# Patient Record
Sex: FEMALE | Race: WHITE | Employment: OTHER | URBAN - METROPOLITAN AREA
[De-identification: names, ages, dates, MRNs, and addresses within clinical notes are randomized per-mention and may not be internally consistent; named-entity substitution may affect disease eponyms.]

---

## 2017-06-21 ENCOUNTER — HOSPITAL ENCOUNTER (OUTPATIENT)
Dept: WOMENS IMAGING | Age: 67
Discharge: HOME | End: 2017-06-21
Attending: FAMILY MEDICINE | Admitting: FAMILY MEDICINE

## 2017-06-21 ENCOUNTER — HOSPITAL ENCOUNTER (OUTPATIENT)
Dept: ULTRASOUND IMAGING | Age: 67
Discharge: HOME | End: 2017-06-21
Attending: FAMILY MEDICINE | Admitting: FAMILY MEDICINE

## 2017-06-21 DIAGNOSIS — N63.10 BREAST MASS, RIGHT: ICD-10-CM

## 2017-06-21 DIAGNOSIS — R92.8 OTHER ABNORMAL AND INCONCLUSIVE FINDINGS ON DIAGNOSTIC IMAGING OF BREAST: ICD-10-CM

## 2017-06-21 DIAGNOSIS — Z09 FOLLOW-UP EXAM: ICD-10-CM

## 2018-02-27 ENCOUNTER — HOSPITAL ENCOUNTER (OUTPATIENT)
Dept: GENERAL RADIOLOGY | Age: 68
Discharge: HOME | End: 2018-02-27
Attending: FAMILY MEDICINE | Admitting: FAMILY MEDICINE

## 2018-02-27 DIAGNOSIS — M54.5 LOW BACK PAIN, UNSPECIFIED BACK PAIN LATERALITY, UNSPECIFIED CHRONICITY, WITH SCIATICA PRESENCE UNSPECIFIED: ICD-10-CM

## 2019-06-21 RX ORDER — FENOFIBRATE 160 MG/1
160 TABLET ORAL DAILY
Qty: 90 TABLET | Refills: 0 | Status: SHIPPED | OUTPATIENT
Start: 2019-06-21 | End: 2020-11-09

## 2019-06-21 RX ORDER — ROSUVASTATIN CALCIUM 10 MG/1
10 TABLET, COATED ORAL DAILY
Qty: 90 TABLET | Refills: 0 | Status: SHIPPED | OUTPATIENT
Start: 2019-06-21

## 2019-06-21 RX ORDER — LEVOTHYROXINE SODIUM 0.15 MG/1
150 TABLET ORAL DAILY
Qty: 90 TABLET | Refills: 0 | Status: SHIPPED | OUTPATIENT
Start: 2019-06-21 | End: 2019-06-26 | Stop reason: SDUPTHER

## 2019-06-24 ENCOUNTER — TELEPHONE (OUTPATIENT)
Dept: FAMILY MEDICINE CLINIC | Age: 69
End: 2019-06-24

## 2019-06-24 NOTE — TELEPHONE ENCOUNTER
----- Message from Tina Gaviria sent at 6/21/2019 11:11 AM EDT -----  Contact: PATIENT  FENOFIBRATE 160MG   CRESTOR 10 MG  SYNTHROID 150 MCG   WRITTEN TO TAKE TO PHARMACY  [phone]

## 2019-06-25 ENCOUNTER — TELEPHONE (OUTPATIENT)
Dept: FAMILY MEDICINE CLINIC | Age: 69
End: 2019-06-25

## 2019-06-26 ENCOUNTER — TELEPHONE (OUTPATIENT)
Dept: FAMILY MEDICINE CLINIC | Age: 69
End: 2019-06-26

## 2019-06-26 RX ORDER — LEVOTHYROXINE SODIUM 0.15 MG/1
150 TABLET ORAL DAILY
Qty: 90 TABLET | Refills: 0 | Status: SHIPPED | OUTPATIENT
Start: 2019-06-26 | End: 2022-10-28

## 2019-06-26 RX ORDER — LEVOTHYROXINE SODIUM 0.15 MG/1
150 TABLET ORAL DAILY
Qty: 90 TABLET | Refills: 0 | Status: SHIPPED | OUTPATIENT
Start: 2019-06-26 | End: 2019-06-26 | Stop reason: CLARIF

## 2019-06-26 NOTE — TELEPHONE ENCOUNTER
----- Message from Dbera Gonzalez sent at 6/25/2019  2:28 PM EDT -----  Contact: PATIENT  SYNTHROID NEEDED TO BE JOSH WRITTEN, IT WAS NOT. PLEASE WRITE A SCRIPT FOR IT FOR JOSH. CALL WHEN READY.    [phone]

## 2019-08-07 ENCOUNTER — HOSPITAL ENCOUNTER (OUTPATIENT)
Age: 69
Discharge: HOME | End: 2019-08-07
Payer: MEDICARE

## 2019-08-07 LAB
ALBUMIN SERPL-MCNC: 4.2 GM/DL (ref 3.4–5)
ALP BLD-CCNC: 42 IU/L (ref 40–128)
ALT SERPL-CCNC: 11 U/L (ref 10–40)
ANION GAP SERPL CALCULATED.3IONS-SCNC: 12 MMOL/L (ref 4–16)
AST SERPL-CCNC: 26 IU/L (ref 15–37)
BASOPHILS ABSOLUTE: 0.1 K/CU MM
BASOPHILS RELATIVE PERCENT: 1.4 % (ref 0–1)
BILIRUB SERPL-MCNC: 0.2 MG/DL (ref 0–1)
BUN BLDV-MCNC: 32 MG/DL (ref 6–23)
CALCIUM SERPL-MCNC: 10 MG/DL (ref 8.3–10.6)
CHLORIDE BLD-SCNC: 102 MMOL/L (ref 99–110)
CHOLESTEROL: 128 MG/DL
CO2: 24 MMOL/L (ref 21–32)
CREAT SERPL-MCNC: 1.8 MG/DL (ref 0.6–1.1)
DIFFERENTIAL TYPE: ABNORMAL
EOSINOPHILS ABSOLUTE: 0.5 K/CU MM
EOSINOPHILS RELATIVE PERCENT: 8.5 % (ref 0–3)
ESTIMATED AVERAGE GLUCOSE: 163 MG/DL
GFR AFRICAN AMERICAN: 34 ML/MIN/1.73M2
GFR NON-AFRICAN AMERICAN: 28 ML/MIN/1.73M2
GLUCOSE BLD-MCNC: 181 MG/DL (ref 70–99)
HBA1C MFR BLD: 7.3 % (ref 4.2–6.3)
HCT VFR BLD CALC: 41.1 % (ref 37–47)
HDLC SERPL-MCNC: 38 MG/DL
HEMOGLOBIN: 12.7 GM/DL (ref 12.5–16)
IMMATURE NEUTROPHIL %: 0.5 % (ref 0–0.43)
LDL CHOLESTEROL DIRECT: 54 MG/DL
LYMPHOCYTES ABSOLUTE: 1.9 K/CU MM
LYMPHOCYTES RELATIVE PERCENT: 29.9 % (ref 24–44)
MCH RBC QN AUTO: 30.2 PG (ref 27–31)
MCHC RBC AUTO-ENTMCNC: 30.9 % (ref 32–36)
MCV RBC AUTO: 97.9 FL (ref 78–100)
MONOCYTES ABSOLUTE: 0.5 K/CU MM
MONOCYTES RELATIVE PERCENT: 8 % (ref 0–4)
NUCLEATED RBC %: 0 %
PDW BLD-RTO: 14.6 % (ref 11.7–14.9)
PLATELET # BLD: 202 K/CU MM (ref 140–440)
PMV BLD AUTO: 10.8 FL (ref 7.5–11.1)
POTASSIUM SERPL-SCNC: 4 MMOL/L (ref 3.5–5.1)
RBC # BLD: 4.2 M/CU MM (ref 4.2–5.4)
SEGMENTED NEUTROPHILS ABSOLUTE COUNT: 3.2 K/CU MM
SEGMENTED NEUTROPHILS RELATIVE PERCENT: 51.7 % (ref 36–66)
SODIUM BLD-SCNC: 138 MMOL/L (ref 135–145)
TOTAL IMMATURE NEUTOROPHIL: 0.03 K/CU MM
TOTAL NUCLEATED RBC: 0 K/CU MM
TOTAL PROTEIN: 6.4 GM/DL (ref 6.4–8.2)
TRIGL SERPL-MCNC: 233 MG/DL
TSH HIGH SENSITIVITY: 1.86 UIU/ML (ref 0.27–4.2)
WBC # BLD: 6.3 K/CU MM (ref 4–10.5)

## 2019-08-07 PROCEDURE — 84436 ASSAY OF TOTAL THYROXINE: CPT

## 2019-08-07 PROCEDURE — 83721 ASSAY OF BLOOD LIPOPROTEIN: CPT

## 2019-08-07 PROCEDURE — 36415 COLL VENOUS BLD VENIPUNCTURE: CPT

## 2019-08-07 PROCEDURE — 80053 COMPREHEN METABOLIC PANEL: CPT

## 2019-08-07 PROCEDURE — 85025 COMPLETE CBC W/AUTO DIFF WBC: CPT

## 2019-08-07 PROCEDURE — 84443 ASSAY THYROID STIM HORMONE: CPT

## 2019-08-07 PROCEDURE — 83036 HEMOGLOBIN GLYCOSYLATED A1C: CPT

## 2019-08-07 PROCEDURE — 80061 LIPID PANEL: CPT

## 2019-08-08 LAB
T4 TOTAL: 11.29 UG/DL (ref 5.1–14.1)
T4 TOTAL: NORMAL UG/DL (ref 5.1–14.1)

## 2019-08-09 ENCOUNTER — HOSPITAL ENCOUNTER (OUTPATIENT)
Dept: WOMENS IMAGING | Age: 69
Discharge: HOME | End: 2019-08-09
Payer: MEDICARE

## 2019-08-09 DIAGNOSIS — Z12.31 VISIT FOR SCREENING MAMMOGRAM: ICD-10-CM

## 2019-08-09 PROCEDURE — 77063 BREAST TOMOSYNTHESIS BI: CPT

## 2019-09-25 ENCOUNTER — HOSPITAL ENCOUNTER (OUTPATIENT)
Dept: PULMONOLOGY | Age: 69
Discharge: HOME | End: 2019-09-25
Payer: MEDICARE

## 2019-09-25 LAB
DLCO %PRED: 81 %
DLCO PRED: NORMAL ML/MIN/MMHG
DLCO/VA %PRED: NORMAL %
DLCO/VA PRED: NORMAL ML/MIN/MMHG
DLCO/VA: NORMAL ML/MIN/MMHG
DLCO: NORMAL ML/MIN/MMHG
EXPIRATORY TIME-POST: NORMAL SEC
EXPIRATORY TIME: NORMAL SEC
FEF 25-75% %CHNG: NORMAL
FEF 25-75% %PRED-POST: NORMAL %
FEF 25-75% %PRED-PRE: NORMAL L/SEC
FEF 25-75% PRED: NORMAL L/SEC
FEF 25-75%-POST: NORMAL L/SEC
FEF 25-75%-PRE: NORMAL L/SEC
FEV1 %PRED-POST: 95 %
FEV1 %PRED-PRE: 88 %
FEV1 PRED: NORMAL L
FEV1-POST: NORMAL L
FEV1-PRE: NORMAL L
FEV1/FVC %PRED-POST: NORMAL %
FEV1/FVC %PRED-PRE: NORMAL %
FEV1/FVC PRED: NORMAL %
FEV1/FVC-POST: 104 %
FEV1/FVC-PRE: 100 %
FVC %PRED-POST: NORMAL L
FVC %PRED-PRE: NORMAL %
FVC PRED: NORMAL L
FVC-POST: NORMAL L
FVC-PRE: NORMAL L
GAW %PRED: NORMAL %
GAW PRED: NORMAL L/S/CMH2O
GAW: NORMAL L/S/CMH2O
IC %PRED: NORMAL %
IC PRED: NORMAL L
IC: NORMAL L
MEP: NORMAL
MIP: NORMAL
MVV %PRED-PRE: NORMAL %
MVV PRED: NORMAL L/MIN
MVV-PRE: NORMAL L/MIN
PEF %PRED-POST: NORMAL %
PEF %PRED-PRE: NORMAL L/SEC
PEF PRED: NORMAL L/SEC
PEF%CHNG: NORMAL
PEF-POST: NORMAL L/SEC
PEF-PRE: NORMAL L/SEC
RAW %PRED: NORMAL %
RAW PRED: NORMAL CMH2O/L/S
RAW: NORMAL CMH2O/L/S
RV %PRED: NORMAL %
RV PRED: NORMAL L
RV: NORMAL L
SVC %PRED: NORMAL %
SVC PRED: NORMAL L
SVC: NORMAL L
TLC %PRED: 86 %
TLC PRED: NORMAL L
TLC: NORMAL L
VA %PRED: NORMAL %
VA PRED: NORMAL L
VA: NORMAL L
VTG %PRED: NORMAL %
VTG PRED: NORMAL L
VTG: NORMAL L

## 2019-09-25 PROCEDURE — 94060 EVALUATION OF WHEEZING: CPT

## 2019-09-25 PROCEDURE — 94729 DIFFUSING CAPACITY: CPT

## 2019-09-25 PROCEDURE — 94727 GAS DIL/WSHOT DETER LNG VOL: CPT

## 2019-09-25 ASSESSMENT — PULMONARY FUNCTION TESTS
FEV1/FVC_POST: 104
FEV1_PERCENT_PREDICTED_PRE: 88
FEV1/FVC_PRE: 100
FEV1_PERCENT_PREDICTED_POST: 95

## 2019-10-17 ENCOUNTER — HOSPITAL ENCOUNTER (OUTPATIENT)
Age: 69
Discharge: HOME | End: 2019-10-17
Payer: MEDICARE

## 2019-10-17 LAB
ALBUMIN SERPL-MCNC: 4.2 GM/DL (ref 3.4–5)
ANION GAP SERPL CALCULATED.3IONS-SCNC: 13 MMOL/L (ref 4–16)
BACTERIA: NEGATIVE /HPF
BASOPHILS ABSOLUTE: 0.1 K/CU MM
BASOPHILS RELATIVE PERCENT: 1.1 % (ref 0–1)
BILIRUBIN URINE: NEGATIVE MG/DL
BLOOD, URINE: NEGATIVE
BUN BLDV-MCNC: 29 MG/DL (ref 6–23)
CALCIUM SERPL-MCNC: 9.9 MG/DL (ref 8.3–10.6)
CHLORIDE BLD-SCNC: 101 MMOL/L (ref 99–110)
CLARITY: CLEAR
CO2: 26 MMOL/L (ref 21–32)
COLOR: YELLOW
CREAT SERPL-MCNC: 1.7 MG/DL (ref 0.6–1.1)
CREATININE URINE: 59 MG/DL (ref 28–217)
CREATININE URINE: 59 MG/DL (ref 28–217)
DIFFERENTIAL TYPE: ABNORMAL
EOSINOPHILS ABSOLUTE: 0.5 K/CU MM
EOSINOPHILS RELATIVE PERCENT: 7.5 % (ref 0–3)
GFR AFRICAN AMERICAN: 36 ML/MIN/1.73M2
GFR NON-AFRICAN AMERICAN: 30 ML/MIN/1.73M2
GLUCOSE BLD-MCNC: 132 MG/DL (ref 70–99)
GLUCOSE, URINE: >500 MG/DL
HAV IGM SER IA-ACNC: NON REACTIVE
HCT VFR BLD CALC: 43.6 % (ref 37–47)
HEMOGLOBIN: 13.1 GM/DL (ref 12.5–16)
HEPATITIS B CORE IGM ANTIBODY: NON REACTIVE
HEPATITIS B SURFACE ANTIGEN: NON REACTIVE
HEPATITIS C ANTIBODY: NON REACTIVE
IMMATURE NEUTROPHIL %: 0.2 % (ref 0–0.43)
INR BLD: 2.37 INDEX
KETONES, URINE: NEGATIVE MG/DL
LEUKOCYTE ESTERASE, URINE: ABNORMAL
LYMPHOCYTES ABSOLUTE: 2.2 K/CU MM
LYMPHOCYTES RELATIVE PERCENT: 33.3 % (ref 24–44)
MCH RBC QN AUTO: 29.7 PG (ref 27–31)
MCHC RBC AUTO-ENTMCNC: 30 % (ref 32–36)
MCV RBC AUTO: 98.9 FL (ref 78–100)
MICROALBUMIN/CREAT 24H UR: 17 MG/DL
MICROALBUMIN/CREAT UR-RTO: 288.1 MG/G CREAT (ref 0–30)
MONOCYTES ABSOLUTE: 0.5 K/CU MM
MONOCYTES RELATIVE PERCENT: 7.8 % (ref 0–4)
MUCUS: ABNORMAL HPF
NITRITE URINE, QUANTITATIVE: NEGATIVE
NUCLEATED RBC %: 0 %
PDW BLD-RTO: 14 % (ref 11.7–14.9)
PH, URINE: 5 (ref 5–8)
PHOSPHORUS: 2.8 MG/DL (ref 2.5–4.9)
PLATELET # BLD: 207 K/CU MM (ref 140–440)
PMV BLD AUTO: 11.2 FL (ref 7.5–11.1)
POTASSIUM SERPL-SCNC: 4.1 MMOL/L (ref 3.5–5.1)
PROT/CREAT RATIO, UR: ABNORMAL
PROTEIN UA: 30 MG/DL
PROTHROMBIN TIME: 27.2 SECONDS (ref 11.7–14.5)
RBC # BLD: 4.41 M/CU MM (ref 4.2–5.4)
RBC URINE: 2 /HPF (ref 0–6)
SEGMENTED NEUTROPHILS ABSOLUTE COUNT: 3.3 K/CU MM
SEGMENTED NEUTROPHILS RELATIVE PERCENT: 50.1 % (ref 36–66)
SODIUM BLD-SCNC: 140 MMOL/L (ref 135–145)
SPECIFIC GRAVITY UA: 1.02 (ref 1–1.03)
SQUAMOUS EPITHELIAL: 1 /HPF
TOTAL IMMATURE NEUTOROPHIL: 0.01 K/CU MM
TOTAL NUCLEATED RBC: 0 K/CU MM
TRICHOMONAS: ABNORMAL /HPF
URINE TOTAL PROTEIN: 32 MG/DL
UROBILINOGEN, URINE: NORMAL MG/DL (ref 0.2–1)
WBC # BLD: 6.6 K/CU MM (ref 4–10.5)
WBC UA: 10 /HPF (ref 0–5)

## 2019-10-17 PROCEDURE — 82043 UR ALBUMIN QUANTITATIVE: CPT

## 2019-10-17 PROCEDURE — 82570 ASSAY OF URINE CREATININE: CPT

## 2019-10-17 PROCEDURE — 36415 COLL VENOUS BLD VENIPUNCTURE: CPT

## 2019-10-17 PROCEDURE — 87086 URINE CULTURE/COLONY COUNT: CPT

## 2019-10-17 PROCEDURE — 80074 ACUTE HEPATITIS PANEL: CPT

## 2019-10-17 PROCEDURE — 80069 RENAL FUNCTION PANEL: CPT

## 2019-10-17 PROCEDURE — 87389 HIV-1 AG W/HIV-1&-2 AB AG IA: CPT

## 2019-10-17 PROCEDURE — 85025 COMPLETE CBC W/AUTO DIFF WBC: CPT

## 2019-10-17 PROCEDURE — 81001 URINALYSIS AUTO W/SCOPE: CPT

## 2019-10-17 PROCEDURE — 84165 PROTEIN E-PHORESIS SERUM: CPT

## 2019-10-17 PROCEDURE — 85610 PROTHROMBIN TIME: CPT

## 2019-10-17 PROCEDURE — 84156 ASSAY OF PROTEIN URINE: CPT

## 2019-10-19 LAB
CULTURE: ABNORMAL
Lab: ABNORMAL
Lab: NORMAL
Lab: NORMAL
SPECIMEN: ABNORMAL
TEST NAME: NORMAL
TOTAL COLONY COUNT: ABNORMAL

## 2019-10-22 LAB
ALBUMIN ELP: 3.5 GM/DL (ref 3.2–5.6)
ALPHA-1-GLOBULIN: 0.3 GM/DL (ref 0.1–0.4)
ALPHA-2-GLOBULIN: 0.8 GM/DL (ref 0.4–1.2)
BETA GLOBULIN: 1.2 GM/DL (ref 0.5–1.3)
GAMMA GLOBULIN: 0.7 GM/DL (ref 0.5–1.6)
SPEP INTERPRETATION: NORMAL
TOTAL PROTEIN: 6.4 GM/DL (ref 6.4–8.2)

## 2019-11-08 RX ORDER — AMLODIPINE BESYLATE 5 MG/1
TABLET ORAL
COMMUNITY
Start: 2019-08-05 | End: 2022-11-17

## 2019-11-08 RX ORDER — LANCETS 30 GAUGE
EACH MISCELLANEOUS
COMMUNITY
End: 2020-11-09

## 2019-11-08 RX ORDER — LOSARTAN POTASSIUM AND HYDROCHLOROTHIAZIDE 25; 100 MG/1; MG/1
1 TABLET ORAL DAILY
COMMUNITY
Start: 2019-07-31 | End: 2021-10-12

## 2019-11-08 RX ORDER — LORATADINE 10 MG/1
10 TABLET ORAL PRN
COMMUNITY
Start: 2019-08-05 | End: 2023-06-30

## 2019-11-11 ENCOUNTER — TELEPHONE (OUTPATIENT)
Dept: CARDIOLOGY CLINIC | Age: 69
End: 2019-11-11

## 2019-11-19 ENCOUNTER — INITIAL CONSULT (OUTPATIENT)
Dept: CARDIOLOGY CLINIC | Age: 69
End: 2019-11-19
Payer: MEDICARE

## 2019-11-19 VITALS
DIASTOLIC BLOOD PRESSURE: 70 MMHG | WEIGHT: 250 LBS | SYSTOLIC BLOOD PRESSURE: 108 MMHG | BODY MASS INDEX: 40.18 KG/M2 | HEIGHT: 66 IN | HEART RATE: 61 BPM

## 2019-11-19 DIAGNOSIS — E78.2 MIXED HYPERLIPIDEMIA: ICD-10-CM

## 2019-11-19 DIAGNOSIS — E03.9 ACQUIRED HYPOTHYROIDISM: ICD-10-CM

## 2019-11-19 DIAGNOSIS — R06.02 SOB (SHORTNESS OF BREATH): Primary | ICD-10-CM

## 2019-11-19 DIAGNOSIS — I10 ESSENTIAL HYPERTENSION: ICD-10-CM

## 2019-11-19 DIAGNOSIS — G47.33 OSA ON CPAP: ICD-10-CM

## 2019-11-19 DIAGNOSIS — Z99.89 OSA ON CPAP: ICD-10-CM

## 2019-11-19 PROCEDURE — 93000 ELECTROCARDIOGRAM COMPLETE: CPT | Performed by: INTERNAL MEDICINE

## 2019-11-19 PROCEDURE — G8484 FLU IMMUNIZE NO ADMIN: HCPCS | Performed by: INTERNAL MEDICINE

## 2019-11-19 PROCEDURE — G8417 CALC BMI ABV UP PARAM F/U: HCPCS | Performed by: INTERNAL MEDICINE

## 2019-11-19 PROCEDURE — 1036F TOBACCO NON-USER: CPT | Performed by: INTERNAL MEDICINE

## 2019-11-19 PROCEDURE — G8399 PT W/DXA RESULTS DOCUMENT: HCPCS | Performed by: INTERNAL MEDICINE

## 2019-11-19 PROCEDURE — G8427 DOCREV CUR MEDS BY ELIG CLIN: HCPCS | Performed by: INTERNAL MEDICINE

## 2019-11-19 PROCEDURE — 3017F COLORECTAL CA SCREEN DOC REV: CPT | Performed by: INTERNAL MEDICINE

## 2019-11-19 PROCEDURE — 1090F PRES/ABSN URINE INCON ASSESS: CPT | Performed by: INTERNAL MEDICINE

## 2019-11-19 PROCEDURE — 1123F ACP DISCUSS/DSCN MKR DOCD: CPT | Performed by: INTERNAL MEDICINE

## 2019-11-19 PROCEDURE — 4040F PNEUMOC VAC/ADMIN/RCVD: CPT | Performed by: INTERNAL MEDICINE

## 2019-11-19 PROCEDURE — 99204 OFFICE O/P NEW MOD 45 MIN: CPT | Performed by: INTERNAL MEDICINE

## 2019-11-22 ENCOUNTER — TELEPHONE (OUTPATIENT)
Dept: CARDIOLOGY CLINIC | Age: 69
End: 2019-11-22

## 2019-12-04 ENCOUNTER — PROCEDURE VISIT (OUTPATIENT)
Dept: CARDIOLOGY CLINIC | Age: 69
End: 2019-12-04
Payer: MEDICARE

## 2019-12-04 DIAGNOSIS — G47.33 OSA ON CPAP: ICD-10-CM

## 2019-12-04 DIAGNOSIS — E03.9 ACQUIRED HYPOTHYROIDISM: ICD-10-CM

## 2019-12-04 DIAGNOSIS — Z99.89 OSA ON CPAP: ICD-10-CM

## 2019-12-04 DIAGNOSIS — R06.02 SOB (SHORTNESS OF BREATH): Primary | ICD-10-CM

## 2019-12-04 DIAGNOSIS — I10 ESSENTIAL HYPERTENSION: ICD-10-CM

## 2019-12-04 DIAGNOSIS — R06.02 SOB (SHORTNESS OF BREATH): ICD-10-CM

## 2019-12-04 DIAGNOSIS — E78.2 MIXED HYPERLIPIDEMIA: ICD-10-CM

## 2019-12-04 LAB
LV EF: 55 %
LV EF: 58 %
LVEF MODALITY: NORMAL
LVEF MODALITY: NORMAL

## 2019-12-04 PROCEDURE — 93306 TTE W/DOPPLER COMPLETE: CPT | Performed by: INTERNAL MEDICINE

## 2019-12-04 PROCEDURE — A9500 TC99M SESTAMIBI: HCPCS | Performed by: INTERNAL MEDICINE

## 2019-12-04 PROCEDURE — 93017 CV STRESS TEST TRACING ONLY: CPT | Performed by: INTERNAL MEDICINE

## 2019-12-04 PROCEDURE — 93016 CV STRESS TEST SUPVJ ONLY: CPT | Performed by: INTERNAL MEDICINE

## 2019-12-04 PROCEDURE — 93018 CV STRESS TEST I&R ONLY: CPT | Performed by: INTERNAL MEDICINE

## 2019-12-04 PROCEDURE — 78452 HT MUSCLE IMAGE SPECT MULT: CPT | Performed by: INTERNAL MEDICINE

## 2019-12-05 ENCOUNTER — TELEPHONE (OUTPATIENT)
Dept: CARDIOLOGY CLINIC | Age: 69
End: 2019-12-05

## 2019-12-10 ENCOUNTER — TELEPHONE (OUTPATIENT)
Dept: CARDIOLOGY CLINIC | Age: 69
End: 2019-12-10

## 2019-12-10 ENCOUNTER — HOSPITAL ENCOUNTER (OUTPATIENT)
Dept: GENERAL RADIOLOGY | Age: 69
Discharge: HOME | End: 2019-12-10
Payer: MEDICARE

## 2019-12-10 ENCOUNTER — HOSPITAL ENCOUNTER (OUTPATIENT)
Age: 69
End: 2019-12-10
Payer: MEDICARE

## 2019-12-10 ENCOUNTER — HOSPITAL ENCOUNTER (OUTPATIENT)
Age: 69
Discharge: HOME | End: 2019-12-10
Payer: MEDICARE

## 2019-12-10 DIAGNOSIS — Z01.810 PRE-OPERATIVE CARDIOVASCULAR EXAMINATION: ICD-10-CM

## 2019-12-10 LAB
ABO/RH: NORMAL
ANION GAP SERPL CALCULATED.3IONS-SCNC: 15 MMOL/L (ref 4–16)
ANTIBODY SCREEN: NEGATIVE
APTT: 34.4 SECONDS (ref 25.1–37.1)
BASOPHILS ABSOLUTE: 0.1 K/CU MM
BASOPHILS RELATIVE PERCENT: 1.1 % (ref 0–1)
BUN BLDV-MCNC: 20 MG/DL (ref 6–23)
CALCIUM SERPL-MCNC: 10 MG/DL (ref 8.3–10.6)
CHLORIDE BLD-SCNC: 97 MMOL/L (ref 99–110)
CO2: 27 MMOL/L (ref 21–32)
COMMENT: NORMAL
CREAT SERPL-MCNC: 1.5 MG/DL (ref 0.6–1.1)
DIFFERENTIAL TYPE: ABNORMAL
EOSINOPHILS ABSOLUTE: 0.3 K/CU MM
EOSINOPHILS RELATIVE PERCENT: 5.8 % (ref 0–3)
GFR AFRICAN AMERICAN: 41 ML/MIN/1.73M2
GFR NON-AFRICAN AMERICAN: 34 ML/MIN/1.73M2
GLUCOSE BLD-MCNC: 245 MG/DL (ref 70–99)
HCT VFR BLD CALC: 44.7 % (ref 37–47)
HEMOGLOBIN: 13.7 GM/DL (ref 12.5–16)
IMMATURE NEUTROPHIL %: 0.4 % (ref 0–0.43)
INR BLD: 0.93 INDEX
LYMPHOCYTES ABSOLUTE: 1.6 K/CU MM
LYMPHOCYTES RELATIVE PERCENT: 28.2 % (ref 24–44)
MCH RBC QN AUTO: 29.6 PG (ref 27–31)
MCHC RBC AUTO-ENTMCNC: 30.6 % (ref 32–36)
MCV RBC AUTO: 96.5 FL (ref 78–100)
MONOCYTES ABSOLUTE: 0.3 K/CU MM
MONOCYTES RELATIVE PERCENT: 5.4 % (ref 0–4)
NUCLEATED RBC %: 0 %
PDW BLD-RTO: 13.8 % (ref 11.7–14.9)
PLATELET # BLD: 201 K/CU MM (ref 140–440)
PMV BLD AUTO: 11.2 FL (ref 7.5–11.1)
POTASSIUM SERPL-SCNC: 3.5 MMOL/L (ref 3.5–5.1)
PROTHROMBIN TIME: 11.3 SECONDS (ref 11.7–14.5)
RBC # BLD: 4.63 M/CU MM (ref 4.2–5.4)
SEGMENTED NEUTROPHILS ABSOLUTE COUNT: 3.4 K/CU MM
SEGMENTED NEUTROPHILS RELATIVE PERCENT: 59.1 % (ref 36–66)
SODIUM BLD-SCNC: 139 MMOL/L (ref 135–145)
TOTAL IMMATURE NEUTOROPHIL: 0.02 K/CU MM
TOTAL NUCLEATED RBC: 0 K/CU MM
WBC # BLD: 5.7 K/CU MM (ref 4–10.5)

## 2019-12-10 PROCEDURE — 86901 BLOOD TYPING SEROLOGIC RH(D): CPT

## 2019-12-10 PROCEDURE — 85025 COMPLETE CBC W/AUTO DIFF WBC: CPT

## 2019-12-10 PROCEDURE — 86850 RBC ANTIBODY SCREEN: CPT

## 2019-12-10 PROCEDURE — 86900 BLOOD TYPING SEROLOGIC ABO: CPT

## 2019-12-10 PROCEDURE — 85730 THROMBOPLASTIN TIME PARTIAL: CPT

## 2019-12-10 PROCEDURE — 36415 COLL VENOUS BLD VENIPUNCTURE: CPT

## 2019-12-10 PROCEDURE — 85610 PROTHROMBIN TIME: CPT

## 2019-12-10 PROCEDURE — 71046 X-RAY EXAM CHEST 2 VIEWS: CPT

## 2019-12-10 PROCEDURE — 80048 BASIC METABOLIC PNL TOTAL CA: CPT

## 2019-12-11 ENCOUNTER — HOSPITAL ENCOUNTER (OUTPATIENT)
Dept: CARDIAC CATH/INVASIVE PROCEDURES | Age: 69
Discharge: HOME | End: 2019-12-11
Attending: INTERNAL MEDICINE | Admitting: INTERNAL MEDICINE
Payer: MEDICARE

## 2019-12-11 VITALS
OXYGEN SATURATION: 96 % | TEMPERATURE: 98.4 F | DIASTOLIC BLOOD PRESSURE: 66 MMHG | SYSTOLIC BLOOD PRESSURE: 159 MMHG | RESPIRATION RATE: 16 BRPM | BODY MASS INDEX: 40.18 KG/M2 | HEART RATE: 62 BPM | WEIGHT: 250 LBS | HEIGHT: 66 IN

## 2019-12-11 PROBLEM — R94.39 ABNORMAL NUCLEAR STRESS TEST: Status: ACTIVE | Noted: 2019-12-11

## 2019-12-11 LAB
GLUCOSE BLD-MCNC: 164 MG/DL (ref 70–99)
LV EF: 53 %
LVEF MODALITY: NORMAL

## 2019-12-11 PROCEDURE — 93005 ELECTROCARDIOGRAM TRACING: CPT | Performed by: INTERNAL MEDICINE

## 2019-12-11 PROCEDURE — 93458 L HRT ARTERY/VENTRICLE ANGIO: CPT | Performed by: INTERNAL MEDICINE

## 2019-12-11 PROCEDURE — 93010 ELECTROCARDIOGRAM REPORT: CPT | Performed by: INTERNAL MEDICINE

## 2019-12-11 PROCEDURE — 2709999900 HC NON-CHARGEABLE SUPPLY

## 2019-12-11 PROCEDURE — 6370000000 HC RX 637 (ALT 250 FOR IP): Performed by: INTERNAL MEDICINE

## 2019-12-11 PROCEDURE — 2580000003 HC RX 258: Performed by: INTERNAL MEDICINE

## 2019-12-11 PROCEDURE — 6360000004 HC RX CONTRAST MEDICATION

## 2019-12-11 PROCEDURE — 82962 GLUCOSE BLOOD TEST: CPT

## 2019-12-11 PROCEDURE — 6360000002 HC RX W HCPCS

## 2019-12-11 PROCEDURE — C1894 INTRO/SHEATH, NON-LASER: HCPCS

## 2019-12-11 PROCEDURE — 2500000003 HC RX 250 WO HCPCS

## 2019-12-11 PROCEDURE — 93458 L HRT ARTERY/VENTRICLE ANGIO: CPT

## 2019-12-11 RX ORDER — DIPHENHYDRAMINE HCL 25 MG
25 TABLET ORAL
Status: COMPLETED | OUTPATIENT
Start: 2019-12-11 | End: 2019-12-11

## 2019-12-11 RX ORDER — SODIUM CHLORIDE 0.9 % (FLUSH) 0.9 %
10 SYRINGE (ML) INJECTION EVERY 12 HOURS SCHEDULED
Status: DISCONTINUED | OUTPATIENT
Start: 2019-12-11 | End: 2019-12-11 | Stop reason: HOSPADM

## 2019-12-11 RX ORDER — ACETAMINOPHEN 325 MG/1
650 TABLET ORAL EVERY 4 HOURS PRN
Status: DISCONTINUED | OUTPATIENT
Start: 2019-12-11 | End: 2019-12-11 | Stop reason: HOSPADM

## 2019-12-11 RX ORDER — SODIUM CHLORIDE 0.9 % (FLUSH) 0.9 %
10 SYRINGE (ML) INJECTION PRN
Status: DISCONTINUED | OUTPATIENT
Start: 2019-12-11 | End: 2019-12-11 | Stop reason: HOSPADM

## 2019-12-11 RX ORDER — 0.9 % SODIUM CHLORIDE 0.9 %
250 INTRAVENOUS SOLUTION INTRAVENOUS ONCE
Status: COMPLETED | OUTPATIENT
Start: 2019-12-11 | End: 2019-12-11

## 2019-12-11 RX ORDER — SODIUM CHLORIDE 9 MG/ML
INJECTION, SOLUTION INTRAVENOUS CONTINUOUS
Status: DISCONTINUED | OUTPATIENT
Start: 2019-12-11 | End: 2019-12-11 | Stop reason: HOSPADM

## 2019-12-11 RX ADMIN — DIPHENHYDRAMINE HYDROCHLORIDE 25 MG: 25 TABLET ORAL at 09:29

## 2019-12-11 RX ADMIN — SODIUM CHLORIDE: 9 INJECTION, SOLUTION INTRAVENOUS at 09:29

## 2019-12-11 RX ADMIN — SODIUM CHLORIDE 250 ML: 9 INJECTION, SOLUTION INTRAVENOUS at 09:30

## 2019-12-12 LAB
EKG ATRIAL RATE: 53 BPM
EKG DIAGNOSIS: NORMAL
EKG P AXIS: 82 DEGREES
EKG P-R INTERVAL: 222 MS
EKG Q-T INTERVAL: 434 MS
EKG QRS DURATION: 138 MS
EKG QTC CALCULATION (BAZETT): 407 MS
EKG R AXIS: -50 DEGREES
EKG T AXIS: 160 DEGREES
EKG VENTRICULAR RATE: 53 BPM

## 2020-01-30 ENCOUNTER — HOSPITAL ENCOUNTER (OUTPATIENT)
Age: 70
Discharge: HOME | End: 2020-01-30
Payer: MEDICARE

## 2020-01-30 LAB
ALBUMIN SERPL-MCNC: 4 GM/DL (ref 3.4–5)
ALP BLD-CCNC: 53 IU/L (ref 40–128)
ALT SERPL-CCNC: 12 U/L (ref 10–40)
ANION GAP SERPL CALCULATED.3IONS-SCNC: 16 MMOL/L (ref 4–16)
AST SERPL-CCNC: 31 IU/L (ref 15–37)
BACTERIA: NEGATIVE /HPF
BASOPHILS ABSOLUTE: 0.1 K/CU MM
BASOPHILS RELATIVE PERCENT: 1.3 % (ref 0–1)
BILIRUB SERPL-MCNC: 0.3 MG/DL (ref 0–1)
BILIRUBIN URINE: NEGATIVE MG/DL
BLOOD, URINE: ABNORMAL
BUN BLDV-MCNC: 29 MG/DL (ref 6–23)
CALCIUM SERPL-MCNC: 9.3 MG/DL (ref 8.3–10.6)
CHLORIDE BLD-SCNC: 97 MMOL/L (ref 99–110)
CLARITY: ABNORMAL
CO2: 24 MMOL/L (ref 21–32)
COLOR: YELLOW
CREAT SERPL-MCNC: 1.8 MG/DL (ref 0.6–1.1)
CREATININE URINE: 89.2 MG/DL (ref 28–217)
DIFFERENTIAL TYPE: ABNORMAL
EOSINOPHILS ABSOLUTE: 0.5 K/CU MM
EOSINOPHILS RELATIVE PERCENT: 5.8 % (ref 0–3)
GFR AFRICAN AMERICAN: 34 ML/MIN/1.73M2
GFR NON-AFRICAN AMERICAN: 28 ML/MIN/1.73M2
GLUCOSE BLD-MCNC: 130 MG/DL (ref 70–99)
GLUCOSE, URINE: >500 MG/DL
HCT VFR BLD CALC: 43.8 % (ref 37–47)
HEMOGLOBIN: 13.3 GM/DL (ref 12.5–16)
IMMATURE NEUTROPHIL %: 0.1 % (ref 0–0.43)
INR BLD: 0.93 INDEX
KETONES, URINE: NEGATIVE MG/DL
LEUKOCYTE ESTERASE, URINE: ABNORMAL
LYMPHOCYTES ABSOLUTE: 2.8 K/CU MM
LYMPHOCYTES RELATIVE PERCENT: 34.7 % (ref 24–44)
MCH RBC QN AUTO: 30 PG (ref 27–31)
MCHC RBC AUTO-ENTMCNC: 30.4 % (ref 32–36)
MCV RBC AUTO: 98.6 FL (ref 78–100)
MONOCYTES ABSOLUTE: 0.5 K/CU MM
MONOCYTES RELATIVE PERCENT: 6.1 % (ref 0–4)
MUCUS: ABNORMAL HPF
NITRITE URINE, QUANTITATIVE: NEGATIVE
NUCLEATED RBC %: 0 %
PDW BLD-RTO: 14.4 % (ref 11.7–14.9)
PH, URINE: 5 (ref 5–8)
PLATELET # BLD: 222 K/CU MM (ref 140–440)
PMV BLD AUTO: 11.3 FL (ref 7.5–11.1)
POTASSIUM SERPL-SCNC: 4.2 MMOL/L (ref 3.5–5.1)
PROT/CREAT RATIO, UR: ABNORMAL
PROTEIN UA: 30 MG/DL
PROTHROMBIN TIME: 11.2 SECONDS (ref 11.7–14.5)
RBC # BLD: 4.44 M/CU MM (ref 4.2–5.4)
RBC URINE: 18 /HPF (ref 0–6)
SEGMENTED NEUTROPHILS ABSOLUTE COUNT: 4.1 K/CU MM
SEGMENTED NEUTROPHILS RELATIVE PERCENT: 52 % (ref 36–66)
SODIUM BLD-SCNC: 137 MMOL/L (ref 135–145)
SPECIFIC GRAVITY UA: 1.02 (ref 1–1.03)
SQUAMOUS EPITHELIAL: 1 /HPF
TOTAL IMMATURE NEUTOROPHIL: 0.01 K/CU MM
TOTAL NUCLEATED RBC: 0 K/CU MM
TOTAL PROTEIN: 6.5 GM/DL (ref 6.4–8.2)
TRICHOMONAS: ABNORMAL /HPF
URINE TOTAL PROTEIN: 50 MG/DL
UROBILINOGEN, URINE: NORMAL MG/DL (ref 0.2–1)
WBC # BLD: 7.9 K/CU MM (ref 4–10.5)
WBC UA: 311 /HPF (ref 0–5)

## 2020-01-30 PROCEDURE — 85610 PROTHROMBIN TIME: CPT

## 2020-01-30 PROCEDURE — 36415 COLL VENOUS BLD VENIPUNCTURE: CPT

## 2020-01-30 PROCEDURE — 80053 COMPREHEN METABOLIC PANEL: CPT

## 2020-01-30 PROCEDURE — 84156 ASSAY OF PROTEIN URINE: CPT

## 2020-01-30 PROCEDURE — 82570 ASSAY OF URINE CREATININE: CPT

## 2020-01-30 PROCEDURE — 81001 URINALYSIS AUTO W/SCOPE: CPT

## 2020-01-30 PROCEDURE — 85025 COMPLETE CBC W/AUTO DIFF WBC: CPT

## 2020-08-14 ENCOUNTER — HOSPITAL ENCOUNTER (OUTPATIENT)
Age: 70
Discharge: HOME | End: 2020-08-14
Payer: MEDICARE

## 2020-08-14 LAB
ANION GAP SERPL CALCULATED.3IONS-SCNC: 11 MMOL/L (ref 4–16)
BUN BLDV-MCNC: 34 MG/DL (ref 6–23)
CALCIUM SERPL-MCNC: 9.8 MG/DL (ref 8.3–10.6)
CHLORIDE BLD-SCNC: 103 MMOL/L (ref 99–110)
CO2: 26 MMOL/L (ref 21–32)
CREAT SERPL-MCNC: 1.9 MG/DL (ref 0.6–1.1)
GFR AFRICAN AMERICAN: 31 ML/MIN/1.73M2
GFR NON-AFRICAN AMERICAN: 26 ML/MIN/1.73M2
GLUCOSE BLD-MCNC: 159 MG/DL (ref 70–99)
POTASSIUM SERPL-SCNC: 3.9 MMOL/L (ref 3.5–5.1)
SODIUM BLD-SCNC: 140 MMOL/L (ref 135–145)
VITAMIN D 25-HYDROXY: 26.59 NG/ML

## 2020-08-14 PROCEDURE — 82306 VITAMIN D 25 HYDROXY: CPT

## 2020-08-14 PROCEDURE — 36415 COLL VENOUS BLD VENIPUNCTURE: CPT

## 2020-08-14 PROCEDURE — 80048 BASIC METABOLIC PNL TOTAL CA: CPT

## 2020-11-09 PROBLEM — I12.9 HYPERTENSIVE CHRONIC KIDNEY DISEASE: Status: ACTIVE | Noted: 2020-11-09

## 2020-11-09 PROBLEM — R80.8 OTHER PROTEINURIA: Status: ACTIVE | Noted: 2020-11-09

## 2020-11-09 PROBLEM — N17.8 OTHER ACUTE KIDNEY FAILURE (HCC): Status: ACTIVE | Noted: 2020-11-09

## 2020-11-09 PROBLEM — N18.4 CHRONIC KIDNEY DISEASE, STAGE IV (SEVERE) (HCC): Status: ACTIVE | Noted: 2020-11-09

## 2020-11-09 PROBLEM — E11.22 TYPE 2 DIABETES MELLITUS WITH CHRONIC KIDNEY DISEASE (HCC): Status: ACTIVE | Noted: 2020-11-09

## 2020-11-13 ENCOUNTER — HOSPITAL ENCOUNTER (OUTPATIENT)
Age: 70
Discharge: HOME | End: 2020-11-13
Payer: MEDICARE

## 2020-11-13 LAB
ANION GAP SERPL CALCULATED.3IONS-SCNC: 11 MMOL/L (ref 4–16)
BUN BLDV-MCNC: 28 MG/DL (ref 6–23)
CALCIUM SERPL-MCNC: 9.8 MG/DL (ref 8.3–10.6)
CHLORIDE BLD-SCNC: 101 MMOL/L (ref 99–110)
CO2: 27 MMOL/L (ref 21–32)
CREAT SERPL-MCNC: 1.7 MG/DL (ref 0.6–1.1)
GFR AFRICAN AMERICAN: 36 ML/MIN/1.73M2
GFR NON-AFRICAN AMERICAN: 30 ML/MIN/1.73M2
GLUCOSE BLD-MCNC: 161 MG/DL (ref 70–99)
POTASSIUM SERPL-SCNC: 3.8 MMOL/L (ref 3.5–5.1)
SODIUM BLD-SCNC: 139 MMOL/L (ref 135–145)

## 2020-11-13 PROCEDURE — 80048 BASIC METABOLIC PNL TOTAL CA: CPT

## 2020-11-13 PROCEDURE — 36415 COLL VENOUS BLD VENIPUNCTURE: CPT

## 2020-11-16 PROBLEM — E11.21 DIABETIC NEPHROPATHY ASSOCIATED WITH TYPE 2 DIABETES MELLITUS (HCC): Status: ACTIVE | Noted: 2020-11-16

## 2020-11-16 PROBLEM — E83.9 CHRONIC KIDNEY DISEASE-MINERAL AND BONE DISORDER: Status: ACTIVE | Noted: 2020-11-16

## 2020-11-16 PROBLEM — M89.9 CHRONIC KIDNEY DISEASE-MINERAL AND BONE DISORDER: Status: ACTIVE | Noted: 2020-11-16

## 2020-11-16 PROBLEM — N18.9 CHRONIC KIDNEY DISEASE-MINERAL AND BONE DISORDER: Status: ACTIVE | Noted: 2020-11-16

## 2021-03-23 ENCOUNTER — HOSPITAL ENCOUNTER (OUTPATIENT)
Dept: WOMENS IMAGING | Age: 71
Discharge: HOME | End: 2021-03-23
Payer: MEDICARE

## 2021-03-23 DIAGNOSIS — Z78.0 POSTMENOPAUSE: ICD-10-CM

## 2021-03-23 DIAGNOSIS — Z12.31 ENCOUNTER FOR SCREENING MAMMOGRAM FOR MALIGNANT NEOPLASM OF BREAST: ICD-10-CM

## 2021-03-23 PROCEDURE — 77063 BREAST TOMOSYNTHESIS BI: CPT

## 2021-03-23 PROCEDURE — 77080 DXA BONE DENSITY AXIAL: CPT

## 2021-07-15 ENCOUNTER — HOSPITAL ENCOUNTER (OUTPATIENT)
Age: 71
Discharge: HOME | End: 2021-07-15
Payer: MEDICARE

## 2021-07-15 DIAGNOSIS — N18.4 CKD (CHRONIC KIDNEY DISEASE), STAGE IV (HCC): ICD-10-CM

## 2021-07-15 LAB
ANION GAP SERPL CALCULATED.3IONS-SCNC: 13 MMOL/L (ref 4–16)
BUN BLDV-MCNC: 34 MG/DL (ref 6–23)
CALCIUM SERPL-MCNC: 10.8 MG/DL (ref 8.3–10.6)
CHLORIDE BLD-SCNC: 101 MMOL/L (ref 99–110)
CO2: 25 MMOL/L (ref 21–32)
CREAT SERPL-MCNC: 2.1 MG/DL (ref 0.6–1.1)
GFR AFRICAN AMERICAN: 28 ML/MIN/1.73M2
GFR NON-AFRICAN AMERICAN: 23 ML/MIN/1.73M2
GLUCOSE BLD-MCNC: 162 MG/DL (ref 70–99)
POTASSIUM SERPL-SCNC: 3.7 MMOL/L (ref 3.5–5.1)
SODIUM BLD-SCNC: 139 MMOL/L (ref 135–145)

## 2021-07-15 PROCEDURE — 36415 COLL VENOUS BLD VENIPUNCTURE: CPT

## 2021-07-15 PROCEDURE — 80048 BASIC METABOLIC PNL TOTAL CA: CPT

## 2021-08-05 ENCOUNTER — TELEPHONE (OUTPATIENT)
Dept: GASTROENTEROLOGY | Age: 71
End: 2021-08-05

## 2021-09-20 ENCOUNTER — OFFICE VISIT (OUTPATIENT)
Dept: CARDIOLOGY CLINIC | Age: 71
End: 2021-09-20
Payer: MEDICARE

## 2021-09-20 ENCOUNTER — NURSE ONLY (OUTPATIENT)
Dept: CARDIOLOGY CLINIC | Age: 71
End: 2021-09-20
Payer: MEDICARE

## 2021-09-20 VITALS
SYSTOLIC BLOOD PRESSURE: 130 MMHG | HEART RATE: 52 BPM | WEIGHT: 242.6 LBS | DIASTOLIC BLOOD PRESSURE: 80 MMHG | HEIGHT: 66 IN | BODY MASS INDEX: 38.99 KG/M2

## 2021-09-20 DIAGNOSIS — E78.2 MIXED HYPERLIPIDEMIA: ICD-10-CM

## 2021-09-20 DIAGNOSIS — G47.33 OSA ON CPAP: ICD-10-CM

## 2021-09-20 DIAGNOSIS — Z99.89 OSA ON CPAP: ICD-10-CM

## 2021-09-20 DIAGNOSIS — R00.1 BRADYCARDIA: Primary | ICD-10-CM

## 2021-09-20 DIAGNOSIS — E11.22 TYPE 2 DIABETES MELLITUS WITH DIABETIC CHRONIC KIDNEY DISEASE, UNSPECIFIED CKD STAGE, UNSPECIFIED WHETHER LONG TERM INSULIN USE (HCC): ICD-10-CM

## 2021-09-20 DIAGNOSIS — I10 ESSENTIAL HYPERTENSION: ICD-10-CM

## 2021-09-20 PROCEDURE — 3017F COLORECTAL CA SCREEN DOC REV: CPT | Performed by: INTERNAL MEDICINE

## 2021-09-20 PROCEDURE — 2022F DILAT RTA XM EVC RTNOPTHY: CPT | Performed by: INTERNAL MEDICINE

## 2021-09-20 PROCEDURE — G8427 DOCREV CUR MEDS BY ELIG CLIN: HCPCS | Performed by: INTERNAL MEDICINE

## 2021-09-20 PROCEDURE — 3046F HEMOGLOBIN A1C LEVEL >9.0%: CPT | Performed by: INTERNAL MEDICINE

## 2021-09-20 PROCEDURE — 1090F PRES/ABSN URINE INCON ASSESS: CPT | Performed by: INTERNAL MEDICINE

## 2021-09-20 PROCEDURE — 1123F ACP DISCUSS/DSCN MKR DOCD: CPT | Performed by: INTERNAL MEDICINE

## 2021-09-20 PROCEDURE — 99213 OFFICE O/P EST LOW 20 MIN: CPT | Performed by: INTERNAL MEDICINE

## 2021-09-20 PROCEDURE — 4040F PNEUMOC VAC/ADMIN/RCVD: CPT | Performed by: INTERNAL MEDICINE

## 2021-09-20 PROCEDURE — 1036F TOBACCO NON-USER: CPT | Performed by: INTERNAL MEDICINE

## 2021-09-20 PROCEDURE — G8399 PT W/DXA RESULTS DOCUMENT: HCPCS | Performed by: INTERNAL MEDICINE

## 2021-09-20 PROCEDURE — 93242 EXT ECG>48HR<7D RECORDING: CPT | Performed by: INTERNAL MEDICINE

## 2021-09-20 PROCEDURE — G8417 CALC BMI ABV UP PARAM F/U: HCPCS | Performed by: INTERNAL MEDICINE

## 2021-09-20 PROCEDURE — 93000 ELECTROCARDIOGRAM COMPLETE: CPT | Performed by: INTERNAL MEDICINE

## 2021-09-20 NOTE — LETTER
Juliana Monk  1950  [id number]    Have you had any Chest Pain that is not new? - No          Have you had any Shortness of Breath - No      Have you had any dizziness - No       Have you had any palpitations that are not new? - No      Is the patient on any of the following medications -   If Yes DO EKG - Needs done every 6 months    Do you have any edema - swelling in No        If we do not have these labs you are retrieve these labs for these providers! Do you have a surgery or procedure scheduled in the near future -   If Yes- DO EKG  If Yes - Who is the surgery with?    Phone number of physician   Fax number of physician   Type of surgery   GIVE THIS INFORMATION TO LEO NAGY     Ask patient if they want to sign up for [facility] if they are not already signed up     Check to see if we have an E-MAIL on file for the patient     Check medication list thoroughly!!! AND RECONCILE OUTSIDE MEDICATIONS  If dose has changed change the entire order not just the [place] At check out add to every patient's \"wrap up\" the following dot phrase AFTERHOURSEDUCATION and ensure we explain this to our patients

## 2021-09-20 NOTE — LETTER
[facility]  [address] [place] [address]  Phone: [phone]  Fax: [phone]    Tunde Nguyen MD    September 20, 2021     Bandar Rubin, APRN - [facility]  [address] [facility] [address]    Patient: America Young   MR Number: [id number]   YOB: 1950   Date of Visit: 9/20/2021       Dear Bandar Rubin: Thank you for referring Jacob Sunshine to me for evaluation/treatment. Below are the relevant portions of my assessment and plan of care. If you have questions, please do not hesitate to call me. I look forward to following [facility] Schlatter along with you.     Sincerely,      Tunde Nguyen MD

## 2021-09-20 NOTE — PROGRESS NOTES
OFFICE PROGRESS NOTES      Osmin Bertrand is a 66 y.o. female who has    CHIEF COMPLAINT AS FOLLOWS:  CHEST PAIN: Patient denies any C/O chest pains at this time. SOB: No C/O SOB at this time. LEG EDEMA: No leg edema   PALPITATIONS: Denies any C/O Palpitations   DIZZINESS: No C/O Dizziness   SYNCOPE: None   OTHER: Patient says she is feeling better since she has been taken off of Metoprolol. HPI: Patient is here for F/U on her Bradycardia, HTN & Dyslipidemia problems. Bradycardia: with LBBB  HTN: Patient has known Hx of essential HTN. Has been treated with guideline recommended medical / physical/ diet therapy as stated below. Dyslipidemia: Patient has known Hx of mixed dyslipidemia. Has been treated with guideline recommended medical / physical/ diet therapy as stated below. She does not have any new complaints at this time.     Current Outpatient Medications   Medication Sig Dispense Refill    vitamin D3 (CHOLECALCIFEROL) 25 MCG (1000 UT) TABS tablet Take 2 tablets by mouth daily 30 tablet 5    fenofibrate micronized (ANTARA) 130 MG capsule Take 130 mg by mouth every morning (before breakfast)      sitaGLIPtan-metformin (JANUMET)  MG per tablet Take 1 tablet by mouth 2 times daily (with meals)      amLODIPine (NORVASC) 5 MG tablet       loratadine (CLARITIN) 10 MG tablet Take 10 mg by mouth daily       losartan-hydrochlorothiazide (HYZAAR) 100-25 MG per tablet Take 1 tablet by mouth daily       levothyroxine (SYNTHROID) 150 MCG tablet Take 1 tablet by mouth Daily 90 tablet 0    rosuvastatin (CRESTOR) 10 MG tablet Take 1 tablet by mouth daily 90 tablet 0    Canagliflozin (INVOKANA) 300 MG TABS Take 150 mg by mouth every morning (before breakfast)       insulin glargine (LANTUS SOLOSTAR) 100 UNIT/ML injection Inject 25 Units into the skin nightly       FLUoxetine (PROZAC) 20 MG capsule Take 20 mg by mouth 2 times daily       aspirin 81 MG tablet Take 81 mg by mouth daily. No current facility-administered medications for this visit. Allergies: Empagliflozin and Lorazepam  Review of Systems:    Constitutional: Negative for diaphoresis and fatigue  Respiratory: Negative for shortness of breath  Cardiovascular: Negative for chest pain, dyspnea on exertion, claudication, edema, irregular heartbeat, murmur, palpitations or shortness of breath  Musculoskeletal: Negative for muscle pain, muscular weakness, negative for pain in arm and leg or swelling in foot and leg    Objective:  /80   Pulse 52   Ht 5' 6\" (1.676 m)   Wt 242 lb 9.6 oz (110 kg)   BMI 39.16 kg/m²   Wt Readings from Last 3 Encounters:   09/20/21 242 lb 9.6 oz (110 kg)   07/20/21 237 lb 3.2 oz (107.6 kg)   03/15/21 255 lb (115.7 kg)     Body mass index is 39.16 kg/m². GENERAL - Alert, oriented, pleasant, in no apparent distress. EYES: No jaundice, no conjunctival pallor. Neck - Supple. No jugular venous distention noted. No carotid bruits. Cardiovascular - Normal S1 and S2 without obvious murmur or gallop. Extremities - No cyanosis, clubbing, or significant edema. Pulmonary - No respiratory distress. No wheezes or rales.       Lab Review   No results found for: TROPONINT  No results found for: BNP, PROBNP  Lab Results   Component Value Date    INR 0.93 01/30/2020    INR 0.93 12/10/2019     Lab Results   Component Value Date    LABA1C 7.3 (H) 08/07/2019     Lab Results   Component Value Date    WBC 7.9 01/30/2020    WBC 5.7 12/10/2019    HCT 43.8 01/30/2020    HCT 44.7 12/10/2019    MCV 98.6 01/30/2020    MCV 96.5 12/10/2019     01/30/2020     12/10/2019     Lab Results   Component Value Date    CHOL 128 08/07/2019    TRIG 233 (H) 08/07/2019    HDL 38 (L) 08/07/2019    LDLDIRECT 54 08/07/2019     Lab Results   Component Value Date    ALT 12 01/30/2020    ALT 11 08/07/2019    AST 31 01/30/2020    AST 26 08/07/2019     BMP:    Lab Results   Component Value Date     07/15/2021     11/13/2020    K 3.7 07/15/2021    K 3.8 11/13/2020     07/15/2021     11/13/2020    CO2 25 07/15/2021    CO2 27 11/13/2020    BUN 34 07/15/2021    BUN 28 11/13/2020    CREATININE 2.1 07/15/2021    CREATININE 1.7 11/13/2020     CMP:   Lab Results   Component Value Date     07/15/2021     11/13/2020    K 3.7 07/15/2021    K 3.8 11/13/2020     07/15/2021     11/13/2020    CO2 25 07/15/2021    CO2 27 11/13/2020    BUN 34 07/15/2021    BUN 28 11/13/2020    CREATININE 2.1 07/15/2021    CREATININE 1.7 11/13/2020    PROT 6.5 01/30/2020    PROT 6.4 10/17/2019     Lab Results   Component Value Date    TSHHS 1.860 08/07/2019     CATH 12/2019   No significant CAD. Left dominant system & Left side arteries   are all normal. RCA is non-dominant has minimal disease. Normal LV systolic function. LVEF is 50 to 55 %. ECHO 12/2019   Left ventricular systolic function is normal with an ejection fraction of   55-60%. Grade II diastolic dysfunction. The left atrium is moderately dilated. No significant valvular disease noted. Right ventricular systolic pressure of 38 mmHg consistent with mild   pulmonary hypertension. No evidence of pericardial effusion. EKG today: Sinus Bradycardia 52/min with LBBB. QUALITY MEASURES REVIEWED:  1.CAD:Patient is taking anti platelet agent:Yes  Patient does not have Hx of documented CAD  2. DYSLIPIDEMIA: Patient is on cholesterol lowering medication:Yes   3. Beta-Blocker therapy for CAD, if prior Myocardial Infarction:Yes   Has Bradycardia  4. Counselled regarding smoking cessation. No   Patient does not Smoke. 5.Anticoagulation therapy (for A.Fib) No   Does Not have A.Fib.  6.Discussed weight management strategies. Assessment & Plan:  Primary / Secondary prevention is the goal by aggressive risk modification, healthy and therapeutic life style changes for cardiovascular risk reduction.  Various goals are

## 2021-09-20 NOTE — PATIENT INSTRUCTIONS
Please be informed that if you contact our office outside of normal business hours the physician on call cannot help with any scheduling or rescheduling issues, procedure instruction questions or any type of medication issue. We advise you for any urgent/emergency that you go to the nearest emergency room! PLEASE CALL OUR OFFICE DURING NORMAL BUSINESS HOURS    Monday - Friday   8 am to 5 pm    [facility]: [phone]    [place]:  [phone]  CAD:No  HTN:Yes  well controlled on current medical regimen, see list above. - changes in  treatment:   no    VHD:no   No significant VHD noted  DYSLIPIDEMIA: yes,   Patient's profile is at / near [employer] [employer] [place] current medical regimen wellyes,   See most recent Lab values in Labs section above. Diabetes mellitis:yes,   BS under good control no  OTHER RELEVANT DIAGNOSIS:as noted in patient's active problem list:  Arrhythmia: Asymptomatic Bradycardia. TESTS ORDERED:   Check Holter monitor & F/U. All previously ordered tests reviewed. MEDICATIONS: [facility]   Office f/u FOR TEST RESULTS.

## 2021-10-05 PROCEDURE — 93244 EXT ECG>48HR<7D REV&INTERPJ: CPT | Performed by: INTERNAL MEDICINE

## 2021-10-05 NOTE — ADDENDUM NOTE
Ref Range & Units 1d ago 1yr ago   TSH 0.350 - 5.000 mcUnits/mL 0.666  0.635      Attempted to reach pt, no answer at this time. Left message    Addended by: Coby Aguilar on: 10/5/2021 02:37 PM     Modules accepted: Orders

## 2021-10-12 ENCOUNTER — OFFICE VISIT (OUTPATIENT)
Dept: CARDIOLOGY CLINIC | Age: 71
End: 2021-10-12
Payer: MEDICARE

## 2021-10-12 VITALS
SYSTOLIC BLOOD PRESSURE: 138 MMHG | DIASTOLIC BLOOD PRESSURE: 76 MMHG | HEIGHT: 66 IN | WEIGHT: 241 LBS | BODY MASS INDEX: 38.73 KG/M2 | HEART RATE: 68 BPM

## 2021-10-12 DIAGNOSIS — E03.9 ACQUIRED HYPOTHYROIDISM: ICD-10-CM

## 2021-10-12 DIAGNOSIS — E11.22 TYPE 2 DIABETES MELLITUS WITH DIABETIC CHRONIC KIDNEY DISEASE, UNSPECIFIED CKD STAGE, UNSPECIFIED WHETHER LONG TERM INSULIN USE (HCC): ICD-10-CM

## 2021-10-12 DIAGNOSIS — E78.2 MIXED HYPERLIPIDEMIA: ICD-10-CM

## 2021-10-12 DIAGNOSIS — Z99.89 OSA ON CPAP: ICD-10-CM

## 2021-10-12 DIAGNOSIS — I10 ESSENTIAL HYPERTENSION: ICD-10-CM

## 2021-10-12 DIAGNOSIS — R00.1 BRADYCARDIA: Primary | ICD-10-CM

## 2021-10-12 DIAGNOSIS — G47.33 OSA ON CPAP: ICD-10-CM

## 2021-10-12 PROCEDURE — 3046F HEMOGLOBIN A1C LEVEL >9.0%: CPT | Performed by: INTERNAL MEDICINE

## 2021-10-12 PROCEDURE — 1036F TOBACCO NON-USER: CPT | Performed by: INTERNAL MEDICINE

## 2021-10-12 PROCEDURE — 4040F PNEUMOC VAC/ADMIN/RCVD: CPT | Performed by: INTERNAL MEDICINE

## 2021-10-12 PROCEDURE — G8484 FLU IMMUNIZE NO ADMIN: HCPCS | Performed by: INTERNAL MEDICINE

## 2021-10-12 PROCEDURE — 1123F ACP DISCUSS/DSCN MKR DOCD: CPT | Performed by: INTERNAL MEDICINE

## 2021-10-12 PROCEDURE — G8399 PT W/DXA RESULTS DOCUMENT: HCPCS | Performed by: INTERNAL MEDICINE

## 2021-10-12 PROCEDURE — G8427 DOCREV CUR MEDS BY ELIG CLIN: HCPCS | Performed by: INTERNAL MEDICINE

## 2021-10-12 PROCEDURE — G8417 CALC BMI ABV UP PARAM F/U: HCPCS | Performed by: INTERNAL MEDICINE

## 2021-10-12 PROCEDURE — 1090F PRES/ABSN URINE INCON ASSESS: CPT | Performed by: INTERNAL MEDICINE

## 2021-10-12 PROCEDURE — 3017F COLORECTAL CA SCREEN DOC REV: CPT | Performed by: INTERNAL MEDICINE

## 2021-10-12 PROCEDURE — 2022F DILAT RTA XM EVC RTNOPTHY: CPT | Performed by: INTERNAL MEDICINE

## 2021-10-12 PROCEDURE — 99213 OFFICE O/P EST LOW 20 MIN: CPT | Performed by: INTERNAL MEDICINE

## 2021-10-12 RX ORDER — LOSARTAN POTASSIUM 100 MG/1
100 TABLET ORAL DAILY
COMMUNITY
Start: 2021-08-30 | End: 2022-08-30

## 2021-10-12 RX ORDER — SITAGLIPTIN 50 MG/1
TABLET, FILM COATED ORAL
COMMUNITY
Start: 2021-08-31 | End: 2022-02-23 | Stop reason: ALTCHOICE

## 2021-10-12 NOTE — PATIENT INSTRUCTIONS
CAD:None known  HTN:Yes  well controlled on current medical regimen, see list above. - changes in  treatment:   no    VHD:no              No significant VHD noted  DYSLIPIDEMIA: yes,   Patient's profile is at / near [employer] [employer] [place] current medical regimen wellyes,   See most recent Lab values in Labs section above. Diabetes mellitis:yes,   BS under good control no  OTHER RELEVANT DIAGNOSIS:as noted in patient's active problem list:  Arrhythmia: Asymptomatic Bradycardia. Severe Bradycardia's recorded  During sleep only  TESTS ORDERED:   None this visit  All previously ordered tests reviewed. MEDICATIONS: CPM  OV in 6 months.

## 2021-10-12 NOTE — LETTER
2:00    Patient Name: Enrrique Keys  : 1950  MRN# [id number]    REASON FOR VISIT: results holter       Current Outpatient Medications   Medication Sig Dispense Refill    vitamin D3 (CHOLECALCIFEROL) 25 MCG (1000 UT) TABS tablet Take 2 tablets by mouth daily 30 tablet 5    fenofibrate micronized (ANTARA) 130 MG capsule Take 130 mg by mouth every morning (before breakfast)      sitaGLIPtan-metformin (JANUMET)  MG per tablet Take 1 tablet by mouth 2 times daily (with meals)      amLODIPine (NORVASC) 5 MG tablet       loratadine (CLARITIN) 10 MG tablet Take 10 mg by mouth daily       losartan-hydrochlorothiazide (HYZAAR) 100-25 MG per tablet Take 1 tablet by mouth daily       levothyroxine (SYNTHROID) 150 MCG tablet Take 1 tablet by mouth Daily 90 tablet 0    rosuvastatin (CRESTOR) 10 MG tablet Take 1 tablet by mouth daily 90 tablet 0    Canagliflozin (INVOKANA) 300 MG TABS Take 150 mg by mouth every morning (before breakfast)       insulin glargine (LANTUS SOLOSTAR) 100 UNIT/ML injection Inject 25 Units into the skin nightly       FLUoxetine (PROZAC) 20 MG capsule Take 20 mg by mouth 2 times daily       aspirin 81 MG tablet Take 81 mg by mouth daily. No current facility-administered medications for this visit. Smoke: What:                           How much:    Alcohol: How Much:     Caffeine: Pop:         Tea:            Coffee:                Chocolate:    Exercise:    Labs: Lipids:             CBC:       BMP/CMP:          TSH:              A1C:    Last Visit:  Complaints:  Changes:    Last EKG:      STRESS TEST:  2019  Abnormal Study.    Moderate inferior & lateral wall ischemia of a large territory.    Normal LV function. LVEF is 55 %. ECHO: 2019   Left ventricular systolic function is normal with an ejection fraction of   55-60%. Grade II diastolic dysfunction. The left atrium is moderately dilated.    No significant valvular disease noted. Right ventricular systolic pressure of 38 mmHg consistent with mild   pulmonary hypertension. No evidence of pericardial effusion. CAROTID: 6/2013  1. There is no hemodynamic significant stenosis present. 2. There is less than 50 % stenosis of both internal carotid   arteries. MUGA: NONE    LAST PACER CHECK: NONE    CARDIAC CATH: 12/2019   No significant CAD. Left dominant system & Left side arteries   are all normal. RCA is non-dominant has minimal disease. Normal LV systolic function. LVEF is 50 to 55 %    Amio Protocol:    CHADS:[id number] Score for Atrial Fibrillation Stroke Risk   Risk   Factors  Component Value   C CHF No 0   H HTN Yes 1   A2 Age >= 76 No,  (76 y.o.) 0   D DM Yes 1   S2 Prior Stroke/TIA No 0   V Vascular Disease No 0   A Age 74-69 Yes,  (76 y.o.) 1   Sc Sex female 1    [id number]  Score  4   Score last updated 10/12/21 7:80 AM EDT    Click here for a link to the UpToDate guideline \"Atrial Fibrillation: Anticoagulation therapy to prevent embolization    Disclaimer: Risk Score calculation is dependent on accuracy of patient problem list and past encounter diagnosis.

## 2021-10-12 NOTE — LETTER
[facility]  [address] [place] [address]  Phone: [phone]  Fax: [phone]    Mona Mejía MD    October 12, 2021     SARAH Rogers - CNP  [address] [facility] [address]    Patient: Chantal Bonilla   MR Number: [id number]   YOB: 1950   Date of Visit: 10/12/2021       Dear Germán Barton: Thank you for referring Anny Saleem to me for evaluation/treatment. Below are the relevant portions of my assessment and plan of care. If you have questions, please do not hesitate to call me. I look forward to following [facility] along with you.     Sincerely,      Mona Mejía MD

## 2021-10-12 NOTE — PROGRESS NOTES
OFFICE PROGRESS NOTES      Amy Whaley is a 66 y.o. female who has    CHIEF COMPLAINT AS FOLLOWS:  CHEST PAIN: Patient denies any C/O chest pains at this time. SOB: No C/O SOB at this time. LEG EDEMA: No leg edema   PALPITATIONS: Denies any C/O Palpitations   DIZZINESS: No C/O Dizziness   SYNCOPE: None   OTHER: Patient says she has lot more energy now off Metoprolol. HPI: Patient is here for F/U on her Dominguez-Arrhythmia, HTN & Dyslipidemia problems. Arrhythmia: Patient has known Hx of Bradycardia with LBBB. HTN: Patient has known Hx of essential HTN. Has been treated with guideline recommended medical / physical/ diet therapy as stated below. Dyslipidemia: Patient has known Hx of mixed dyslipidemia. Has been treated with guideline recommended medical / physical/ diet therapy as stated below. She does not have any new complaints at this time. Current Outpatient Medications   Medication Sig Dispense Refill    losartan (COZAAR) 100 MG tablet Take 100 mg by mouth daily      JANUVIA 50 MG tablet       fenofibrate micronized (ANTARA) 130 MG capsule Take 130 mg by mouth every morning (before breakfast)      amLODIPine (NORVASC) 5 MG tablet       loratadine (CLARITIN) 10 MG tablet Take 10 mg by mouth daily       levothyroxine (SYNTHROID) 150 MCG tablet Take 1 tablet by mouth Daily 90 tablet 0    rosuvastatin (CRESTOR) 10 MG tablet Take 1 tablet by mouth daily 90 tablet 0    Canagliflozin (INVOKANA) 300 MG TABS Take 150 mg by mouth every morning (before breakfast)       insulin glargine (LANTUS SOLOSTAR) 100 UNIT/ML injection Inject 25 Units into the skin nightly       FLUoxetine (PROZAC) 20 MG capsule Take 20 mg by mouth 2 times daily       aspirin 81 MG tablet Take 81 mg by mouth daily. No current facility-administered medications for this visit.      Allergies: Empagliflozin and Lorazepam  Review of Systems:    Constitutional: Negative for diaphoresis and fatigue  Respiratory: Negative for shortness of breath  Cardiovascular: Negative for chest pain, dyspnea on exertion, claudication, edema, irregular heartbeat, murmur, palpitations or shortness of breath  Musculoskeletal: Negative for muscle pain, muscular weakness, negative for pain in arm and leg or swelling in foot and leg    Objective:  /76   Pulse 68   Ht 5' 6\" (1.676 m)   Wt 241 lb (109.3 kg)   BMI 38.90 kg/m²   Wt Readings from Last 3 Encounters:   10/12/21 241 lb (109.3 kg)   09/20/21 242 lb 9.6 oz (110 kg)   07/20/21 237 lb 3.2 oz (107.6 kg)     Body mass index is 38.9 kg/m². GENERAL - Alert, oriented, pleasant, in no apparent distress. EYES: No jaundice, no conjunctival pallor. Neck - Supple. No jugular venous distention noted. No carotid bruits. Cardiovascular  Normal S1 and S2 without obvious murmur or gallop. Extremities - No cyanosis, clubbing, or significant edema. Pulmonary  No respiratory distress. No wheezes or rales.       Lab Review   No results found for: TROPONINT  No results found for: BNP, PROBNP  Lab Results   Component Value Date    INR 0.93 01/30/2020    INR 0.93 12/10/2019     Lab Results   Component Value Date    LABA1C 7.3 (H) 08/07/2019     Lab Results   Component Value Date    WBC 7.9 01/30/2020    WBC 5.7 12/10/2019    HCT 43.8 01/30/2020    HCT 44.7 12/10/2019    MCV 98.6 01/30/2020    MCV 96.5 12/10/2019     01/30/2020     12/10/2019     Lab Results   Component Value Date    CHOL 128 08/07/2019    TRIG 233 (H) 08/07/2019    HDL 38 (L) 08/07/2019    LDLDIRECT 54 08/07/2019     Lab Results   Component Value Date    ALT 12 01/30/2020    ALT 11 08/07/2019    AST 31 01/30/2020    AST 26 08/07/2019     BMP:    Lab Results   Component Value Date     07/15/2021     11/13/2020    K 3.7 07/15/2021    K 3.8 11/13/2020     07/15/2021     11/13/2020    CO2 25 07/15/2021    CO2 27 11/13/2020    BUN 34 07/15/2021 BUN 28 11/13/2020    CREATININE 2.1 07/15/2021    CREATININE 1.7 11/13/2020     CMP:   Lab Results   Component Value Date     07/15/2021     11/13/2020    K 3.7 07/15/2021    K 3.8 11/13/2020     07/15/2021     11/13/2020    CO2 25 07/15/2021    CO2 27 11/13/2020    BUN 34 07/15/2021    BUN 28 11/13/2020    CREATININE 2.1 07/15/2021    CREATININE 1.7 11/13/2020    PROT 6.5 01/30/2020    PROT 6.4 10/17/2019     Lab Results   Component Value Date    TSHHS 1.860 08/07/2019        CATH 12/2019   No significant CAD. Left dominant system & Left side arteries   are all normal. RCA is non-dominant has minimal disease.   Normal LV systolic function. LVEF is 50 to 55 %.     ECHO 12/2019   Left ventricular systolic function is normal with an ejection fraction of   55-60%.  Grade II diastolic dysfunction.   The left atrium is moderately dilated.   No significant valvular disease noted.   Right ventricular systolic pressure of 38 mmHg consistent with mild   pulmonary hypertension.   No evidence of pericardial effusion. Monitor 9/2021  Normal sinus rhythm with BBB. Bradycardia / Tachycardia episodes noted. Brief SVT runs seen. No clinically significant arrhythmias seen. QUALITY MEASURES REVIEWED:  1.CAD:Patient is taking anti platelet agent:Yes  Patient does not have Hx of documented CAD  2. DYSLIPIDEMIA: Patient is on cholesterol lowering medication:Yes   3. Beta-Blocker therapy for CAD, if prior Myocardial Infarction:No   Due to side-effect(s) / Bradycardia  4. Counselled regarding smoking cessation. No   Patient does not Smoke. 5.Anticoagulation therapy (for A.Fib) No   Does Not have A.Fib.  6.Discussed weight management strategies. Assessment & Plan:  Primary / Secondary prevention is the goal by aggressive risk modification, healthy and therapeutic life style changes for cardiovascular risk reduction. Various goals are discussed and multiple questions answered.     CAD:None known  HTN:Yes  well controlled on current medical regimen, see list above. - changes in  treatment:   no    VHD:no              No significant VHD noted  DYSLIPIDEMIA: yes,   Patient's profile is at / near [employer] [employer] current medical regimen wellyes,   See most recent Lab values in Labs section above. Diabetes mellitis:yes,   BS under good control no  OTHER RELEVANT DIAGNOSIS:as noted in patient's active problem list:  Arrhythmia: Asymptomatic Bradycardia. Severe Bradycardia's recorded  During sleep only  TESTS ORDERED:   None this visit  All previously ordered tests reviewed. MEDICATIONS: CPM  OV in 6 months.

## 2021-10-15 ENCOUNTER — OFFICE VISIT (OUTPATIENT)
Dept: GASTROENTEROLOGY | Age: 71
End: 2021-10-15
Payer: MEDICARE

## 2021-10-15 VITALS
BODY MASS INDEX: 38.41 KG/M2 | TEMPERATURE: 97.2 F | SYSTOLIC BLOOD PRESSURE: 148 MMHG | HEART RATE: 69 BPM | OXYGEN SATURATION: 97 % | DIASTOLIC BLOOD PRESSURE: 62 MMHG | HEIGHT: 66 IN | WEIGHT: 239 LBS

## 2021-10-15 DIAGNOSIS — Z86.010 HISTORY OF COLON POLYPS: ICD-10-CM

## 2021-10-15 DIAGNOSIS — D36.9 ADENOMATOUS POLYPS: Primary | ICD-10-CM

## 2021-10-15 PROCEDURE — G8417 CALC BMI ABV UP PARAM F/U: HCPCS | Performed by: SPECIALIST

## 2021-10-15 PROCEDURE — G8399 PT W/DXA RESULTS DOCUMENT: HCPCS | Performed by: SPECIALIST

## 2021-10-15 PROCEDURE — 1036F TOBACCO NON-USER: CPT | Performed by: SPECIALIST

## 2021-10-15 PROCEDURE — G8427 DOCREV CUR MEDS BY ELIG CLIN: HCPCS | Performed by: SPECIALIST

## 2021-10-15 PROCEDURE — 4040F PNEUMOC VAC/ADMIN/RCVD: CPT | Performed by: SPECIALIST

## 2021-10-15 PROCEDURE — G8484 FLU IMMUNIZE NO ADMIN: HCPCS | Performed by: SPECIALIST

## 2021-10-15 PROCEDURE — 99203 OFFICE O/P NEW LOW 30 MIN: CPT | Performed by: SPECIALIST

## 2021-10-15 PROCEDURE — 1090F PRES/ABSN URINE INCON ASSESS: CPT | Performed by: SPECIALIST

## 2021-10-15 PROCEDURE — 1123F ACP DISCUSS/DSCN MKR DOCD: CPT | Performed by: SPECIALIST

## 2021-10-15 PROCEDURE — 3017F COLORECTAL CA SCREEN DOC REV: CPT | Performed by: SPECIALIST

## 2021-10-15 RX ORDER — INSULIN GLARGINE 100 [IU]/ML
INJECTION, SOLUTION SUBCUTANEOUS
COMMUNITY
Start: 2021-08-31

## 2021-10-15 RX ORDER — BLOOD SUGAR DIAGNOSTIC
STRIP MISCELLANEOUS
COMMUNITY
Start: 2021-08-30 | End: 2022-08-30

## 2021-10-15 NOTE — PROGRESS NOTES
Gastroenterology  Note  Deja Ribera. Kriste Skiff MD      Subjective:      Patient ID:      Tan Ayala                 1950    HPI:     She is here for follow up colonoscopy. Her father had colon cancer in his 52's. At her last colonoscopy 5/2016 she had a 1.5 cm semi- pedunculated polyp removed from the upper ascending colon--- on path was reported as \"hyperplastic\" (which would be unusual for this size polyp and even if so, a hyperplastic polyp this size in the right colon should be treated as an adenoma. Repeat was recommended in 3 years. The patient denies abdominal pain,nausea,vomiting, diarrhea, constipation,melena, hematochezia,hematemesis, unexplained weight loss or dysphagia. ROS: non-contributory except as noted     Objective:     PHYSICAL EXAM:    Vitals:  BP (!) 148/62 (Site: Left Upper Arm, Position: Sitting, Cuff Size: Large Adult)   Pulse 69   Temp 97.2 °F (36.2 °C) (Infrared)   Ht 5' 6\" (1.676 m)   Wt 239 lb (108.4 kg)   SpO2 97%   BMI 38.58 kg/m²   CONSTITUTIONAL: alert, cooperative, no apparent distress,   EYES:  pupils equal, round and reactive to light and sclera clear  ENT:  normocepalic, without obvious abnormality  NECK:  supple, symmetrical, trachea midline  HEMATOLOGIC/LYMPHATICS:  no cervical lymphadenopathy and no supraclavicular lymphadenopathy  LUNGS:  clear to auscultation  CARDIOVASCULAR:  regular rate and rhythm and no murmur noted  ABDOMEN:  normal bowel sounds, soft, non-distended, non-tender with no masses or hepatomegaly palpated  NEUROLOGIC: no focal deficit detected  SKIN:  no lesions  EXTREMITIES: no clubbing, cyanosis, or edema     Assessment:     1) history of large hyperplastic polyp right colon- needs follow up colonoscopy  2) CKD- creat 2.0- needs Colyte bowel prep      Plan:     1) colonoscopy with Colyte prep          Logan Kirk M.D.

## 2021-10-18 ENCOUNTER — HOSPITAL ENCOUNTER (OUTPATIENT)
Age: 71
Discharge: HOME | End: 2021-10-18
Payer: MEDICARE

## 2021-10-18 DIAGNOSIS — E83.52 HYPERCALCEMIA: ICD-10-CM

## 2021-10-18 DIAGNOSIS — N18.4 CKD (CHRONIC KIDNEY DISEASE), STAGE IV (HCC): ICD-10-CM

## 2021-10-18 LAB
ANION GAP SERPL CALCULATED.3IONS-SCNC: 11 MMOL/L (ref 4–16)
BUN BLDV-MCNC: 30 MG/DL (ref 6–23)
CALCIUM SERPL-MCNC: 9.4 MG/DL (ref 8.3–10.6)
CHLORIDE BLD-SCNC: 100 MMOL/L (ref 99–110)
CO2: 23 MMOL/L (ref 21–32)
CREAT SERPL-MCNC: 1.4 MG/DL (ref 0.6–1.1)
GFR AFRICAN AMERICAN: 45 ML/MIN/1.73M2
GFR NON-AFRICAN AMERICAN: 37 ML/MIN/1.73M2
GLUCOSE BLD-MCNC: 162 MG/DL (ref 70–99)
POTASSIUM SERPL-SCNC: 3.9 MMOL/L (ref 3.5–5.1)
SODIUM BLD-SCNC: 134 MMOL/L (ref 135–145)
VITAMIN D 25-HYDROXY: 39.28 NG/ML

## 2021-10-18 PROCEDURE — 80048 BASIC METABOLIC PNL TOTAL CA: CPT

## 2021-10-18 PROCEDURE — 36415 COLL VENOUS BLD VENIPUNCTURE: CPT

## 2021-10-18 PROCEDURE — 82306 VITAMIN D 25 HYDROXY: CPT

## 2021-11-10 ENCOUNTER — HOSPITAL ENCOUNTER (OUTPATIENT)
Age: 71
Setting detail: SPECIMEN
Discharge: HOME | End: 2021-11-10
Payer: MEDICARE

## 2021-11-10 ENCOUNTER — NURSE ONLY (OUTPATIENT)
Dept: GASTROENTEROLOGY | Age: 71
End: 2021-11-10

## 2021-11-10 DIAGNOSIS — Z01.818 PRE-OP TESTING: Primary | ICD-10-CM

## 2021-11-10 PROCEDURE — U0005 INFEC AGEN DETEC AMPLI PROBE: HCPCS

## 2021-11-10 PROCEDURE — U0003 INFECTIOUS AGENT DETECTION BY NUCLEIC ACID (DNA OR RNA); SEVERE ACUTE RESPIRATORY SYNDROME CORONAVIRUS 2 (SARS-COV-2) (CORONAVIRUS DISEASE [COVID-19]), AMPLIFIED PROBE TECHNIQUE, MAKING USE OF HIGH THROUGHPUT TECHNOLOGIES AS DESCRIBED BY CMS-2020-01-R: HCPCS

## 2021-11-11 LAB
SARS-COV-2: NOT DETECTED
SOURCE: NORMAL

## 2021-11-12 NOTE — PROGRESS NOTES
Patient will arrive at 0830 on 11/16/2021 for her procedure at 1000. 1. Do not eat or drink anything after 12 midnight (or____hours) unless instructed by your doctor prior to surgery. This includes no water, chewing gum or mints. NO chewing tobacco.  2. Follow your directions as prescribed by the doctor for your procedure and medications. 3.Check with your Doctor regarding stopping Plavix, Coumadin, Lovenox,Effient,Pradaxa,Xarelto, Fragmin or other blood thinners and follow their instructions. 4. Do not smoke, and do not drink any alcoholic beverages 24 hours prior to surgery. This includes NA Beer. 5. You may brush your teeth and gargle the morning of surgery. DO NOT SWALLOW WATER  6. You MUST make arrangements for a responsible adult to take you home after your surgery and be able to check on you every couple hours for the day. You will not be allowed     to leave alone or drive yourself home. It is strongly suggested someone stay with you the first 24 hrs. Your surgery will be cancelled if you do not have a ride home. 7. Please wear simple, loose fitting clothing to the hospital.  Rangel Barrios not bring valuables (money, credit cards, checkbooks, etc.) Do not wear any makeup (including no eye makeup) or nail polish on your fingers or toes. 8. DO NOT wear any jewelry or piercings on day of surgery. All body piercing jewelry must be removed  9. If you have dentures, they will be removed before going to the OR; we will provide you a container. If you wear contact lenses or glasses, they will be removed; please bring a case for them. 10. If you  have a Living Will and Durable Power of  for Healthcare, please bring in a copy. 11 Please bring picture ID,  insurance card, paperwork from the doctors office    (H & P, Consent, & card for implantable devices). Patient will take her norvasc and synthroid the morning of her procedure.

## 2021-11-15 ENCOUNTER — ANESTHESIA EVENT (OUTPATIENT)
Dept: OPERATING ROOM | Age: 71
End: 2021-11-15
Payer: MEDICARE

## 2021-11-15 NOTE — H&P
Original H &P in soft chart. I have examined the patient immediately before the procedure and there is no change in the previous history and physical exam, which has been reviewed. There is a history of sleep apnea. There has been no  previous adverse experience with sedation/anesthesia. There is no increased risk for aspiration of gastric contents. The patient has been instructed that all resuscitative measures (during the operative and immediate perioperative period) will be instituted in the unlikely event that they will be needed. The patient has no pertinent past surgical or family history other than listed in the original H&P. The patient was counseled about the risks of angel Covid-19 during their perioperative period and any recovery window from their procedure. The patient was made aware that angel Covid-19  may worsen their prognosis for recovering from their procedure  and lend to a higher morbidity and/or mortality risk. All material risks, benefits, and reasonable alternatives including postponing the procedure were discussed. The patient does wish to proceed with the procedure at this time.     ASA Class: 3  AIRWAY Class: 1

## 2021-11-16 ENCOUNTER — HOSPITAL ENCOUNTER (OUTPATIENT)
Age: 71
Setting detail: OUTPATIENT SURGERY
Discharge: HOME | End: 2021-11-16
Attending: SPECIALIST | Admitting: SPECIALIST
Payer: MEDICARE

## 2021-11-16 ENCOUNTER — ANESTHESIA (OUTPATIENT)
Dept: OPERATING ROOM | Age: 71
End: 2021-11-16
Payer: MEDICARE

## 2021-11-16 VITALS
OXYGEN SATURATION: 100 % | HEIGHT: 66 IN | RESPIRATION RATE: 18 BRPM | BODY MASS INDEX: 37.77 KG/M2 | WEIGHT: 235 LBS | DIASTOLIC BLOOD PRESSURE: 55 MMHG | TEMPERATURE: 97 F | HEART RATE: 65 BPM | SYSTOLIC BLOOD PRESSURE: 177 MMHG

## 2021-11-16 VITALS — OXYGEN SATURATION: 99 % | DIASTOLIC BLOOD PRESSURE: 96 MMHG | SYSTOLIC BLOOD PRESSURE: 119 MMHG

## 2021-11-16 DIAGNOSIS — Z86.010 PERSONAL HISTORY OF COLONIC POLYPS: ICD-10-CM

## 2021-11-16 LAB — GLUCOSE BLD-MCNC: 166 MG/DL (ref 70–99)

## 2021-11-16 PROCEDURE — 3700000001 HC ADD 15 MINUTES (ANESTHESIA): Performed by: SPECIALIST

## 2021-11-16 PROCEDURE — 82962 GLUCOSE BLOOD TEST: CPT

## 2021-11-16 PROCEDURE — 45390 COLONOSCOPY W/RESECTION: CPT | Performed by: SPECIALIST

## 2021-11-16 PROCEDURE — 2709999900 HC NON-CHARGEABLE SUPPLY: Performed by: SPECIALIST

## 2021-11-16 PROCEDURE — 6370000000 HC RX 637 (ALT 250 FOR IP): Performed by: SPECIALIST

## 2021-11-16 PROCEDURE — 3700000000 HC ANESTHESIA ATTENDED CARE: Performed by: SPECIALIST

## 2021-11-16 PROCEDURE — 2580000003 HC RX 258: Performed by: SPECIALIST

## 2021-11-16 PROCEDURE — 88305 TISSUE EXAM BY PATHOLOGIST: CPT | Performed by: PATHOLOGY

## 2021-11-16 PROCEDURE — 3609010200 HC COLONOSCOPY ABLATION TUMOR POLYP/OTHER LES: Performed by: SPECIALIST

## 2021-11-16 PROCEDURE — 7100000010 HC PHASE II RECOVERY - FIRST 15 MIN: Performed by: SPECIALIST

## 2021-11-16 PROCEDURE — 7100000011 HC PHASE II RECOVERY - ADDTL 15 MIN: Performed by: SPECIALIST

## 2021-11-16 PROCEDURE — 45385 COLONOSCOPY W/LESION REMOVAL: CPT | Performed by: SPECIALIST

## 2021-11-16 PROCEDURE — 2720000010 HC SURG SUPPLY STERILE: Performed by: SPECIALIST

## 2021-11-16 PROCEDURE — 6360000002 HC RX W HCPCS: Performed by: NURSE ANESTHETIST, CERTIFIED REGISTERED

## 2021-11-16 RX ORDER — ONDANSETRON 2 MG/ML
INJECTION INTRAMUSCULAR; INTRAVENOUS PRN
Status: DISCONTINUED | OUTPATIENT
Start: 2021-11-16 | End: 2021-11-16 | Stop reason: SDUPTHER

## 2021-11-16 RX ORDER — SIMETHICONE 20 MG/.3ML
EMULSION ORAL PRN
Status: DISCONTINUED | OUTPATIENT
Start: 2021-11-16 | End: 2021-11-16 | Stop reason: ALTCHOICE

## 2021-11-16 RX ORDER — SODIUM CHLORIDE, SODIUM LACTATE, POTASSIUM CHLORIDE, CALCIUM CHLORIDE 600; 310; 30; 20 MG/100ML; MG/100ML; MG/100ML; MG/100ML
INJECTION, SOLUTION INTRAVENOUS CONTINUOUS
Status: DISCONTINUED | OUTPATIENT
Start: 2021-11-16 | End: 2021-11-16 | Stop reason: HOSPADM

## 2021-11-16 RX ORDER — LIDOCAINE HYDROCHLORIDE 20 MG/ML
INJECTION, SOLUTION INTRAVENOUS PRN
Status: DISCONTINUED | OUTPATIENT
Start: 2021-11-16 | End: 2021-11-16 | Stop reason: SDUPTHER

## 2021-11-16 RX ORDER — DEXAMETHASONE SODIUM PHOSPHATE 4 MG/ML
INJECTION, SOLUTION INTRA-ARTICULAR; INTRALESIONAL; INTRAMUSCULAR; INTRAVENOUS; SOFT TISSUE PRN
Status: DISCONTINUED | OUTPATIENT
Start: 2021-11-16 | End: 2021-11-16 | Stop reason: SDUPTHER

## 2021-11-16 RX ORDER — PROPOFOL 10 MG/ML
INJECTION, EMULSION INTRAVENOUS PRN
Status: DISCONTINUED | OUTPATIENT
Start: 2021-11-16 | End: 2021-11-16 | Stop reason: SDUPTHER

## 2021-11-16 RX ADMIN — ONDANSETRON 4 MG: 2 INJECTION INTRAMUSCULAR; INTRAVENOUS at 10:01

## 2021-11-16 RX ADMIN — PROPOFOL 630 MG: 10 INJECTION, EMULSION INTRAVENOUS at 10:04

## 2021-11-16 RX ADMIN — DEXAMETHASONE SODIUM PHOSPHATE 4 MG: 4 INJECTION, SOLUTION INTRAMUSCULAR; INTRAVENOUS at 10:01

## 2021-11-16 RX ADMIN — SODIUM CHLORIDE, POTASSIUM CHLORIDE, SODIUM LACTATE AND CALCIUM CHLORIDE: 600; 310; 30; 20 INJECTION, SOLUTION INTRAVENOUS at 09:19

## 2021-11-16 RX ADMIN — LIDOCAINE HYDROCHLORIDE 100 MG: 20 INJECTION, SOLUTION INTRAVENOUS at 10:04

## 2021-11-16 ASSESSMENT — PAIN SCALES - GENERAL
PAINLEVEL_OUTOF10: 0
PAINLEVEL_OUTOF10: 0

## 2021-11-16 ASSESSMENT — PAIN - FUNCTIONAL ASSESSMENT: PAIN_FUNCTIONAL_ASSESSMENT: 0-10

## 2021-11-16 NOTE — BRIEF OP NOTE
BRIEF COLONOSCOPY REPORT:   Photos and full colonoscopy report available by going to \"chart review\" then \"procedures\" then  \"colonoscopy\" then \"View Report\"      IMPRESSION :   1) 1.2 cm sessile polyp removed from the hepatic flexure by EMR: submucosa injected with Orise, polyp transected in 1 piece, margins of the polypectomy site coagulated with snare tip soft coag, and polypectomy site closed with 2 endoclips. The mucosa 5 cm distal (toward anus) to the site tattooed with [place] ink submucosal injection.   2) 5 mm polyp removed from the transverse colon with the cold snare  3) 6 mm polyp removed from the upper descending colon at 40 cm with the cold snare  4) mild sigmoid diverticulosis  5) small internal hemorrhoids  6) otherwise normal colon    PLAN : Colonoscopy in 3 years

## 2021-11-16 NOTE — ANESTHESIA POSTPROCEDURE EVALUATION
Department of Anesthesiology  Postprocedure Note    Patient: Martha Henao  MRN: [id number]  YOB: 1950  Date of evaluation: 11/16/2021  Time:  11:05 AM     Procedure Summary     Date: 11/16/21 Room / Location: [address]    Anesthesia Start: 0957 Anesthesia Stop: 3250    Procedure: COLONOSCOPY W/ ENDOSCOPIC MUCOSAL RESECTION WITH ORISE INJECTION, HEMACLIP PLACEMENT X 2 POST-POLYPECTOMY, SPOT INK TO LEXY 1.2CM POLYP HEPATIC FLEXURE (N/A ) Diagnosis:       Personal history of colonic polyps      (Personal history of colonic polyps [Z86.010])    Surgeons: Jennyfer Douglass MD Responsible Provider: SARAH Gramajo CRNA    Anesthesia Type: MAC ASA Status: 3          Anesthesia Type: MAC    Quinn Phase I: Quinn Score: 10    Quinn Phase II:  10    Last vitals: Reviewed and per EMR flowsheets.        Anesthesia Post Evaluation    Patient location during evaluation: bedside  Patient participation: complete - patient participated  Level of consciousness: awake and alert  Pain score: 0  Airway patency: patent  Nausea & Vomiting: no nausea and no vomiting  Complications: no  Cardiovascular status: hemodynamically stable  Respiratory status: acceptable, room air, spontaneous ventilation and nonlabored ventilation  Hydration status: euvolemic

## 2021-11-19 ENCOUNTER — HOSPITAL ENCOUNTER (OUTPATIENT)
Age: 71
Discharge: HOME | End: 2021-11-19
Payer: MEDICARE

## 2021-11-19 LAB
ALBUMIN SERPL-MCNC: 3.6 GM/DL (ref 3.4–5)
ALP BLD-CCNC: 55 IU/L (ref 40–128)
ALT SERPL-CCNC: 12 U/L (ref 10–40)
ANION GAP SERPL CALCULATED.3IONS-SCNC: 10 MMOL/L (ref 4–16)
AST SERPL-CCNC: 33 IU/L (ref 15–37)
BILIRUB SERPL-MCNC: 0.4 MG/DL (ref 0–1)
BUN BLDV-MCNC: 31 MG/DL (ref 6–23)
CALCIUM SERPL-MCNC: 9.2 MG/DL (ref 8.3–10.6)
CHLORIDE BLD-SCNC: 106 MMOL/L (ref 99–110)
CHOLESTEROL: 124 MG/DL
CO2: 25 MMOL/L (ref 21–32)
CREAT SERPL-MCNC: 2 MG/DL (ref 0.6–1.1)
ESTIMATED AVERAGE GLUCOSE: 148 MG/DL
GFR AFRICAN AMERICAN: 30 ML/MIN/1.73M2
GFR NON-AFRICAN AMERICAN: 24 ML/MIN/1.73M2
GLUCOSE BLD-MCNC: 146 MG/DL (ref 70–99)
HBA1C MFR BLD: 6.8 % (ref 4.2–6.3)
HDLC SERPL-MCNC: 32 MG/DL
LDL CHOLESTEROL DIRECT: 60 MG/DL
POTASSIUM SERPL-SCNC: 4.2 MMOL/L (ref 3.5–5.1)
SODIUM BLD-SCNC: 141 MMOL/L (ref 135–145)
TOTAL PROTEIN: 6.3 GM/DL (ref 6.4–8.2)
TRIGL SERPL-MCNC: 165 MG/DL
TSH HIGH SENSITIVITY: 3.25 UIU/ML (ref 0.27–4.2)

## 2021-11-19 PROCEDURE — 84443 ASSAY THYROID STIM HORMONE: CPT

## 2021-11-19 PROCEDURE — 80053 COMPREHEN METABOLIC PANEL: CPT

## 2021-11-19 PROCEDURE — 83721 ASSAY OF BLOOD LIPOPROTEIN: CPT

## 2021-11-19 PROCEDURE — 80061 LIPID PANEL: CPT

## 2021-11-19 PROCEDURE — 36415 COLL VENOUS BLD VENIPUNCTURE: CPT

## 2021-11-19 PROCEDURE — 83036 HEMOGLOBIN GLYCOSYLATED A1C: CPT

## 2022-02-21 ENCOUNTER — HOSPITAL ENCOUNTER (OUTPATIENT)
Age: 72
Discharge: HOME | End: 2022-02-21
Payer: MEDICARE

## 2022-02-21 DIAGNOSIS — N18.4 CKD (CHRONIC KIDNEY DISEASE), STAGE IV (HCC): ICD-10-CM

## 2022-02-21 DIAGNOSIS — E11.21 DIABETIC NEPHROPATHY ASSOCIATED WITH TYPE 2 DIABETES MELLITUS (HCC): ICD-10-CM

## 2022-02-21 LAB
ANION GAP SERPL CALCULATED.3IONS-SCNC: 12 MMOL/L (ref 4–16)
BACTERIA: ABNORMAL /HPF
BILIRUBIN URINE: NEGATIVE MG/DL
BLOOD, URINE: ABNORMAL
BUN BLDV-MCNC: 28 MG/DL (ref 6–23)
CALCIUM SERPL-MCNC: 9.2 MG/DL (ref 8.3–10.6)
CHLORIDE BLD-SCNC: 103 MMOL/L (ref 99–110)
CLARITY: ABNORMAL
CO2: 22 MMOL/L (ref 21–32)
COLOR: ABNORMAL
CREAT SERPL-MCNC: 1.7 MG/DL (ref 0.6–1.1)
CREATININE URINE: 43.7 MG/DL (ref 28–217)
GFR AFRICAN AMERICAN: 36 ML/MIN/1.73M2
GFR NON-AFRICAN AMERICAN: 29 ML/MIN/1.73M2
GLUCOSE BLD-MCNC: 255 MG/DL (ref 70–99)
GLUCOSE, URINE: >1000 MG/DL
KETONES, URINE: NEGATIVE MG/DL
LEUKOCYTE ESTERASE, URINE: ABNORMAL
MUCUS: ABNORMAL HPF
NITRITE URINE, QUANTITATIVE: POSITIVE
PH, URINE: 5.5 (ref 5–8)
POTASSIUM SERPL-SCNC: 3.9 MMOL/L (ref 3.5–5.1)
PROT/CREAT RATIO, UR: 2
PROTEIN UA: 100 MG/DL
RBC URINE: 12 /HPF (ref 0–6)
SODIUM BLD-SCNC: 137 MMOL/L (ref 135–145)
SPECIFIC GRAVITY UA: 1.01 (ref 1–1.03)
SQUAMOUS EPITHELIAL: <1 /HPF
URINE TOTAL PROTEIN: 89.2 MG/DL
UROBILINOGEN, URINE: 0.2 MG/DL (ref 0.2–1)
WBC CLUMP: ABNORMAL /HPF
WBC UA: 349 /HPF (ref 0–5)

## 2022-02-21 PROCEDURE — 84156 ASSAY OF PROTEIN URINE: CPT

## 2022-02-21 PROCEDURE — 80048 BASIC METABOLIC PNL TOTAL CA: CPT

## 2022-02-21 PROCEDURE — 36415 COLL VENOUS BLD VENIPUNCTURE: CPT

## 2022-02-21 PROCEDURE — 81001 URINALYSIS AUTO W/SCOPE: CPT

## 2022-02-21 PROCEDURE — 82570 ASSAY OF URINE CREATININE: CPT

## 2022-02-21 NOTE — RESULT ENCOUNTER NOTE
TELL PT:  URINE TEST SHOWS A UTI. I HAVE SENT KEFLEX 500MG PO BID FOR 7 DAYS TO [facility].  THX

## 2022-04-13 ENCOUNTER — OFFICE VISIT (OUTPATIENT)
Dept: CARDIOLOGY CLINIC | Age: 72
End: 2022-04-13
Payer: MEDICARE

## 2022-04-13 VITALS
DIASTOLIC BLOOD PRESSURE: 70 MMHG | SYSTOLIC BLOOD PRESSURE: 132 MMHG | HEIGHT: 65 IN | WEIGHT: 239.8 LBS | HEART RATE: 64 BPM | BODY MASS INDEX: 39.95 KG/M2

## 2022-04-13 DIAGNOSIS — G47.33 OSA ON CPAP: ICD-10-CM

## 2022-04-13 DIAGNOSIS — E11.22 TYPE 2 DIABETES MELLITUS WITH DIABETIC CHRONIC KIDNEY DISEASE, UNSPECIFIED CKD STAGE, UNSPECIFIED WHETHER LONG TERM INSULIN USE (HCC): ICD-10-CM

## 2022-04-13 DIAGNOSIS — E78.2 MIXED HYPERLIPIDEMIA: ICD-10-CM

## 2022-04-13 DIAGNOSIS — Z99.89 OSA ON CPAP: ICD-10-CM

## 2022-04-13 DIAGNOSIS — I10 ESSENTIAL HYPERTENSION: Primary | ICD-10-CM

## 2022-04-13 PROCEDURE — 1036F TOBACCO NON-USER: CPT | Performed by: INTERNAL MEDICINE

## 2022-04-13 PROCEDURE — 1123F ACP DISCUSS/DSCN MKR DOCD: CPT | Performed by: INTERNAL MEDICINE

## 2022-04-13 PROCEDURE — 3017F COLORECTAL CA SCREEN DOC REV: CPT | Performed by: INTERNAL MEDICINE

## 2022-04-13 PROCEDURE — 99213 OFFICE O/P EST LOW 20 MIN: CPT | Performed by: INTERNAL MEDICINE

## 2022-04-13 PROCEDURE — 3046F HEMOGLOBIN A1C LEVEL >9.0%: CPT | Performed by: INTERNAL MEDICINE

## 2022-04-13 PROCEDURE — 4040F PNEUMOC VAC/ADMIN/RCVD: CPT | Performed by: INTERNAL MEDICINE

## 2022-04-13 PROCEDURE — G8417 CALC BMI ABV UP PARAM F/U: HCPCS | Performed by: INTERNAL MEDICINE

## 2022-04-13 PROCEDURE — G8399 PT W/DXA RESULTS DOCUMENT: HCPCS | Performed by: INTERNAL MEDICINE

## 2022-04-13 PROCEDURE — 2022F DILAT RTA XM EVC RTNOPTHY: CPT | Performed by: INTERNAL MEDICINE

## 2022-04-13 PROCEDURE — 1090F PRES/ABSN URINE INCON ASSESS: CPT | Performed by: INTERNAL MEDICINE

## 2022-04-13 PROCEDURE — G8427 DOCREV CUR MEDS BY ELIG CLIN: HCPCS | Performed by: INTERNAL MEDICINE

## 2022-04-13 NOTE — PATIENT INSTRUCTIONS
CORONARY ARTERY DISEASE::None known  CATH 12/2019   No significant CAD. Left dominant system & Left side arteries   are all normal. RCA is non-dominant has minimal disease.   Normal LV systolic function. LVEF is 50 to 55 %. HTN:Yes  well controlled on current medical regimen, see list above. - changes in  treatment:   no, on Cozaar & Amlodipine   VHD:no              No significant VHD noted   ECHO 12/2019   Left ventricular systolic function is normal with an ejection fraction of   55-60%.  Grade II diastolic dysfunction.   The left atrium is moderately dilated.   No significant valvular disease noted.   Right ventricular systolic pressure of 38 mmHg consistent with mild   pulmonary hypertension.   No evidence of pericardial effusion. DYSLIPIDEMIA: yes,   Patient's profile is at / near [place] [facility] [place] current medical regimen wellyes, takes Crestor. See most recent Lab values in Labs section above. Diabetes mellitis:yes,   BS under good control no    Arrhythmia: Asymptomatic Bradycardia. Significant Bradycardia's recorded  During sleep only    Obesity: Diet & Exercise. TESTS ORDERED: None this visit     PREVIOUSLY ORDERED TESTS REVIEWED & DISCUSSED WITH THE PATIENT:     I personally reviewed & interpreted, all previously ordered tests as copied above. Latest Labs are pulled in to the note with dates. Labs, specially in Reference to Lipid profile, Cardiac testing in the form of Echo ( dated: ), stress tests ( dated: ) & other relevant cardiac testing reviewed with patient & recommendations made based on assessment of the results. Discussed role of Cardiac risk factors & effects + treatment of co morbidities with patient & advised accordingly. MEDICATIONS: List of medications patient is currently taking is reviewed in detail with the patient. Discussed any side effects or problems taking the medication. Recommend Continue present management & medications as listed.      AFFIRMATION: I spent at least 20 minutes of time reviewing patient's history, previous & current medical problems & all Labs + testing. This includes chart prep even prior to the vosit. Various goals are discussed and multiple questions answered. Relevant concelling performed. Office follow up in six months.

## 2022-04-13 NOTE — LETTER
[facility]  [address] [address]  Phone: [phone]  Fax: [phone]    Marisa Goins MD    April 13, 2022     [facility]  [address] [facility] [address]    Patient: Jenni Flores   MR Number: [id number]   YOB: 1950   Date of Visit: 4/13/2022       Dear Tena Coyle: Thank you for referring Severiano Rojo to me for evaluation/treatment. Below are the relevant portions of my assessment and plan of care. If you have questions, please do not hesitate to call me. I look forward to following Bello Suarez along with you.     Sincerely,      Marisa Goins MD

## 2022-04-13 NOTE — LETTER
2022  2:15 PM    Patient Name: Giuseppe Cross  : 1950  MRN# <[id number]>    REASON FOR VISIT:6 month     Patient Active Problem List    Diagnosis Date Noted    Personal history of colonic polyps     Benign neoplasm of ascending colon     Benign neoplasm of transverse colon     Benign neoplasm of descending colon     Bradycardia 2021    Diabetic nephropathy associated with type 2 diabetes mellitus ([facility] 2020    Chronic kidney disease-mineral and bone disorder 2020    CKD (chronic kidney disease), stage IV ([facility] 2020    Other acute kidney failure ([facility] 2020    Other proteinuria 2020    Type 2 diabetes mellitus with chronic kidney disease ([facility] 2020    Hypertensive renal disease 2020    Abnormal nuclear stress test 2019    SOB (shortness of breath) 2019    Hypothyroidism     Essential hypertension     Hyperlipidemia     Family history of colon cancer 2016    Chronic asthmatic bronchitis ([facility] 10/08/2013    JARAD on CPAP 10/08/2013       Allergies: Empagliflozin and Lorazepam      Current Outpatient Medications   Medication Sig Dispense Refill    cephALEXin (KEFLEX) 500 MG capsule Take 1 capsule by mouth 2 times daily 14 capsule 0    NONFORMULARY daily      blood glucose test strips (ASCENSIA AUTODISC VI;ONE TOUCH ULTRA TEST VI) strip by Other route      blood glucose test strips (EXACTECH TEST) strip TEST BLOOD GLUCOSE 2 TIMES A DAY (BEFORE MEALS).       LANTUS SOLOSTAR 100 UNIT/ML injection pen       losartan (COZAAR) 100 MG tablet Take 100 mg by mouth daily      fenofibrate micronized (ANTARA) 130 MG capsule Take 130 mg by mouth every morning (before breakfast)      amLODIPine (NORVASC) 5 MG tablet       loratadine (CLARITIN) 10 MG tablet Take 10 mg by mouth daily       levothyroxine (SYNTHROID) 150 MCG tablet Take 1 tablet by mouth Daily 90 tablet 0    rosuvastatin (CRESTOR) 10 MG tablet Take 1 tablet by mouth daily 90 tablet 0    Canagliflozin (INVOKANA) 300 MG TABS Take 150 mg by mouth every morning (before breakfast)       insulin glargine (LANTUS SOLOSTAR) 100 UNIT/ML injection Inject 25 Units into the skin nightly       FLUoxetine (PROZAC) 20 MG capsule Take 20 mg by mouth 2 times daily       aspirin 81 MG tablet Take 81 mg by mouth daily. No current facility-administered medications for this visit.          Lab Review   No results found for: CKTOTAL, CKMB, CKMBINDEX, TROPONINT  BNP:  No results found for: BNP, PROBNP  PT/INR:    Lab Results   Component Value Date    INR 0.93 01/30/2020    INR 0.93 12/10/2019     Lab Results   Component Value Date    LABA1C 6.8 (H) 11/19/2021    LABA1C 7.3 (H) 08/07/2019     Lab Results   Component Value Date    WBC 7.9 01/30/2020    WBC 5.7 12/10/2019    HCT 43.8 01/30/2020    HCT 44.7 12/10/2019    MCV 98.6 01/30/2020    MCV 96.5 12/10/2019     01/30/2020     12/10/2019     Lab Results   Component Value Date    CHOL 124 11/19/2021    CHOL 128 08/07/2019    TRIG 165 (H) 11/19/2021    TRIG 233 (H) 08/07/2019    HDL 32 (L) 11/19/2021    HDL 38 (L) 08/07/2019    LDLDIRECT 60 11/19/2021    LDLDIRECT 54 08/07/2019     Lab Results   Component Value Date    ALT 12 11/19/2021    ALT 12 01/30/2020    AST 33 11/19/2021    AST 31 01/30/2020     BMP:    Lab Results   Component Value Date     02/21/2022     11/19/2021    K 3.9 02/21/2022    K 4.2 11/19/2021     02/21/2022     11/19/2021    CO2 22 02/21/2022    CO2 25 11/19/2021    BUN 28 02/21/2022    BUN 31 11/19/2021    CREATININE 1.7 02/21/2022    CREATININE 2.0 11/19/2021     CMP:   Lab Results   Component Value Date     02/21/2022     11/19/2021    K 3.9 02/21/2022    K 4.2 11/19/2021     02/21/2022     11/19/2021    CO2 22 02/21/2022    CO2 25 11/19/2021    BUN 28 02/21/2022    BUN 31 11/19/2021    CREATININE 1.7 02/21/2022    CREATININE 2.0 11/19/2021    PROT 6.3 2021    PROT 6.5 2020     TSH:    Lab Results   Component Value Date    TSHHS 3.250 2021    TSHHS 1.860 2019       Smoke: What:                           How much:    Alcohol: How Much:     Caffeine: Pop:         Tea:            Coffee:                Chocolate:    Exercise:    Last Visit:10/12/2021  Complaints:Patient says she has lot more energy now off Metoprolol  Changes:None this visit    LAST EK2021  Sinus  Bradycardia  -With rate variation   cv = 22.  -Left bundle branch block and left axis. ABNORMAL    VENOUS DOPPLER: NONE    HOLTER/ EVENT MONITOR: 10/5/2021  Normal sinus rhythm with BBB. Bradycardia / Tachycardia episodes noted. Brief SVT runs seen. No clinically significant arrhythmias seen    STRESS TEST:  2019  Abnormal Study.    Moderate inferior & lateral wall ischemia of a large territory.    Normal LV function. LVEF is 55 %. ECHO: 2019   Left ventricular systolic function is normal with an ejection fraction of   55-60%. Grade II diastolic dysfunction. The left atrium is moderately dilated. No significant valvular disease noted. Right ventricular systolic pressure of 38 mmHg consistent with mild   pulmonary hypertension. No evidence of pericardial effusion    CAROTID: 2014  No significant stenosis at the origin of the internal   carotid   arteries bilaterally       MUGA: NONE    LAST PACER CHECK: NONE    CARDIAC CATH: 2019   No significant CAD. Left dominant system & Left side arteries   are all normal. RCA is non-dominant has minimal disease. Normal LV systolic function. LVEF is 50 to 55 %.        Amio Protocol:    CHADS: [id number] Score for Atrial Fibrillation Stroke Risk   Risk   Factors  Component Value   C CHF No 0   H HTN Yes 1   A2 Age >= 76 No,  (76 y.o.) 0   D DM Yes 1   S2 Prior Stroke/TIA No 0   V Vascular Disease No 0   A Age 74-69 Yes,  (76 y.o.) 1   Sc Sex female 1 [id number]  Score  4   Score last updated 4/13/22 9:54 AM EDT    Click here for a link to the UpToDate guideline \"Atrial Fibrillation: Anticoagulation therapy to prevent embolization    Disclaimer: Risk Score calculation is dependent on accuracy of patient problem list and past encounter diagnosis.

## 2022-04-13 NOTE — PROGRESS NOTES
OFFICE PROGRESS NOTES      Mike Vilchis is a 66 y.o. female who has    CHIEF COMPLAINT AS FOLLOWS:  CHEST PAIN: Patient denies any C/O chest pains at this time. SOB:  C/O SOB some times. LEG EDEMA: No leg edema   PALPITATIONS: Denies any C/O Palpitations   DIZZINESS: No C/O Dizziness   SYNCOPE: None   OTHER/ ADDITIONAL COMPLAINTS:                                     HPI: Patient is here for F/U on her Daria Kasal- Arrhythmia, HTN & Dyslipidemia problems. Arrhythmia: Patient has known  Bradycardia with LBBB. HTN: Patient has known essential HTN. Has been treated with guideline recommended medical / physical/ diet therapy as stated below. Dyslipidemia: Patient has known mixed dyslipidemia. Has been treated with guideline recommended medical / physical/ diet therapy as stated below. Current Outpatient Medications   Medication Sig Dispense Refill    cephALEXin (KEFLEX) 500 MG capsule Take 1 capsule by mouth 2 times daily 14 capsule 0    NONFORMULARY daily      blood glucose test strips (ASCENSIA AUTODISC VI;ONE TOUCH ULTRA TEST VI) strip by Other route      blood glucose test strips (EXACTECH TEST) strip TEST BLOOD GLUCOSE 2 TIMES A DAY (BEFORE MEALS).       LANTUS SOLOSTAR 100 UNIT/ML injection pen       losartan (COZAAR) 100 MG tablet Take 100 mg by mouth daily      fenofibrate micronized (ANTARA) 130 MG capsule Take 130 mg by mouth every morning (before breakfast)      amLODIPine (NORVASC) 5 MG tablet       loratadine (CLARITIN) 10 MG tablet Take 10 mg by mouth daily       levothyroxine (SYNTHROID) 150 MCG tablet Take 1 tablet by mouth Daily 90 tablet 0    rosuvastatin (CRESTOR) 10 MG tablet Take 1 tablet by mouth daily 90 tablet 0    canagliflozin (INVOKANA) 100 MG TABS tablet Take 100 mg by mouth every morning (before breakfast)       insulin glargine (LANTUS SOLOSTAR) 100 UNIT/ML injection Inject 25 Units into the skin nightly       FLUoxetine (PROZAC) 20 MG capsule Take 20 mg by mouth 2 times daily       aspirin 81 MG tablet Take 81 mg by mouth daily. No current facility-administered medications for this visit. Allergies: Empagliflozin and Lorazepam  Review of Systems:    Constitutional: Negative for diaphoresis and fatigue  Respiratory: Negative for shortness of breath  Cardiovascular: Negative for chest pain, dyspnea on exertion, claudication, edema, irregular heartbeat, murmur, palpitations or shortness of breath  Musculoskeletal: Negative for muscle pain, muscular weakness, negative for pain in arm and leg or swelling in foot and leg    Objective:  /70   Pulse 64   Ht 5' 5\" (1.651 m)   Wt 239 lb 12.8 oz (108.8 kg)   BMI 39.90 kg/m²   Wt Readings from Last 3 Encounters:   04/13/22 239 lb 12.8 oz (108.8 kg)   02/23/22 234 lb 12.8 oz (106.5 kg)   11/16/21 235 lb (106.6 kg)     Body mass index is 39.9 kg/m². GENERAL - Alert, oriented, pleasant, in no apparent distress. EYES: No jaundice, no conjunctival pallor. Neck - Supple. No jugular venous distention noted. No carotid bruits. Cardiovascular - Normal S1 and S2  With 0-0/3 systolic murmur . Extremities - No cyanosis, clubbing, or significant edema. Pulmonary - No respiratory distress. No wheezes or rales.       MEDICAL DECISION MAKING & DATA REVIEW:    Lab Review   No results found for: TROPONINT  No results found for: BNP, PROBNP  Lab Results   Component Value Date    INR 0.93 01/30/2020    INR 0.93 12/10/2019     Lab Results   Component Value Date    LABA1C 6.8 (H) 11/19/2021    LABA1C 7.3 (H) 08/07/2019     Lab Results   Component Value Date    WBC 7.9 01/30/2020    WBC 5.7 12/10/2019    HCT 43.8 01/30/2020    HCT 44.7 12/10/2019    MCV 98.6 01/30/2020    MCV 96.5 12/10/2019     01/30/2020     12/10/2019     Lab Results   Component Value Date    CHOL 124 11/19/2021    CHOL 128 08/07/2019    TRIG 165 (H) 11/19/2021    TRIG 233 (H) 08/07/2019    HDL 32 (L) 11/19/2021    HDL 38 (L) 08/07/2019 LDLDIRECT 60 11/19/2021    LDLDIRECT 54 08/07/2019     Lab Results   Component Value Date    ALT 12 11/19/2021    ALT 12 01/30/2020    AST 33 11/19/2021    AST 31 01/30/2020     BMP:    Lab Results   Component Value Date     02/21/2022     11/19/2021    K 3.9 02/21/2022    K 4.2 11/19/2021     02/21/2022     11/19/2021    CO2 22 02/21/2022    CO2 25 11/19/2021    BUN 28 02/21/2022    BUN 31 11/19/2021    CREATININE 1.7 02/21/2022    CREATININE 2.0 11/19/2021     CMP:   Lab Results   Component Value Date     02/21/2022     11/19/2021    K 3.9 02/21/2022    K 4.2 11/19/2021     02/21/2022     11/19/2021    CO2 22 02/21/2022    CO2 25 11/19/2021    BUN 28 02/21/2022    BUN 31 11/19/2021    CREATININE 1.7 02/21/2022    CREATININE 2.0 11/19/2021    PROT 6.3 11/19/2021    PROT 6.5 01/30/2020     Lab Results   Component Value Date    TSHHS 3.250 11/19/2021    TSHHS 1.860 08/07/2019       QUALITY MEASURES REVIEWED:  1.CAD:Patient is taking anti platelet agent:Yes  Patient does not have Hx of documented CAD  2. DYSLIPIDEMIA: Patient is on cholesterol lowering medication:Yes   3. Beta-Blocker therapy for CAD, if prior Myocardial Infarction:No   4. Counselled regarding smoking cessation. No   Patient does not Smoke. 5.Anticoagulation therapy (for A.Fib) No   Does Not have A.Fib.  6.Discussed weight management strategies. Assessment & Plan:  Primary / Secondary prevention is the goal by aggressive risk modification, healthy and therapeutic life style changes for cardiovascular risk reduction. CORONARY ARTERY DISEASE::None known  CATH 12/2019   No significant CAD. Left dominant system & Left side arteries   are all normal. RCA is non-dominant has minimal disease.   Normal LV systolic function. LVEF is 50 to 55 %. HTN:Yes  well controlled on current medical regimen, see list above.   - changes in  treatment:   no, on Cozaar & Amlodipine   VHD:no              No significant VHD noted   ECHO 12/2019   Left ventricular systolic function is normal with an ejection fraction of   55-60%.  Grade II diastolic dysfunction.   The left atrium is moderately dilated.   No significant valvular disease noted.   Right ventricular systolic pressure of 38 mmHg consistent with mild   pulmonary hypertension.   No evidence of pericardial effusion. DYSLIPIDEMIA: yes,   Patient's profile is at / near [place] [facility] [place] current medical regimen wellyes, takes Crestor. See most recent Lab values in Labs section above. Diabetes mellitis:yes,   BS under good control no    Arrhythmia: Asymptomatic Bradycardia. Significant Bradycardia's recorded  During sleep only    Obesity: Diet & Exercise. TESTS ORDERED: None this visit     PREVIOUSLY ORDERED TESTS REVIEWED & DISCUSSED WITH THE PATIENT:     I personally reviewed & interpreted, all previously ordered tests as copied above. Latest Labs are pulled in to the note with dates. Labs, specially in Reference to Lipid profile, Cardiac testing in the form of Echo ( dated: ), stress tests ( dated: ) & other relevant cardiac testing reviewed with patient & recommendations made based on assessment of the results. Discussed role of Cardiac risk factors & effects + treatment of co morbidities with patient & advised accordingly. MEDICATIONS: List of medications patient is currently taking is reviewed in detail with the patient. Discussed any side effects or problems taking the medication. Recommend Continue present management & medications as listed. AFFIRMATION: I spent at least 20 minutes of time reviewing patient's history, previous & current medical problems & all Labs + testing. This includes chart prep even prior to the vosit. Various goals are discussed and multiple questions answered. Relevant concelling performed. Office follow up in six months.

## 2022-10-21 NOTE — ANESTHESIA PRE PROCEDURE
Department of Anesthesiology  Preprocedure Note       Name:  Bola Holman   Age:  66 y.o.  :  1950                                          MRN:  [id number]         Date:  11/15/2021      Surgeon: Dez Abraham):  Lendel Koyanagi, MD    Procedure: Procedure(s):  COLONOSCOPY DIAGNOSTIC    Medications prior to admission:   Prior to Admission medications    Medication Sig Start Date End Date Taking? Authorizing Provider   NONFORMULARY daily   Yes Historical Provider, MD   blood glucose test strips (ASCENSIA AUTODISC VI;ONE TOUCH ULTRA TEST VI) strip by Other route 21  Historical Provider, MD   blood glucose test strips (EXACTECH TEST) strip TEST BLOOD GLUCOSE 2 TIMES A DAY (BEFORE MEALS).  21  Historical Provider, MD   vitamin D (CHOLECALCIFEROL) 25 MCG (1000 UT) TABS tablet Take 2,000 Units by mouth daily    Historical Provider, MD   LANTUS SOLOSTAR 100 UNIT/ML injection pen  21   Historical Provider, MD   losartan (COZAAR) 100 MG tablet Take 100 mg by mouth daily 21  Historical Provider, MD   JANUVIA 50 MG tablet  21   Historical Provider, MD   fenofibrate micronized (ANTARA) 130 MG capsule Take 130 mg by mouth every morning (before breakfast)    Historical Provider, MD   amLODIPine (NORVASC) 5 MG tablet  19   Historical Provider, MD   loratadine (CLARITIN) 10 MG tablet Take 10 mg by mouth daily  19   Historical Provider, MD   levothyroxine (SYNTHROID) 150 MCG tablet Take 1 tablet by mouth Daily 19  Deepali Treviño MD   rosuvastatin (CRESTOR) 10 MG tablet Take 1 tablet by mouth daily 19   Deepali Treviño MD   Canagliflozin (INVOKANA) 300 MG TABS Take 150 mg by mouth every morning (before breakfast)     Historical Provider, MD   insulin glargine (LANTUS SOLOSTAR) 100 UNIT/ML injection Inject 25 Units into the skin nightly     Historical Provider, MD   FLUoxetine (PROZAC) 20 MG capsule Take 20 mg by mouth 2 times daily Historical Provider, MD   aspirin 81 MG tablet Take 81 mg by mouth daily. Historical Provider, MD       Current medications:    No current facility-administered medications for this encounter. Current Outpatient Medications   Medication Sig Dispense Refill    NONFORMULARY daily      blood glucose test strips (ASCENSIA AUTODISC VI;ONE TOUCH ULTRA TEST VI) strip by Other route      blood glucose test strips (EXACTECH TEST) strip TEST BLOOD GLUCOSE 2 TIMES A DAY (BEFORE MEALS).  vitamin D (CHOLECALCIFEROL) 25 MCG (1000 UT) TABS tablet Take 2,000 Units by mouth daily      LANTUS SOLOSTAR 100 UNIT/ML injection pen       losartan (COZAAR) 100 MG tablet Take 100 mg by mouth daily      JANUVIA 50 MG tablet       fenofibrate micronized (ANTARA) 130 MG capsule Take 130 mg by mouth every morning (before breakfast)      amLODIPine (NORVASC) 5 MG tablet       loratadine (CLARITIN) 10 MG tablet Take 10 mg by mouth daily       levothyroxine (SYNTHROID) 150 MCG tablet Take 1 tablet by mouth Daily 90 tablet 0    rosuvastatin (CRESTOR) 10 MG tablet Take 1 tablet by mouth daily 90 tablet 0    Canagliflozin (INVOKANA) 300 MG TABS Take 150 mg by mouth every morning (before breakfast)       insulin glargine (LANTUS SOLOSTAR) 100 UNIT/ML injection Inject 25 Units into the skin nightly       FLUoxetine (PROZAC) 20 MG capsule Take 20 mg by mouth 2 times daily       aspirin 81 MG tablet Take 81 mg by mouth daily. Allergies:     Allergies   Allergen Reactions    Empagliflozin Hives    Lorazepam Other (See Comments)     Confusion        Problem List:    Patient Active Problem List   Diagnosis Code    Chronic asthmatic bronchitis (HCC) J44.9    JARAD on CPAP G47.33, Z99.89    Family history of colon cancer Z80.0    Hypothyroidism E03.9    Essential hypertension I10    Hyperlipidemia E78.5    SOB (shortness of breath) R06.02    Abnormal nuclear stress test R94.39    CKD (chronic kidney disease), stage IV (HCC) N18.4    Other acute kidney failure (HCC) N17.8    Other proteinuria R80.8    Type 2 diabetes mellitus with chronic kidney disease ([facility] E11.22    Hypertensive renal disease I12.9    Diabetic nephropathy associated with type 2 diabetes mellitus ([facility]) E11.21    Chronic kidney disease-mineral and bone disorder N18.9, E83.9, M89.9    Bradycardia R00.1       Past Medical History:        Diagnosis Date    Asthma     Bradycardia     Bronchitis     COPD (chronic obstructive pulmonary disease) ([facility]     H/O echocardiogram 2019    EF 55-60%, Grade II Diastolic Dysfunction, Left atrium is moderately dilated, no significant valvular disease, Mild Pulm HTN, No pericardial effusion     History of nuclear stress test 2019    ABN, Moderate inferior and lateral wall ischemia of a large territory, EF 55%    Hypercholesteremia     Hyperlipidemia     Hypertension     Hyperthyroidism     Hypothyroidism     Kidney disease     Pneumonia     Type II or unspecified type diabetes mellitus without mention of complication, not stated as uncontrolled     Unspecified sleep apnea        Past Surgical History:        Procedure Laterality Date    APPENDECTOMY      CARDIAC CATHETERIZATION       SECTION      CHOLECYSTECTOMY      COLONOSCOPY         Social History:    Social History     Tobacco Use    Smoking status: Former Smoker     Packs/day: 1.00     Years: 20.00     Pack years: 20.00     Quit date: 1991     Years since quittin.8    Smokeless tobacco: Never Used   Substance Use Topics    Alcohol use: Not Currently                                Counseling given: Not Answered      Vital Signs (Current):   Vitals:    21 0849   Weight: 239 lb (108.4 kg)   Height: 5' 6\" (1.676 m)                                              BP Readings from Last 3 Encounters:   10/20/21 (!) 140/70   10/15/21 (!) 148/62   10/12/21 138/76       NPO Status: 24 hrs. Solids                    6 hrs. clears                                      BMI:   Wt Readings from Last 3 Encounters:   10/20/21 239 lb (108.4 kg)   10/15/21 239 lb (108.4 kg)   10/12/21 241 lb (109.3 kg)     Body mass index is 38.58 kg/m². CBC:   Lab Results   Component Value Date    WBC 7.9 01/30/2020    RBC 4.44 01/30/2020    HGB 13.3 01/30/2020    HCT 43.8 01/30/2020    MCV 98.6 01/30/2020    RDW 14.4 01/30/2020     01/30/2020       CMP:   Lab Results   Component Value Date     10/18/2021    K 3.9 10/18/2021     10/18/2021    CO2 23 10/18/2021    BUN 30 10/18/2021    CREATININE 1.4 10/18/2021    GFRAA 45 10/18/2021    LABGLOM 37 10/18/2021    GLUCOSE 162 10/18/2021    PROT 6.5 01/30/2020    CALCIUM 9.4 10/18/2021    BILITOT 0.3 01/30/2020    ALKPHOS 53 01/30/2020    AST 31 01/30/2020    ALT 12 01/30/2020       POC Tests: No results for input(s): POCGLU, POCNA, POCK, POCCL, POCBUN, POCHEMO, POCHCT in the last 72 hours. Coags:   Lab Results   Component Value Date    PROTIME 11.2 01/30/2020    PROTIME 9.0 09/17/2010    INR 0.93 01/30/2020    APTT 34.4 12/10/2019       HCG (If Applicable): No results found for: PREGTESTUR, PREGSERUM, HCG, HCGQUANT     ABGs: No results found for: PHART, PO2ART, PIE2YTP, WTG0ORI, BEART, W3XQYBSR     Type & Screen (If Applicable):  No results found for: LABABO, LABRH    Drug/Infectious Status (If Applicable):  Lab Results   Component Value Date    HEPCAB NON REACTIVE 10/17/2019       COVID-19 Screening (If Applicable):   Lab Results   Component Value Date    COVID19 NOT DETECTED 11/10/2021           Anesthesia Evaluation  Patient summary reviewed   history of anesthetic complications: PONV.   Airway: Mallampati: II  TM distance: >3 FB   Neck ROM: full  Mouth opening: > = 3 FB Dental:          Pulmonary:normal exam    (+) COPD:  sleep apnea: on CPAP,  asthma:                           ROS comment: Former Smoker - 20 pack years  Quit Smoking: 01/01/91    CXR 12/2019:  Impression  No acute process. Cardiovascular:  Exercise tolerance: good (>4 METS),   (+) hypertension:, pulmonary hypertension: mild, hyperlipidemia         Beta Blocker:  Not on Beta Blocker      ROS comment: 12/2019:  Diagnostic procedure:Left Heart Cath With Ventriculogram, Right   Coronary Angiography, Left Coronary Angiography      Conclusions      Procedure Summary   No significant CAD. Left dominant system & Left side arteries   are all normal. RCA is non-dominant has minimal disease. Normal LV systolic function. LVEF is 50 to 55 %. Patient tolerated the procedure well. No immediate complications. Recommendations   Medical therapy as needed. Echo 12/2019:  Summary   Left ventricular systolic function is normal with an ejection fraction of   55-60%. Grade II diastolic dysfunction. The left atrium is moderately dilated. No significant valvular disease noted. Right ventricular systolic pressure of 38 mmHg consistent with mild   pulmonary hypertension. No evidence of pericardial effusion. Neuro/Psych:   Negative Neuro/Psych ROS              GI/Hepatic/Renal:   (+) renal disease: CRI, bowel prep, morbid obesity          Endo/Other:    (+) DiabetesType II DM, , hypothyroidism::., .                 Abdominal:   (+) obese,           Vascular: Other Findings:           Anesthesia Plan      MAC     ASA 3       Induction: intravenous. Anesthetic plan and risks discussed with patient. SARAH Bennett CRNA   11/15/2021        Pre Anesthesia Evaluation complete. Anesthesia plan, risks, benefits, alternatives, and personnel discussed with patient and/or legal guardian. Patient and/or legal guardian verbalized an understanding and agreed to proceed. Anesthesia plan discussed with care team members and agreed upon.   SARAH Bennett CRNA  11/16/2021 no

## 2022-10-25 NOTE — PROGRESS NOTES
IR Procedure at [facility]:  Spoke with patient's  Daryl Sorenson and patient will arrive at 0930 for her procedure at 1130. Also went over below instructions. NPO at [address]     2. Follow your directions as prescribed by the doctor for your procedure and medications. 3.   Consult your provider as to when to stop blood thinner  4. Do not take any pain medication within 6 hours of your procedure  5. Do not drink any alcoholic beverages or use any street drugs 24 hours before procedure. 6.   Please wear simple, loose fitting clothing to the hospital.  Do not bring valuables (money,             credit cards, checkbooks, etc.)     7. If you  have a Living Will and Durable Power of  for Healthcare, please bring in a copy. 8.   Please bring picture ID,  insurance card, paperwork from the doctors office            (H & P, Consent,  & card for implantable devices). 9.   Report to the information desk on the ground floor. 10. Take a shower the night before or morning of your procedure, do not apply any lotion, oil or powder. 11. If you are going to be sedated for the procedure, you will need a responsible adult to drive you home.

## 2022-10-28 ENCOUNTER — HOSPITAL ENCOUNTER (OUTPATIENT)
Dept: INTERVENTIONAL RADIOLOGY/VASCULAR | Age: 72
Discharge: HOME | End: 2022-10-28
Payer: MEDICARE

## 2022-10-28 VITALS
HEIGHT: 66 IN | DIASTOLIC BLOOD PRESSURE: 66 MMHG | OXYGEN SATURATION: 99 % | HEART RATE: 70 BPM | WEIGHT: 193 LBS | SYSTOLIC BLOOD PRESSURE: 153 MMHG | BODY MASS INDEX: 31.02 KG/M2 | TEMPERATURE: 98.1 F | RESPIRATION RATE: 12 BRPM

## 2022-10-28 DIAGNOSIS — N18.6 ESRD (END STAGE RENAL DISEASE) (HCC): ICD-10-CM

## 2022-10-28 LAB
APTT: 46.5 SECONDS (ref 25.1–37.1)
HCT VFR BLD CALC: 27 % (ref 37–47)
HEMOGLOBIN: 8.2 GM/DL (ref 12.5–16)
INR BLD: 1.51 INDEX
MCH RBC QN AUTO: 31.8 PG (ref 27–31)
MCHC RBC AUTO-ENTMCNC: 30.4 % (ref 32–36)
MCV RBC AUTO: 104.7 FL (ref 78–100)
PDW BLD-RTO: 15.4 % (ref 11.7–14.9)
PLATELET # BLD: 122 K/CU MM (ref 140–440)
PMV BLD AUTO: 9.9 FL (ref 7.5–11.1)
PROTHROMBIN TIME: 19.6 SECONDS (ref 11.7–14.5)
RBC # BLD: 2.58 M/CU MM (ref 4.2–5.4)
WBC # BLD: 4.6 K/CU MM (ref 4–10.5)

## 2022-10-28 PROCEDURE — 7100000010 HC PHASE II RECOVERY - FIRST 15 MIN

## 2022-10-28 PROCEDURE — 36415 COLL VENOUS BLD VENIPUNCTURE: CPT

## 2022-10-28 PROCEDURE — 36589 REMOVAL TUNNELED CV CATH: CPT

## 2022-10-28 PROCEDURE — 85027 COMPLETE CBC AUTOMATED: CPT

## 2022-10-28 PROCEDURE — 85730 THROMBOPLASTIN TIME PARTIAL: CPT

## 2022-10-28 PROCEDURE — 85610 PROTHROMBIN TIME: CPT

## 2022-10-28 PROCEDURE — 7100000011 HC PHASE II RECOVERY - ADDTL 15 MIN

## 2022-10-28 RX ORDER — PANTOPRAZOLE SODIUM 40 MG/1
40 GRANULE, DELAYED RELEASE ORAL
COMMUNITY

## 2022-10-28 RX ORDER — HYDROCODONE BITARTRATE AND ACETAMINOPHEN 5; 325 MG/1; MG/1
0.5 TABLET ORAL EVERY 6 HOURS PRN
COMMUNITY
End: 2022-11-17

## 2022-10-28 ASSESSMENT — PAIN SCALES - GENERAL: PAINLEVEL_OUTOF10: 0

## 2022-10-28 ASSESSMENT — PAIN - FUNCTIONAL ASSESSMENT: PAIN_FUNCTIONAL_ASSESSMENT: 0-10

## 2022-10-28 NOTE — PROGRESS NOTES
1240 Pt VSS, AxO. Pt dressing site is clean, dry and intact. denies any needs, call light within reach,  at bedside. 1306 Discharge instructions reviewed with Patient and family. 1266 [facility] called, report given to Ravindra. 1315 Pt wheeled down for discharge to [facility] medical transport.

## 2022-10-28 NOTE — PROGRESS NOTES
1210  Patient arrived back to [facility]. Report given to this nurse from [facility]. Patient A&O  Beverage of choice offered to patient. Call light in reach and bed in lowest position.    1240 report given to [facility]

## 2022-10-28 NOTE — OR NURSING
PROCEDURE PERFORMED: Remove tunneled dialysis catheter    PRIMARY INDICATION FOR PROCEDURE: no longer needed    INFORMED CONSENT:  Obtained prior to procedure with Dr Yvrose Elizabeth                                              Consent placed in chart. PT TRANSPORTED FROM:  [facility]                                 TO THE IR ROOM: IR large room                       ASSESSMENT: WDL    BARRIER PRECAUTIONS & STERILE TECHNIQUE:               Pt arrived to IR large room in bed and will stay in bed for the procedure. Warm blankets given. Pt placed on Cardiac Monitor. Pt and draped in a sterile fashion with chlorhexadine by Hector ALONSO    PAIN/LOCAL ANESTHESIA/SEDATION MANAGEMENT: lidocaine 1% was used          Local: Lidocaine 1% given by Dr          Sedation:              Fentanyl:             Versed:     INTRAOPERATIVE:           ACCESS TIME:           US/FLUORO:           WIRE USED:           SHEATH USED:           CATHETER USED:           FINAL IMAGE TAKEN TO CONFIRM PLACEMENT OF:           CONTRAST/CC:     STERILE DRESSINGS: 4X4 and tegaderm was applied to site    SPECIMENS: no orders to send cath tip at this time    EBL:   Less than 1cc       FOLLOW- UP X-RAY: none needed     COMPLICATIONS/ OUTCOME: patient tolerated procedure well without any complications. Catheter was removed at 1200. Pressure was held for approximately 7 minutes until hemostasis was achieved.       REPORT CALLED TO: Report called to Rommel Yepez RN in [facility]

## 2022-10-28 NOTE — OR NURSING
PROCEDURE PERFORMED: Remove tunneled dialysis catheter    PRIMARY INDICATION FOR PROCEDURE:  No longer needed    PT TRANSPORTED FROM: [facility]                           TO THE IR ROOM: IR large room       PT IN THE ROOM AT WHAT TIME: 1125                            INFORMED CONSENT:  PT ALERT & ORIENTED and verbalizes understanding of procedure. Pt signed consent with Dr. Heather Roman prior to procedure. Consent placed in chart. NURSING ASSESSMENT: WDL    TIME OUT COMPLETE: [address]  Pt arrived to IR large room in bed and will stay in bed for the procedure. Warm blankets given. Pt placed on Cardiac Monitor. Pt in the semifowlers position.  CHLORHEXADINE scrubbed and draped in a sterile fashion by Flores ALONSO    PAIN/LOCAL ANESTHESIA/SEDATION MANAGEMENT:  Lidocaine 1% was used    INTRAOPERATIVE:    ACCESS TIME:   US/FLUORO:  WIRE USED:  SHEATH USED:   CATHETER USED:  FINAL IMAGE TAKEN TO CONFIRM PLACEMENT OF:   CONTRAST/CC:   FLUID RETURN:     STERILE DRESSINGS:  Dry sterile dressing applied by Flores ALONSO    SPECIMENS: no orders to send cath tip at this time    EBL: less than 1cc    FOLLOW- UP X-RAY: None needed    COMPLICATIONS: patient tolerated procedure well without any complications    STAFF PRESENT DURING PROCEDURE:  Flores Leo RT, Gabriela RN, Dr Leo Villarreal: Report called to Gema MARTINEZ in [facility]    PT LEFT ROOM AT WHAT TIME:

## 2022-11-17 ENCOUNTER — OFFICE VISIT (OUTPATIENT)
Dept: CARDIOLOGY CLINIC | Age: 72
End: 2022-11-17
Payer: MEDICARE

## 2022-11-17 VITALS
HEART RATE: 60 BPM | DIASTOLIC BLOOD PRESSURE: 60 MMHG | HEIGHT: 65 IN | WEIGHT: 193 LBS | BODY MASS INDEX: 32.15 KG/M2 | SYSTOLIC BLOOD PRESSURE: 120 MMHG

## 2022-11-17 DIAGNOSIS — E78.2 MIXED HYPERLIPIDEMIA: ICD-10-CM

## 2022-11-17 DIAGNOSIS — Z99.89 OSA ON CPAP: ICD-10-CM

## 2022-11-17 DIAGNOSIS — I10 ESSENTIAL HYPERTENSION: Primary | ICD-10-CM

## 2022-11-17 DIAGNOSIS — G47.33 OSA ON CPAP: ICD-10-CM

## 2022-11-17 DIAGNOSIS — E11.22 TYPE 2 DIABETES MELLITUS WITH DIABETIC CHRONIC KIDNEY DISEASE, UNSPECIFIED CKD STAGE, UNSPECIFIED WHETHER LONG TERM INSULIN USE (HCC): ICD-10-CM

## 2022-11-17 PROCEDURE — 3074F SYST BP LT 130 MM HG: CPT | Performed by: INTERNAL MEDICINE

## 2022-11-17 PROCEDURE — 1123F ACP DISCUSS/DSCN MKR DOCD: CPT | Performed by: INTERNAL MEDICINE

## 2022-11-17 PROCEDURE — G8417 CALC BMI ABV UP PARAM F/U: HCPCS | Performed by: INTERNAL MEDICINE

## 2022-11-17 PROCEDURE — G8484 FLU IMMUNIZE NO ADMIN: HCPCS | Performed by: INTERNAL MEDICINE

## 2022-11-17 PROCEDURE — 99214 OFFICE O/P EST MOD 30 MIN: CPT | Performed by: INTERNAL MEDICINE

## 2022-11-17 PROCEDURE — 93000 ELECTROCARDIOGRAM COMPLETE: CPT | Performed by: INTERNAL MEDICINE

## 2022-11-17 PROCEDURE — 3046F HEMOGLOBIN A1C LEVEL >9.0%: CPT | Performed by: INTERNAL MEDICINE

## 2022-11-17 PROCEDURE — 2022F DILAT RTA XM EVC RTNOPTHY: CPT | Performed by: INTERNAL MEDICINE

## 2022-11-17 PROCEDURE — G8427 DOCREV CUR MEDS BY ELIG CLIN: HCPCS | Performed by: INTERNAL MEDICINE

## 2022-11-17 PROCEDURE — 3017F COLORECTAL CA SCREEN DOC REV: CPT | Performed by: INTERNAL MEDICINE

## 2022-11-17 PROCEDURE — G8399 PT W/DXA RESULTS DOCUMENT: HCPCS | Performed by: INTERNAL MEDICINE

## 2022-11-17 PROCEDURE — 1036F TOBACCO NON-USER: CPT | Performed by: INTERNAL MEDICINE

## 2022-11-17 PROCEDURE — 1090F PRES/ABSN URINE INCON ASSESS: CPT | Performed by: INTERNAL MEDICINE

## 2022-11-17 PROCEDURE — 3078F DIAST BP <80 MM HG: CPT | Performed by: INTERNAL MEDICINE

## 2022-11-17 RX ORDER — FERROUS SULFATE 325(65) MG
325 TABLET ORAL
COMMUNITY

## 2022-11-17 RX ORDER — LIDOCAINE 4 G/G
1 PATCH TOPICAL DAILY
COMMUNITY
Start: 2022-06-03

## 2022-11-17 RX ORDER — LANOLIN ALCOHOL/MO/W.PET/CERES
3 CREAM (GRAM) TOPICAL DAILY
COMMUNITY

## 2022-11-17 RX ORDER — CHOLECALCIFEROL (VITAMIN D3) 10 MCG
1 TABLET ORAL DAILY
COMMUNITY

## 2022-11-17 RX ORDER — CYCLOBENZAPRINE HCL 10 MG
10 TABLET ORAL PRN
COMMUNITY

## 2022-11-17 RX ORDER — ZINC OXIDE 20 %
OINTMENT (GRAM) TOPICAL PRN
COMMUNITY

## 2022-11-17 RX ORDER — ATORVASTATIN CALCIUM 20 MG/1
20 TABLET, FILM COATED ORAL DAILY
COMMUNITY

## 2022-11-17 NOTE — LETTER
[facility]  [address] [address]  Phone: [phone]  Fax: [phone]    Johana Hill MD    November 17, 2022     Kelsie Guzmán, APRN - CNP  [address] [facility] [address]    Patient: Sylvia Cesar   MR Number: [id number]   YOB: 1950   Date of Visit: 11/17/2022       Dear Kelsie Guzmán: Thank you for referring Jose Enrique Schaefer to me for evaluation/treatment. Below are the relevant portions of my assessment and plan of care. If you have questions, please do not hesitate to call me. I look forward to following Brooke Walker along with you.     Sincerely,      Johana Hill MD

## 2022-11-17 NOTE — PROGRESS NOTES
OFFICE PROGRESS NOTES      Niles Lima is a 74 y.o. female who has    CHIEF COMPLAINT AS FOLLOWS:  CHEST PAIN:  Patient denies any C/O chest pains at this time. SOB:  C/O SOB some times. LEG EDEMA: No leg edema   PALPITATIONS: Denies any C/O Palpitations   DIZZINESS: No C/O Dizziness   SYNCOPE: None   OTHER/ ADDITIONAL COMPLAINTS: Patient was hospitalized for 3 weeks with infection & renal failure. Was on Ventilator    Just got off dialysis recently                                    HPI: Patient is here for F/U on her A-fib, Dominguez-Arrhythmia, HTN & Dyslipidemia problems. Arrhythmia: Patient has known  Bradycardia with LBBB. New diagnosis of A-fib. HTN: Patient has known essential HTN. Has been treated with guideline recommended medical / physical/ diet therapy as stated below. Dyslipidemia: Patient has known mixed dyslipidemia. Has been treated with guideline recommended medical / physical/ diet therapy as stated below. Current Outpatient Medications   Medication Sig Dispense Refill    atorvastatin (LIPITOR) 20 MG tablet Take 20 mg by mouth daily      ferrous sulfate (IRON 325) 325 (65 Fe) MG tablet Take 325 mg by mouth daily (with breakfast)      lidocaine 4 % external patch Place 1 patch onto the skin daily      zinc oxide 20 % ointment Apply topically as needed for Dry Skin Apply topically as needed.       cyclobenzaprine (FLEXERIL) 10 MG tablet Take 10 mg by mouth as needed for Muscle spasms      melatonin 3 MG TABS tablet Take 3 mg by mouth daily      b complex-C-folic acid (NEPHROCAPS) 1 MG capsule Take 1 capsule by mouth daily      vitamin D (CHOLECALCIFEROL) 25 MCG (1000 UT) TABS tablet Take 1,000 Units by mouth daily      apixaban (ELIQUIS) 5 MG TABS tablet Take by mouth 2 times daily      pantoprazole sodium (PROTONIX) 40 MG PACK packet Take 40 mg by mouth every morning (before breakfast)      cephALEXin (KEFLEX) 500 MG capsule Take 1 capsule by mouth 2 times daily 14 capsule 0 loratadine (CLARITIN) 10 MG tablet Take 10 mg by mouth daily       levothyroxine (SYNTHROID) 150 MCG tablet Take 1 tablet by mouth Daily (Patient taking differently: Take 175 mcg by mouth Daily) 90 tablet 0    FLUoxetine (PROZAC) 20 MG capsule Take 20 mg by mouth 2 times daily       canagliflozin (INVOKANA) 100 MG TABS tablet Take 100 mg by mouth every morning (before breakfast)  (Patient not taking: No sig reported)       No current facility-administered medications for this visit. Allergies: Empagliflozin and Lorazepam  Review of Systems:    Constitutional: Negative for diaphoresis and fatigue  Respiratory: Negative for shortness of breath  Cardiovascular: Negative for chest pain, dyspnea on exertion, claudication, edema, irregular heartbeat, murmur, palpitations or shortness of breath  Musculoskeletal: Negative for muscle pain, muscular weakness, negative for pain in arm and leg or swelling in foot and leg    Objective:  /60   Pulse 60   Ht 5' 5\" (1.651 m)   Wt 193 lb (87.5 kg)   BMI 32.12 kg/m²   Wt Readings from Last 3 Encounters:   11/17/22 193 lb (87.5 kg)   10/28/22 193 lb (87.5 kg)   04/13/22 239 lb 12.8 oz (108.8 kg)     Body mass index is 32.12 kg/m². GENERAL - Alert, oriented, pleasant, in no apparent distress. EYES: No jaundice, no conjunctival pallor. Neck - Supple. No jugular venous distention noted. No carotid bruits. Cardiovascular - Normal S1 and S2 without obvious murmur or gallop. Extremities - No cyanosis, clubbing, There is significant edema. R>L   Pulmonary - No respiratory distress. No wheezes or rales.       MEDICAL DECISION MAKING & DATA REVIEW:    Lab Review   No results found for: TROPONINT  No results found for: BNP, PROBNP  Lab Results   Component Value Date    INR 1.51 10/28/2022    INR 0.93 01/30/2020     Lab Results   Component Value Date    LABA1C 6.8 (H) 11/19/2021    LABA1C 7.3 (H) 08/07/2019     Lab Results   Component Value Date    WBC 4.6 10/28/2022 WBC 7.9 01/30/2020    HCT 27.0 (L) 10/28/2022    HCT 43.8 01/30/2020    .7 (H) 10/28/2022    MCV 98.6 01/30/2020     (L) 10/28/2022     01/30/2020     Lab Results   Component Value Date    CHOL 124 11/19/2021    CHOL 128 08/07/2019    TRIG 165 (H) 11/19/2021    TRIG 233 (H) 08/07/2019    HDL 32 (L) 11/19/2021    HDL 38 (L) 08/07/2019    LDLDIRECT 60 11/19/2021    LDLDIRECT 54 08/07/2019     Lab Results   Component Value Date    ALT 12 11/19/2021    ALT 12 01/30/2020    AST 33 11/19/2021    AST 31 01/30/2020     BMP:    Lab Results   Component Value Date/Time     02/21/2022 12:41 PM     11/19/2021 03:22 PM    K 3.9 02/21/2022 12:41 PM    K 4.2 11/19/2021 03:22 PM     02/21/2022 12:41 PM     11/19/2021 03:22 PM    CO2 22 02/21/2022 12:41 PM    CO2 25 11/19/2021 03:22 PM    BUN 28 02/21/2022 12:41 PM    BUN 31 11/19/2021 03:22 PM    CREATININE 1.7 02/21/2022 12:41 PM    CREATININE 2.0 11/19/2021 03:22 PM     CMP:   Lab Results   Component Value Date/Time     02/21/2022 12:41 PM     11/19/2021 03:22 PM    K 3.9 02/21/2022 12:41 PM    K 4.2 11/19/2021 03:22 PM     02/21/2022 12:41 PM     11/19/2021 03:22 PM    CO2 22 02/21/2022 12:41 PM    CO2 25 11/19/2021 03:22 PM    BUN 28 02/21/2022 12:41 PM    BUN 31 11/19/2021 03:22 PM    CREATININE 1.7 02/21/2022 12:41 PM    CREATININE 2.0 11/19/2021 03:22 PM    PROT 6.3 11/19/2021 03:22 PM    PROT 6.5 01/30/2020 03:53 PM     Lab Results   Component Value Date/Time    [facility] 3.250 11/19/2021 03:22 PM    TSH 1.860 08/07/2019 10:11 AM       QUALITY MEASURES REVIEWED:  1.CAD:Patient is taking anti platelet agent:No  Patient does not have Hx of documented CAD  2. DYSLIPIDEMIA: Patient is on cholesterol lowering medication:Yes   3. Beta-Blocker therapy for CAD, if prior Myocardial Infarction:No   4. Counselled regarding smoking cessation. No   Patient does not Smoke.   5.Anticoagulation therapy (for A.Fib) Yes. 6.Discussed weight management strategies. Assessment & Plan:  Primary / Secondary prevention is the goal by aggressive risk modification, healthy and therapeutic life style changes for cardiovascular risk reduction. CORONARY ARTERY DISEASE:None known  CATH 12/2019   No significant CAD. Left dominant system & Left side arteries   are all normal. RCA is non-dominant has minimal disease. Normal LV systolic function. LVEF is 50 to 55 %. HTN:Yes  well controlled on current medical regimen, see list above. - changes in  treatment:   no, not on any medications at this time. VHD: No significant VHD noted     ECHO 5/2022  1. Left ventricle: The cavity size was mildly to moderately      dilated. There was mild concentric hypertrophy. Systolic      function was normal. The calculated Ejection Fraction is 58%. Wall motion was normal; there were no regional wall motion      abnormalities. Doppler parameters are consistent with abnormal      left ventricular relaxation (grade 1 diastolic dysfunction). Internal dimension, ED (PLAX chordal): 6.2cm. Global      longitudinal strain rate of 17.70%. The longitudal strain study      is normal. The longitudal strain study is borderline abnormal.   2. Aortic valve: There was very mild stenosis. Peak velocity (S):      2.15m/sec. Mean gradient (S): 10mm Hg. VTI ratio of LVOT to      aortic valve: 0.57. Valve area (VTI): 1.9cm^2. 3. Mitral valve: There was mild regurgitation. Mean gradient (D):      3mm Hg. Valve area by pressure half-time: 2.3cm^2. Valve area by      continuity equation (using LVOT flow): 2.8cm^2. 4. Left atrium: The atrium was moderately to markedly dilated. 5. Right ventricle: The cavity size was normal. Wall thickness was      normal. Systolic function was normal.   6. Right atrium: The atrium was dilated. 7. Pulmonary arteries: Estimated PA peak pressure is 40mm Hg (S). 8. Pericardium, extracardiac:  There was no pericardial effusion. DYSLIPIDEMIA: yes,   Patient's profile is at / near [place] [employer] [place] current medical regimen wellyes, takes Crestor. See most recent Lab values in Labs section above. Diabetes mellitis:yes,   BS under good control no     Arrhythmia: EKG at [facility]    5/22/2022    ABNORMAL ECG -      REPORT  Atrial  fibrillation  with  RVR    REPORT  Left bundle branch block    REPORT  ST elevation secondary to IVCD     Patient is now on Eliquis for a new diagnosis of A-fib. [id number] Score for Atrial Fibrillation Stroke Risk   Risk   Factors  Component Value   C CHF No 0   H HTN Yes 1   A2 Age >= 76 No,  (69 y.o.) 0   D DM Yes 1   S2 Prior Stroke/TIA No 0   V Vascular Disease No 0   A Age 74-69 Yes,  (69 y.o.) 1   Sc Sex female 1    [id number]  Score  4   Score last updated 11/17/22 1:47 PM EST    EKG: Sinus Bradycardia, LBBB at 55/min. Obesity: Diet & Exercise. TESTS ORDERED: None this visit     PREVIOUSLY ORDERED TESTS REVIEWED & DISCUSSED WITH THE PATIENT:     I personally reviewed & interpreted, all previously ordered tests as copied above. Latest Labs are pulled in to the note with dates. Labs, specially in Reference to Lipid profile, Cardiac testing in the form of Echo ( dated: ), stress tests ( dated: ) & other relevant cardiac testing reviewed with patient & recommendations made based on assessment of the results. Discussed role of Cardiac risk factors & effects + treatment of co morbidities with patient & advised accordingly. MEDICATIONS: List of medications patient is currently taking is reviewed in detail with the patient. Discussed any side effects or problems taking the medication. Recommend Continue present management & medications as listed. AFFIRMATION: I  reviewed patient's history, previous & current medical problems & all Labs + testing. This includes chart prep even prior to the vosit. Various goals are discussed and multiple questions answered. Relevant concelling performed. Office follow up in 3 months.

## 2022-11-17 NOTE — PROGRESS NOTES
After Visit Summary   9/20/2017    Mely Guerra    MRN: [id number]           Patient Information     Date Of Birth          1944        Visit Information        Provider Department      9/20/2017 2:00 PM  39 ATC; [facility]        Today's Diagnoses     Dysphagia    -  1    Age-related osteoporosis without current pathological fracture          Care Instructions    Dear Mely Guerra    Thank you for choosing [facility] ([facility]) for your infusion.  The following information is a summary of our appointment as well as important reminders.          Additional information: you received your reclast infusion today.       Patient Education    Zoledronic Acid Solution for injection    Zoledronic Acid Solution for injection [Hypercalcemia of Malignancy]    Zoledronic Acid Solution for injection [Pagets Disease]  Zoledronic Acid Solution for injection  What is this medicine?  ZOLEDRONIC ACID (JAYLEN le dron ik AS id) lowers the amount of calcium loss from bone. It is used to treat Paget's disease and osteoporosis in women.  This medicine may be used for other purposes; ask your health care provider or pharmacist if you have questions.  What should I tell my health care provider before I take this medicine?  They need to know if you have any of these conditions:    aspirin-sensitive asthma    cancer, especially if you are receiving medicines used to treat cancer    dental disease or wear dentures    infection    kidney disease    low levels of calcium in the blood    past surgery on the parathyroid gland or intestines    receiving corticosteroids like dexamethasone or prednisone    an unusual or allergic reaction to zoledronic acid, other medicines, foods, dyes, or preservatives    pregnant or trying to get pregnant    breast-feeding  How should I use this medicine?  This  Vein \"LEG PROBLEM Questionnaire\"  Do you have prominent leg veins? Yes   Do you have any skin discoloration? Yes  Do you have any healed or active sores? No  Do you have swelling of the legs? Yes  Do you have a family history of varicose veins? No  Does your profession involve pro-longed        standing or heavy lifting? No  7. Have you been fighting overweight problems? Yes  8. Do you have restless legs? No  9. Do you have any night time cramps? No  10. Do you have any of the following in your legs:        Aching I  11. If Yes - Have they worn compression stockings No  12.  If they have worn compression stockings medicine is for infusion into a vein. It is given by a health care professional in a hospital or clinic setting.  Talk to your pediatrician regarding the use of this medicine in children. This medicine is not approved for use in children.  Overdosage: If you think you have taken too much of this medicine contact a poison control center or emergency room at once.  NOTE: This medicine is only for you. Do not share this medicine with others.  What if I miss a dose?  It is important not to miss your dose. Call your doctor or health care professional if you are unable to keep an appointment.  What may interact with this medicine?    certain antibiotics given by injection    NSAIDs, medicines for pain and inflammation, like ibuprofen or naproxen    some diuretics like bumetanide, furosemide    teriparatide  This list may not describe all possible interactions. Give your health care provider a list of all the medicines, herbs, non-prescription drugs, or dietary supplements you use. Also tell them if you smoke, drink alcohol, or use illegal drugs. Some items may interact with your medicine.  What should I watch for while using this medicine?  Visit your doctor or health care professional for regular checkups. It may be some time before you see the benefit from this medicine. Do not stop taking your medicine unless your doctor tells you to. Your doctor may order blood tests or other tests to see how you are doing.  Women should inform their doctor if they wish to become pregnant or think they might be pregnant. There is a potential for serious side effects to an unborn child. Talk to your health care professional or pharmacist for more information.  You should make sure that you get enough calcium and vitamin D while you are taking this medicine. Discuss the foods you eat and the vitamins you take with your health care professional.  Some people who take this medicine have severe bone, joint, and/or muscle pain. This  medicine may also increase your risk for jaw problems or a broken thigh bone. Tell your doctor right away if you have severe pain in your jaw, bones, joints, or muscles. Tell your doctor if you have any pain that does not go away or that gets worse.  Tell your dentist and dental surgeon that you are taking this medicine. You should not have major dental surgery while on this medicine. See your dentist to have a dental exam and fix any dental problems before starting this medicine. Take good care of your teeth while on this medicine. Make sure you see your dentist for regular follow-up appointments.  What side effects may I notice from receiving this medicine?  Side effects that you should report to your doctor or health care professional as soon as possible:    allergic reactions like skin rash, itching or hives, swelling of the face, lips, or tongue    anxiety, confusion, or depression    breathing problems    changes in vision    eye pain    feeling faint or lightheaded, falls    jaw pain, especially after dental work    mouth sores    muscle cramps, stiffness, or weakness    redness, blistering, peeling or loosening of the skin, including inside the mouth    trouble passing urine or change in the amount of urine  Side effects that usually do not require medical attention (report to your doctor or health care professional if they continue or are bothersome):    bone, joint, or muscle pain    constipation    diarrhea    fever    hair loss    irritation at site where injected    loss of appetite    nausea, vomiting    stomach upset    trouble sleeping    trouble swallowing    weak or tired  This list may not describe all possible side effects. Call your doctor for medical advice about side effects. You may report side effects to FDA at 4-526-FDA-6284.  Where should I keep my medicine?  This drug is given in a hospital or clinic and will not be stored at home.  NOTE:This sheet is a summary. It may not cover all  possible information. If you have questions about this medicine, talk to your doctor, pharmacist, or health care provider. Copyright  2016 Gold Standard            We look forward in seeing you on your next appointment here at [facility].  Please don t hesitate to call us at [phone] to reschedule any of your appointments or to speak with one of the [facility] registered nurses.  It was a pleasure taking care of you today.    Sincerely,    [facility]  [address] [place] [address]  [id number]  Phone:  [phone]          Follow-ups after your visit        Who to contact     If you have questions or need follow up information about today's clinic visit or your schedule please contact [facility] directly at [phone].  Normal or non-critical lab and imaging results will be communicated to you by [employer]17hart, letter or phone within 4 business days after the clinic has received the results. If you do not hear from us within 7 days, please contact the clinic through R17hart or phone. If you have a critical or abnormal lab result, we will notify you by phone as soon as possible.  Submit refill requests through [employer] or call your pharmacy and they will forward the refill request to us. Please allow 3 business days for your refill to be completed.          Additional Information About Your Visit        [employer] Information     [employer] gives you secure access to your electronic health record. If you see a primary care provider, you can also send messages to your care team and make appointments. If you have questions, please call your primary care clinic.  If you do not have a primary care provider, please call [phone] and they will assist you.        Care EveryWhere ID     This is your Care EveryWhere ID. This could be used by other organizations to access your [place] medical records  [id number]         Your Vitals Were     Pulse Temperature Respirations Pulse Oximetry BMI (Body Mass Index)       91 98.8  F (37.1  C) (Oral) 18 95% 22.76 kg/m2        Blood Pressure from Last 3 Encounters:   09/20/17 121/73   09/06/17 130/78   08/30/17 147/75    Weight from Last 3 Encounters:   09/20/17 60.1 kg (132 lb 9.6 oz)   09/06/17 59.2 kg (130 lb 9.6 oz)   08/30/17 60.5 kg (133 lb 4.8 oz)              We Performed the Following     Calcium     Creatinine        Primary Care Provider Office Phone # Fax #    Nette Burk [phone] [address] [address]        Equal Access to Services     CIRILO BAUER : Eliot Hutchinson, waleela sinclair, qaybrahat kaalmaisaías zazueta, erik greene . So [facility] [phone].    ATENCIÓN: Si habla español, tiene a mcfadden disposición servicios gratuitos de asistencia lingüística. Jostin al [phone].    We comply with applicable federal civil rights laws and [place] laws. We do not discriminate on the basis of race, color, national origin, age, disability sex, sexual orientation or gender identity.            Thank you!     Thank you for choosing [facility]  for your care. Our goal is always to provide you with excellent care. Hearing back from our patients is one way we can continue to improve our services. Please take a few minutes to complete the written survey that you may receive in the mail after your visit with us. Thank you!             Your Updated Medication List - Protect others around you: Learn how to safely use, store and throw away your medicines at www.disposemymeds.org.          This list is accurate as of: 9/20/17  2:06 PM.  Always use your most recent med list.                   Brand Name Dispense Instructions for use Diagnosis    ALEVE PO      Take 220 mg by mouth Takes every AM and PM when not taking ibuprofen.        CALCIUM CITRATE + D  PO      2 tablets 2 times daily        Chromium 200 MCG Tabs tablet      Take  by mouth.        FISH OIL PO      1,000 mg 2 times daily        fluticasone 50 MCG/ACT spray    FLONASE    48 g    Spray 2 sprays into both nostrils daily    Other allergic rhinitis       GLUCOSAMINE 1500 COMPLEX Caps      Take 1 capsule by mouth daily.    Osteoporosis, unspecified       IBUPROFEN PO      Takes every AM and PM when not taking Aleve.        levothyroxine 100 MCG tablet    SYNTHROID/LEVOTHROID    90 tablet    Take 1 tablet (100 mcg) by mouth daily    Acquired hypothyroidism       losartan 100 MG tablet    COZAAR    90 tablet    Take 1 tablet (100 mg) by mouth daily    Essential hypertension with goal blood pressure less than 140/90       PRESERVISION AREDS 2+MULTI VIT Caps           triamterene-hydrochlorothiazide 37.5-25 MG per capsule    DYAZIDE    90 capsule    Take 1 capsule by mouth daily    Hypertension goal BP (blood pressure) < 140/90

## 2022-11-17 NOTE — PATIENT INSTRUCTIONS
CORONARY ARTERY DISEASE:None known  CATH 12/2019   No significant CAD. Left dominant system & Left side arteries   are all normal. RCA is non-dominant has minimal disease. Normal LV systolic function. LVEF is 50 to 55 %. HTN:Yes  well controlled on current medical regimen, see list above. - changes in  treatment:   no, not on any medications at this time. VHD: No significant VHD noted     ECHO 5/2022  1. Left ventricle: The cavity size was mildly to moderately      dilated. There was mild concentric hypertrophy. Systolic      function was normal. The calculated Ejection Fraction is 58%. Wall motion was normal; there were no regional wall motion      abnormalities. Doppler parameters are consistent with abnormal      left ventricular relaxation (grade 1 diastolic dysfunction). Internal dimension, ED (PLAX chordal): 6.2cm. Global      longitudinal strain rate of 17.70%. The longitudal strain study      is normal. The longitudal strain study is borderline abnormal.   2. Aortic valve: There was very mild stenosis. Peak velocity (S):      2.15m/sec. Mean gradient (S): 10mm Hg. VTI ratio of LVOT to      aortic valve: 0.57. Valve area (VTI): 1.9cm^2. 3. Mitral valve: There was mild regurgitation. Mean gradient (D):      3mm Hg. Valve area by pressure half-time: 2.3cm^2. Valve area by      continuity equation (using LVOT flow): 2.8cm^2. 4. Left atrium: The atrium was moderately to markedly dilated. 5. Right ventricle: The cavity size was normal. Wall thickness was      normal. Systolic function was normal.   6. Right atrium: The atrium was dilated. 7. Pulmonary arteries: Estimated PA peak pressure is 40mm Hg (S). 8. Pericardium, extracardiac: There was no pericardial effusion. DYSLIPIDEMIA: yes,   Patient's profile is at / near [place] [employer] [place] current medical regimen wellyes, takes Crestor. See most recent Lab values in Labs section above.   Diabetes mellitis:yes,   BS under good control no     Arrhythmia: EKG at [facility]    5/22/2022    ABNORMAL ECG -      REPORT  Atrial  fibrillation  with  RVR    REPORT  Left bundle branch block    REPORT  ST elevation secondary to IVCD     Patient is now on Eliquis for a new diagnosis of A-fib. [id number] Score for Atrial Fibrillation Stroke Risk   Risk   Factors  Component Value   C CHF No 0   H HTN Yes 1   A2 Age >= 76 No,  (69 y.o.) 0   D DM Yes 1   S2 Prior Stroke/TIA No 0   V Vascular Disease No 0   A Age 74-69 Yes,  (69 y.o.) 1   Sc Sex female 1    [id number]  Score  4   Score last updated 11/17/22 1:47 PM EST    EKG: Sinus Bradycardia, LBBB at 55/min. Obesity: Diet & Exercise. TESTS ORDERED: None this visit     PREVIOUSLY ORDERED TESTS REVIEWED & DISCUSSED WITH THE PATIENT:     I personally reviewed & interpreted, all previously ordered tests as copied above. Latest Labs are pulled in to the note with dates. Labs, specially in Reference to Lipid profile, Cardiac testing in the form of Echo ( dated: ), stress tests ( dated: ) & other relevant cardiac testing reviewed with patient & recommendations made based on assessment of the results. Discussed role of Cardiac risk factors & effects + treatment of co morbidities with patient & advised accordingly. MEDICATIONS: List of medications patient is currently taking is reviewed in detail with the patient. Discussed any side effects or problems taking the medication. Recommend Continue present management & medications as listed. AFFIRMATION: I  reviewed patient's history, previous & current medical problems & all Labs + testing. This includes chart prep even prior to the vosit. Various goals are discussed and multiple questions answered. Relevant concelling performed. Office follow up in 3 months.

## 2022-11-30 ENCOUNTER — HOSPITAL ENCOUNTER (OUTPATIENT)
Age: 72
Setting detail: SPECIMEN
Discharge: HOME | End: 2022-11-30
Payer: MEDICARE

## 2022-11-30 LAB
BACTERIA: NEGATIVE /HPF
BILIRUBIN URINE: NEGATIVE MG/DL
BLOOD, URINE: ABNORMAL
CLARITY: CLEAR
COLOR: YELLOW
GLUCOSE, URINE: NEGATIVE MG/DL
KETONES, URINE: NEGATIVE MG/DL
LEUKOCYTE ESTERASE, URINE: ABNORMAL
NITRITE URINE, QUANTITATIVE: NEGATIVE
PH, URINE: 5.5 (ref 5–8)
PROTEIN UA: 100 MG/DL
RBC URINE: 67 /HPF (ref 0–6)
SPECIFIC GRAVITY UA: 1.02 (ref 1–1.03)
TRICHOMONAS: ABNORMAL /HPF
UROBILINOGEN, URINE: 0.2 MG/DL (ref 0.2–1)
WBC UA: 2402 /HPF (ref 0–5)

## 2022-11-30 PROCEDURE — 87186 SC STD MICRODIL/AGAR DIL: CPT

## 2022-11-30 PROCEDURE — 87077 CULTURE AEROBIC IDENTIFY: CPT

## 2022-11-30 PROCEDURE — 81001 URINALYSIS AUTO W/SCOPE: CPT

## 2022-11-30 PROCEDURE — 87086 URINE CULTURE/COLONY COUNT: CPT

## 2022-12-02 LAB
CULTURE: ABNORMAL
Lab: ABNORMAL
SPECIMEN: ABNORMAL

## 2022-12-14 ENCOUNTER — HOSPITAL ENCOUNTER (OUTPATIENT)
Age: 72
Discharge: HOME | End: 2022-12-14
Payer: MEDICARE

## 2022-12-14 LAB
ANION GAP SERPL CALCULATED.3IONS-SCNC: 9 MMOL/L (ref 4–16)
BUN BLDV-MCNC: 50 MG/DL (ref 6–23)
CALCIUM SERPL-MCNC: 8.9 MG/DL (ref 8.3–10.6)
CHLORIDE BLD-SCNC: 106 MMOL/L (ref 99–110)
CO2: 21 MMOL/L (ref 21–32)
CREAT SERPL-MCNC: 3.3 MG/DL (ref 0.6–1.1)
GFR SERPL CREATININE-BSD FRML MDRD: 14 ML/MIN/1.73M2
GLUCOSE BLD-MCNC: 174 MG/DL (ref 70–99)
POTASSIUM SERPL-SCNC: 4.1 MMOL/L (ref 3.5–5.1)
SODIUM BLD-SCNC: 136 MMOL/L (ref 135–145)

## 2022-12-14 PROCEDURE — 36415 COLL VENOUS BLD VENIPUNCTURE: CPT

## 2022-12-14 PROCEDURE — 80048 BASIC METABOLIC PNL TOTAL CA: CPT

## 2023-02-14 ENCOUNTER — HOSPITAL ENCOUNTER (OUTPATIENT)
Age: 73
Discharge: HOME | End: 2023-02-14
Payer: MEDICARE

## 2023-02-14 LAB
25(OH)D3 SERPL-MCNC: 38.5 NG/ML
ANION GAP SERPL CALCULATED.3IONS-SCNC: 14 MMOL/L (ref 4–16)
BUN SERPL-MCNC: 70 MG/DL (ref 6–23)
CALCIUM SERPL-MCNC: 9.2 MG/DL (ref 8.3–10.6)
CHLORIDE BLD-SCNC: 105 MMOL/L (ref 99–110)
CO2: 19 MMOL/L (ref 21–32)
CREAT SERPL-MCNC: 3.7 MG/DL (ref 0.6–1.1)
GFR SERPL CREATININE-BSD FRML MDRD: 12 ML/MIN/1.73M2
GLUCOSE SERPL-MCNC: 134 MG/DL (ref 70–99)
POTASSIUM SERPL-SCNC: 4 MMOL/L (ref 3.5–5.1)
SODIUM BLD-SCNC: 138 MMOL/L (ref 135–145)

## 2023-02-14 PROCEDURE — 82306 VITAMIN D 25 HYDROXY: CPT

## 2023-02-14 PROCEDURE — 83970 ASSAY OF PARATHORMONE: CPT

## 2023-02-14 PROCEDURE — 36415 COLL VENOUS BLD VENIPUNCTURE: CPT

## 2023-02-14 PROCEDURE — 80048 BASIC METABOLIC PNL TOTAL CA: CPT

## 2023-02-16 ENCOUNTER — OFFICE VISIT (OUTPATIENT)
Dept: CARDIOLOGY CLINIC | Age: 73
End: 2023-02-16
Payer: MEDICARE

## 2023-02-16 VITALS
BODY MASS INDEX: 31.99 KG/M2 | HEART RATE: 56 BPM | WEIGHT: 192 LBS | DIASTOLIC BLOOD PRESSURE: 60 MMHG | HEIGHT: 65 IN | SYSTOLIC BLOOD PRESSURE: 122 MMHG

## 2023-02-16 DIAGNOSIS — Z99.89 OSA ON CPAP: ICD-10-CM

## 2023-02-16 DIAGNOSIS — E11.21 DIABETIC NEPHROPATHY ASSOCIATED WITH TYPE 2 DIABETES MELLITUS (HCC): ICD-10-CM

## 2023-02-16 DIAGNOSIS — G47.33 OSA ON CPAP: ICD-10-CM

## 2023-02-16 DIAGNOSIS — E78.2 MIXED HYPERLIPIDEMIA: ICD-10-CM

## 2023-02-16 DIAGNOSIS — I10 ESSENTIAL HYPERTENSION: Primary | ICD-10-CM

## 2023-02-16 PROCEDURE — G8417 CALC BMI ABV UP PARAM F/U: HCPCS | Performed by: INTERNAL MEDICINE

## 2023-02-16 PROCEDURE — 3074F SYST BP LT 130 MM HG: CPT | Performed by: INTERNAL MEDICINE

## 2023-02-16 PROCEDURE — 99213 OFFICE O/P EST LOW 20 MIN: CPT | Performed by: INTERNAL MEDICINE

## 2023-02-16 PROCEDURE — 3017F COLORECTAL CA SCREEN DOC REV: CPT | Performed by: INTERNAL MEDICINE

## 2023-02-16 PROCEDURE — 1090F PRES/ABSN URINE INCON ASSESS: CPT | Performed by: INTERNAL MEDICINE

## 2023-02-16 PROCEDURE — G8427 DOCREV CUR MEDS BY ELIG CLIN: HCPCS | Performed by: INTERNAL MEDICINE

## 2023-02-16 PROCEDURE — G8399 PT W/DXA RESULTS DOCUMENT: HCPCS | Performed by: INTERNAL MEDICINE

## 2023-02-16 PROCEDURE — G8484 FLU IMMUNIZE NO ADMIN: HCPCS | Performed by: INTERNAL MEDICINE

## 2023-02-16 PROCEDURE — 1123F ACP DISCUSS/DSCN MKR DOCD: CPT | Performed by: INTERNAL MEDICINE

## 2023-02-16 PROCEDURE — 2022F DILAT RTA XM EVC RTNOPTHY: CPT | Performed by: INTERNAL MEDICINE

## 2023-02-16 PROCEDURE — 3046F HEMOGLOBIN A1C LEVEL >9.0%: CPT | Performed by: INTERNAL MEDICINE

## 2023-02-16 PROCEDURE — 1036F TOBACCO NON-USER: CPT | Performed by: INTERNAL MEDICINE

## 2023-02-16 PROCEDURE — 3078F DIAST BP <80 MM HG: CPT | Performed by: INTERNAL MEDICINE

## 2023-02-16 NOTE — LETTER
[facility]  [address] [place] [address] [address]  Phone: [phone]  Fax: [phone]    Aby Mir MD    February 16, 2023     Moe Quiñones, APRN - [facility]  [address] [facility] [address]    Patient: Clover Guerrero   MR Number: [id number]   YOB: 1950   Date of Visit: 2/16/2023       Dear Moe Quiñones: Thank you for referring Miguel Saeed to me for evaluation/treatment. Below are the relevant portions of my assessment and plan of care. If you have questions, please do not hesitate to call me. I look forward to following Kavon Aaliyah along with you.     Sincerely,      Aby Mir MD

## 2023-02-16 NOTE — PROGRESS NOTES
OFFICE PROGRESS NOTES      Josie Whitman is a 74 y.o. female who has    CHIEF COMPLAINT AS FOLLOWS:  CHEST PAIN: Patient denies any C/O chest pains at this time. SOB:  C/O SOB some times. LEG EDEMA: No leg edema   PALPITATIONS: Denies any C/O Palpitations   DIZZINESS: No C/O Dizziness   SYNCOPE: None   OTHER/ ADDITIONAL COMPLAINTS:                                     HPI: Patient is here for F/U on her  Arrhythmia, A-Fib, HTN & Dyslipidemia problems. Arrhythmia: Patient has known  Bradycardia with LBBB. New diagnosis of A-fib. HTN: Patient has known essential HTN. Has been treated with guideline recommended medical / physical/ diet therapy as stated below. Dyslipidemia: Patient has known mixed dyslipidemia. Has been treated with guideline recommended medical / physical/ diet therapy as stated below.                 Current Outpatient Medications   Medication Sig Dispense Refill    [START ON 2/17/2023] bumetanide (BUMEX) 2 MG tablet Take 1 tablet by mouth three times a week 30 tablet 3    apixaban (ELIQUIS) 5 MG TABS tablet Take 1 tablet by mouth 2 times daily 60 tablet 5    atorvastatin (LIPITOR) 20 MG tablet Take 20 mg by mouth daily      melatonin 3 MG TABS tablet Take 3 mg by mouth daily      b complex-C-folic acid (NEPHROCAPS) 1 MG capsule Take 1 capsule by mouth daily      vitamin D (CHOLECALCIFEROL) 25 MCG (1000 UT) TABS tablet Take 1,000 Units by mouth daily      loratadine (CLARITIN) 10 MG tablet Take 10 mg by mouth daily       levothyroxine (SYNTHROID) 150 MCG tablet Take 1 tablet by mouth Daily (Patient taking differently: Take 175 mcg by mouth Daily) 90 tablet 0    FLUoxetine (PROZAC) 20 MG capsule Take 20 mg by mouth 2 times daily       sodium bicarbonate 650 MG tablet Take 1 tablet by mouth daily (Patient not taking: Reported on 2/16/2023) 30 tablet 3    lidocaine 4 % external patch Place 1 patch onto the skin daily (Patient not taking: Reported on 2/16/2023)      zinc oxide 20 % ointment Apply topically as needed for Dry Skin Apply topically as needed. (Patient not taking: Reported on 2/16/2023)      cyclobenzaprine (FLEXERIL) 10 MG tablet Take 10 mg by mouth as needed for Muscle spasms (Patient not taking: Reported on 2/16/2023)      pantoprazole sodium (PROTONIX) 40 MG PACK packet Take 40 mg by mouth every morning (before breakfast) (Patient not taking: Reported on 2/16/2023)       No current facility-administered medications for this visit. Allergies: Empagliflozin and Lorazepam  Review of Systems:    Constitutional: Negative for diaphoresis and fatigue  Respiratory: Negative for shortness of breath  Cardiovascular: Negative for chest pain, dyspnea on exertion, claudication, edema, irregular heartbeat, murmur, palpitations or shortness of breath  Musculoskeletal: Negative for muscle pain, muscular weakness, negative for pain in arm and leg or swelling in foot and leg    Objective:  /60 (Site: Right Upper Arm, Position: Sitting, Cuff Size: Medium Adult)   Pulse 56   Ht 5' 5\" (1.651 m)   Wt 192 lb (87.1 kg)   BMI 31.95 kg/m²   Wt Readings from Last 3 Encounters:   02/16/23 192 lb (87.1 kg)   02/16/23 191 lb (86.6 kg)   12/16/22 193 lb (87.5 kg)     Body mass index is 31.95 kg/m². GENERAL - Alert, oriented, pleasant, in no apparent distress. EYES: No jaundice, no conjunctival pallor. Neck - Supple. No jugular venous distention noted. No carotid bruits. Cardiovascular - Normal S1 and S2 without obvious murmur or gallop. Extremities - No cyanosis, clubbing, or significant edema. Pulmonary - No respiratory distress. No wheezes or rales.       MEDICAL DECISION MAKING & DATA REVIEW:    Lab Review   No results found for: TROPONINT  No results found for: BNP, PROBNP  Lab Results   Component Value Date    INR 1.51 10/28/2022    INR 0.93 01/30/2020     Lab Results   Component Value Date    LABA1C 6.8 (H) 11/19/2021    LABA1C 7.3 (H) 08/07/2019     Lab Results   Component Value Date    WBC 4.6 10/28/2022    WBC 7.9 01/30/2020    HCT 27.0 (L) 10/28/2022    HCT 43.8 01/30/2020    .7 (H) 10/28/2022    MCV 98.6 01/30/2020     (L) 10/28/2022     01/30/2020     Lab Results   Component Value Date    CHOL 124 11/19/2021    CHOL 128 08/07/2019    TRIG 165 (H) 11/19/2021    TRIG 233 (H) 08/07/2019    HDL 32 (L) 11/19/2021    HDL 38 (L) 08/07/2019    LDLDIRECT 60 11/19/2021    LDLDIRECT 54 08/07/2019     Lab Results   Component Value Date    ALT 12 11/19/2021    ALT 12 01/30/2020    AST 33 11/19/2021    AST 31 01/30/2020     BMP:    Lab Results   Component Value Date/Time     02/14/2023 02:21 PM     12/14/2022 03:32 PM    K 4.0 02/14/2023 02:21 PM    K 4.1 12/14/2022 03:32 PM     02/14/2023 02:21 PM     12/14/2022 03:32 PM    CO2 19 02/14/2023 02:21 PM    CO2 21 12/14/2022 03:32 PM    BUN 70 02/14/2023 02:21 PM    BUN 50 12/14/2022 03:32 PM    CREATININE 3.7 02/14/2023 02:21 PM    CREATININE 3.3 12/14/2022 03:32 PM     CMP:   Lab Results   Component Value Date/Time     02/14/2023 02:21 PM     12/14/2022 03:32 PM    K 4.0 02/14/2023 02:21 PM    K 4.1 12/14/2022 03:32 PM     02/14/2023 02:21 PM     12/14/2022 03:32 PM    CO2 19 02/14/2023 02:21 PM    CO2 21 12/14/2022 03:32 PM    BUN 70 02/14/2023 02:21 PM    BUN 50 12/14/2022 03:32 PM    CREATININE 3.7 02/14/2023 02:21 PM    CREATININE 3.3 12/14/2022 03:32 PM    PROT 6.3 11/19/2021 03:22 PM    PROT 6.5 01/30/2020 03:53 PM     Lab Results   Component Value Date/Time    [facility] 3.250 11/19/2021 03:22 PM    [facility] 1.860 08/07/2019 10:11 AM       QUALITY MEASURES REVIEWED:  1.CAD:Patient is taking anti platelet agent:No  Patient does not have Hx of documented CAD  2. DYSLIPIDEMIA: Patient is on cholesterol lowering medication:Yes   3. Beta-Blocker therapy for CAD, if prior Myocardial Infarction:No   4. Counselled regarding smoking cessation. No   Patient does not Smoke.   5.Anticoagulation therapy (for A.Fib) Yes   6. Discussed weight management strategies. Assessment & Plan:  Primary / Secondary prevention is the goal by aggressive risk modification, healthy and therapeutic life style changes for cardiovascular risk reduction. CORONARY ARTERY DISEASE:None known  CATH 12/2019   No significant CAD. Left dominant system & Left side arteries   are all normal. RCA is non-dominant has minimal disease. Normal LV systolic function. LVEF is 50 to 55 %. HTN:Yes  well controlled on current medical regimen, see list above. - changes in  treatment:   no, not on any medications at this time. VHD: No significant VHD noted      ECHO 5/2022  1. Left ventricle: The cavity size was mildly to moderately      dilated. There was mild concentric hypertrophy. Systolic      function was normal. The calculated Ejection Fraction is 58%. Wall motion was normal; there were no regional wall motion      abnormalities. Doppler parameters are consistent with abnormal      left ventricular relaxation (grade 1 diastolic dysfunction). Internal dimension, ED (PLAX chordal): 6.2cm. Global      longitudinal strain rate of 17.70%. The longitudal strain study      is normal. The longitudal strain study is borderline abnormal.   2. Aortic valve: There was very mild stenosis. Peak velocity (S):      2.15m/sec. Mean gradient (S): 10mm Hg. VTI ratio of LVOT to      aortic valve: 0.57. Valve area (VTI): 1.9cm^2. 3. Mitral valve: There was mild regurgitation. Mean gradient (D):      3mm Hg. Valve area by pressure half-time: 2.3cm^2. Valve area by      continuity equation (using LVOT flow): 2.8cm^2. 4. Left atrium: The atrium was moderately to markedly dilated. 5. Right ventricle: The cavity size was normal. Wall thickness was      normal. Systolic function was normal.   6. Right atrium: The atrium was dilated. 7. Pulmonary arteries: Estimated PA peak pressure is 40mm Hg (S). 8. Pericardium, extracardiac:  There was no pericardial effusion. DYSLIPIDEMIA: yes,   Patient's profile is at / near [place] current medical regimen wellyes, takes Lipitor. See most recent Lab values in Labs section above. Diabetes mellitis:yes,   BS under good control no     Arrhythmia: EKG at [facility]    5/22/2022    ABNORMAL ECG -        REPORT   Atrial  fibrillation  with  RVR    REPORT   Left bundle branch block    REPORT   ST elevation secondary to IVCD      Patient is now on Eliquis for a new diagnosis of A-fib. [id number] Score for Atrial Fibrillation Stroke Risk    Risk   Factors   Component Value   C CHF No 0   H HTN Yes 1   A2 Age >= 76 No,  (69 y.o.) 0   D DM Yes 1   S2 Prior Stroke/TIA No 0   V Vascular Disease No 0   A Age 74-69 Yes,  (69 y.o.) 1   Sc Sex female 1     [id number]  Score   4   Score last updated 11/17/22 1:47 PM EST     EKG: Sinus Bradycardia, LBBB at 55/min. Obesity: Diet & Exercise. TESTS ORDERED: None this visit     PREVIOUSLY ORDERED TESTS REVIEWED & DISCUSSED WITH THE PATIENT:     I personally reviewed & interpreted, all previously ordered tests as copied above. Latest Labs are pulled in to the note with dates. Labs, specially in Reference to Lipid profile, Cardiac testing in the form of Echo ( dated: ), stress tests ( dated: ) & other relevant cardiac testing reviewed with patient & recommendations made based on assessment of the results. Discussed role of Cardiac risk factors & effects + treatment of co morbidities with patient & advised accordingly. MEDICATIONS: List of medications patient is currently taking is reviewed in detail with the patient. Discussed any side effects or problems taking the medication. Recommend Continue present management & medications as listed. AFFIRMATION: I spent at least 20 minutes of time reviewing patient's history, previous & current medical problems & all Labs + testing. This includes chart prep even prior to the vosit.  Various goals are discussed and multiple questions answered. Relevant concelling performed. Office follow up in six months.

## 2023-02-16 NOTE — PATIENT INSTRUCTIONS
CORONARY ARTERY DISEASE:None known  CATH 12/2019   No significant CAD. Left dominant system & Left side arteries   are all normal. RCA is non-dominant has minimal disease. Normal LV systolic function. LVEF is 50 to 55 %. HTN:Yes  well controlled on current medical regimen, see list above. - changes in  treatment:   no, not on any medications at this time. VHD: No significant VHD noted      ECHO 5/2022  1. Left ventricle: The cavity size was mildly to moderately      dilated. There was mild concentric hypertrophy. Systolic      function was normal. The calculated Ejection Fraction is 58%. Wall motion was normal; there were no regional wall motion      abnormalities. Doppler parameters are consistent with abnormal      left ventricular relaxation (grade 1 diastolic dysfunction). Internal dimension, ED (PLAX chordal): 6.2cm. Global      longitudinal strain rate of 17.70%. The longitudal strain study      is normal. The longitudal strain study is borderline abnormal.   2. Aortic valve: There was very mild stenosis. Peak velocity (S):      2.15m/sec. Mean gradient (S): 10mm Hg. VTI ratio of LVOT to      aortic valve: 0.57. Valve area (VTI): 1.9cm^2. 3. Mitral valve: There was mild regurgitation. Mean gradient (D):      3mm Hg. Valve area by pressure half-time: 2.3cm^2. Valve area by      continuity equation (using LVOT flow): 2.8cm^2. 4. Left atrium: The atrium was moderately to markedly dilated. 5. Right ventricle: The cavity size was normal. Wall thickness was      normal. Systolic function was normal.   6. Right atrium: The atrium was dilated. 7. Pulmonary arteries: Estimated PA peak pressure is 40mm Hg (S). 8. Pericardium, extracardiac: There was no pericardial effusion. DYSLIPIDEMIA: yes,   Patient's profile is at / near [place] [employer] [place] current medical regimen wellyes, takes Lipitor. See most recent Lab values in Labs section above.   Diabetes mellitis:yes,   BS under good control no     Arrhythmia: EKG at [facility]    5/22/2022    ABNORMAL ECG -        REPORT   Atrial  fibrillation  with  RVR    REPORT   Left bundle branch block    REPORT   ST elevation secondary to IVCD      Patient is now on Eliquis for a new diagnosis of A-fib. [id number] Score for Atrial Fibrillation Stroke Risk    Risk   Factors   Component Value   C CHF No 0   H HTN Yes 1   A2 Age >= 76 No,  (69 y.o.) 0   D DM Yes 1   S2 Prior Stroke/TIA No 0   V Vascular Disease No 0   A Age 74-69 Yes,  (69 y.o.) 1   Sc Sex female 1     [id number]  Score   4   Score last updated 11/17/22 1:47 PM EST     EKG: Sinus Bradycardia, LBBB at 55/min. Obesity: Diet & Exercise. TESTS ORDERED: None this visit     PREVIOUSLY ORDERED TESTS REVIEWED & DISCUSSED WITH THE PATIENT:     I personally reviewed & interpreted, all previously ordered tests as copied above. Latest Labs are pulled in to the note with dates. Labs, specially in Reference to Lipid profile, Cardiac testing in the form of Echo ( dated: ), stress tests ( dated: ) & other relevant cardiac testing reviewed with patient & recommendations made based on assessment of the results. Discussed role of Cardiac risk factors & effects + treatment of co morbidities with patient & advised accordingly. MEDICATIONS: List of medications patient is currently taking is reviewed in detail with the patient. Discussed any side effects or problems taking the medication. Recommend Continue present management & medications as listed. AFFIRMATION: I spent at least 20 minutes of time reviewing patient's history, previous & current medical problems & all Labs + testing. This includes chart prep even prior to the vosit. Various goals are discussed and multiple questions answered. Relevant concelling performed. Office follow up in six months.

## 2023-02-17 LAB — PTH-INTACT SERPL-MCNC: 102 PG/ML (ref 15–65)

## 2023-03-02 ENCOUNTER — HOSPITAL ENCOUNTER (OUTPATIENT)
Age: 73
Discharge: HOME | End: 2023-03-02
Payer: MEDICARE

## 2023-03-02 DIAGNOSIS — N18.4 CKD (CHRONIC KIDNEY DISEASE), STAGE IV (HCC): ICD-10-CM

## 2023-03-02 LAB
ANION GAP SERPL CALCULATED.3IONS-SCNC: 15 MMOL/L (ref 4–16)
BUN SERPL-MCNC: 78 MG/DL (ref 6–23)
CALCIUM SERPL-MCNC: 9.3 MG/DL (ref 8.3–10.6)
CHLORIDE BLD-SCNC: 103 MMOL/L (ref 99–110)
CO2: 19 MMOL/L (ref 21–32)
CREAT SERPL-MCNC: 4.7 MG/DL (ref 0.6–1.1)
FERRITIN: 147 NG/ML (ref 15–150)
FOLATE SERPL-MCNC: >20 NG/ML (ref 3.1–17.5)
GFR SERPL CREATININE-BSD FRML MDRD: 9 ML/MIN/1.73M2
GLUCOSE SERPL-MCNC: 224 MG/DL (ref 70–99)
HCT VFR BLD CALC: 33.9 % (ref 37–47)
HEMOGLOBIN: 10.5 GM/DL (ref 12.5–16)
IRON: 49 UG/DL (ref 37–145)
MCH RBC QN AUTO: 30.7 PG (ref 27–31)
MCHC RBC AUTO-ENTMCNC: 31 % (ref 32–36)
MCV RBC AUTO: 99.1 FL (ref 78–100)
PCT TRANSFERRIN: 21 % (ref 10–44)
PDW BLD-RTO: 14.4 % (ref 11.7–14.9)
PLATELET # BLD: 164 K/CU MM (ref 140–440)
PMV BLD AUTO: 10.4 FL (ref 7.5–11.1)
POTASSIUM SERPL-SCNC: 4 MMOL/L (ref 3.5–5.1)
RBC # BLD: 3.42 M/CU MM (ref 4.2–5.4)
SODIUM BLD-SCNC: 137 MMOL/L (ref 135–145)
TOTAL IRON BINDING CAPACITY: 230 UG/DL (ref 250–450)
UNSATURATED IRON BINDING CAPACITY: 181 UG/DL (ref 110–370)
VITAMIN B-12: 1384 PG/ML (ref 211–911)
WBC # BLD: 5.4 K/CU MM (ref 4–10.5)

## 2023-03-02 PROCEDURE — 83550 IRON BINDING TEST: CPT

## 2023-03-02 PROCEDURE — 82728 ASSAY OF FERRITIN: CPT

## 2023-03-02 PROCEDURE — 82746 ASSAY OF FOLIC ACID SERUM: CPT

## 2023-03-02 PROCEDURE — 82607 VITAMIN B-12: CPT

## 2023-03-02 PROCEDURE — 83540 ASSAY OF IRON: CPT

## 2023-03-02 PROCEDURE — 80048 BASIC METABOLIC PNL TOTAL CA: CPT

## 2023-03-02 PROCEDURE — 85027 COMPLETE CBC AUTOMATED: CPT

## 2023-03-02 PROCEDURE — 36415 COLL VENOUS BLD VENIPUNCTURE: CPT

## 2023-03-03 NOTE — RESULT ENCOUNTER NOTE
TELL PT:  -YOUR KIDNEY FUNCTION IS WORSE THAN LAST TIME. IT IS AT THE STAGE WHERE YOU MIGHT NEED DIALYSIS AGAIN.   -PLEASE STOP THE WATER PILL, BUMEX  -INCREASE THE SODIUM BICARBONATE TO TWICE A DAY  -I WANT YOU TO HAVE A DIALYSIS CLASS ASAP SO THAT YOU CAN LEARN ABOUT HOME DIALYSIS AND LET ME KNOW IF YOU WOULD BE INTERESTED IN THAT (have Seymour Marcos do it asap with her)  -ANOTHER BMP next week    THX

## 2023-03-16 ENCOUNTER — HOSPITAL ENCOUNTER (OUTPATIENT)
Age: 73
Discharge: HOME | End: 2023-03-16
Payer: MEDICARE

## 2023-03-16 DIAGNOSIS — N18.9 CHRONIC KIDNEY DISEASE-MINERAL AND BONE DISORDER: ICD-10-CM

## 2023-03-16 DIAGNOSIS — N18.4 CKD (CHRONIC KIDNEY DISEASE), STAGE IV (HCC): ICD-10-CM

## 2023-03-16 DIAGNOSIS — M89.9 CHRONIC KIDNEY DISEASE-MINERAL AND BONE DISORDER: ICD-10-CM

## 2023-03-16 DIAGNOSIS — E83.9 CHRONIC KIDNEY DISEASE-MINERAL AND BONE DISORDER: ICD-10-CM

## 2023-03-16 LAB
25(OH)D3 SERPL-MCNC: 33.18 NG/ML
ANION GAP SERPL CALCULATED.3IONS-SCNC: 10 MMOL/L (ref 4–16)
BUN SERPL-MCNC: 87 MG/DL (ref 6–23)
CALCIUM SERPL-MCNC: 8.9 MG/DL (ref 8.3–10.6)
CHLORIDE BLD-SCNC: 104 MMOL/L (ref 99–110)
CO2: 20 MMOL/L (ref 21–32)
CREAT SERPL-MCNC: 3.9 MG/DL (ref 0.6–1.1)
GFR SERPL CREATININE-BSD FRML MDRD: 12 ML/MIN/1.73M2
GLUCOSE SERPL-MCNC: 122 MG/DL (ref 70–99)
POTASSIUM SERPL-SCNC: 4.5 MMOL/L (ref 3.5–5.1)
SODIUM BLD-SCNC: 134 MMOL/L (ref 135–145)

## 2023-03-16 PROCEDURE — 83970 ASSAY OF PARATHORMONE: CPT

## 2023-03-16 PROCEDURE — 36415 COLL VENOUS BLD VENIPUNCTURE: CPT

## 2023-03-16 PROCEDURE — 80048 BASIC METABOLIC PNL TOTAL CA: CPT

## 2023-03-16 PROCEDURE — 82306 VITAMIN D 25 HYDROXY: CPT

## 2023-03-17 NOTE — RESULT ENCOUNTER NOTE
TELL PT:  KIDNEY FUNCTION SLIGHTLY BETTER AT 12% BUT WE NEED TO MEET IN PERSON AND DISCUSS THE FUTURE  COME NEXT WK FOR AN APPT PLEASE  THX

## 2023-03-18 LAB — PTH-INTACT SERPL-MCNC: 103 PG/ML (ref 15–65)

## 2023-04-14 ENCOUNTER — HOSPITAL ENCOUNTER (INPATIENT)
Age: 73
LOS: 10 days | Discharge: NURSING, REHAB OR LONG-TERM CARE | DRG: 242 | End: 2023-04-24
Attending: EMERGENCY MEDICINE | Admitting: STUDENT IN AN ORGANIZED HEALTH CARE EDUCATION/TRAINING PROGRAM
Payer: MEDICARE

## 2023-04-14 ENCOUNTER — APPOINTMENT (OUTPATIENT)
Dept: GENERAL RADIOLOGY | Age: 73
DRG: 242 | End: 2023-04-14
Payer: MEDICARE

## 2023-04-14 DIAGNOSIS — R00.1 BRADYCARDIA: Primary | ICD-10-CM

## 2023-04-14 DIAGNOSIS — I44.39 HIGH DEGREE ATRIOVENTRICULAR BLOCK: ICD-10-CM

## 2023-04-14 DIAGNOSIS — N28.9 ACUTE RENAL INSUFFICIENCY: ICD-10-CM

## 2023-04-14 PROBLEM — N18.6 ESRD (END STAGE RENAL DISEASE) (HCC): Status: ACTIVE | Noted: 2023-04-14

## 2023-04-14 LAB
ALBUMIN SERPL-MCNC: 3.2 GM/DL (ref 3.4–5)
ALP BLD-CCNC: 105 IU/L (ref 40–129)
ALT SERPL-CCNC: 21 U/L (ref 10–40)
ANION GAP SERPL CALCULATED.3IONS-SCNC: 13 MMOL/L (ref 4–16)
ANION GAP SERPL CALCULATED.3IONS-SCNC: 15 MMOL/L (ref 4–16)
ANION GAP SERPL CALCULATED.3IONS-SCNC: 16 MMOL/L (ref 4–16)
AST SERPL-CCNC: 34 IU/L (ref 15–37)
BASE EXCESS: 6 (ref 0–2.4)
BASOPHILS ABSOLUTE: 0 K/CU MM
BASOPHILS ABSOLUTE: 0.1 K/CU MM
BASOPHILS RELATIVE PERCENT: 0.5 % (ref 0–1)
BASOPHILS RELATIVE PERCENT: 0.8 % (ref 0–1)
BILIRUB SERPL-MCNC: 0.4 MG/DL (ref 0–1)
BUN SERPL-MCNC: 86 MG/DL (ref 6–23)
BUN SERPL-MCNC: 88 MG/DL (ref 6–23)
BUN SERPL-MCNC: 88 MG/DL (ref 6–23)
CALCIUM IONIZED: 4.76 MG/DL (ref 4.48–5.28)
CALCIUM SERPL-MCNC: 8.8 MG/DL (ref 8.3–10.6)
CALCIUM SERPL-MCNC: 9 MG/DL (ref 8.3–10.6)
CALCIUM SERPL-MCNC: 9.5 MG/DL (ref 8.3–10.6)
CARBON MONOXIDE, BLOOD: 1.9 % (ref 0–5)
CHLORIDE BLD-SCNC: 103 MMOL/L (ref 99–110)
CHLORIDE BLD-SCNC: 107 MMOL/L (ref 99–110)
CHLORIDE BLD-SCNC: 107 MMOL/L (ref 99–110)
CHOLEST SERPL-MCNC: 89 MG/DL
CO2 CONTENT: 19.1 MMOL/L (ref 19–24)
CO2: 16 MMOL/L (ref 21–32)
CO2: 18 MMOL/L (ref 21–32)
CO2: 20 MMOL/L (ref 21–32)
COMMENT: ABNORMAL
CREAT SERPL-MCNC: 5.3 MG/DL (ref 0.6–1.1)
DIFFERENTIAL TYPE: ABNORMAL
DIFFERENTIAL TYPE: ABNORMAL
EKG ATRIAL RATE: 38 BPM
EKG DIAGNOSIS: NORMAL
EKG P AXIS: 29 DEGREES
EKG P-R INTERVAL: 214 MS
EKG Q-T INTERVAL: 554 MS
EKG QRS DURATION: 178 MS
EKG QTC CALCULATION (BAZETT): 440 MS
EKG R AXIS: -51 DEGREES
EKG T AXIS: 106 DEGREES
EKG VENTRICULAR RATE: 38 BPM
EOSINOPHILS ABSOLUTE: 0.1 K/CU MM
EOSINOPHILS ABSOLUTE: 0.4 K/CU MM
EOSINOPHILS RELATIVE PERCENT: 0.9 % (ref 0–3)
EOSINOPHILS RELATIVE PERCENT: 4.8 % (ref 0–3)
ESTIMATED AVERAGE GLUCOSE: 126 MG/DL
GFR SERPL CREATININE-BSD FRML MDRD: 8 ML/MIN/1.73M2
GLUCOSE BLD-MCNC: 139 MG/DL (ref 70–99)
GLUCOSE SERPL-MCNC: 144 MG/DL (ref 70–99)
GLUCOSE SERPL-MCNC: 155 MG/DL (ref 70–99)
GLUCOSE SERPL-MCNC: 212 MG/DL (ref 70–99)
HBA1C MFR BLD: 6 % (ref 4.2–6.3)
HCO3 ARTERIAL: 18.1 MMOL/L (ref 18–23)
HCT VFR BLD CALC: 25.8 % (ref 37–47)
HCT VFR BLD CALC: 31.6 % (ref 37–47)
HDLC SERPL-MCNC: 45 MG/DL
HEMOGLOBIN: 8.2 GM/DL (ref 12.5–16)
HEMOGLOBIN: 9.8 GM/DL (ref 12.5–16)
IMMATURE NEUTROPHIL %: 0.1 % (ref 0–0.43)
IMMATURE NEUTROPHIL %: 0.8 % (ref 0–0.43)
IONIZED CA: 1.19 MMOL/L (ref 1.12–1.32)
LACTATE: 1.1 MMOL/L (ref 0.5–1.9)
LDLC SERPL CALC-MCNC: 32 MG/DL
LV EF: 38 %
LVEF MODALITY: NORMAL
LYMPHOCYTES ABSOLUTE: 0.8 K/CU MM
LYMPHOCYTES ABSOLUTE: 2.7 K/CU MM
LYMPHOCYTES RELATIVE PERCENT: 12.7 % (ref 24–44)
LYMPHOCYTES RELATIVE PERCENT: 35.6 % (ref 24–44)
MAGNESIUM: 2.2 MG/DL (ref 1.8–2.4)
MAGNESIUM: 2.4 MG/DL (ref 1.8–2.4)
MAGNESIUM: 2.5 MG/DL (ref 1.8–2.4)
MCH RBC QN AUTO: 30.4 PG (ref 27–31)
MCH RBC QN AUTO: 30.7 PG (ref 27–31)
MCHC RBC AUTO-ENTMCNC: 31 % (ref 32–36)
MCHC RBC AUTO-ENTMCNC: 31.8 % (ref 32–36)
MCV RBC AUTO: 96.6 FL (ref 78–100)
MCV RBC AUTO: 98.1 FL (ref 78–100)
METHEMOGLOBIN ARTERIAL: 1.8 %
MONOCYTES ABSOLUTE: 0.8 K/CU MM
MONOCYTES ABSOLUTE: 0.8 K/CU MM
MONOCYTES RELATIVE PERCENT: 12.1 % (ref 0–4)
MONOCYTES RELATIVE PERCENT: 9.9 % (ref 0–4)
NUCLEATED RBC %: 0 %
NUCLEATED RBC %: 0 %
O2 SATURATION: 96 % (ref 96–97)
PCO2 ARTERIAL: 32 MMHG (ref 32–45)
PDW BLD-RTO: 14.9 % (ref 11.7–14.9)
PDW BLD-RTO: 15 % (ref 11.7–14.9)
PH BLOOD: 7.36 (ref 7.34–7.45)
PHOSPHORUS: 4.9 MG/DL (ref 2.5–4.9)
PHOSPHORUS: 5 MG/DL (ref 2.5–4.9)
PLATELET # BLD: 129 K/CU MM (ref 140–440)
PLATELET # BLD: 167 K/CU MM (ref 140–440)
PMV BLD AUTO: 10 FL (ref 7.5–11.1)
PMV BLD AUTO: 10.5 FL (ref 7.5–11.1)
PO2 ARTERIAL: 257 MMHG (ref 75–100)
POTASSIUM SERPL-SCNC: 3.4 MMOL/L (ref 3.5–5.1)
POTASSIUM SERPL-SCNC: 3.6 MMOL/L (ref 3.5–5.1)
POTASSIUM SERPL-SCNC: 3.7 MMOL/L (ref 3.5–5.1)
PRO-BNP: ABNORMAL PG/ML
RBC # BLD: 2.67 M/CU MM (ref 4.2–5.4)
RBC # BLD: 3.22 M/CU MM (ref 4.2–5.4)
SEGMENTED NEUTROPHILS ABSOLUTE COUNT: 3.7 K/CU MM
SEGMENTED NEUTROPHILS ABSOLUTE COUNT: 4.9 K/CU MM
SEGMENTED NEUTROPHILS RELATIVE PERCENT: 48.8 % (ref 36–66)
SEGMENTED NEUTROPHILS RELATIVE PERCENT: 73 % (ref 36–66)
SODIUM BLD-SCNC: 136 MMOL/L (ref 135–145)
SODIUM BLD-SCNC: 139 MMOL/L (ref 135–145)
SODIUM BLD-SCNC: 140 MMOL/L (ref 135–145)
T4 FREE SERPL-MCNC: 1.71 NG/DL (ref 0.9–1.8)
TOTAL IMMATURE NEUTOROPHIL: 0.01 K/CU MM
TOTAL IMMATURE NEUTOROPHIL: 0.05 K/CU MM
TOTAL NUCLEATED RBC: 0 K/CU MM
TOTAL NUCLEATED RBC: 0 K/CU MM
TOTAL PROTEIN: 6.8 GM/DL (ref 6.4–8.2)
TRIGL SERPL-MCNC: 59 MG/DL
TROPONIN T: 0.04 NG/ML
TROPONIN T: 0.04 NG/ML
TSH SERPL DL<=0.005 MIU/L-ACNC: 0.15 UIU/ML (ref 0.27–4.2)
WBC # BLD: 6.6 K/CU MM (ref 4–10.5)
WBC # BLD: 7.7 K/CU MM (ref 4–10.5)

## 2023-04-14 PROCEDURE — 02H63JZ INSERTION OF PACEMAKER LEAD INTO RIGHT ATRIUM, PERCUTANEOUS APPROACH: ICD-10-PCS | Performed by: INTERNAL MEDICINE

## 2023-04-14 PROCEDURE — 93010 ELECTROCARDIOGRAM REPORT: CPT | Performed by: INTERNAL MEDICINE

## 2023-04-14 PROCEDURE — 83036 HEMOGLOBIN GLYCOSYLATED A1C: CPT

## 2023-04-14 PROCEDURE — 0JH606Z INSERTION OF PACEMAKER, DUAL CHAMBER INTO CHEST SUBCUTANEOUS TISSUE AND FASCIA, OPEN APPROACH: ICD-10-PCS | Performed by: INTERNAL MEDICINE

## 2023-04-14 PROCEDURE — 2500000003 HC RX 250 WO HCPCS

## 2023-04-14 PROCEDURE — 84439 ASSAY OF FREE THYROXINE: CPT

## 2023-04-14 PROCEDURE — 2500000003 HC RX 250 WO HCPCS: Performed by: STUDENT IN AN ORGANIZED HEALTH CARE EDUCATION/TRAINING PROGRAM

## 2023-04-14 PROCEDURE — 2580000003 HC RX 258: Performed by: PHYSICIAN ASSISTANT

## 2023-04-14 PROCEDURE — 5A1945Z RESPIRATORY VENTILATION, 24-96 CONSECUTIVE HOURS: ICD-10-PCS | Performed by: STUDENT IN AN ORGANIZED HEALTH CARE EDUCATION/TRAINING PROGRAM

## 2023-04-14 PROCEDURE — 94003 VENT MGMT INPAT SUBQ DAY: CPT

## 2023-04-14 PROCEDURE — 02HK3JZ INSERTION OF PACEMAKER LEAD INTO RIGHT VENTRICLE, PERCUTANEOUS APPROACH: ICD-10-PCS | Performed by: INTERNAL MEDICINE

## 2023-04-14 PROCEDURE — 85025 COMPLETE CBC W/AUTO DIFF WBC: CPT

## 2023-04-14 PROCEDURE — 89220 SPUTUM SPECIMEN COLLECTION: CPT

## 2023-04-14 PROCEDURE — 82962 GLUCOSE BLOOD TEST: CPT

## 2023-04-14 PROCEDURE — 6360000002 HC RX W HCPCS

## 2023-04-14 PROCEDURE — 2500000003 HC RX 250 WO HCPCS: Performed by: PHYSICIAN ASSISTANT

## 2023-04-14 PROCEDURE — 71045 X-RAY EXAM CHEST 1 VIEW: CPT

## 2023-04-14 PROCEDURE — 93005 ELECTROCARDIOGRAM TRACING: CPT | Performed by: NURSE PRACTITIONER

## 2023-04-14 PROCEDURE — C1733 CATH, EP, OTHR THAN COOL-TIP: HCPCS

## 2023-04-14 PROCEDURE — 84100 ASSAY OF PHOSPHORUS: CPT

## 2023-04-14 PROCEDURE — 33208 INSRT HEART PM ATRIAL & VENT: CPT

## 2023-04-14 PROCEDURE — 2000000000 HC ICU R&B

## 2023-04-14 PROCEDURE — 2709999900 HC NON-CHARGEABLE SUPPLY

## 2023-04-14 PROCEDURE — C1898 LEAD, PMKR, OTHER THAN TRANS: HCPCS

## 2023-04-14 PROCEDURE — 83880 ASSAY OF NATRIURETIC PEPTIDE: CPT

## 2023-04-14 PROCEDURE — 84484 ASSAY OF TROPONIN QUANT: CPT

## 2023-04-14 PROCEDURE — 6360000002 HC RX W HCPCS: Performed by: PHYSICIAN ASSISTANT

## 2023-04-14 PROCEDURE — 80048 BASIC METABOLIC PNL TOTAL CA: CPT

## 2023-04-14 PROCEDURE — 37799 UNLISTED PX VASCULAR SURGERY: CPT

## 2023-04-14 PROCEDURE — 36620 INSERTION CATHETER ARTERY: CPT

## 2023-04-14 PROCEDURE — 2580000003 HC RX 258: Performed by: NURSE PRACTITIONER

## 2023-04-14 PROCEDURE — 80053 COMPREHEN METABOLIC PANEL: CPT

## 2023-04-14 PROCEDURE — C1892 INTRO/SHEATH,FIXED,PEEL-AWAY: HCPCS

## 2023-04-14 PROCEDURE — C1785 PMKR, DUAL, RATE-RESP: HCPCS

## 2023-04-14 PROCEDURE — 82330 ASSAY OF CALCIUM: CPT

## 2023-04-14 PROCEDURE — 85027 COMPLETE CBC AUTOMATED: CPT

## 2023-04-14 PROCEDURE — 83605 ASSAY OF LACTIC ACID: CPT

## 2023-04-14 PROCEDURE — 0BH17EZ INSERTION OF ENDOTRACHEAL AIRWAY INTO TRACHEA, VIA NATURAL OR ARTIFICIAL OPENING: ICD-10-PCS | Performed by: STUDENT IN AN ORGANIZED HEALTH CARE EDUCATION/TRAINING PROGRAM

## 2023-04-14 PROCEDURE — 6370000000 HC RX 637 (ALT 250 FOR IP): Performed by: PHYSICIAN ASSISTANT

## 2023-04-14 PROCEDURE — 99285 EMERGENCY DEPT VISIT HI MDM: CPT

## 2023-04-14 PROCEDURE — C1889 IMPLANT/INSERT DEVICE, NOC: HCPCS

## 2023-04-14 PROCEDURE — 93306 TTE W/DOPPLER COMPLETE: CPT

## 2023-04-14 PROCEDURE — 31500 INSERT EMERGENCY AIRWAY: CPT

## 2023-04-14 PROCEDURE — C9113 INJ PANTOPRAZOLE SODIUM, VIA: HCPCS | Performed by: PHYSICIAN ASSISTANT

## 2023-04-14 PROCEDURE — 6360000002 HC RX W HCPCS: Performed by: STUDENT IN AN ORGANIZED HEALTH CARE EDUCATION/TRAINING PROGRAM

## 2023-04-14 PROCEDURE — 2720000010 HC SURG SUPPLY STERILE

## 2023-04-14 PROCEDURE — 84443 ASSAY THYROID STIM HORMONE: CPT

## 2023-04-14 PROCEDURE — 2580000003 HC RX 258: Performed by: STUDENT IN AN ORGANIZED HEALTH CARE EDUCATION/TRAINING PROGRAM

## 2023-04-14 PROCEDURE — 6360000004 HC RX CONTRAST MEDICATION

## 2023-04-14 PROCEDURE — 94761 N-INVAS EAR/PLS OXIMETRY MLT: CPT

## 2023-04-14 PROCEDURE — 33208 INSRT HEART PM ATRIAL & VENT: CPT | Performed by: INTERNAL MEDICINE

## 2023-04-14 PROCEDURE — 83735 ASSAY OF MAGNESIUM: CPT

## 2023-04-14 PROCEDURE — 80061 LIPID PANEL: CPT

## 2023-04-14 PROCEDURE — 94002 VENT MGMT INPAT INIT DAY: CPT

## 2023-04-14 PROCEDURE — 2700000000 HC OXYGEN THERAPY PER DAY

## 2023-04-14 PROCEDURE — 82803 BLOOD GASES ANY COMBINATION: CPT

## 2023-04-14 PROCEDURE — 36415 COLL VENOUS BLD VENIPUNCTURE: CPT

## 2023-04-14 PROCEDURE — 03HY32Z INSERTION OF MONITORING DEVICE INTO UPPER ARTERY, PERCUTANEOUS APPROACH: ICD-10-PCS | Performed by: INTERNAL MEDICINE

## 2023-04-14 RX ORDER — CALCIUM GLUCONATE 20 MG/ML
2000 INJECTION, SOLUTION INTRAVENOUS PRN
Status: DISCONTINUED | OUTPATIENT
Start: 2023-04-14 | End: 2023-04-17

## 2023-04-14 RX ORDER — EPINEPHRINE 0.1 MG/ML
SYRINGE (ML) INJECTION
Status: COMPLETED | OUTPATIENT
Start: 2023-04-14 | End: 2023-04-14

## 2023-04-14 RX ORDER — SODIUM CHLORIDE 0.9 % (FLUSH) 0.9 %
5-40 SYRINGE (ML) INJECTION EVERY 12 HOURS SCHEDULED
Status: DISCONTINUED | OUTPATIENT
Start: 2023-04-14 | End: 2023-04-24 | Stop reason: HOSPADM

## 2023-04-14 RX ORDER — ONDANSETRON 2 MG/ML
4 INJECTION INTRAMUSCULAR; INTRAVENOUS EVERY 6 HOURS PRN
Status: DISCONTINUED | OUTPATIENT
Start: 2023-04-14 | End: 2023-04-24 | Stop reason: HOSPADM

## 2023-04-14 RX ORDER — CHLORHEXIDINE GLUCONATE 0.12 MG/ML
15 RINSE ORAL 2 TIMES DAILY
Status: DISCONTINUED | OUTPATIENT
Start: 2023-04-14 | End: 2023-04-17

## 2023-04-14 RX ORDER — LEVOTHYROXINE SODIUM 0.15 MG/1
150 TABLET ORAL DAILY
Status: DISCONTINUED | OUTPATIENT
Start: 2023-04-15 | End: 2023-04-24 | Stop reason: HOSPADM

## 2023-04-14 RX ORDER — POTASSIUM CHLORIDE 29.8 MG/ML
20 INJECTION INTRAVENOUS PRN
Status: DISCONTINUED | OUTPATIENT
Start: 2023-04-14 | End: 2023-04-17

## 2023-04-14 RX ORDER — CALCIUM GLUCONATE 20 MG/ML
1000 INJECTION, SOLUTION INTRAVENOUS PRN
Status: DISCONTINUED | OUTPATIENT
Start: 2023-04-14 | End: 2023-04-17

## 2023-04-14 RX ORDER — FLUOXETINE HYDROCHLORIDE 20 MG/1
20 CAPSULE ORAL 2 TIMES DAILY
Status: DISCONTINUED | OUTPATIENT
Start: 2023-04-15 | End: 2023-04-24 | Stop reason: HOSPADM

## 2023-04-14 RX ORDER — POLYETHYLENE GLYCOL 3350 17 G/17G
17 POWDER, FOR SOLUTION ORAL DAILY PRN
Status: DISCONTINUED | OUTPATIENT
Start: 2023-04-14 | End: 2023-04-24 | Stop reason: HOSPADM

## 2023-04-14 RX ORDER — SODIUM CHLORIDE 0.9 % (FLUSH) 0.9 %
5-40 SYRINGE (ML) INJECTION PRN
Status: DISCONTINUED | OUTPATIENT
Start: 2023-04-14 | End: 2023-04-24 | Stop reason: HOSPADM

## 2023-04-14 RX ORDER — ACETAMINOPHEN 325 MG/1
650 TABLET ORAL EVERY 6 HOURS PRN
Status: DISCONTINUED | OUTPATIENT
Start: 2023-04-14 | End: 2023-04-24 | Stop reason: HOSPADM

## 2023-04-14 RX ORDER — PROPOFOL 10 MG/ML
5-80 INJECTION, EMULSION INTRAVENOUS CONTINUOUS
Status: DISCONTINUED | OUTPATIENT
Start: 2023-04-14 | End: 2023-04-17

## 2023-04-14 RX ORDER — HEPARIN SODIUM 5000 [USP'U]/ML
5000 INJECTION, SOLUTION INTRAVENOUS; SUBCUTANEOUS EVERY 8 HOURS SCHEDULED
Status: DISCONTINUED | OUTPATIENT
Start: 2023-04-15 | End: 2023-04-15

## 2023-04-14 RX ORDER — MIDAZOLAM HYDROCHLORIDE 1 MG/ML
INJECTION INTRAMUSCULAR; INTRAVENOUS
Status: COMPLETED
Start: 2023-04-14 | End: 2023-04-14

## 2023-04-14 RX ORDER — ROSUVASTATIN CALCIUM 5 MG/1
10 TABLET, COATED ORAL NIGHTLY
Status: DISCONTINUED | OUTPATIENT
Start: 2023-04-15 | End: 2023-04-24 | Stop reason: HOSPADM

## 2023-04-14 RX ORDER — SODIUM CHLORIDE 9 MG/ML
INJECTION, SOLUTION INTRAVENOUS PRN
Status: DISCONTINUED | OUTPATIENT
Start: 2023-04-14 | End: 2023-04-24 | Stop reason: HOSPADM

## 2023-04-14 RX ORDER — ETOMIDATE 2 MG/ML
INJECTION INTRAVENOUS
Status: COMPLETED | OUTPATIENT
Start: 2023-04-14 | End: 2023-04-14

## 2023-04-14 RX ORDER — ROSUVASTATIN CALCIUM 20 MG/1
20 TABLET, COATED ORAL NIGHTLY
Status: DISCONTINUED | OUTPATIENT
Start: 2023-04-15 | End: 2023-04-14

## 2023-04-14 RX ORDER — ONDANSETRON 4 MG/1
4 TABLET, ORALLY DISINTEGRATING ORAL EVERY 8 HOURS PRN
Status: DISCONTINUED | OUTPATIENT
Start: 2023-04-14 | End: 2023-04-24 | Stop reason: HOSPADM

## 2023-04-14 RX ORDER — MIDAZOLAM HYDROCHLORIDE 2 MG/2ML
2 INJECTION, SOLUTION INTRAMUSCULAR; INTRAVENOUS ONCE
Status: COMPLETED | OUTPATIENT
Start: 2023-04-14 | End: 2023-04-14

## 2023-04-14 RX ORDER — HEPARIN SODIUM 1000 [USP'U]/ML
2800 INJECTION, SOLUTION INTRAVENOUS; SUBCUTANEOUS ONCE
Status: COMPLETED | OUTPATIENT
Start: 2023-04-14 | End: 2023-04-16

## 2023-04-14 RX ORDER — 0.9 % SODIUM CHLORIDE 0.9 %
1000 INTRAVENOUS SOLUTION INTRAVENOUS PRN
Status: DISCONTINUED | OUTPATIENT
Start: 2023-04-14 | End: 2023-04-17

## 2023-04-14 RX ORDER — FAMOTIDINE 10 MG/ML
20 INJECTION, SOLUTION INTRAVENOUS DAILY
Status: DISCONTINUED | OUTPATIENT
Start: 2023-04-14 | End: 2023-04-14

## 2023-04-14 RX ORDER — IPRATROPIUM BROMIDE AND ALBUTEROL SULFATE 2.5; .5 MG/3ML; MG/3ML
1 SOLUTION RESPIRATORY (INHALATION)
Status: DISCONTINUED | OUTPATIENT
Start: 2023-04-14 | End: 2023-04-15

## 2023-04-14 RX ORDER — PANTOPRAZOLE SODIUM 40 MG/10ML
40 INJECTION, POWDER, LYOPHILIZED, FOR SOLUTION INTRAVENOUS DAILY
Status: DISCONTINUED | OUTPATIENT
Start: 2023-04-14 | End: 2023-04-17

## 2023-04-14 RX ORDER — NOREPINEPHRINE BIT/0.9 % NACL 16MG/250ML
1-100 INFUSION BOTTLE (ML) INTRAVENOUS CONTINUOUS
Status: DISCONTINUED | OUTPATIENT
Start: 2023-04-14 | End: 2023-04-17

## 2023-04-14 RX ORDER — HEPARIN SODIUM 5000 [USP'U]/ML
5000 INJECTION, SOLUTION INTRAVENOUS; SUBCUTANEOUS EVERY 8 HOURS SCHEDULED
Status: DISCONTINUED | OUTPATIENT
Start: 2023-04-15 | End: 2023-04-14

## 2023-04-14 RX ORDER — MAGNESIUM SULFATE 1 G/100ML
1000 INJECTION INTRAVENOUS PRN
Status: DISCONTINUED | OUTPATIENT
Start: 2023-04-14 | End: 2023-04-17

## 2023-04-14 RX ORDER — AMIODARONE HYDROCHLORIDE 50 MG/ML
INJECTION, SOLUTION INTRAVENOUS
Status: COMPLETED | OUTPATIENT
Start: 2023-04-14 | End: 2023-04-14

## 2023-04-14 RX ORDER — ACETAMINOPHEN 650 MG/1
650 SUPPOSITORY RECTAL EVERY 6 HOURS PRN
Status: DISCONTINUED | OUTPATIENT
Start: 2023-04-14 | End: 2023-04-24 | Stop reason: HOSPADM

## 2023-04-14 RX ORDER — PROPOFOL 10 MG/ML
INJECTION, EMULSION INTRAVENOUS
Status: COMPLETED
Start: 2023-04-14 | End: 2023-04-14

## 2023-04-14 RX ADMIN — SODIUM CHLORIDE, PRESERVATIVE FREE 20 ML: 5 INJECTION INTRAVENOUS at 20:50

## 2023-04-14 RX ADMIN — SODIUM CHLORIDE, PRESERVATIVE FREE 10 ML: 5 INJECTION INTRAVENOUS at 20:52

## 2023-04-14 RX ADMIN — EPINEPHRINE 1 MG: 0.1 INJECTION INTRACARDIAC; INTRAVENOUS at 14:46

## 2023-04-14 RX ADMIN — PANTOPRAZOLE SODIUM 40 MG: 40 INJECTION, POWDER, FOR SOLUTION INTRAVENOUS at 20:50

## 2023-04-14 RX ADMIN — AMIODARONE HYDROCHLORIDE 300 MG: 50 INJECTION, SOLUTION INTRAVENOUS at 14:48

## 2023-04-14 RX ADMIN — MIDAZOLAM HYDROCHLORIDE 2 MG: 2 INJECTION, SOLUTION INTRAMUSCULAR; INTRAVENOUS at 17:40

## 2023-04-14 RX ADMIN — CHLORHEXIDINE GLUCONATE 0.12% ORAL RINSE 15 ML: 1.2 LIQUID ORAL at 20:50

## 2023-04-14 RX ADMIN — EPINEPHRINE 40 MG: 0.1 INJECTION INTRACARDIAC; INTRAVENOUS at 14:55

## 2023-04-14 RX ADMIN — ETOMIDATE 20 MG: 2 INJECTION, SOLUTION INTRAVENOUS at 14:54

## 2023-04-14 RX ADMIN — PROPOFOL 10 MCG/KG/MIN: 10 INJECTION, EMULSION INTRAVENOUS at 17:48

## 2023-04-14 RX ADMIN — Medication 5 MCG/MIN: at 17:40

## 2023-04-14 RX ADMIN — EPINEPHRINE 1 MG: 0.1 INJECTION INTRACARDIAC; INTRAVENOUS at 14:48

## 2023-04-14 RX ADMIN — PROPOFOL 30 MCG/KG/MIN: 10 INJECTION, EMULSION INTRAVENOUS at 22:58

## 2023-04-14 RX ADMIN — SODIUM BICARBONATE: 84 INJECTION, SOLUTION INTRAVENOUS at 17:43

## 2023-04-14 ASSESSMENT — LIFESTYLE VARIABLES
HOW MANY STANDARD DRINKS CONTAINING ALCOHOL DO YOU HAVE ON A TYPICAL DAY: PATIENT DOES NOT DRINK
HOW OFTEN DO YOU HAVE A DRINK CONTAINING ALCOHOL: NEVER
HOW OFTEN DO YOU HAVE A DRINK CONTAINING ALCOHOL: NEVER

## 2023-04-14 ASSESSMENT — PULMONARY FUNCTION TESTS
PIF_VALUE: 19
PIF_VALUE: 19
PIF_VALUE: 29
PIF_VALUE: 24
PIF_VALUE: 21
PIF_VALUE: 33
PIF_VALUE: 22
PIF_VALUE: 19
PIF_VALUE: 27
PIF_VALUE: 23
PIF_VALUE: 35
PIF_VALUE: 21
PIF_VALUE: 19
PIF_VALUE: 22
PIF_VALUE: 25
PIF_VALUE: 23
PIF_VALUE: 26
PIF_VALUE: 31
PIF_VALUE: 19
PIF_VALUE: 23
PIF_VALUE: 19
PIF_VALUE: 19
PIF_VALUE: 21
PIF_VALUE: 19
PIF_VALUE: 24
PIF_VALUE: 19

## 2023-04-15 ENCOUNTER — APPOINTMENT (OUTPATIENT)
Dept: GENERAL RADIOLOGY | Age: 73
DRG: 242 | End: 2023-04-15
Payer: MEDICARE

## 2023-04-15 LAB
ANION GAP SERPL CALCULATED.3IONS-SCNC: 10 MMOL/L (ref 4–16)
ANION GAP SERPL CALCULATED.3IONS-SCNC: 10 MMOL/L (ref 4–16)
ANION GAP SERPL CALCULATED.3IONS-SCNC: 13 MMOL/L (ref 4–16)
BUN SERPL-MCNC: 59 MG/DL (ref 6–23)
BUN SERPL-MCNC: 59 MG/DL (ref 6–23)
BUN SERPL-MCNC: 87 MG/DL (ref 6–23)
CALCIUM IONIZED: 4.6 MG/DL (ref 4.48–5.28)
CALCIUM SERPL-MCNC: 8.3 MG/DL (ref 8.3–10.6)
CALCIUM SERPL-MCNC: 8.3 MG/DL (ref 8.3–10.6)
CALCIUM SERPL-MCNC: 8.6 MG/DL (ref 8.3–10.6)
CHLORIDE BLD-SCNC: 106 MMOL/L (ref 99–110)
CO2: 21 MMOL/L (ref 21–32)
CO2: 23 MMOL/L (ref 21–32)
CO2: 23 MMOL/L (ref 21–32)
CREAT SERPL-MCNC: 3.4 MG/DL (ref 0.6–1.1)
CREAT SERPL-MCNC: 3.4 MG/DL (ref 0.6–1.1)
CREAT SERPL-MCNC: 5.2 MG/DL (ref 0.6–1.1)
GFR SERPL CREATININE-BSD FRML MDRD: 14 ML/MIN/1.73M2
GFR SERPL CREATININE-BSD FRML MDRD: 14 ML/MIN/1.73M2
GFR SERPL CREATININE-BSD FRML MDRD: 8 ML/MIN/1.73M2
GLUCOSE SERPL-MCNC: 124 MG/DL (ref 70–99)
GLUCOSE SERPL-MCNC: 124 MG/DL (ref 70–99)
GLUCOSE SERPL-MCNC: 154 MG/DL (ref 70–99)
HCT VFR BLD CALC: 25.9 % (ref 37–47)
HEMOGLOBIN: 8.3 GM/DL (ref 12.5–16)
IONIZED CA: 1.15 MMOL/L (ref 1.12–1.32)
MAGNESIUM: 2.1 MG/DL (ref 1.8–2.4)
MAGNESIUM: 2.1 MG/DL (ref 1.8–2.4)
MAGNESIUM: 2.2 MG/DL (ref 1.8–2.4)
MCH RBC QN AUTO: 30.4 PG (ref 27–31)
MCHC RBC AUTO-ENTMCNC: 32 % (ref 32–36)
MCV RBC AUTO: 94.9 FL (ref 78–100)
PDW BLD-RTO: 14.8 % (ref 11.7–14.9)
PHOSPHORUS: 2.7 MG/DL (ref 2.5–4.9)
PHOSPHORUS: 2.7 MG/DL (ref 2.5–4.9)
PHOSPHORUS: 4.3 MG/DL (ref 2.5–4.9)
PLATELET # BLD: 128 K/CU MM (ref 140–440)
PMV BLD AUTO: 10.4 FL (ref 7.5–11.1)
POTASSIUM SERPL-SCNC: 3.2 MMOL/L (ref 3.5–5.1)
POTASSIUM SERPL-SCNC: 3.6 MMOL/L (ref 3.5–5.1)
POTASSIUM SERPL-SCNC: 3.6 MMOL/L (ref 3.5–5.1)
RBC # BLD: 2.73 M/CU MM (ref 4.2–5.4)
SODIUM BLD-SCNC: 139 MMOL/L (ref 135–145)
SODIUM BLD-SCNC: 139 MMOL/L (ref 135–145)
SODIUM BLD-SCNC: 140 MMOL/L (ref 135–145)
TROPONIN T: 0.07 NG/ML
TROPONIN T: 0.07 NG/ML
TROPONIN T: 0.08 NG/ML
TROPONIN T: 0.12 NG/ML
TROPONIN T: 0.15 NG/ML
WBC # BLD: 5.4 K/CU MM (ref 4–10.5)

## 2023-04-15 PROCEDURE — 84100 ASSAY OF PHOSPHORUS: CPT

## 2023-04-15 PROCEDURE — 94761 N-INVAS EAR/PLS OXIMETRY MLT: CPT

## 2023-04-15 PROCEDURE — 2700000000 HC OXYGEN THERAPY PER DAY

## 2023-04-15 PROCEDURE — 2580000003 HC RX 258: Performed by: NURSE PRACTITIONER

## 2023-04-15 PROCEDURE — 2500000003 HC RX 250 WO HCPCS: Performed by: STUDENT IN AN ORGANIZED HEALTH CARE EDUCATION/TRAINING PROGRAM

## 2023-04-15 PROCEDURE — 2580000003 HC RX 258: Performed by: INTERNAL MEDICINE

## 2023-04-15 PROCEDURE — 6360000002 HC RX W HCPCS: Performed by: PHYSICIAN ASSISTANT

## 2023-04-15 PROCEDURE — 5A1D90Z PERFORMANCE OF URINARY FILTRATION, CONTINUOUS, GREATER THAN 18 HOURS PER DAY: ICD-10-PCS | Performed by: INTERNAL MEDICINE

## 2023-04-15 PROCEDURE — 83735 ASSAY OF MAGNESIUM: CPT

## 2023-04-15 PROCEDURE — 6370000000 HC RX 637 (ALT 250 FOR IP): Performed by: PHYSICIAN ASSISTANT

## 2023-04-15 PROCEDURE — 2000000000 HC ICU R&B

## 2023-04-15 PROCEDURE — 2500000003 HC RX 250 WO HCPCS: Performed by: INTERNAL MEDICINE

## 2023-04-15 PROCEDURE — 37799 UNLISTED PX VASCULAR SURGERY: CPT

## 2023-04-15 PROCEDURE — 82330 ASSAY OF CALCIUM: CPT

## 2023-04-15 PROCEDURE — 99233 SBSQ HOSP IP/OBS HIGH 50: CPT | Performed by: INTERNAL MEDICINE

## 2023-04-15 PROCEDURE — 6370000000 HC RX 637 (ALT 250 FOR IP): Performed by: STUDENT IN AN ORGANIZED HEALTH CARE EDUCATION/TRAINING PROGRAM

## 2023-04-15 PROCEDURE — 2580000003 HC RX 258: Performed by: STUDENT IN AN ORGANIZED HEALTH CARE EDUCATION/TRAINING PROGRAM

## 2023-04-15 PROCEDURE — 90945 DIALYSIS ONE EVALUATION: CPT

## 2023-04-15 PROCEDURE — 94003 VENT MGMT INPAT SUBQ DAY: CPT

## 2023-04-15 PROCEDURE — 85027 COMPLETE CBC AUTOMATED: CPT

## 2023-04-15 PROCEDURE — C9113 INJ PANTOPRAZOLE SODIUM, VIA: HCPCS | Performed by: PHYSICIAN ASSISTANT

## 2023-04-15 PROCEDURE — 84484 ASSAY OF TROPONIN QUANT: CPT

## 2023-04-15 PROCEDURE — 74018 RADEX ABDOMEN 1 VIEW: CPT

## 2023-04-15 PROCEDURE — 6360000002 HC RX W HCPCS: Performed by: INTERNAL MEDICINE

## 2023-04-15 PROCEDURE — 89220 SPUTUM SPECIMEN COLLECTION: CPT

## 2023-04-15 PROCEDURE — 94640 AIRWAY INHALATION TREATMENT: CPT

## 2023-04-15 PROCEDURE — 80048 BASIC METABOLIC PNL TOTAL CA: CPT

## 2023-04-15 PROCEDURE — 6360000002 HC RX W HCPCS: Performed by: NURSE PRACTITIONER

## 2023-04-15 RX ORDER — ALBUTEROL SULFATE 90 UG/1
4 AEROSOL, METERED RESPIRATORY (INHALATION) 4 TIMES DAILY
Status: DISCONTINUED | OUTPATIENT
Start: 2023-04-15 | End: 2023-04-16

## 2023-04-15 RX ADMIN — Medication: at 20:14

## 2023-04-15 RX ADMIN — ALBUTEROL SULFATE 4 PUFF: 90 AEROSOL, METERED RESPIRATORY (INHALATION) at 07:33

## 2023-04-15 RX ADMIN — HEPARIN SODIUM 5000 UNITS: 5000 INJECTION INTRAVENOUS; SUBCUTANEOUS at 13:40

## 2023-04-15 RX ADMIN — Medication: at 11:00

## 2023-04-15 RX ADMIN — Medication 5 MCG/MIN: at 12:45

## 2023-04-15 RX ADMIN — PROPOFOL 40 MCG/KG/MIN: 10 INJECTION, EMULSION INTRAVENOUS at 10:24

## 2023-04-15 RX ADMIN — CHLORHEXIDINE GLUCONATE 0.12% ORAL RINSE 15 ML: 1.2 LIQUID ORAL at 10:17

## 2023-04-15 RX ADMIN — Medication: at 14:00

## 2023-04-15 RX ADMIN — SODIUM CHLORIDE, PRESERVATIVE FREE 10 ML: 5 INJECTION INTRAVENOUS at 21:42

## 2023-04-15 RX ADMIN — IPRATROPIUM BROMIDE 4 PUFF: 17 AEROSOL, METERED RESPIRATORY (INHALATION) at 11:13

## 2023-04-15 RX ADMIN — PANTOPRAZOLE SODIUM 40 MG: 40 INJECTION, POWDER, FOR SOLUTION INTRAVENOUS at 21:42

## 2023-04-15 RX ADMIN — FLUOXETINE HYDROCHLORIDE 20 MG: 10 CAPSULE ORAL at 10:17

## 2023-04-15 RX ADMIN — POTASSIUM CHLORIDE 20 MEQ: 29.8 INJECTION, SOLUTION INTRAVENOUS at 06:58

## 2023-04-15 RX ADMIN — HEPARIN SODIUM 5000 UNITS: 5000 INJECTION INTRAVENOUS; SUBCUTANEOUS at 05:56

## 2023-04-15 RX ADMIN — Medication: at 22:46

## 2023-04-15 RX ADMIN — CEFAZOLIN SODIUM 1000 MG: 1 INJECTION, POWDER, FOR SOLUTION INTRAMUSCULAR; INTRAVENOUS at 06:20

## 2023-04-15 RX ADMIN — LEVOTHYROXINE SODIUM 150 MCG: 150 TABLET ORAL at 10:17

## 2023-04-15 RX ADMIN — CALCIUM GLUCONATE 1000 MG: 20 INJECTION, SOLUTION INTRAVENOUS at 16:54

## 2023-04-15 RX ADMIN — CEFAZOLIN SODIUM 1000 MG: 1 INJECTION, POWDER, FOR SOLUTION INTRAMUSCULAR; INTRAVENOUS at 00:41

## 2023-04-15 RX ADMIN — ALBUTEROL SULFATE 4 PUFF: 90 AEROSOL, METERED RESPIRATORY (INHALATION) at 11:13

## 2023-04-15 RX ADMIN — IPRATROPIUM BROMIDE 4 PUFF: 17 AEROSOL, METERED RESPIRATORY (INHALATION) at 07:33

## 2023-04-15 ASSESSMENT — PULMONARY FUNCTION TESTS
PIF_VALUE: 19
PIF_VALUE: 20
PIF_VALUE: 19
PIF_VALUE: 24
PIF_VALUE: 18
PIF_VALUE: 19
PIF_VALUE: 34
PIF_VALUE: 22
PIF_VALUE: 19
PIF_VALUE: 31
PIF_VALUE: 19
PIF_VALUE: 20
PIF_VALUE: 19
PIF_VALUE: 21
PIF_VALUE: 21
PIF_VALUE: 23
PIF_VALUE: 19
PIF_VALUE: 23
PIF_VALUE: 23
PIF_VALUE: 19
PIF_VALUE: 19
PIF_VALUE: 20
PIF_VALUE: 19
PIF_VALUE: 22
PIF_VALUE: 21
PIF_VALUE: 20
PIF_VALUE: 20

## 2023-04-15 ASSESSMENT — PAIN SCALES - GENERAL
PAINLEVEL_OUTOF10: 0
PAINLEVEL_OUTOF10: 4
PAINLEVEL_OUTOF10: 0
PAINLEVEL_OUTOF10: 4
PAINLEVEL_OUTOF10: 0

## 2023-04-15 ASSESSMENT — PAIN - FUNCTIONAL ASSESSMENT
PAIN_FUNCTIONAL_ASSESSMENT: PREVENTS OR INTERFERES SOME ACTIVE ACTIVITIES AND ADLS
PAIN_FUNCTIONAL_ASSESSMENT: PREVENTS OR INTERFERES SOME ACTIVE ACTIVITIES AND ADLS

## 2023-04-15 ASSESSMENT — PAIN DESCRIPTION - FREQUENCY
FREQUENCY: CONTINUOUS
FREQUENCY: CONTINUOUS

## 2023-04-15 ASSESSMENT — PAIN DESCRIPTION - LOCATION
LOCATION: CHEST
LOCATION: CHEST

## 2023-04-15 ASSESSMENT — PAIN DESCRIPTION - DESCRIPTORS
DESCRIPTORS: ACHING
DESCRIPTORS: ACHING

## 2023-04-15 ASSESSMENT — PAIN DESCRIPTION - ONSET
ONSET: ON-GOING
ONSET: ON-GOING

## 2023-04-15 ASSESSMENT — PAIN DESCRIPTION - PAIN TYPE
TYPE: ACUTE PAIN
TYPE: ACUTE PAIN

## 2023-04-15 ASSESSMENT — PAIN DESCRIPTION - ORIENTATION
ORIENTATION: MID
ORIENTATION: MID

## 2023-04-16 LAB
ANION GAP SERPL CALCULATED.3IONS-SCNC: 10 MMOL/L (ref 4–16)
ANION GAP SERPL CALCULATED.3IONS-SCNC: 12 MMOL/L (ref 4–16)
BUN SERPL-MCNC: 17 MG/DL (ref 6–23)
BUN SERPL-MCNC: 24 MG/DL (ref 6–23)
CALCIUM IONIZED: 4.44 MG/DL (ref 4.48–5.28)
CALCIUM IONIZED: 4.48 MG/DL (ref 4.48–5.28)
CALCIUM SERPL-MCNC: 7.6 MG/DL (ref 8.3–10.6)
CALCIUM SERPL-MCNC: 8 MG/DL (ref 8.3–10.6)
CHLORIDE BLD-SCNC: 104 MMOL/L (ref 99–110)
CHLORIDE BLD-SCNC: 105 MMOL/L (ref 99–110)
CO2: 24 MMOL/L (ref 21–32)
CO2: 24 MMOL/L (ref 21–32)
CREAT SERPL-MCNC: 1.4 MG/DL (ref 0.6–1.1)
CREAT SERPL-MCNC: 1.7 MG/DL (ref 0.6–1.1)
GFR SERPL CREATININE-BSD FRML MDRD: 31 ML/MIN/1.73M2
GFR SERPL CREATININE-BSD FRML MDRD: 39 ML/MIN/1.73M2
GLUCOSE SERPL-MCNC: 103 MG/DL (ref 70–99)
GLUCOSE SERPL-MCNC: 112 MG/DL (ref 70–99)
HCT VFR BLD CALC: 26.7 % (ref 37–47)
HEMOGLOBIN: 8.3 GM/DL (ref 12.5–16)
IONIZED CA: 1.11 MMOL/L (ref 1.12–1.32)
IONIZED CA: 1.12 MMOL/L (ref 1.12–1.32)
MAGNESIUM: 2.1 MG/DL (ref 1.8–2.4)
MAGNESIUM: 2.1 MG/DL (ref 1.8–2.4)
MCH RBC QN AUTO: 30.2 PG (ref 27–31)
MCHC RBC AUTO-ENTMCNC: 31.1 % (ref 32–36)
MCV RBC AUTO: 97.1 FL (ref 78–100)
PDW BLD-RTO: 15 % (ref 11.7–14.9)
PHOSPHORUS: 1.9 MG/DL (ref 2.5–4.9)
PHOSPHORUS: 2.2 MG/DL (ref 2.5–4.9)
PLATELET # BLD: 115 K/CU MM (ref 140–440)
PMV BLD AUTO: 9.9 FL (ref 7.5–11.1)
POTASSIUM SERPL-SCNC: 3.8 MMOL/L (ref 3.5–5.1)
POTASSIUM SERPL-SCNC: 4 MMOL/L (ref 3.5–5.1)
RBC # BLD: 2.75 M/CU MM (ref 4.2–5.4)
SODIUM BLD-SCNC: 139 MMOL/L (ref 135–145)
SODIUM BLD-SCNC: 140 MMOL/L (ref 135–145)
TROPONIN T: 0.04 NG/ML
TROPONIN T: 0.06 NG/ML
TROPONIN T: 0.06 NG/ML
WBC # BLD: 5.6 K/CU MM (ref 4–10.5)

## 2023-04-16 PROCEDURE — 6360000002 HC RX W HCPCS: Performed by: PHYSICIAN ASSISTANT

## 2023-04-16 PROCEDURE — 6370000000 HC RX 637 (ALT 250 FOR IP): Performed by: PHYSICIAN ASSISTANT

## 2023-04-16 PROCEDURE — 80048 BASIC METABOLIC PNL TOTAL CA: CPT

## 2023-04-16 PROCEDURE — 2500000003 HC RX 250 WO HCPCS: Performed by: INTERNAL MEDICINE

## 2023-04-16 PROCEDURE — 6360000002 HC RX W HCPCS: Performed by: INTERNAL MEDICINE

## 2023-04-16 PROCEDURE — 2500000003 HC RX 250 WO HCPCS: Performed by: STUDENT IN AN ORGANIZED HEALTH CARE EDUCATION/TRAINING PROGRAM

## 2023-04-16 PROCEDURE — 2000000000 HC ICU R&B

## 2023-04-16 PROCEDURE — 83735 ASSAY OF MAGNESIUM: CPT

## 2023-04-16 PROCEDURE — 2580000003 HC RX 258: Performed by: NURSE PRACTITIONER

## 2023-04-16 PROCEDURE — 37799 UNLISTED PX VASCULAR SURGERY: CPT

## 2023-04-16 PROCEDURE — C9113 INJ PANTOPRAZOLE SODIUM, VIA: HCPCS | Performed by: PHYSICIAN ASSISTANT

## 2023-04-16 PROCEDURE — 2580000003 HC RX 258: Performed by: STUDENT IN AN ORGANIZED HEALTH CARE EDUCATION/TRAINING PROGRAM

## 2023-04-16 PROCEDURE — 84484 ASSAY OF TROPONIN QUANT: CPT

## 2023-04-16 PROCEDURE — 84100 ASSAY OF PHOSPHORUS: CPT

## 2023-04-16 PROCEDURE — 82330 ASSAY OF CALCIUM: CPT

## 2023-04-16 PROCEDURE — 2580000003 HC RX 258: Performed by: INTERNAL MEDICINE

## 2023-04-16 PROCEDURE — 85027 COMPLETE CBC AUTOMATED: CPT

## 2023-04-16 RX ORDER — ALBUTEROL SULFATE 90 UG/1
2 AEROSOL, METERED RESPIRATORY (INHALATION) EVERY 4 HOURS PRN
Status: DISCONTINUED | OUTPATIENT
Start: 2023-04-16 | End: 2023-04-24 | Stop reason: HOSPADM

## 2023-04-16 RX ADMIN — ROSUVASTATIN CALCIUM 10 MG: 5 TABLET, COATED ORAL at 20:36

## 2023-04-16 RX ADMIN — Medication: at 14:20

## 2023-04-16 RX ADMIN — SODIUM CHLORIDE, PRESERVATIVE FREE 10 ML: 5 INJECTION INTRAVENOUS at 08:05

## 2023-04-16 RX ADMIN — Medication: at 16:37

## 2023-04-16 RX ADMIN — HEPARIN SODIUM 2800 UNITS: 1000 INJECTION INTRAVENOUS; SUBCUTANEOUS at 21:07

## 2023-04-16 RX ADMIN — Medication: at 16:38

## 2023-04-16 RX ADMIN — SODIUM PHOSPHATE, MONOBASIC, MONOHYDRATE AND SODIUM PHOSPHATE, DIBASIC, ANHYDROUS 6 MMOL: 142; 276 INJECTION, SOLUTION INTRAVENOUS at 17:47

## 2023-04-16 RX ADMIN — APIXABAN 5 MG: 5 TABLET, FILM COATED ORAL at 08:04

## 2023-04-16 RX ADMIN — Medication: at 06:25

## 2023-04-16 RX ADMIN — SODIUM CHLORIDE, PRESERVATIVE FREE 10 ML: 5 INJECTION INTRAVENOUS at 08:13

## 2023-04-16 RX ADMIN — CALCIUM GLUCONATE 1000 MG: 20 INJECTION, SOLUTION INTRAVENOUS at 16:40

## 2023-04-16 RX ADMIN — Medication: at 06:19

## 2023-04-16 RX ADMIN — Medication 1 MCG/MIN: at 17:01

## 2023-04-16 RX ADMIN — CALCIUM GLUCONATE 1000 MG: 20 INJECTION, SOLUTION INTRAVENOUS at 08:04

## 2023-04-16 RX ADMIN — Medication: at 18:57

## 2023-04-16 RX ADMIN — SODIUM CHLORIDE, PRESERVATIVE FREE 10 ML: 5 INJECTION INTRAVENOUS at 20:30

## 2023-04-16 RX ADMIN — SODIUM CHLORIDE, PRESERVATIVE FREE 10 ML: 5 INJECTION INTRAVENOUS at 20:31

## 2023-04-16 RX ADMIN — SODIUM PHOSPHATE, MONOBASIC, MONOHYDRATE AND SODIUM PHOSPHATE, DIBASIC, ANHYDROUS 12 MMOL: 142; 276 INJECTION, SOLUTION INTRAVENOUS at 08:48

## 2023-04-16 RX ADMIN — Medication: at 03:45

## 2023-04-16 RX ADMIN — Medication: at 01:06

## 2023-04-16 RX ADMIN — FLUOXETINE HYDROCHLORIDE 20 MG: 10 CAPSULE ORAL at 20:36

## 2023-04-16 RX ADMIN — APIXABAN 5 MG: 5 TABLET, FILM COATED ORAL at 20:36

## 2023-04-16 RX ADMIN — PANTOPRAZOLE SODIUM 40 MG: 40 INJECTION, POWDER, FOR SOLUTION INTRAVENOUS at 20:36

## 2023-04-16 RX ADMIN — Medication: at 08:56

## 2023-04-16 RX ADMIN — LEVOTHYROXINE SODIUM 150 MCG: 150 TABLET ORAL at 08:07

## 2023-04-16 RX ADMIN — FLUOXETINE HYDROCHLORIDE 20 MG: 10 CAPSULE ORAL at 08:05

## 2023-04-16 ASSESSMENT — PAIN SCALES - GENERAL
PAINLEVEL_OUTOF10: 0

## 2023-04-17 ENCOUNTER — ANESTHESIA EVENT (OUTPATIENT)
Dept: OPERATING ROOM | Age: 73
End: 2023-04-17

## 2023-04-17 LAB
ANION GAP SERPL CALCULATED.3IONS-SCNC: 12 MMOL/L (ref 4–16)
BUN SERPL-MCNC: 21 MG/DL (ref 6–23)
CALCIUM IONIZED: 4.64 MG/DL (ref 4.48–5.28)
CALCIUM SERPL-MCNC: 8.1 MG/DL (ref 8.3–10.6)
CHLORIDE BLD-SCNC: 104 MMOL/L (ref 99–110)
CO2: 22 MMOL/L (ref 21–32)
CREAT SERPL-MCNC: 1.9 MG/DL (ref 0.6–1.1)
GFR SERPL CREATININE-BSD FRML MDRD: 27 ML/MIN/1.73M2
GLUCOSE SERPL-MCNC: 91 MG/DL (ref 70–99)
HBV SURFACE AB SERPL IA-ACNC: <3.5 M[IU]/ML
HBV SURFACE AG SERPL QL IA: NON REACTIVE
HCT VFR BLD CALC: 26.1 % (ref 37–47)
HEMOGLOBIN: 8 GM/DL (ref 12.5–16)
IONIZED CA: 1.16 MMOL/L (ref 1.12–1.32)
MAGNESIUM: 2.1 MG/DL (ref 1.8–2.4)
MCH RBC QN AUTO: 30.3 PG (ref 27–31)
MCHC RBC AUTO-ENTMCNC: 30.7 % (ref 32–36)
MCV RBC AUTO: 98.9 FL (ref 78–100)
PDW BLD-RTO: 14.8 % (ref 11.7–14.9)
PHOSPHORUS: 3 MG/DL (ref 2.5–4.9)
PLATELET # BLD: 99 K/CU MM (ref 140–440)
PMV BLD AUTO: 9.6 FL (ref 7.5–11.1)
POTASSIUM SERPL-SCNC: 3.9 MMOL/L (ref 3.5–5.1)
RBC # BLD: 2.64 M/CU MM (ref 4.2–5.4)
SODIUM BLD-SCNC: 138 MMOL/L (ref 135–145)
TROPONIN T: 0.07 NG/ML
TROPONIN T: 0.07 NG/ML
WBC # BLD: 5.8 K/CU MM (ref 4–10.5)

## 2023-04-17 PROCEDURE — 99232 SBSQ HOSP IP/OBS MODERATE 35: CPT | Performed by: SURGERY

## 2023-04-17 PROCEDURE — 2580000003 HC RX 258: Performed by: STUDENT IN AN ORGANIZED HEALTH CARE EDUCATION/TRAINING PROGRAM

## 2023-04-17 PROCEDURE — 87340 HEPATITIS B SURFACE AG IA: CPT

## 2023-04-17 PROCEDURE — 83735 ASSAY OF MAGNESIUM: CPT

## 2023-04-17 PROCEDURE — 37799 UNLISTED PX VASCULAR SURGERY: CPT

## 2023-04-17 PROCEDURE — 6370000000 HC RX 637 (ALT 250 FOR IP): Performed by: PHYSICIAN ASSISTANT

## 2023-04-17 PROCEDURE — 85027 COMPLETE CBC AUTOMATED: CPT

## 2023-04-17 PROCEDURE — 86706 HEP B SURFACE ANTIBODY: CPT

## 2023-04-17 PROCEDURE — 2580000003 HC RX 258: Performed by: NURSE PRACTITIONER

## 2023-04-17 PROCEDURE — 2140000000 HC CCU INTERMEDIATE R&B

## 2023-04-17 PROCEDURE — 6370000000 HC RX 637 (ALT 250 FOR IP): Performed by: INTERNAL MEDICINE

## 2023-04-17 PROCEDURE — 94761 N-INVAS EAR/PLS OXIMETRY MLT: CPT

## 2023-04-17 PROCEDURE — 2700000000 HC OXYGEN THERAPY PER DAY

## 2023-04-17 PROCEDURE — 84100 ASSAY OF PHOSPHORUS: CPT

## 2023-04-17 PROCEDURE — 80048 BASIC METABOLIC PNL TOTAL CA: CPT

## 2023-04-17 PROCEDURE — 82330 ASSAY OF CALCIUM: CPT

## 2023-04-17 PROCEDURE — 84484 ASSAY OF TROPONIN QUANT: CPT

## 2023-04-17 PROCEDURE — 86704 HEP B CORE ANTIBODY TOTAL: CPT

## 2023-04-17 RX ORDER — PANTOPRAZOLE SODIUM 40 MG/1
40 TABLET, DELAYED RELEASE ORAL
Status: DISCONTINUED | OUTPATIENT
Start: 2023-04-18 | End: 2023-04-24 | Stop reason: HOSPADM

## 2023-04-17 RX ORDER — BUMETANIDE 0.5 MG/1
2 TABLET ORAL DAILY
Status: DISCONTINUED | OUTPATIENT
Start: 2023-04-17 | End: 2023-04-24 | Stop reason: HOSPADM

## 2023-04-17 RX ADMIN — LEVOTHYROXINE SODIUM 150 MCG: 150 TABLET ORAL at 10:03

## 2023-04-17 RX ADMIN — SODIUM CHLORIDE, PRESERVATIVE FREE 10 ML: 5 INJECTION INTRAVENOUS at 10:08

## 2023-04-17 RX ADMIN — SODIUM CHLORIDE, PRESERVATIVE FREE 10 ML: 5 INJECTION INTRAVENOUS at 09:00

## 2023-04-17 RX ADMIN — SODIUM CHLORIDE, PRESERVATIVE FREE 10 ML: 5 INJECTION INTRAVENOUS at 20:01

## 2023-04-17 RX ADMIN — BUMETANIDE 2 MG: 0.5 TABLET ORAL at 10:00

## 2023-04-17 RX ADMIN — FLUOXETINE HYDROCHLORIDE 20 MG: 10 CAPSULE ORAL at 10:00

## 2023-04-17 RX ADMIN — CHLORHEXIDINE GLUCONATE 0.12% ORAL RINSE 15 ML: 1.2 LIQUID ORAL at 10:04

## 2023-04-17 RX ADMIN — ROSUVASTATIN CALCIUM 10 MG: 5 TABLET, COATED ORAL at 20:01

## 2023-04-17 RX ADMIN — FLUOXETINE HYDROCHLORIDE 20 MG: 10 CAPSULE ORAL at 20:01

## 2023-04-17 ASSESSMENT — PAIN SCALES - GENERAL
PAINLEVEL_OUTOF10: 0

## 2023-04-17 NOTE — ANESTHESIA PRE PROCEDURE
Ventriculogram, Right   Coronary Angiography, Left Coronary Angiography      Conclusions      Procedure Summary   No significant CAD. Left dominant system & Left side arteries   are all normal. RCA is non-dominant has minimal disease. Normal LV systolic function. LVEF is 50 to 55 %. Patient tolerated the procedure well. No immediate complications. Recommendations   Medical therapy as needed. Echo 12/2019:  Summary   Left ventricular systolic function is normal with an ejection fraction of   55-60%. Grade II diastolic dysfunction. The left atrium is moderately dilated. No significant valvular disease noted. Right ventricular systolic pressure of 38 mmHg consistent with mild   pulmonary hypertension. No evidence of pericardial effusion. Neuro/Psych:               GI/Hepatic/Renal:   (+) renal disease: CRI and ESRD, bowel prep, morbid obesity          Endo/Other:    (+) DiabetesType II DM, , hypothyroidism::., .                 Abdominal:             Vascular: Other Findings:             Anesthesia Plan      MAC     ASA 3     (Chart review 4/17/2023)  Induction: intravenous. Anesthetic plan and risks discussed with patient. SARAH Moctezuma CRNA   4/17/2023        Pre Anesthesia Evaluation complete. Anesthesia plan, risks, benefits, alternatives, and personnel discussed with patient and/or legal guardian. Patient and/or legal guardian verbalized an understanding and agreed to proceed. Anesthesia plan discussed with care team members and agreed upon.   SARAH Moctezuma CRNA  4/17/2023

## 2023-04-18 ENCOUNTER — ANESTHESIA (OUTPATIENT)
Dept: OPERATING ROOM | Age: 73
End: 2023-04-18

## 2023-04-18 LAB
HBV CORE AB SERPL QL IA: NEGATIVE
HCT VFR BLD CALC: 28.7 % (ref 37–47)
HEMOGLOBIN: 8.7 GM/DL (ref 12.5–16)
MCH RBC QN AUTO: 30.5 PG (ref 27–31)
MCHC RBC AUTO-ENTMCNC: 30.3 % (ref 32–36)
MCV RBC AUTO: 100.7 FL (ref 78–100)
PDW BLD-RTO: 14.4 % (ref 11.7–14.9)
PLATELET # BLD: 105 K/CU MM (ref 140–440)
PMV BLD AUTO: 9.4 FL (ref 7.5–11.1)
RBC # BLD: 2.85 M/CU MM (ref 4.2–5.4)
WBC # BLD: 5.7 K/CU MM (ref 4–10.5)

## 2023-04-18 PROCEDURE — 2140000000 HC CCU INTERMEDIATE R&B

## 2023-04-18 PROCEDURE — 84100 ASSAY OF PHOSPHORUS: CPT

## 2023-04-18 PROCEDURE — 6370000000 HC RX 637 (ALT 250 FOR IP): Performed by: STUDENT IN AN ORGANIZED HEALTH CARE EDUCATION/TRAINING PROGRAM

## 2023-04-18 PROCEDURE — 82330 ASSAY OF CALCIUM: CPT

## 2023-04-18 PROCEDURE — 6370000000 HC RX 637 (ALT 250 FOR IP): Performed by: INTERNAL MEDICINE

## 2023-04-18 PROCEDURE — 85730 THROMBOPLASTIN TIME PARTIAL: CPT

## 2023-04-18 PROCEDURE — 2700000000 HC OXYGEN THERAPY PER DAY

## 2023-04-18 PROCEDURE — 6370000000 HC RX 637 (ALT 250 FOR IP): Performed by: PHYSICIAN ASSISTANT

## 2023-04-18 PROCEDURE — 83735 ASSAY OF MAGNESIUM: CPT

## 2023-04-18 PROCEDURE — 51702 INSERT TEMP BLADDER CATH: CPT

## 2023-04-18 PROCEDURE — 6360000002 HC RX W HCPCS: Performed by: INTERNAL MEDICINE

## 2023-04-18 PROCEDURE — 85027 COMPLETE CBC AUTOMATED: CPT

## 2023-04-18 PROCEDURE — 94761 N-INVAS EAR/PLS OXIMETRY MLT: CPT

## 2023-04-18 PROCEDURE — 80048 BASIC METABOLIC PNL TOTAL CA: CPT

## 2023-04-18 RX ADMIN — LEVOTHYROXINE SODIUM 150 MCG: 150 TABLET ORAL at 06:12

## 2023-04-18 RX ADMIN — EPOETIN ALFA-EPBX 10000 UNITS: 10000 INJECTION, SOLUTION INTRAVENOUS; SUBCUTANEOUS at 08:59

## 2023-04-18 RX ADMIN — PANTOPRAZOLE SODIUM 40 MG: 40 TABLET, DELAYED RELEASE ORAL at 06:12

## 2023-04-18 RX ADMIN — FLUOXETINE HYDROCHLORIDE 20 MG: 10 CAPSULE ORAL at 21:02

## 2023-04-18 RX ADMIN — ACETAMINOPHEN 650 MG: 325 TABLET ORAL at 14:17

## 2023-04-18 RX ADMIN — BUMETANIDE 2 MG: 0.5 TABLET ORAL at 14:17

## 2023-04-18 RX ADMIN — FLUOXETINE HYDROCHLORIDE 20 MG: 10 CAPSULE ORAL at 14:18

## 2023-04-18 RX ADMIN — ROSUVASTATIN CALCIUM 10 MG: 5 TABLET, COATED ORAL at 21:02

## 2023-04-18 ASSESSMENT — PAIN SCALES - GENERAL: PAINLEVEL_OUTOF10: 4

## 2023-04-18 ASSESSMENT — PAIN DESCRIPTION - PAIN TYPE: TYPE: ACUTE PAIN

## 2023-04-18 ASSESSMENT — PAIN DESCRIPTION - FREQUENCY: FREQUENCY: INTERMITTENT

## 2023-04-18 ASSESSMENT — PAIN DESCRIPTION - DESCRIPTORS: DESCRIPTORS: ACHING;DISCOMFORT;STABBING

## 2023-04-18 ASSESSMENT — PAIN DESCRIPTION - LOCATION: LOCATION: BACK

## 2023-04-18 ASSESSMENT — PAIN - FUNCTIONAL ASSESSMENT: PAIN_FUNCTIONAL_ASSESSMENT: ACTIVITIES ARE NOT PREVENTED

## 2023-04-18 ASSESSMENT — PAIN DESCRIPTION - ONSET: ONSET: GRADUAL

## 2023-04-19 ENCOUNTER — APPOINTMENT (OUTPATIENT)
Dept: INTERVENTIONAL RADIOLOGY/VASCULAR | Age: 73
DRG: 242 | End: 2023-04-19
Payer: MEDICARE

## 2023-04-19 LAB
ANION GAP SERPL CALCULATED.3IONS-SCNC: 9 MMOL/L (ref 4–16)
APTT: 22.6 SECONDS (ref 25.1–37.1)
BUN SERPL-MCNC: 24 MG/DL (ref 6–23)
CALCIUM SERPL-MCNC: 8.4 MG/DL (ref 8.3–10.6)
CHLORIDE BLD-SCNC: 99 MMOL/L (ref 99–110)
CO2: 28 MMOL/L (ref 21–32)
CREAT SERPL-MCNC: 2.8 MG/DL (ref 0.6–1.1)
GFR SERPL CREATININE-BSD FRML MDRD: 17 ML/MIN/1.73M2
GLUCOSE SERPL-MCNC: 133 MG/DL (ref 70–99)
HCT VFR BLD CALC: 29.3 % (ref 37–47)
HEMOGLOBIN: 8.9 GM/DL (ref 12.5–16)
INR BLD: 1.08 INDEX
MCH RBC QN AUTO: 30.3 PG (ref 27–31)
MCHC RBC AUTO-ENTMCNC: 30.4 % (ref 32–36)
MCV RBC AUTO: 99.7 FL (ref 78–100)
PDW BLD-RTO: 14.3 % (ref 11.7–14.9)
PLATELET # BLD: 101 K/CU MM (ref 140–440)
PMV BLD AUTO: 10.1 FL (ref 7.5–11.1)
POTASSIUM SERPL-SCNC: 3.8 MMOL/L (ref 3.5–5.1)
PROTHROMBIN TIME: 13.7 SECONDS (ref 11.7–14.5)
RBC # BLD: 2.94 M/CU MM (ref 4.2–5.4)
SODIUM BLD-SCNC: 136 MMOL/L (ref 135–145)
WBC # BLD: 5.6 K/CU MM (ref 4–10.5)

## 2023-04-19 PROCEDURE — B5131ZA FLUOROSCOPY OF RIGHT JUGULAR VEINS USING LOW OSMOLAR CONTRAST, GUIDANCE: ICD-10-PCS | Performed by: RADIOLOGY

## 2023-04-19 PROCEDURE — 6360000002 HC RX W HCPCS

## 2023-04-19 PROCEDURE — 80048 BASIC METABOLIC PNL TOTAL CA: CPT

## 2023-04-19 PROCEDURE — 2700000000 HC OXYGEN THERAPY PER DAY

## 2023-04-19 PROCEDURE — 36415 COLL VENOUS BLD VENIPUNCTURE: CPT

## 2023-04-19 PROCEDURE — 85730 THROMBOPLASTIN TIME PARTIAL: CPT

## 2023-04-19 PROCEDURE — 6370000000 HC RX 637 (ALT 250 FOR IP): Performed by: STUDENT IN AN ORGANIZED HEALTH CARE EDUCATION/TRAINING PROGRAM

## 2023-04-19 PROCEDURE — 36558 INSERT TUNNELED CV CATH: CPT

## 2023-04-19 PROCEDURE — 6370000000 HC RX 637 (ALT 250 FOR IP): Performed by: INTERNAL MEDICINE

## 2023-04-19 PROCEDURE — 2500000003 HC RX 250 WO HCPCS

## 2023-04-19 PROCEDURE — 0JH63XZ INSERTION OF TUNNELED VASCULAR ACCESS DEVICE INTO CHEST SUBCUTANEOUS TISSUE AND FASCIA, PERCUTANEOUS APPROACH: ICD-10-PCS | Performed by: RADIOLOGY

## 2023-04-19 PROCEDURE — 2709999900 IR TUNNELED CVC PLACE WO SQ PORT/PUMP > 5 YEARS

## 2023-04-19 PROCEDURE — 2580000003 HC RX 258: Performed by: NURSE PRACTITIONER

## 2023-04-19 PROCEDURE — 85027 COMPLETE CBC AUTOMATED: CPT

## 2023-04-19 PROCEDURE — 76937 US GUIDE VASCULAR ACCESS: CPT

## 2023-04-19 PROCEDURE — 77001 FLUOROGUIDE FOR VEIN DEVICE: CPT

## 2023-04-19 PROCEDURE — 94761 N-INVAS EAR/PLS OXIMETRY MLT: CPT

## 2023-04-19 PROCEDURE — 05HM33Z INSERTION OF INFUSION DEVICE INTO RIGHT INTERNAL JUGULAR VEIN, PERCUTANEOUS APPROACH: ICD-10-PCS | Performed by: RADIOLOGY

## 2023-04-19 PROCEDURE — 85610 PROTHROMBIN TIME: CPT

## 2023-04-19 PROCEDURE — 6370000000 HC RX 637 (ALT 250 FOR IP): Performed by: PHYSICIAN ASSISTANT

## 2023-04-19 PROCEDURE — 1200000000 HC SEMI PRIVATE

## 2023-04-19 PROCEDURE — 2580000003 HC RX 258: Performed by: STUDENT IN AN ORGANIZED HEALTH CARE EDUCATION/TRAINING PROGRAM

## 2023-04-19 RX ORDER — ACETAMINOPHEN 500 MG
1000 TABLET ORAL ONCE
Status: COMPLETED | OUTPATIENT
Start: 2023-04-19 | End: 2023-04-19

## 2023-04-19 RX ADMIN — BUMETANIDE 2 MG: 0.5 TABLET ORAL at 08:53

## 2023-04-19 RX ADMIN — SODIUM CHLORIDE, PRESERVATIVE FREE 10 ML: 5 INJECTION INTRAVENOUS at 19:49

## 2023-04-19 RX ADMIN — ACETAMINOPHEN 1000 MG: 500 TABLET ORAL at 19:46

## 2023-04-19 RX ADMIN — LEVOTHYROXINE SODIUM 150 MCG: 150 TABLET ORAL at 08:53

## 2023-04-19 RX ADMIN — SODIUM CHLORIDE, PRESERVATIVE FREE 10 ML: 5 INJECTION INTRAVENOUS at 19:47

## 2023-04-19 RX ADMIN — FLUOXETINE HYDROCHLORIDE 20 MG: 10 CAPSULE ORAL at 08:53

## 2023-04-19 RX ADMIN — SODIUM CHLORIDE, PRESERVATIVE FREE 10 ML: 5 INJECTION INTRAVENOUS at 08:56

## 2023-04-19 RX ADMIN — PANTOPRAZOLE SODIUM 40 MG: 40 TABLET, DELAYED RELEASE ORAL at 08:53

## 2023-04-19 RX ADMIN — ROSUVASTATIN CALCIUM 10 MG: 5 TABLET, COATED ORAL at 19:46

## 2023-04-19 RX ADMIN — FLUOXETINE HYDROCHLORIDE 20 MG: 10 CAPSULE ORAL at 19:47

## 2023-04-19 ASSESSMENT — PAIN DESCRIPTION - DESCRIPTORS: DESCRIPTORS: ACHING

## 2023-04-19 ASSESSMENT — PAIN DESCRIPTION - ORIENTATION: ORIENTATION: RIGHT

## 2023-04-19 ASSESSMENT — PAIN SCALES - GENERAL
PAINLEVEL_OUTOF10: 0
PAINLEVEL_OUTOF10: 3
PAINLEVEL_OUTOF10: 0
PAINLEVEL_OUTOF10: 0

## 2023-04-19 ASSESSMENT — PAIN DESCRIPTION - ONSET: ONSET: GRADUAL

## 2023-04-19 ASSESSMENT — PAIN - FUNCTIONAL ASSESSMENT: PAIN_FUNCTIONAL_ASSESSMENT: ACTIVITIES ARE NOT PREVENTED

## 2023-04-19 ASSESSMENT — PAIN DESCRIPTION - FREQUENCY: FREQUENCY: INTERMITTENT

## 2023-04-19 ASSESSMENT — PAIN DESCRIPTION - PAIN TYPE: TYPE: ACUTE PAIN

## 2023-04-19 ASSESSMENT — PAIN DESCRIPTION - LOCATION: LOCATION: CHEST

## 2023-04-20 LAB
ANION GAP SERPL CALCULATED.3IONS-SCNC: 11 MMOL/L (ref 4–16)
BUN SERPL-MCNC: 33 MG/DL (ref 6–23)
CALCIUM SERPL-MCNC: 8.4 MG/DL (ref 8.3–10.6)
CHLORIDE BLD-SCNC: 99 MMOL/L (ref 99–110)
CO2: 27 MMOL/L (ref 21–32)
CREAT SERPL-MCNC: 3.3 MG/DL (ref 0.6–1.1)
GFR SERPL CREATININE-BSD FRML MDRD: 14 ML/MIN/1.73M2
GLUCOSE SERPL-MCNC: 142 MG/DL (ref 70–99)
HCT VFR BLD CALC: 30.1 % (ref 37–47)
HEMOGLOBIN: 9.1 GM/DL (ref 12.5–16)
MCH RBC QN AUTO: 30.2 PG (ref 27–31)
MCHC RBC AUTO-ENTMCNC: 30.2 % (ref 32–36)
MCV RBC AUTO: 100 FL (ref 78–100)
PDW BLD-RTO: 13.9 % (ref 11.7–14.9)
PLATELET # BLD: 88 K/CU MM (ref 140–440)
PMV BLD AUTO: 10.1 FL (ref 7.5–11.1)
POTASSIUM SERPL-SCNC: 3.6 MMOL/L (ref 3.5–5.1)
RBC # BLD: 3.01 M/CU MM (ref 4.2–5.4)
SODIUM BLD-SCNC: 137 MMOL/L (ref 135–145)
WBC # BLD: 4.7 K/CU MM (ref 4–10.5)

## 2023-04-20 PROCEDURE — 6370000000 HC RX 637 (ALT 250 FOR IP): Performed by: INTERNAL MEDICINE

## 2023-04-20 PROCEDURE — 97166 OT EVAL MOD COMPLEX 45 MIN: CPT

## 2023-04-20 PROCEDURE — 5A1D70Z PERFORMANCE OF URINARY FILTRATION, INTERMITTENT, LESS THAN 6 HOURS PER DAY: ICD-10-PCS | Performed by: INTERNAL MEDICINE

## 2023-04-20 PROCEDURE — 6370000000 HC RX 637 (ALT 250 FOR IP): Performed by: STUDENT IN AN ORGANIZED HEALTH CARE EDUCATION/TRAINING PROGRAM

## 2023-04-20 PROCEDURE — 6360000002 HC RX W HCPCS: Performed by: INTERNAL MEDICINE

## 2023-04-20 PROCEDURE — 2580000003 HC RX 258: Performed by: STUDENT IN AN ORGANIZED HEALTH CARE EDUCATION/TRAINING PROGRAM

## 2023-04-20 PROCEDURE — 97116 GAIT TRAINING THERAPY: CPT

## 2023-04-20 PROCEDURE — 97112 NEUROMUSCULAR REEDUCATION: CPT

## 2023-04-20 PROCEDURE — 94761 N-INVAS EAR/PLS OXIMETRY MLT: CPT

## 2023-04-20 PROCEDURE — 36415 COLL VENOUS BLD VENIPUNCTURE: CPT

## 2023-04-20 PROCEDURE — 85027 COMPLETE CBC AUTOMATED: CPT

## 2023-04-20 PROCEDURE — 2580000003 HC RX 258: Performed by: NURSE PRACTITIONER

## 2023-04-20 PROCEDURE — 97162 PT EVAL MOD COMPLEX 30 MIN: CPT

## 2023-04-20 PROCEDURE — 90935 HEMODIALYSIS ONE EVALUATION: CPT

## 2023-04-20 PROCEDURE — 80048 BASIC METABOLIC PNL TOTAL CA: CPT

## 2023-04-20 PROCEDURE — 6370000000 HC RX 637 (ALT 250 FOR IP): Performed by: PHYSICIAN ASSISTANT

## 2023-04-20 PROCEDURE — 97530 THERAPEUTIC ACTIVITIES: CPT

## 2023-04-20 PROCEDURE — 1200000000 HC SEMI PRIVATE

## 2023-04-20 RX ORDER — OXYCODONE HYDROCHLORIDE 5 MG/1
5 TABLET ORAL ONCE
Status: DISCONTINUED | OUTPATIENT
Start: 2023-04-20 | End: 2023-04-23

## 2023-04-20 RX ORDER — MIDODRINE HYDROCHLORIDE 5 MG/1
5 TABLET ORAL 3 TIMES DAILY PRN
Status: DISCONTINUED | OUTPATIENT
Start: 2023-04-20 | End: 2023-04-24 | Stop reason: HOSPADM

## 2023-04-20 RX ADMIN — SODIUM CHLORIDE, PRESERVATIVE FREE 10 ML: 5 INJECTION INTRAVENOUS at 20:39

## 2023-04-20 RX ADMIN — ACETAMINOPHEN 650 MG: 325 TABLET ORAL at 02:02

## 2023-04-20 RX ADMIN — FLUOXETINE HYDROCHLORIDE 20 MG: 10 CAPSULE ORAL at 20:38

## 2023-04-20 RX ADMIN — SODIUM CHLORIDE, PRESERVATIVE FREE 10 ML: 5 INJECTION INTRAVENOUS at 09:00

## 2023-04-20 RX ADMIN — BUMETANIDE 2 MG: 0.5 TABLET ORAL at 13:52

## 2023-04-20 RX ADMIN — ACETAMINOPHEN 650 MG: 325 TABLET ORAL at 13:54

## 2023-04-20 RX ADMIN — SODIUM CHLORIDE, PRESERVATIVE FREE 10 ML: 5 INJECTION INTRAVENOUS at 20:40

## 2023-04-20 RX ADMIN — EPOETIN ALFA-EPBX 10000 UNITS: 10000 INJECTION, SOLUTION INTRAVENOUS; SUBCUTANEOUS at 10:46

## 2023-04-20 RX ADMIN — LEVOTHYROXINE SODIUM 150 MCG: 150 TABLET ORAL at 05:37

## 2023-04-20 RX ADMIN — ROSUVASTATIN CALCIUM 10 MG: 5 TABLET, COATED ORAL at 20:38

## 2023-04-20 RX ADMIN — APIXABAN 5 MG: 5 TABLET, FILM COATED ORAL at 20:38

## 2023-04-20 RX ADMIN — FLUOXETINE HYDROCHLORIDE 20 MG: 10 CAPSULE ORAL at 13:53

## 2023-04-20 RX ADMIN — MIDODRINE HYDROCHLORIDE 5 MG: 5 TABLET ORAL at 18:14

## 2023-04-20 RX ADMIN — APIXABAN 5 MG: 5 TABLET, FILM COATED ORAL at 13:53

## 2023-04-20 ASSESSMENT — PAIN DESCRIPTION - LOCATION
LOCATION: CHEST
LOCATION: ARM
LOCATION: CHEST

## 2023-04-20 ASSESSMENT — PAIN SCALES - GENERAL
PAINLEVEL_OUTOF10: 0
PAINLEVEL_OUTOF10: 0
PAINLEVEL_OUTOF10: 3
PAINLEVEL_OUTOF10: 2
PAINLEVEL_OUTOF10: 0
PAINLEVEL_OUTOF10: 2

## 2023-04-20 ASSESSMENT — PAIN DESCRIPTION - ORIENTATION
ORIENTATION: RIGHT

## 2023-04-20 ASSESSMENT — PAIN DESCRIPTION - PAIN TYPE: TYPE: ACUTE PAIN

## 2023-04-20 ASSESSMENT — PAIN DESCRIPTION - DESCRIPTORS
DESCRIPTORS: ACHING
DESCRIPTORS: DISCOMFORT
DESCRIPTORS: ACHING

## 2023-04-20 ASSESSMENT — PAIN - FUNCTIONAL ASSESSMENT
PAIN_FUNCTIONAL_ASSESSMENT: ACTIVITIES ARE NOT PREVENTED
PAIN_FUNCTIONAL_ASSESSMENT: ACTIVITIES ARE NOT PREVENTED

## 2023-04-20 ASSESSMENT — PAIN DESCRIPTION - FREQUENCY: FREQUENCY: INTERMITTENT

## 2023-04-20 ASSESSMENT — PAIN DESCRIPTION - ONSET: ONSET: ON-GOING

## 2023-04-21 LAB
ANION GAP SERPL CALCULATED.3IONS-SCNC: 11 MMOL/L (ref 4–16)
BUN SERPL-MCNC: 24 MG/DL (ref 6–23)
CALCIUM SERPL-MCNC: 8.3 MG/DL (ref 8.3–10.6)
CHLORIDE BLD-SCNC: 100 MMOL/L (ref 99–110)
CO2: 24 MMOL/L (ref 21–32)
CREAT SERPL-MCNC: 2.7 MG/DL (ref 0.6–1.1)
GFR SERPL CREATININE-BSD FRML MDRD: 18 ML/MIN/1.73M2
GLUCOSE SERPL-MCNC: 161 MG/DL (ref 70–99)
HCT VFR BLD CALC: 31.2 % (ref 37–47)
HEMOGLOBIN: 9.3 GM/DL (ref 12.5–16)
MCH RBC QN AUTO: 30.3 PG (ref 27–31)
MCHC RBC AUTO-ENTMCNC: 29.8 % (ref 32–36)
MCV RBC AUTO: 101.6 FL (ref 78–100)
PDW BLD-RTO: 14.2 % (ref 11.7–14.9)
PLATELET # BLD: 97 K/CU MM (ref 140–440)
PMV BLD AUTO: 10.6 FL (ref 7.5–11.1)
POTASSIUM SERPL-SCNC: 3.7 MMOL/L (ref 3.5–5.1)
RBC # BLD: 3.07 M/CU MM (ref 4.2–5.4)
SARS-COV-2 RDRP RESP QL NAA+PROBE: NOT DETECTED
SODIUM BLD-SCNC: 135 MMOL/L (ref 135–145)
SOURCE: NORMAL
WBC # BLD: 5.2 K/CU MM (ref 4–10.5)

## 2023-04-21 PROCEDURE — 2580000003 HC RX 258: Performed by: STUDENT IN AN ORGANIZED HEALTH CARE EDUCATION/TRAINING PROGRAM

## 2023-04-21 PROCEDURE — 2580000003 HC RX 258: Performed by: NURSE PRACTITIONER

## 2023-04-21 PROCEDURE — 85027 COMPLETE CBC AUTOMATED: CPT

## 2023-04-21 PROCEDURE — 80048 BASIC METABOLIC PNL TOTAL CA: CPT

## 2023-04-21 PROCEDURE — 6370000000 HC RX 637 (ALT 250 FOR IP): Performed by: INTERNAL MEDICINE

## 2023-04-21 PROCEDURE — 36415 COLL VENOUS BLD VENIPUNCTURE: CPT

## 2023-04-21 PROCEDURE — 87635 SARS-COV-2 COVID-19 AMP PRB: CPT

## 2023-04-21 PROCEDURE — 6370000000 HC RX 637 (ALT 250 FOR IP): Performed by: PHYSICIAN ASSISTANT

## 2023-04-21 PROCEDURE — 97535 SELF CARE MNGMENT TRAINING: CPT

## 2023-04-21 PROCEDURE — 94761 N-INVAS EAR/PLS OXIMETRY MLT: CPT

## 2023-04-21 PROCEDURE — 1200000000 HC SEMI PRIVATE

## 2023-04-21 PROCEDURE — 2700000000 HC OXYGEN THERAPY PER DAY

## 2023-04-21 PROCEDURE — 97530 THERAPEUTIC ACTIVITIES: CPT

## 2023-04-21 PROCEDURE — 6370000000 HC RX 637 (ALT 250 FOR IP): Performed by: STUDENT IN AN ORGANIZED HEALTH CARE EDUCATION/TRAINING PROGRAM

## 2023-04-21 RX ADMIN — SODIUM CHLORIDE, PRESERVATIVE FREE 10 ML: 5 INJECTION INTRAVENOUS at 20:33

## 2023-04-21 RX ADMIN — ACETAMINOPHEN 650 MG: 325 TABLET ORAL at 12:35

## 2023-04-21 RX ADMIN — ACETAMINOPHEN 650 MG: 325 TABLET ORAL at 06:15

## 2023-04-21 RX ADMIN — SODIUM CHLORIDE, PRESERVATIVE FREE 10 ML: 5 INJECTION INTRAVENOUS at 20:34

## 2023-04-21 RX ADMIN — ROSUVASTATIN CALCIUM 10 MG: 5 TABLET, COATED ORAL at 20:33

## 2023-04-21 RX ADMIN — LEVOTHYROXINE SODIUM 150 MCG: 150 TABLET ORAL at 06:15

## 2023-04-21 RX ADMIN — FLUOXETINE HYDROCHLORIDE 20 MG: 10 CAPSULE ORAL at 20:33

## 2023-04-21 RX ADMIN — APIXABAN 5 MG: 5 TABLET, FILM COATED ORAL at 09:05

## 2023-04-21 RX ADMIN — SODIUM CHLORIDE, PRESERVATIVE FREE 10 ML: 5 INJECTION INTRAVENOUS at 09:07

## 2023-04-21 RX ADMIN — BUMETANIDE 2 MG: 0.5 TABLET ORAL at 09:05

## 2023-04-21 RX ADMIN — MIDODRINE HYDROCHLORIDE 5 MG: 5 TABLET ORAL at 13:25

## 2023-04-21 RX ADMIN — APIXABAN 5 MG: 5 TABLET, FILM COATED ORAL at 20:33

## 2023-04-21 RX ADMIN — ACETAMINOPHEN 650 MG: 325 TABLET ORAL at 18:44

## 2023-04-21 RX ADMIN — FLUOXETINE HYDROCHLORIDE 20 MG: 10 CAPSULE ORAL at 09:05

## 2023-04-21 RX ADMIN — ACETAMINOPHEN 650 MG: 325 TABLET ORAL at 00:31

## 2023-04-21 ASSESSMENT — PAIN SCALES - WONG BAKER
WONGBAKER_NUMERICALRESPONSE: 0
WONGBAKER_NUMERICALRESPONSE: 0
WONGBAKER_NUMERICALRESPONSE: 2
WONGBAKER_NUMERICALRESPONSE: 0
WONGBAKER_NUMERICALRESPONSE: 0

## 2023-04-21 ASSESSMENT — PAIN SCALES - GENERAL
PAINLEVEL_OUTOF10: 2
PAINLEVEL_OUTOF10: 0
PAINLEVEL_OUTOF10: 0
PAINLEVEL_OUTOF10: 3
PAINLEVEL_OUTOF10: 2
PAINLEVEL_OUTOF10: 4
PAINLEVEL_OUTOF10: 8
PAINLEVEL_OUTOF10: 2
PAINLEVEL_OUTOF10: 0
PAINLEVEL_OUTOF10: 2
PAINLEVEL_OUTOF10: 8

## 2023-04-21 ASSESSMENT — PAIN - FUNCTIONAL ASSESSMENT
PAIN_FUNCTIONAL_ASSESSMENT: PREVENTS OR INTERFERES SOME ACTIVE ACTIVITIES AND ADLS
PAIN_FUNCTIONAL_ASSESSMENT: ACTIVITIES ARE NOT PREVENTED

## 2023-04-21 ASSESSMENT — PAIN DESCRIPTION - DESCRIPTORS
DESCRIPTORS: STABBING;CRUSHING
DESCRIPTORS: STABBING
DESCRIPTORS: OTHER (COMMENT)
DESCRIPTORS: ACHING
DESCRIPTORS: ACHING
DESCRIPTORS: ACHING;DISCOMFORT

## 2023-04-21 ASSESSMENT — PAIN DESCRIPTION - ORIENTATION
ORIENTATION: RIGHT
ORIENTATION: RIGHT
ORIENTATION: POSTERIOR
ORIENTATION: RIGHT;UPPER
ORIENTATION: RIGHT

## 2023-04-21 ASSESSMENT — PAIN DESCRIPTION - PAIN TYPE
TYPE: ACUTE PAIN
TYPE: ACUTE PAIN

## 2023-04-21 ASSESSMENT — PAIN DESCRIPTION - LOCATION
LOCATION: CHEST
LOCATION: BACK;CHEST
LOCATION: BACK

## 2023-04-21 ASSESSMENT — PAIN DESCRIPTION - FREQUENCY
FREQUENCY: INTERMITTENT
FREQUENCY: INTERMITTENT

## 2023-04-21 ASSESSMENT — PAIN DESCRIPTION - ONSET
ONSET: ON-GOING
ONSET: ON-GOING

## 2023-04-22 LAB
ANION GAP SERPL CALCULATED.3IONS-SCNC: 10 MMOL/L (ref 4–16)
BUN SERPL-MCNC: 13 MG/DL (ref 6–23)
CALCIUM SERPL-MCNC: 8.2 MG/DL (ref 8.3–10.6)
CHLORIDE BLD-SCNC: 98 MMOL/L (ref 99–110)
CO2: 28 MMOL/L (ref 21–32)
CREAT SERPL-MCNC: 1.7 MG/DL (ref 0.6–1.1)
GFR SERPL CREATININE-BSD FRML MDRD: 31 ML/MIN/1.73M2
GLUCOSE SERPL-MCNC: 131 MG/DL (ref 70–99)
HCT VFR BLD CALC: 29.9 % (ref 37–47)
HEMOGLOBIN: 9.2 GM/DL (ref 12.5–16)
MCH RBC QN AUTO: 30.3 PG (ref 27–31)
MCHC RBC AUTO-ENTMCNC: 30.8 % (ref 32–36)
MCV RBC AUTO: 98.4 FL (ref 78–100)
PDW BLD-RTO: 14.4 % (ref 11.7–14.9)
PLATELET # BLD: 106 K/CU MM (ref 140–440)
PMV BLD AUTO: 9.9 FL (ref 7.5–11.1)
POTASSIUM SERPL-SCNC: 3.2 MMOL/L (ref 3.5–5.1)
RBC # BLD: 3.04 M/CU MM (ref 4.2–5.4)
SODIUM BLD-SCNC: 136 MMOL/L (ref 135–145)
WBC # BLD: 6.5 K/CU MM (ref 4–10.5)

## 2023-04-22 PROCEDURE — 6370000000 HC RX 637 (ALT 250 FOR IP): Performed by: PHYSICIAN ASSISTANT

## 2023-04-22 PROCEDURE — 6360000002 HC RX W HCPCS: Performed by: INTERNAL MEDICINE

## 2023-04-22 PROCEDURE — 2580000003 HC RX 258: Performed by: NURSE PRACTITIONER

## 2023-04-22 PROCEDURE — 6370000000 HC RX 637 (ALT 250 FOR IP): Performed by: STUDENT IN AN ORGANIZED HEALTH CARE EDUCATION/TRAINING PROGRAM

## 2023-04-22 PROCEDURE — 6370000000 HC RX 637 (ALT 250 FOR IP): Performed by: INTERNAL MEDICINE

## 2023-04-22 PROCEDURE — 2580000003 HC RX 258: Performed by: STUDENT IN AN ORGANIZED HEALTH CARE EDUCATION/TRAINING PROGRAM

## 2023-04-22 PROCEDURE — 80048 BASIC METABOLIC PNL TOTAL CA: CPT

## 2023-04-22 PROCEDURE — 1200000000 HC SEMI PRIVATE

## 2023-04-22 PROCEDURE — 85027 COMPLETE CBC AUTOMATED: CPT

## 2023-04-22 PROCEDURE — 94761 N-INVAS EAR/PLS OXIMETRY MLT: CPT

## 2023-04-22 PROCEDURE — 90935 HEMODIALYSIS ONE EVALUATION: CPT

## 2023-04-22 RX ADMIN — EPOETIN ALFA-EPBX 10000 UNITS: 10000 INJECTION, SOLUTION INTRAVENOUS; SUBCUTANEOUS at 09:35

## 2023-04-22 RX ADMIN — SODIUM CHLORIDE, PRESERVATIVE FREE 10 ML: 5 INJECTION INTRAVENOUS at 19:34

## 2023-04-22 RX ADMIN — ACETAMINOPHEN 650 MG: 325 TABLET ORAL at 08:26

## 2023-04-22 RX ADMIN — SODIUM CHLORIDE, PRESERVATIVE FREE 10 ML: 5 INJECTION INTRAVENOUS at 08:21

## 2023-04-22 RX ADMIN — LEVOTHYROXINE SODIUM 150 MCG: 150 TABLET ORAL at 06:33

## 2023-04-22 RX ADMIN — ACETAMINOPHEN 650 MG: 325 TABLET ORAL at 14:46

## 2023-04-22 RX ADMIN — PANTOPRAZOLE SODIUM 40 MG: 40 TABLET, DELAYED RELEASE ORAL at 06:33

## 2023-04-22 RX ADMIN — FLUOXETINE HYDROCHLORIDE 20 MG: 10 CAPSULE ORAL at 19:34

## 2023-04-22 RX ADMIN — ACETAMINOPHEN 650 MG: 325 TABLET ORAL at 02:19

## 2023-04-22 RX ADMIN — BUMETANIDE 2 MG: 0.5 TABLET ORAL at 08:16

## 2023-04-22 RX ADMIN — ROSUVASTATIN CALCIUM 10 MG: 5 TABLET, COATED ORAL at 19:34

## 2023-04-22 RX ADMIN — FLUOXETINE HYDROCHLORIDE 20 MG: 10 CAPSULE ORAL at 08:17

## 2023-04-22 ASSESSMENT — PAIN DESCRIPTION - ORIENTATION
ORIENTATION: RIGHT

## 2023-04-22 ASSESSMENT — PAIN SCALES - WONG BAKER
WONGBAKER_NUMERICALRESPONSE: 0

## 2023-04-22 ASSESSMENT — PAIN SCALES - GENERAL
PAINLEVEL_OUTOF10: 2
PAINLEVEL_OUTOF10: 4
PAINLEVEL_OUTOF10: 0
PAINLEVEL_OUTOF10: 7
PAINLEVEL_OUTOF10: 0
PAINLEVEL_OUTOF10: 2
PAINLEVEL_OUTOF10: 3

## 2023-04-22 ASSESSMENT — PAIN DESCRIPTION - DESCRIPTORS
DESCRIPTORS: SORE
DESCRIPTORS: ACHING;SORE;THROBBING

## 2023-04-22 ASSESSMENT — PAIN DESCRIPTION - LOCATION
LOCATION: CHEST
LOCATION: HEAD;CHEST
LOCATION: CHEST
LOCATION: CHEST

## 2023-04-23 LAB
ANION GAP SERPL CALCULATED.3IONS-SCNC: 9 MMOL/L (ref 4–16)
BUN SERPL-MCNC: 24 MG/DL (ref 6–23)
CALCIUM SERPL-MCNC: 8.4 MG/DL (ref 8.3–10.6)
CHLORIDE BLD-SCNC: 98 MMOL/L (ref 99–110)
CO2: 28 MMOL/L (ref 21–32)
CREAT SERPL-MCNC: 3.3 MG/DL (ref 0.6–1.1)
GFR SERPL CREATININE-BSD FRML MDRD: 14 ML/MIN/1.73M2
GLUCOSE SERPL-MCNC: 158 MG/DL (ref 70–99)
HCT VFR BLD CALC: 28.7 % (ref 37–47)
HEMOGLOBIN: 8.7 GM/DL (ref 12.5–16)
MCH RBC QN AUTO: 30.3 PG (ref 27–31)
MCHC RBC AUTO-ENTMCNC: 30.3 % (ref 32–36)
MCV RBC AUTO: 100 FL (ref 78–100)
PDW BLD-RTO: 14.6 % (ref 11.7–14.9)
PLATELET # BLD: 96 K/CU MM (ref 140–440)
PMV BLD AUTO: 9.8 FL (ref 7.5–11.1)
POTASSIUM SERPL-SCNC: 3.7 MMOL/L (ref 3.5–5.1)
RBC # BLD: 2.87 M/CU MM (ref 4.2–5.4)
SODIUM BLD-SCNC: 135 MMOL/L (ref 135–145)
WBC # BLD: 4.9 K/CU MM (ref 4–10.5)

## 2023-04-23 PROCEDURE — 2580000003 HC RX 258: Performed by: NURSE PRACTITIONER

## 2023-04-23 PROCEDURE — 1200000000 HC SEMI PRIVATE

## 2023-04-23 PROCEDURE — 6370000000 HC RX 637 (ALT 250 FOR IP): Performed by: STUDENT IN AN ORGANIZED HEALTH CARE EDUCATION/TRAINING PROGRAM

## 2023-04-23 PROCEDURE — 2580000003 HC RX 258: Performed by: STUDENT IN AN ORGANIZED HEALTH CARE EDUCATION/TRAINING PROGRAM

## 2023-04-23 PROCEDURE — 6370000000 HC RX 637 (ALT 250 FOR IP): Performed by: PHYSICIAN ASSISTANT

## 2023-04-23 PROCEDURE — 6370000000 HC RX 637 (ALT 250 FOR IP): Performed by: INTERNAL MEDICINE

## 2023-04-23 PROCEDURE — 94761 N-INVAS EAR/PLS OXIMETRY MLT: CPT

## 2023-04-23 PROCEDURE — 80048 BASIC METABOLIC PNL TOTAL CA: CPT

## 2023-04-23 PROCEDURE — 85027 COMPLETE CBC AUTOMATED: CPT

## 2023-04-23 RX ADMIN — BUMETANIDE 2 MG: 0.5 TABLET ORAL at 09:10

## 2023-04-23 RX ADMIN — PANTOPRAZOLE SODIUM 40 MG: 40 TABLET, DELAYED RELEASE ORAL at 06:10

## 2023-04-23 RX ADMIN — FLUOXETINE HYDROCHLORIDE 20 MG: 10 CAPSULE ORAL at 22:04

## 2023-04-23 RX ADMIN — ROSUVASTATIN CALCIUM 10 MG: 5 TABLET, COATED ORAL at 22:04

## 2023-04-23 RX ADMIN — LEVOTHYROXINE SODIUM 150 MCG: 150 TABLET ORAL at 06:11

## 2023-04-23 RX ADMIN — ACETAMINOPHEN 650 MG: 325 TABLET ORAL at 17:23

## 2023-04-23 RX ADMIN — SODIUM CHLORIDE, PRESERVATIVE FREE 10 ML: 5 INJECTION INTRAVENOUS at 22:05

## 2023-04-23 RX ADMIN — SODIUM CHLORIDE, PRESERVATIVE FREE 10 ML: 5 INJECTION INTRAVENOUS at 09:08

## 2023-04-23 RX ADMIN — FLUOXETINE HYDROCHLORIDE 20 MG: 10 CAPSULE ORAL at 09:10

## 2023-04-23 ASSESSMENT — PAIN DESCRIPTION - ORIENTATION
ORIENTATION: RIGHT
ORIENTATION: RIGHT

## 2023-04-23 ASSESSMENT — PAIN DESCRIPTION - DESCRIPTORS
DESCRIPTORS: ACHING;SORE
DESCRIPTORS: ACHING;SORE

## 2023-04-23 ASSESSMENT — PAIN DESCRIPTION - LOCATION
LOCATION: CHEST
LOCATION: CHEST

## 2023-04-23 ASSESSMENT — PAIN SCALES - GENERAL
PAINLEVEL_OUTOF10: 3
PAINLEVEL_OUTOF10: 0

## 2023-04-24 ENCOUNTER — ANESTHESIA (OUTPATIENT)
Dept: OPERATING ROOM | Age: 73
DRG: 242 | End: 2023-04-24
Payer: MEDICARE

## 2023-04-24 ENCOUNTER — HOSPITAL ENCOUNTER (INPATIENT)
Age: 73
DRG: 949 | End: 2023-04-24
Attending: PHYSICAL MEDICINE & REHABILITATION | Admitting: PHYSICAL MEDICINE & REHABILITATION
Payer: MEDICARE

## 2023-04-24 ENCOUNTER — ANESTHESIA EVENT (OUTPATIENT)
Dept: OPERATING ROOM | Age: 73
DRG: 242 | End: 2023-04-24
Payer: MEDICARE

## 2023-04-24 VITALS
TEMPERATURE: 97.5 F | OXYGEN SATURATION: 94 % | WEIGHT: 182.02 LBS | HEIGHT: 65 IN | SYSTOLIC BLOOD PRESSURE: 120 MMHG | DIASTOLIC BLOOD PRESSURE: 58 MMHG | RESPIRATION RATE: 16 BRPM | HEART RATE: 63 BPM | BODY MASS INDEX: 30.33 KG/M2

## 2023-04-24 PROBLEM — E44.0 MODERATE MALNUTRITION (HCC): Status: ACTIVE | Noted: 2023-04-24

## 2023-04-24 PROBLEM — I46.9 CARDIAC ARREST (HCC): Status: ACTIVE | Noted: 2023-04-24

## 2023-04-24 LAB
ANION GAP SERPL CALCULATED.3IONS-SCNC: 11 MMOL/L (ref 4–16)
BUN SERPL-MCNC: 33 MG/DL (ref 6–23)
CALCIUM SERPL-MCNC: 8.6 MG/DL (ref 8.3–10.6)
CHLORIDE BLD-SCNC: 99 MMOL/L (ref 99–110)
CO2: 27 MMOL/L (ref 21–32)
CREAT SERPL-MCNC: 4.1 MG/DL (ref 0.6–1.1)
GFR SERPL CREATININE-BSD FRML MDRD: 11 ML/MIN/1.73M2
GLUCOSE SERPL-MCNC: 170 MG/DL (ref 70–99)
HCT VFR BLD CALC: 31 % (ref 37–47)
HEMOGLOBIN: 9.5 GM/DL (ref 12.5–16)
MCH RBC QN AUTO: 30.4 PG (ref 27–31)
MCHC RBC AUTO-ENTMCNC: 30.6 % (ref 32–36)
MCV RBC AUTO: 99.4 FL (ref 78–100)
PDW BLD-RTO: 14.7 % (ref 11.7–14.9)
PLATELET # BLD: 127 K/CU MM (ref 140–440)
PMV BLD AUTO: 10.4 FL (ref 7.5–11.1)
POTASSIUM SERPL-SCNC: 3.7 MMOL/L (ref 3.5–5.1)
RBC # BLD: 3.12 M/CU MM (ref 4.2–5.4)
SODIUM BLD-SCNC: 137 MMOL/L (ref 135–145)
WBC # BLD: 5.5 K/CU MM (ref 4–10.5)

## 2023-04-24 PROCEDURE — C1750 CATH, HEMODIALYSIS,LONG-TERM: HCPCS | Performed by: SURGERY

## 2023-04-24 PROCEDURE — 3E1M39Z IRRIGATION OF PERITONEAL CAVITY USING DIALYSATE, PERCUTANEOUS APPROACH: ICD-10-PCS | Performed by: SURGERY

## 2023-04-24 PROCEDURE — 94761 N-INVAS EAR/PLS OXIMETRY MLT: CPT

## 2023-04-24 PROCEDURE — 2500000003 HC RX 250 WO HCPCS: Performed by: NURSE ANESTHETIST, CERTIFIED REGISTERED

## 2023-04-24 PROCEDURE — 6360000002 HC RX W HCPCS: Performed by: SURGERY

## 2023-04-24 PROCEDURE — 6360000002 HC RX W HCPCS: Performed by: NURSE ANESTHETIST, CERTIFIED REGISTERED

## 2023-04-24 PROCEDURE — 2580000003 HC RX 258: Performed by: NURSE PRACTITIONER

## 2023-04-24 PROCEDURE — 2500000003 HC RX 250 WO HCPCS: Performed by: SURGERY

## 2023-04-24 PROCEDURE — 2580000003 HC RX 258: Performed by: NURSE ANESTHETIST, CERTIFIED REGISTERED

## 2023-04-24 PROCEDURE — 6370000000 HC RX 637 (ALT 250 FOR IP): Performed by: STUDENT IN AN ORGANIZED HEALTH CARE EDUCATION/TRAINING PROGRAM

## 2023-04-24 PROCEDURE — 3600000012 HC SURGERY LEVEL 2 ADDTL 15MIN: Performed by: SURGERY

## 2023-04-24 PROCEDURE — 3700000000 HC ANESTHESIA ATTENDED CARE: Performed by: SURGERY

## 2023-04-24 PROCEDURE — 2580000003 HC RX 258: Performed by: SURGERY

## 2023-04-24 PROCEDURE — 6370000000 HC RX 637 (ALT 250 FOR IP): Performed by: PHYSICAL MEDICINE & REHABILITATION

## 2023-04-24 PROCEDURE — 6370000000 HC RX 637 (ALT 250 FOR IP): Performed by: PHYSICIAN ASSISTANT

## 2023-04-24 PROCEDURE — 99223 1ST HOSP IP/OBS HIGH 75: CPT | Performed by: PHYSICAL MEDICINE & REHABILITATION

## 2023-04-24 PROCEDURE — 3700000001 HC ADD 15 MINUTES (ANESTHESIA): Performed by: SURGERY

## 2023-04-24 PROCEDURE — 80048 BASIC METABOLIC PNL TOTAL CA: CPT

## 2023-04-24 PROCEDURE — 2709999900 HC NON-CHARGEABLE SUPPLY: Performed by: SURGERY

## 2023-04-24 PROCEDURE — 7100000000 HC PACU RECOVERY - FIRST 15 MIN: Performed by: SURGERY

## 2023-04-24 PROCEDURE — 85027 COMPLETE CBC AUTOMATED: CPT

## 2023-04-24 PROCEDURE — C2628 CATHETER, OCCLUSION: HCPCS | Performed by: SURGERY

## 2023-04-24 PROCEDURE — 6370000000 HC RX 637 (ALT 250 FOR IP): Performed by: INTERNAL MEDICINE

## 2023-04-24 PROCEDURE — 7100000001 HC PACU RECOVERY - ADDTL 15 MIN: Performed by: SURGERY

## 2023-04-24 PROCEDURE — 1280000000 HC REHAB R&B

## 2023-04-24 PROCEDURE — 3600000002 HC SURGERY LEVEL 2 BASE: Performed by: SURGERY

## 2023-04-24 PROCEDURE — 36415 COLL VENOUS BLD VENIPUNCTURE: CPT

## 2023-04-24 PROCEDURE — 49324 LAP INSERT TUNNEL IP CATH: CPT | Performed by: SURGERY

## 2023-04-24 DEVICE — CATHETER PERITONEAL DLYS 35X53 MMX62 CM FLEX-NECK CLASSIC: Type: IMPLANTABLE DEVICE | Site: ABDOMEN | Status: FUNCTIONAL

## 2023-04-24 RX ORDER — ACETAMINOPHEN 325 MG/1
650 TABLET ORAL EVERY 4 HOURS PRN
Status: DISCONTINUED | OUTPATIENT
Start: 2023-04-24 | End: 2023-05-06 | Stop reason: HOSPADM

## 2023-04-24 RX ORDER — ACETAMINOPHEN 650 MG/1
650 SUPPOSITORY RECTAL EVERY 6 HOURS PRN
Status: DISCONTINUED | OUTPATIENT
Start: 2023-04-24 | End: 2023-04-24

## 2023-04-24 RX ORDER — FENTANYL CITRATE 50 UG/ML
INJECTION, SOLUTION INTRAMUSCULAR; INTRAVENOUS PRN
Status: DISCONTINUED | OUTPATIENT
Start: 2023-04-24 | End: 2023-04-24 | Stop reason: SDUPTHER

## 2023-04-24 RX ORDER — POLYETHYLENE GLYCOL 3350 17 G/17G
17 POWDER, FOR SOLUTION ORAL DAILY PRN
Status: DISCONTINUED | OUTPATIENT
Start: 2023-04-24 | End: 2023-05-06 | Stop reason: HOSPADM

## 2023-04-24 RX ORDER — LIDOCAINE HYDROCHLORIDE 20 MG/ML
INJECTION, SOLUTION INTRAVENOUS PRN
Status: DISCONTINUED | OUTPATIENT
Start: 2023-04-24 | End: 2023-04-24 | Stop reason: SDUPTHER

## 2023-04-24 RX ORDER — HYDRALAZINE HYDROCHLORIDE 20 MG/ML
10 INJECTION INTRAMUSCULAR; INTRAVENOUS
Status: DISCONTINUED | OUTPATIENT
Start: 2023-04-24 | End: 2023-04-24 | Stop reason: HOSPADM

## 2023-04-24 RX ORDER — POLYETHYLENE GLYCOL 3350 17 G/17G
17 POWDER, FOR SOLUTION ORAL DAILY PRN
Status: CANCELLED | OUTPATIENT
Start: 2023-04-24

## 2023-04-24 RX ORDER — BUMETANIDE 0.5 MG/1
2 TABLET ORAL DAILY
Status: CANCELLED | OUTPATIENT
Start: 2023-04-25

## 2023-04-24 RX ORDER — DEXAMETHASONE SODIUM PHOSPHATE 4 MG/ML
INJECTION, SOLUTION INTRA-ARTICULAR; INTRALESIONAL; INTRAMUSCULAR; INTRAVENOUS; SOFT TISSUE PRN
Status: DISCONTINUED | OUTPATIENT
Start: 2023-04-24 | End: 2023-04-24 | Stop reason: SDUPTHER

## 2023-04-24 RX ORDER — MIDODRINE HYDROCHLORIDE 5 MG/1
5 TABLET ORAL
Status: DISCONTINUED | OUTPATIENT
Start: 2023-04-24 | End: 2023-04-25

## 2023-04-24 RX ORDER — SODIUM CHLORIDE 0.9 % (FLUSH) 0.9 %
5-40 SYRINGE (ML) INJECTION PRN
Status: DISCONTINUED | OUTPATIENT
Start: 2023-04-24 | End: 2023-04-24 | Stop reason: HOSPADM

## 2023-04-24 RX ORDER — MIDODRINE HYDROCHLORIDE 5 MG/1
5 TABLET ORAL 3 TIMES DAILY PRN
Status: CANCELLED | OUTPATIENT
Start: 2023-04-24

## 2023-04-24 RX ORDER — FLUOXETINE HYDROCHLORIDE 20 MG/1
20 CAPSULE ORAL 2 TIMES DAILY
Status: CANCELLED | OUTPATIENT
Start: 2023-04-24

## 2023-04-24 RX ORDER — SODIUM CHLORIDE, SODIUM LACTATE, POTASSIUM CHLORIDE, CALCIUM CHLORIDE 600; 310; 30; 20 MG/100ML; MG/100ML; MG/100ML; MG/100ML
INJECTION, SOLUTION INTRAVENOUS CONTINUOUS
Status: DISCONTINUED | OUTPATIENT
Start: 2023-04-24 | End: 2023-04-24 | Stop reason: HOSPADM

## 2023-04-24 RX ORDER — ONDANSETRON 2 MG/ML
INJECTION INTRAMUSCULAR; INTRAVENOUS PRN
Status: DISCONTINUED | OUTPATIENT
Start: 2023-04-24 | End: 2023-04-24 | Stop reason: SDUPTHER

## 2023-04-24 RX ORDER — DROPERIDOL 2.5 MG/ML
0.62 INJECTION, SOLUTION INTRAMUSCULAR; INTRAVENOUS EVERY 10 MIN PRN
Status: DISCONTINUED | OUTPATIENT
Start: 2023-04-24 | End: 2023-04-24 | Stop reason: HOSPADM

## 2023-04-24 RX ORDER — ROSUVASTATIN CALCIUM 5 MG/1
10 TABLET, COATED ORAL NIGHTLY
Status: CANCELLED | OUTPATIENT
Start: 2023-04-24

## 2023-04-24 RX ORDER — ONDANSETRON 2 MG/ML
4 INJECTION INTRAMUSCULAR; INTRAVENOUS EVERY 6 HOURS PRN
Status: DISCONTINUED | OUTPATIENT
Start: 2023-04-24 | End: 2023-05-06 | Stop reason: HOSPADM

## 2023-04-24 RX ORDER — DIPHENHYDRAMINE HYDROCHLORIDE 50 MG/ML
12.5 INJECTION INTRAMUSCULAR; INTRAVENOUS
Status: DISCONTINUED | OUTPATIENT
Start: 2023-04-24 | End: 2023-04-24 | Stop reason: HOSPADM

## 2023-04-24 RX ORDER — FENTANYL CITRATE 50 UG/ML
25 INJECTION, SOLUTION INTRAMUSCULAR; INTRAVENOUS EVERY 5 MIN PRN
Status: DISCONTINUED | OUTPATIENT
Start: 2023-04-24 | End: 2023-04-24 | Stop reason: HOSPADM

## 2023-04-24 RX ORDER — IPRATROPIUM BROMIDE AND ALBUTEROL SULFATE 2.5; .5 MG/3ML; MG/3ML
1 SOLUTION RESPIRATORY (INHALATION)
Status: DISCONTINUED | OUTPATIENT
Start: 2023-04-24 | End: 2023-04-24 | Stop reason: HOSPADM

## 2023-04-24 RX ORDER — ONDANSETRON 4 MG/1
4 TABLET, ORALLY DISINTEGRATING ORAL EVERY 8 HOURS PRN
Status: DISCONTINUED | OUTPATIENT
Start: 2023-04-24 | End: 2023-05-06 | Stop reason: HOSPADM

## 2023-04-24 RX ORDER — ALBUTEROL SULFATE 90 UG/1
2 AEROSOL, METERED RESPIRATORY (INHALATION) EVERY 4 HOURS PRN
Status: CANCELLED | OUTPATIENT
Start: 2023-04-24

## 2023-04-24 RX ORDER — ALBUTEROL SULFATE 90 UG/1
2 AEROSOL, METERED RESPIRATORY (INHALATION) EVERY 4 HOURS PRN
Status: DISCONTINUED | OUTPATIENT
Start: 2023-04-24 | End: 2023-05-06 | Stop reason: HOSPADM

## 2023-04-24 RX ORDER — FLUOXETINE HYDROCHLORIDE 20 MG/1
20 CAPSULE ORAL 2 TIMES DAILY
Status: DISCONTINUED | OUTPATIENT
Start: 2023-04-24 | End: 2023-05-06 | Stop reason: HOSPADM

## 2023-04-24 RX ORDER — ACETAMINOPHEN 325 MG/1
650 TABLET ORAL EVERY 6 HOURS PRN
Status: DISCONTINUED | OUTPATIENT
Start: 2023-04-24 | End: 2023-05-06 | Stop reason: HOSPADM

## 2023-04-24 RX ORDER — LEVOTHYROXINE SODIUM 0.07 MG/1
150 TABLET ORAL DAILY
Status: DISCONTINUED | OUTPATIENT
Start: 2023-04-25 | End: 2023-05-06 | Stop reason: HOSPADM

## 2023-04-24 RX ORDER — ACETAMINOPHEN 650 MG/1
650 SUPPOSITORY RECTAL EVERY 6 HOURS PRN
Status: CANCELLED | OUTPATIENT
Start: 2023-04-24

## 2023-04-24 RX ORDER — ONDANSETRON 2 MG/ML
4 INJECTION INTRAMUSCULAR; INTRAVENOUS
Status: DISCONTINUED | OUTPATIENT
Start: 2023-04-24 | End: 2023-04-24 | Stop reason: HOSPADM

## 2023-04-24 RX ORDER — BUPIVACAINE HYDROCHLORIDE 5 MG/ML
INJECTION, SOLUTION EPIDURAL; INTRACAUDAL
Status: COMPLETED | OUTPATIENT
Start: 2023-04-24 | End: 2023-04-24

## 2023-04-24 RX ORDER — BUMETANIDE 1 MG/1
2 TABLET ORAL DAILY
Status: DISCONTINUED | OUTPATIENT
Start: 2023-04-25 | End: 2023-04-26

## 2023-04-24 RX ORDER — ROCURONIUM BROMIDE 10 MG/ML
INJECTION, SOLUTION INTRAVENOUS PRN
Status: DISCONTINUED | OUTPATIENT
Start: 2023-04-24 | End: 2023-04-24 | Stop reason: SDUPTHER

## 2023-04-24 RX ORDER — SODIUM CHLORIDE 0.9 % (FLUSH) 0.9 %
5-40 SYRINGE (ML) INJECTION EVERY 12 HOURS SCHEDULED
Status: DISCONTINUED | OUTPATIENT
Start: 2023-04-24 | End: 2023-04-24 | Stop reason: HOSPADM

## 2023-04-24 RX ORDER — ACETAMINOPHEN 325 MG/1
650 TABLET ORAL EVERY 6 HOURS PRN
Status: CANCELLED | OUTPATIENT
Start: 2023-04-24

## 2023-04-24 RX ORDER — PROPOFOL 10 MG/ML
INJECTION, EMULSION INTRAVENOUS PRN
Status: DISCONTINUED | OUTPATIENT
Start: 2023-04-24 | End: 2023-04-24 | Stop reason: SDUPTHER

## 2023-04-24 RX ORDER — PANTOPRAZOLE SODIUM 40 MG/1
40 TABLET, DELAYED RELEASE ORAL
Status: DISCONTINUED | OUTPATIENT
Start: 2023-04-25 | End: 2023-05-06 | Stop reason: HOSPADM

## 2023-04-24 RX ORDER — MIDODRINE HYDROCHLORIDE 5 MG/1
5 TABLET ORAL 3 TIMES DAILY PRN
Status: DISCONTINUED | OUTPATIENT
Start: 2023-04-24 | End: 2023-04-24

## 2023-04-24 RX ORDER — LEVOTHYROXINE SODIUM 0.15 MG/1
150 TABLET ORAL DAILY
Status: CANCELLED | OUTPATIENT
Start: 2023-04-25

## 2023-04-24 RX ORDER — POLYETHYLENE GLYCOL 3350 17 G/17G
17 POWDER, FOR SOLUTION ORAL DAILY PRN
Status: DISCONTINUED | OUTPATIENT
Start: 2023-04-24 | End: 2023-04-24

## 2023-04-24 RX ORDER — ONDANSETRON 2 MG/ML
4 INJECTION INTRAMUSCULAR; INTRAVENOUS EVERY 6 HOURS PRN
Status: CANCELLED | OUTPATIENT
Start: 2023-04-24

## 2023-04-24 RX ORDER — LABETALOL HYDROCHLORIDE 5 MG/ML
10 INJECTION, SOLUTION INTRAVENOUS
Status: DISCONTINUED | OUTPATIENT
Start: 2023-04-24 | End: 2023-04-24 | Stop reason: HOSPADM

## 2023-04-24 RX ORDER — PANTOPRAZOLE SODIUM 40 MG/1
40 TABLET, DELAYED RELEASE ORAL
Status: CANCELLED | OUTPATIENT
Start: 2023-04-25

## 2023-04-24 RX ORDER — OXYCODONE HYDROCHLORIDE 5 MG/1
5 TABLET ORAL
Status: DISCONTINUED | OUTPATIENT
Start: 2023-04-24 | End: 2023-04-24 | Stop reason: HOSPADM

## 2023-04-24 RX ORDER — OXYCODONE HYDROCHLORIDE AND ACETAMINOPHEN 5; 325 MG/1; MG/1
1 TABLET ORAL EVERY 4 HOURS PRN
Status: CANCELLED | OUTPATIENT
Start: 2023-04-24

## 2023-04-24 RX ORDER — ONDANSETRON 4 MG/1
4 TABLET, ORALLY DISINTEGRATING ORAL EVERY 8 HOURS PRN
Status: CANCELLED | OUTPATIENT
Start: 2023-04-24

## 2023-04-24 RX ORDER — SODIUM CHLORIDE, SODIUM LACTATE, POTASSIUM CHLORIDE, CALCIUM CHLORIDE 600; 310; 30; 20 MG/100ML; MG/100ML; MG/100ML; MG/100ML
INJECTION, SOLUTION INTRAVENOUS CONTINUOUS
OUTPATIENT
Start: 2023-04-24

## 2023-04-24 RX ORDER — ROSUVASTATIN CALCIUM 5 MG/1
10 TABLET, COATED ORAL NIGHTLY
Status: DISCONTINUED | OUTPATIENT
Start: 2023-04-24 | End: 2023-05-06 | Stop reason: HOSPADM

## 2023-04-24 RX ORDER — PHENYLEPHRINE HCL IN 0.9% NACL 1 MG/10 ML
SYRINGE (ML) INTRAVENOUS PRN
Status: DISCONTINUED | OUTPATIENT
Start: 2023-04-24 | End: 2023-04-24 | Stop reason: SDUPTHER

## 2023-04-24 RX ADMIN — Medication 200 MCG: at 12:21

## 2023-04-24 RX ADMIN — Medication 200 MCG: at 12:19

## 2023-04-24 RX ADMIN — ACETAMINOPHEN 650 MG: 325 TABLET ORAL at 16:06

## 2023-04-24 RX ADMIN — PROPOFOL 100 MG: 10 INJECTION, EMULSION INTRAVENOUS at 11:59

## 2023-04-24 RX ADMIN — SODIUM CHLORIDE: 9 INJECTION, SOLUTION INTRAVENOUS at 11:33

## 2023-04-24 RX ADMIN — SUGAMMADEX 100 MG: 100 INJECTION, SOLUTION INTRAVENOUS at 12:38

## 2023-04-24 RX ADMIN — FLUOXETINE 20 MG: 20 CAPSULE ORAL at 21:53

## 2023-04-24 RX ADMIN — PHENYLEPHRINE HYDROCHLORIDE 100 MCG/MIN: 10 INJECTION INTRAVENOUS at 12:22

## 2023-04-24 RX ADMIN — PANTOPRAZOLE SODIUM 40 MG: 40 TABLET, DELAYED RELEASE ORAL at 06:27

## 2023-04-24 RX ADMIN — ROSUVASTATIN CALCIUM 10 MG: 5 TABLET, FILM COATED ORAL at 21:54

## 2023-04-24 RX ADMIN — DEXAMETHASONE SODIUM PHOSPHATE 4 MG: 4 INJECTION, SOLUTION INTRAMUSCULAR; INTRAVENOUS at 12:04

## 2023-04-24 RX ADMIN — LIDOCAINE HYDROCHLORIDE 100 MG: 20 INJECTION, SOLUTION INTRAVENOUS at 11:59

## 2023-04-24 RX ADMIN — ONDANSETRON 4 MG: 2 INJECTION INTRAMUSCULAR; INTRAVENOUS at 12:24

## 2023-04-24 RX ADMIN — ACETAMINOPHEN 650 MG: 325 TABLET ORAL at 21:53

## 2023-04-24 RX ADMIN — Medication 100 MCG: at 12:12

## 2023-04-24 RX ADMIN — SUGAMMADEX 100 MG: 100 INJECTION, SOLUTION INTRAVENOUS at 12:35

## 2023-04-24 RX ADMIN — Medication 100 MCG: at 12:04

## 2023-04-24 RX ADMIN — SODIUM CHLORIDE, PRESERVATIVE FREE 10 ML: 5 INJECTION INTRAVENOUS at 08:47

## 2023-04-24 RX ADMIN — LEVOTHYROXINE SODIUM 150 MCG: 150 TABLET ORAL at 06:27

## 2023-04-24 RX ADMIN — Medication 100 MCG: at 12:16

## 2023-04-24 RX ADMIN — CEFAZOLIN 2000 MG: 2 INJECTION, POWDER, FOR SOLUTION INTRAMUSCULAR; INTRAVENOUS at 11:52

## 2023-04-24 RX ADMIN — APIXABAN 5 MG: 5 TABLET, FILM COATED ORAL at 21:54

## 2023-04-24 RX ADMIN — FENTANYL CITRATE 25 MCG: 50 INJECTION, SOLUTION INTRAMUSCULAR; INTRAVENOUS at 11:59

## 2023-04-24 RX ADMIN — ROCURONIUM BROMIDE 50 MG: 10 INJECTION, SOLUTION INTRAVENOUS at 11:59

## 2023-04-24 RX ADMIN — CEFAZOLIN 2000 MG: 2 INJECTION, POWDER, FOR SOLUTION INTRAMUSCULAR; INTRAVENOUS at 12:04

## 2023-04-24 ASSESSMENT — PAIN SCALES - WONG BAKER: WONGBAKER_NUMERICALRESPONSE: 0

## 2023-04-24 ASSESSMENT — PAIN DESCRIPTION - DESCRIPTORS
DESCRIPTORS: ACHING
DESCRIPTORS: ACHING;TENDER;PRESSURE

## 2023-04-24 ASSESSMENT — PAIN - FUNCTIONAL ASSESSMENT
PAIN_FUNCTIONAL_ASSESSMENT: 0-10
PAIN_FUNCTIONAL_ASSESSMENT: PREVENTS OR INTERFERES SOME ACTIVE ACTIVITIES AND ADLS
PAIN_FUNCTIONAL_ASSESSMENT: ACTIVITIES ARE NOT PREVENTED

## 2023-04-24 ASSESSMENT — PAIN SCALES - GENERAL
PAINLEVEL_OUTOF10: 4
PAINLEVEL_OUTOF10: 0
PAINLEVEL_OUTOF10: 2
PAINLEVEL_OUTOF10: 0
PAINLEVEL_OUTOF10: 4

## 2023-04-24 ASSESSMENT — PAIN DESCRIPTION - ORIENTATION
ORIENTATION: LEFT
ORIENTATION: LEFT

## 2023-04-24 ASSESSMENT — PAIN DESCRIPTION - LOCATION
LOCATION: ABDOMEN
LOCATION: ABDOMEN;CHEST

## 2023-04-24 NOTE — CARE COORDINATION
Per MD patient medically ready for discharge to ARU today s/p PD port placement. Notified ARU charge. COVID negative test noted. Patient meets criteria and is approved to come to ARU. Patient able to admit once medically stable and after ARU Medical Director and  sign the pre-admission screen (PAS).

## 2023-04-24 NOTE — CONSULTS
>24 hour central line rounding completed on newly placed tunneled Hemodialysis Catheter placed in R R Upper Chest. Sterile dressing change completed per protocol. DRSG Occlusive at this time. Please consult IV/PICC team for questions, concerns, or patient's needs change.

## 2023-04-24 NOTE — PLAN OF CARE
Problem: Discharge Planning  Goal: Discharge to home or other facility with appropriate resources  4/23/2023 2207 by Ham Kimball RN  Outcome: Progressing  4/23/2023 2207 by Ham Kimball RN  Outcome: Progressing     Problem: Safety - Adult  Goal: Free from fall injury  4/23/2023 2207 by Ham Kimball RN  Outcome: Progressing  4/23/2023 2207 by Ham Kimball RN  Outcome: Progressing     Problem: ABCDS Injury Assessment  Goal: Absence of physical injury  4/23/2023 2207 by Ham Kimball RN  Outcome: Progressing  4/23/2023 2207 by Ham Kimball RN  Outcome: Progressing     Problem: Chronic Conditions and Co-morbidities  Goal: Patient's chronic conditions and co-morbidity symptoms are monitored and maintained or improved  Outcome: Progressing     Problem: Skin/Tissue Integrity  Goal: Absence of new skin breakdown  Description: 1. Monitor for areas of redness and/or skin breakdown  2. Assess vascular access sites hourly  3. Every 4-6 hours minimum:  Change oxygen saturation probe site  4. Every 4-6 hours:  If on nasal continuous positive airway pressure, respiratory therapy assess nares and determine need for appliance change or resting period.   Outcome: Progressing

## 2023-04-24 NOTE — OP NOTE
Operative Note      Patient: Devi Rajan  YOB: 1950  MRN: [id number]    Date of Procedure: 4/24/2023    Pre-Op Diagnosis Codes:     * Injury of kidney, unspecified laterality, sequela [S37.009S]    Post-Op Diagnosis: Same       Procedure(s):  CATHETER INSERTION PERITONEAL DIALYSIS    Surgeon(s):  Jay Ramirez MD    Assistant:   * No surgical staff found *    Anesthesia: General    Estimated Blood Loss (mL): 41KT    Complications: None    Specimens:   * No specimens in log *    Implants:  Implant Name Type Inv. Item Serial No.  Lot No. LRB No. Used Action   CATHETER PERITONEAL DLYS 35X53 MMX62 CM FLEX-NECK CLASSIC - [id number] Hemodialysis catheters CATHETER PERITONEAL DLYS 35X53 MMX62 CM FLEX-NECK CLASSIC  [employer]- [id number] N/A 1 Implanted         Drains:   [REMOVED] NG/OG/NJ/NE Tube Nasogastric 16 fr Right nostril (Removed)   Surrounding Skin Clean, dry & intact 04/15/23 1558   Securement device Tape 04/15/23 1558   Status Clamped 04/15/23 1558   Placement Verified X-Ray (Initial) 04/15/23 1558   NG/OG/NJ/NE External Measurement (cm) 65 cm 04/15/23 1558       [REMOVED] Urinary Catheter 04/14/23 [place] (Removed)   $ Urethral catheter insertion $ Not inserted for procedure 04/18/23 0720   Catheter Indications Need for fluid volume management of the critically ill patient in a critical care setting 04/18/23 1657   Site Assessment Pink 04/18/23 1657   Urine Color Yellow 04/18/23 1657   Urine Appearance Clear 04/18/23 1657   Urine Odor Malodorous 04/18/23 1657   Collection Container Standard 04/18/23 1657   Securement Method Leg strap 04/18/23 1657   Catheter Care  Soap and water 04/15/23 0800   Catheter Best Practices  Drainage tube clipped to bed; Tamper seal intact; Bag below bladder;Bag not on floor; Lack of dependent loop in tubing;Drainage bag less than half full;Catheter secured to thigh 04/17/23 2319   Status Draining 04/18/23 1657   Output (mL) 225 mL

## 2023-04-24 NOTE — H&P
Jennifer Valentine    : 1950  Acct #: [de-identified]  MRN: [id number]              History and physical    Date of face-to-face exam: 2023. Time of face-to-face exam: 1505. Admitting diagnosis: Cardiac arrest ( [address])    Comorbid diagnoses impacting rehabilitation: Generalized weakness, gait disturbance, paroxysmal atrial fibrillation, end-stage renal disease on hemodialysis, bradycardia with AV block, uncontrolled diabetes type 2 with hyperglycemia, COPD, mixed hyperlipidemia    Chief complaint: Pain at the peritoneal dialysis insertion site. History of present illness: The patient is a 76-year-old right-hand-dominant female who was noted to have severe AV block at the cardiologist office on 2023. She was sent to our ED from the cardiologist office for further evaluation. At the hospital, she suffered cardiac arrest and required urgent pacemaker placement on 2023. She was eventually extubated but had suffered significant hypotension causing acute kidney injury. This required hemodialysis and a tunneled catheter was placed in her chest.  She has been receiving hemodialysis through that catheter. Her creatinine continues to wax and wane and a peritoneal dialysis port was placed by general surgery earlier today. Throughout this the patient has had poor activity tolerance and is not assimilating sternal precautions well into her self-care activities. She has had poor attention and reasoning and carryover. Blood sugars and blood pressures have fluctuated some and medications of been adjusted. She has become unable to do her own toileting, transfers and self-care and cannot return directly home. She requires inpatient rehabilitation to address these issues. Review of systems: Abdominal pain at the tube insertion site. Some positional dizziness. Generally weak. Poor appetite. Poor sleep. Infrequent bowel movements.   The remainder of their review of systems was negative except as

## 2023-04-24 NOTE — PROGRESS NOTES
GENERAL SURGERY PROGRESS NOTE    Jameson Pierson is a 74 y.o. female with ESRD. Subjective:  Doing well this AM. Questions answered regarding planned PD catheter placement. Objective:    Vitals: VITALS:  /70   Pulse 68   Temp 97.3 °F (36.3 °C) (Oral)   Resp 16   Ht 5' 5\" (1.651 m)   Wt 182 lb 0.3 oz (82.6 kg)   SpO2 97%   BMI 30.29 kg/m²     I/O: No intake/output data recorded. Labs/Imaging Results:   Lab Results   Component Value Date/Time     04/24/2023 05:47 AM    K 3.7 04/24/2023 05:47 AM    CL 99 04/24/2023 05:47 AM    CO2 27 04/24/2023 05:47 AM    BUN 33 04/24/2023 05:47 AM    CREATININE 4.1 04/24/2023 05:47 AM    GLUCOSE 170 04/24/2023 05:47 AM    CALCIUM 8.6 04/24/2023 05:47 AM      Lab Results   Component Value Date    WBC 5.5 04/24/2023    HGB 9.5 (L) 04/24/2023    HCT 31.0 (L) 04/24/2023    MCV 99.4 04/24/2023     (L) 04/24/2023       IV Fluids:   sodium chloride    sodium chloride    Scheduled Meds:   epoetin madison-epbx, 10,000 Units, IntraVENous, Once per day on Tue Thu Sat    bumetanide, 2 mg, Oral, Daily    pantoprazole, 40 mg, Oral, QAM AC    sodium chloride flush, 5-40 mL, IntraVENous, 2 times per day    sodium chloride flush, 5-40 mL, IntraVENous, 2 times per day    [Held by provider] apixaban, 5 mg, Oral, BID    FLUoxetine, 20 mg, Oral, BID    levothyroxine, 150 mcg, Oral, Daily    rosuvastatin, 10 mg, Oral, Nightly    Physical Exam:  General: A&O x 3, no distress. HEENT: Anicteric sclerae, MMM. Extremities: atraumatic      Assessment and Plan:  74 y.o. female with ESRD. Need for PD catheter for ongoing dialysis.     Patient Active Problem List:     Chronic asthmatic bronchitis (HCC)     JARAD on CPAP     Family history of colon cancer     Hypothyroidism     Essential hypertension     Hyperlipidemia     SOB (shortness of breath)     Abnormal nuclear stress test     CKD (chronic kidney disease), stage IV ([address]     Other acute kidney failure ([address]
Nephrology Progress Note        [address], [address] [place] [address]  Phone: [phone]  Office Hours: 8:30AM - 4:30PM  Monday - Friday 4/24/2023 6:50 AM  Subjective:   Admit Date: 4/14/2023  PCP: Wilman Chavez, APRN - CNP  Interval History:   Doing ok  Her mouth feels dry since being npo  BP at goal    Diet: Diet NPO Exceptions are: Sips of Water with Meds      Data:   Scheduled Meds:   epoetin madison-epbx  10,000 Units IntraVENous Once per day on Tue Thu Sat    bumetanide  2 mg Oral Daily    pantoprazole  40 mg Oral QAM AC    sodium chloride flush  5-40 mL IntraVENous 2 times per day    sodium chloride flush  5-40 mL IntraVENous 2 times per day    [Held by provider] apixaban  5 mg Oral BID    FLUoxetine  20 mg Oral BID    levothyroxine  150 mcg Oral Daily    rosuvastatin  10 mg Oral Nightly     Continuous Infusions:   sodium chloride      sodium chloride       PRN Meds:midodrine, albuterol sulfate HFA, ipratropium, sodium chloride flush, sodium chloride, ondansetron **OR** ondansetron, polyethylene glycol, acetaminophen **OR** acetaminophen, sodium chloride flush, sodium chloride  I/O last 3 completed shifts: In: 575 [P.O.:75]  Out: 2200 [Urine:200]  No intake/output data recorded. No intake or output data in the 24 hours ending 04/24/23 0650    CBC:   Recent Labs     04/22/23  1135 04/23/23  0600 04/24/23  0547   WBC 6.5 4.9 5.5   HGB 9.2* 8.7* 9.5*   * 96* 127*       BMP:    Recent Labs     04/22/23  1135 04/23/23  0600 04/24/23  0547    135 137   K 3.2* 3.7 3.7   CL 98* 98* 99   CO2 28 28 27   BUN 13 24* 33*   CREATININE 1.7* 3.3* 4.1*   GLUCOSE 131* 158* 170*     Hepatic: No results for input(s): AST, ALT, ALB, BILITOT, ALKPHOS in the last 72 hours. Troponin: No results for input(s): TROPONINI in the last 72 hours. BNP: No results for input(s): BNP in the last 72 hours. Lipids: No results for input(s): CHOL, HDL in the last 72 hours.     Invalid input(s):
Pt and belongings to [facility] room 1024
Report called to [place], [address]
30.3  Usual Body Weight: 193 lb (87.5 kg) (per MD office Novemebr 2022)  % Weight Change (Calculated): -0.5  Weight Adjustment For: No Adjustment                 BMI Categories: Obese Class 1 (BMI 30.0-34. 9)    Estimated Daily Nutrient Needs:  Energy Requirements Based On: Formula  Weight Used for Energy Requirements: Current  Energy (kcal/day): 8144-3643 [facility] [place], stress factor 1.1-1.2)  Weight Used for Protein Requirements: Ideal  Protein (g/day): 68-74 (1.2-1.3 g/kg)  Method Used for Fluid Requirements: Standard Renal  Fluid (ml/day): fluids per nephrology    Nutrition Diagnosis:   Moderate malnutrition, In context of acute illness or injury related to acute injury/trauma, impaired respiratory function, renal dysfunction (reduced oral intake related to intubation and acute illness) as evidenced by moderate muscle loss, weight loss greater than or equal to 2% in 1 week, localized or generalized fluid accumulation    Nutrition Interventions:   Food and/or Nutrient Delivery: Continue NPO (Start diet and supplements when able)  Nutrition Education/Counseling: No recommendation at this time  Coordination of Nutrition Care: Continue to monitor while inpatient, Feeding Assistance/Environment Change, Coordination of Care  Plan of Care discussed with: pt, MD over malnutrition dx    Goals:  Previous Goal Met: Goal(s) Achieved  Goals: PO intake 50% or greater       Nutrition Monitoring and Evaluation:   Behavioral-Environmental Outcomes: None Identified  Food/Nutrient Intake Outcomes: Diet Advancement/Tolerance  Physical Signs/Symptoms Outcomes: Biochemical Data, GI Status, Skin, Weight, Fluid Status or Edema    Discharge Planning:     (Consider Carb Control/Low Phosphous/No Added Salt Diet with Diabetic supplements at least once per day)     Claudia Courtney RD, LD  Contact: [phone]

## 2023-04-24 NOTE — ANESTHESIA PRE PROCEDURE
Department of Anesthesiology  Preprocedure Note       Name:  Paula Bynum   Age:  74 y.o.  :  1950                                          MRN:  [id number]         Date:  2023      Surgeon: Mary Sánchez):  Ting Flood MD    Procedure: Procedure(s):  CATHETER INSERTION PERITONEAL DIALYSIS    Medications prior to admission:   Prior to Admission medications    Medication Sig Start Date End Date Taking? Authorizing Provider   rosuvastatin (CRESTOR) 20 MG tablet Take 1 tablet by mouth nightly 3/24/23   Historical Provider, MD   acetaminophen (TYLENOL) 500 MG tablet Take 1 tablet by mouth three times daily    Historical Provider, MD   bumetanide (BUMEX) 2 MG tablet Take 1 tablet by mouth daily as needed (leg swelling) 3/20/23   Thelma hTorpe DO   sodium bicarbonate 650 MG tablet Take 1 tablet by mouth 2 times daily 3/20/23 3/19/24  Thelma Thorpe DO   calcitRIOL (ROCALTROL) 0.25 MCG capsule Take 1 capsule by mouth daily 3/20/23   Thelma Thorpe DO   apixaban (ELIQUIS) 5 MG TABS tablet Take 1 tablet by mouth 2 times daily 23   Arnol Aldrich MD   lidocaine 4 % external patch Place 1 patch onto the skin daily 6/3/22   Historical Provider, MD   zinc oxide 20 % ointment Apply topically as needed for Dry Skin Apply topically as needed.     Historical Provider, MD   b complex-C-folic acid (NEPHROCAPS) 1 MG capsule Take 1 capsule by mouth daily    Historical Provider, MD   vitamin D (CHOLECALCIFEROL) 25 MCG (1000 UT) TABS tablet Take 2 tablets by mouth daily    Historical Provider, MD   loratadine (CLARITIN) 10 MG tablet Take 1 tablet by mouth daily 19   Historical Provider, MD   levothyroxine (SYNTHROID) 150 MCG tablet Take 1 tablet by mouth Daily  Patient taking differently: Take 1 tablet by mouth Daily 23 Patient reports dose changed yesterday to 150 mcg, previously on 175 mcg 19  Jeancarlos Castaneda MD   FLUoxetine (PROZAC) 20 MG capsule Take 1 capsule

## 2023-04-25 PROCEDURE — 97163 PT EVAL HIGH COMPLEX 45 MIN: CPT

## 2023-04-25 PROCEDURE — 6370000000 HC RX 637 (ALT 250 FOR IP): Performed by: PHYSICAL MEDICINE & REHABILITATION

## 2023-04-25 PROCEDURE — 97542 WHEELCHAIR MNGMENT TRAINING: CPT

## 2023-04-25 PROCEDURE — 97535 SELF CARE MNGMENT TRAINING: CPT

## 2023-04-25 PROCEDURE — 97530 THERAPEUTIC ACTIVITIES: CPT

## 2023-04-25 PROCEDURE — 99232 SBSQ HOSP IP/OBS MODERATE 35: CPT | Performed by: PHYSICAL MEDICINE & REHABILITATION

## 2023-04-25 PROCEDURE — 97167 OT EVAL HIGH COMPLEX 60 MIN: CPT

## 2023-04-25 PROCEDURE — 6370000000 HC RX 637 (ALT 250 FOR IP): Performed by: INTERNAL MEDICINE

## 2023-04-25 PROCEDURE — 1280000000 HC REHAB R&B

## 2023-04-25 PROCEDURE — 94761 N-INVAS EAR/PLS OXIMETRY MLT: CPT

## 2023-04-25 PROCEDURE — 5A1D70Z PERFORMANCE OF URINARY FILTRATION, INTERMITTENT, LESS THAN 6 HOURS PER DAY: ICD-10-PCS | Performed by: INTERNAL MEDICINE

## 2023-04-25 RX ORDER — MIDODRINE HYDROCHLORIDE 5 MG/1
5 TABLET ORAL 3 TIMES DAILY PRN
Status: DISCONTINUED | OUTPATIENT
Start: 2023-04-25 | End: 2023-05-06 | Stop reason: HOSPADM

## 2023-04-25 RX ADMIN — ACETAMINOPHEN 650 MG: 325 TABLET ORAL at 18:12

## 2023-04-25 RX ADMIN — ACETAMINOPHEN 650 MG: 325 TABLET ORAL at 05:40

## 2023-04-25 RX ADMIN — FLUOXETINE 20 MG: 20 CAPSULE ORAL at 08:29

## 2023-04-25 RX ADMIN — FLUOXETINE 20 MG: 20 CAPSULE ORAL at 21:11

## 2023-04-25 RX ADMIN — BUMETANIDE 2 MG: 1 TABLET ORAL at 08:29

## 2023-04-25 RX ADMIN — PANTOPRAZOLE SODIUM 40 MG: 40 TABLET, DELAYED RELEASE ORAL at 05:40

## 2023-04-25 RX ADMIN — APIXABAN 5 MG: 5 TABLET, FILM COATED ORAL at 08:29

## 2023-04-25 RX ADMIN — ACETAMINOPHEN 650 MG: 325 TABLET ORAL at 11:22

## 2023-04-25 RX ADMIN — APIXABAN 5 MG: 5 TABLET, FILM COATED ORAL at 21:11

## 2023-04-25 RX ADMIN — LEVOTHYROXINE SODIUM 150 MCG: 0.07 TABLET ORAL at 05:40

## 2023-04-25 RX ADMIN — MIDODRINE HYDROCHLORIDE 5 MG: 5 TABLET ORAL at 18:12

## 2023-04-25 RX ADMIN — ROSUVASTATIN CALCIUM 10 MG: 5 TABLET, FILM COATED ORAL at 21:11

## 2023-04-25 ASSESSMENT — PAIN SCALES - GENERAL
PAINLEVEL_OUTOF10: 0
PAINLEVEL_OUTOF10: 1
PAINLEVEL_OUTOF10: 4
PAINLEVEL_OUTOF10: 5

## 2023-04-25 ASSESSMENT — PAIN DESCRIPTION - ORIENTATION
ORIENTATION: RIGHT
ORIENTATION: LEFT
ORIENTATION: MID

## 2023-04-25 ASSESSMENT — PAIN DESCRIPTION - DESCRIPTORS
DESCRIPTORS: ACHING;THROBBING
DESCRIPTORS: ACHING;TENDER
DESCRIPTORS: ACHING;SORE

## 2023-04-25 ASSESSMENT — PAIN DESCRIPTION - LOCATION
LOCATION: BACK
LOCATION: ABDOMEN
LOCATION: ABDOMEN

## 2023-04-25 ASSESSMENT — PAIN - FUNCTIONAL ASSESSMENT: PAIN_FUNCTIONAL_ASSESSMENT: ACTIVITIES ARE NOT PREVENTED

## 2023-04-25 NOTE — CARE COORDINATION
Case Management Admission Note    Patient:Malena Zavala      :1950  JIZ:[id number]  Rehab Dx/Hx: Cardiac arrest [facility]) [I46.9]    Chief Complaint:   Past Medical History:   Diagnosis Date    Asthma     Bradycardia     Bronchitis     COPD (chronic obstructive pulmonary disease) ([facility]     H/O echocardiogram 2019    EF 55-60%, Grade II Diastolic Dysfunction, Left atrium is moderately dilated, no significant valvular disease, Mild Pulm HTN, No pericardial effusion     History of nuclear stress test 2019    ABN, Moderate inferior and lateral wall ischemia of a large territory, EF 55%    Hypercholesteremia     Hyperlipidemia     Hypertension     Hyperthyroidism     Hypothyroidism     Kidney disease     Pneumonia     Type II or unspecified type diabetes mellitus without mention of complication, not stated as uncontrolled     Unspecified sleep apnea      Past Surgical History:   Procedure Laterality Date    [address]    COLONOSCOPY      COLONOSCOPY N/A 2021    COLONOSCOPY W/ ENDOSCOPIC MUCOSAL RESECTION WITH ORISE INJECTION, HEMACLIP PLACEMENT X 2 POST-POLYPECTOMY, SPOT INK TO LEXY 1.2CM POLYP HEPATIC FLEXURE, POLYPECTOMY performed by Kandy Teague MD at [place] [address]    IR TUNNELED [address] 5 YEARS  2023    IR TUNNELED CATHETER PLACEMENT GREATER THAN 5 YEARS 2023 [facility] SPECIAL PROCEDURES     Allergies   Allergen Reactions    Empagliflozin Hives    Lorazepam Other (See Comments)     Confusion      Precautions: falls    Date of Admit: 2023  Room #: 1412/8966-M    Current functional status at time of admit:  Home Living/DME Available:  Type of Home: House  Home Access: Ramped entrance  Bathroom Shower/Tub: Tub/Shower unit, Shower chair with back  Bathroom Toilet: Handicap height (has been using [facility] frame over toilet)  Bathroom Equipment: Shower chair, 3-in-1 commode  Home

## 2023-04-26 PROCEDURE — 6370000000 HC RX 637 (ALT 250 FOR IP): Performed by: INTERNAL MEDICINE

## 2023-04-26 PROCEDURE — 97530 THERAPEUTIC ACTIVITIES: CPT

## 2023-04-26 PROCEDURE — 97110 THERAPEUTIC EXERCISES: CPT

## 2023-04-26 PROCEDURE — 6370000000 HC RX 637 (ALT 250 FOR IP): Performed by: PHYSICAL MEDICINE & REHABILITATION

## 2023-04-26 PROCEDURE — 99232 SBSQ HOSP IP/OBS MODERATE 35: CPT | Performed by: PHYSICAL MEDICINE & REHABILITATION

## 2023-04-26 PROCEDURE — 1280000000 HC REHAB R&B

## 2023-04-26 RX ORDER — BUMETANIDE 1 MG/1
2 TABLET ORAL
Status: DISCONTINUED | OUTPATIENT
Start: 2023-04-27 | End: 2023-04-27

## 2023-04-26 RX ORDER — MIDODRINE HYDROCHLORIDE 5 MG/1
5 TABLET ORAL
Status: DISCONTINUED | OUTPATIENT
Start: 2023-04-26 | End: 2023-04-27

## 2023-04-26 RX ADMIN — MIDODRINE HYDROCHLORIDE 5 MG: 5 TABLET ORAL at 13:47

## 2023-04-26 RX ADMIN — MIDODRINE HYDROCHLORIDE 5 MG: 5 TABLET ORAL at 18:03

## 2023-04-26 RX ADMIN — APIXABAN 5 MG: 5 TABLET, FILM COATED ORAL at 09:15

## 2023-04-26 RX ADMIN — APIXABAN 5 MG: 5 TABLET, FILM COATED ORAL at 20:42

## 2023-04-26 RX ADMIN — FLUOXETINE 20 MG: 20 CAPSULE ORAL at 09:15

## 2023-04-26 RX ADMIN — ACETAMINOPHEN 650 MG: 325 TABLET ORAL at 20:39

## 2023-04-26 RX ADMIN — ROSUVASTATIN CALCIUM 10 MG: 5 TABLET, FILM COATED ORAL at 20:39

## 2023-04-26 RX ADMIN — MIDODRINE HYDROCHLORIDE 5 MG: 5 TABLET ORAL at 13:46

## 2023-04-26 RX ADMIN — LEVOTHYROXINE SODIUM 150 MCG: 0.07 TABLET ORAL at 05:58

## 2023-04-26 RX ADMIN — ACETAMINOPHEN 650 MG: 325 TABLET ORAL at 06:19

## 2023-04-26 RX ADMIN — FLUOXETINE 20 MG: 20 CAPSULE ORAL at 20:39

## 2023-04-26 RX ADMIN — PANTOPRAZOLE SODIUM 40 MG: 40 TABLET, DELAYED RELEASE ORAL at 05:58

## 2023-04-26 RX ADMIN — MIDODRINE HYDROCHLORIDE 5 MG: 5 TABLET ORAL at 09:15

## 2023-04-26 RX ADMIN — MIDODRINE HYDROCHLORIDE 5 MG: 5 TABLET ORAL at 13:53

## 2023-04-26 ASSESSMENT — PAIN DESCRIPTION - DESCRIPTORS: DESCRIPTORS: ACHING

## 2023-04-26 ASSESSMENT — PAIN DESCRIPTION - ORIENTATION: ORIENTATION: RIGHT

## 2023-04-26 ASSESSMENT — PAIN SCALES - GENERAL
PAINLEVEL_OUTOF10: 0
PAINLEVEL_OUTOF10: 2
PAINLEVEL_OUTOF10: 2

## 2023-04-26 ASSESSMENT — PAIN DESCRIPTION - LOCATION: LOCATION: CHEST

## 2023-04-26 NOTE — FLOWSHEET NOTE
[x] daily progress note       [] discharge       Patient Name:  Rosario Barton   :  1956 MRN: [id number]  Room:  [address] [place], [place] [address]A Date of Admission: 2023  Rehabilitation Diagnosis:   Cardiac arrest [facility]) [I46.9]       Date 2023       Day of ARU Week:  3   Time IN/-945   Individual Tx Minutes 60   TOTAL Tx Time Mins 60   Restrictions Restrictions/Precautions  Restrictions/Precautions: General Precautions, Fall Risk, Surgical Protocols (new peritoneal dialysis catheter (watch gait belt placement, can't shower for 2 weeks), pacemaker precautions)  Implants present? : Pacemaker      Communication with other providers: [x]   OK to see per nursing:     [x]   Bladimir Ocampo) provided BP meds during session. Subjective observations and cognitive status: Pt seen in semi-acuna's position in bed at beginning of treatment. Agreeable to therapy. Supine BP was 105/46. No symptoms. Sitting BP: 109/51. No symptoms. BP 82/41 while standing with dizziness. /44 seated EOB with dizziness subsiding. O2 sats 96%; HR 60 bpm. 110/43 seated with no dizziness. Trialed 2nd trial of standing and patient reported dizziness after 30 seconds and BP was 76/49. BP 86/50 seated (slight dizziness). Returned patient to bed and BP was 107/51 while supine. Pain level/location:    0/10        Discharge recommendations   TBD     Bed Mobility:             Rolling R/L:  SBA with bed rails (pt did well with maintaining pacemaker precautions)   Lying --> Sit:  SBA with bed rails and bed features (pt did well with maintaining pacemaker precautions)   Sit --> lying:  mod A for BLEs  Pt did well with maintaining pacemaker precautions    Transfers:    Sit--> Stand:  min A   Stand --> Sit:   CGA  Chair-->Bed/Bed --> Chair:   Deferred d/t low BP   Assistive device required for transfer:   RW  Vcs for proper hand placement with right UE. Pt did well with maintaining pacemaker precautions.      Additional Therapeutic activities/exercises

## 2023-04-26 NOTE — CARE COORDINATION
Spoke with patient and spouse to follow up on patient's request for home delivered meals, not meals on wheels. Provided her with information on [employer] per patient request. Also discussed home cleaning services. Provided her with a list of agencies in her geographic area.

## 2023-04-27 PROCEDURE — 6370000000 HC RX 637 (ALT 250 FOR IP): Performed by: INTERNAL MEDICINE

## 2023-04-27 PROCEDURE — 97110 THERAPEUTIC EXERCISES: CPT

## 2023-04-27 PROCEDURE — 97530 THERAPEUTIC ACTIVITIES: CPT

## 2023-04-27 PROCEDURE — 97116 GAIT TRAINING THERAPY: CPT

## 2023-04-27 PROCEDURE — 1280000000 HC REHAB R&B

## 2023-04-27 PROCEDURE — 94761 N-INVAS EAR/PLS OXIMETRY MLT: CPT

## 2023-04-27 PROCEDURE — 99232 SBSQ HOSP IP/OBS MODERATE 35: CPT | Performed by: PHYSICAL MEDICINE & REHABILITATION

## 2023-04-27 PROCEDURE — 97535 SELF CARE MNGMENT TRAINING: CPT

## 2023-04-27 PROCEDURE — 97542 WHEELCHAIR MNGMENT TRAINING: CPT

## 2023-04-27 RX ORDER — MIDODRINE HYDROCHLORIDE 5 MG/1
10 TABLET ORAL
Status: DISCONTINUED | OUTPATIENT
Start: 2023-04-27 | End: 2023-05-04

## 2023-04-27 RX ORDER — BUMETANIDE 1 MG/1
2 TABLET ORAL
Status: DISCONTINUED | OUTPATIENT
Start: 2023-04-29 | End: 2023-05-06 | Stop reason: HOSPADM

## 2023-04-27 RX ADMIN — APIXABAN 5 MG: 5 TABLET, FILM COATED ORAL at 08:52

## 2023-04-27 RX ADMIN — PANTOPRAZOLE SODIUM 40 MG: 40 TABLET, DELAYED RELEASE ORAL at 06:08

## 2023-04-27 RX ADMIN — ACETAMINOPHEN 650 MG: 325 TABLET ORAL at 21:43

## 2023-04-27 RX ADMIN — MIDODRINE HYDROCHLORIDE 10 MG: 5 TABLET ORAL at 08:51

## 2023-04-27 RX ADMIN — FLUOXETINE 20 MG: 20 CAPSULE ORAL at 21:42

## 2023-04-27 RX ADMIN — MIDODRINE HYDROCHLORIDE 10 MG: 5 TABLET ORAL at 12:53

## 2023-04-27 RX ADMIN — FLUOXETINE 20 MG: 20 CAPSULE ORAL at 08:52

## 2023-04-27 RX ADMIN — MIDODRINE HYDROCHLORIDE 10 MG: 5 TABLET ORAL at 17:07

## 2023-04-27 RX ADMIN — ROSUVASTATIN CALCIUM 10 MG: 5 TABLET, FILM COATED ORAL at 21:42

## 2023-04-27 RX ADMIN — APIXABAN 5 MG: 5 TABLET, FILM COATED ORAL at 21:43

## 2023-04-27 RX ADMIN — LEVOTHYROXINE SODIUM 150 MCG: 0.07 TABLET ORAL at 06:09

## 2023-04-27 RX ADMIN — MIDODRINE HYDROCHLORIDE 5 MG: 5 TABLET ORAL at 06:12

## 2023-04-27 ASSESSMENT — PAIN DESCRIPTION - ORIENTATION: ORIENTATION: RIGHT

## 2023-04-27 ASSESSMENT — PAIN DESCRIPTION - LOCATION: LOCATION: SHOULDER

## 2023-04-27 ASSESSMENT — PAIN DESCRIPTION - PAIN TYPE: TYPE: ACUTE PAIN

## 2023-04-27 ASSESSMENT — PAIN SCALES - WONG BAKER
WONGBAKER_NUMERICALRESPONSE: 0
WONGBAKER_NUMERICALRESPONSE: 0

## 2023-04-27 ASSESSMENT — PAIN SCALES - GENERAL: PAINLEVEL_OUTOF10: 3

## 2023-04-27 NOTE — DISCHARGE SUMMARY
V2.0  Discharge Summary    Name:  Tonie Kayser /Age/Sex: 1950 (36 y.o. female)   Admit Date: 2023  Discharge Date: 23    MRN & CSN:  [id number] & [id number] Encounter Date and Time 23 4:24 PM EDT    Attending:  No att. providers found Discharging Provider: César Shen MD       Hospital Course:     Brief HPI: Tonie Kayser is a 74 y.o. female who presented with bradycardia. Temporary pacemaker was placed in the ED. She went into V. Tach arrest and received CPR with 1 shock, 2 epi, 1 calcium gluconate. Received ROSC and 8 minutes. She also got 300 mg of amnio, 1 amp of bicarb. She was intubated and mechanically ventilated. Patient was taken to the Cath Lab due to complete heart block. Nephrology was consulted for LALITA she was placed on bicarb drip and was placed on pressors. Patient was extubated on . And transferred out of the ICU. Patient did require dialysis, PD was placed on day of discharge. Echo showed EF of 35 to 40% with grade 2 diastolic dysfunction. Patient was excepted into ARU. While inpatient patient received dialysis through tunneled catheter. Patient received her PD catheter and was discharged to ARU    Brief Problem Based Course:     Symptomatic bradycardia s/p PPM: Extubated. Stable: Intubated after code blue. Heart rate in 30s likely sick sinus syndrome EP was consulted patient going for PPM. Temporary pacemaker in place telemetry in place cardiology on board held off on Eliquis. EP on board. PPM in place. Under ICU care primarily, may get transferred out of ICU today. ARF on Chronic kidney disease stage IIIb nephrology sees the patient outpatient. Off CRRT. Continue HD. Right femoral vein hemodialysis catheter removed in IR .    HFrEF (EF 35-40%)  Echo(): EF 35-40%, GIIDD, mild AS, mod TR (RVSP 73), severe pHTN  Holding eliquis until after PD catheter placement tomorrow  Paroxysmal atrial fibrillation on Eliquis hold off on Eliquis for now

## 2023-04-27 NOTE — CARE COORDINATION
Received consult from Dr. Hebert Kwan DO : PLEASE SET HER UP FOR HD AT [address], TWICE PER WEEK AS ESRD    Called [facility] ([phone]) and spoke with Jasmyn. She stated that [facility] does not have any chair times available and patient's referral was routed to [facility] ([phone]). Notified her of upcoming discharge 05/06/2023 and faxed the last 3 dialysis notes to [phone] via route fax. Patient dialysis admissions coordinator is Jenny Concepcion at ext [phone]. Марина Thorpe DO was notified via perfect serve and consult was closed.

## 2023-04-27 NOTE — ANESTHESIA POSTPROCEDURE EVALUATION
Department of Anesthesiology  Postprocedure Note    Patient: Humberto Marc  MRN: [id number]  YOB: 1950  Date of evaluation: 4/26/2023      Procedure Summary     Date: 04/24/23 Room / Location: [address]    Anesthesia Start: 1152 Anesthesia Stop: 1252    Procedure: CATHETER INSERTION PERITONEAL DIALYSIS (Abdomen) Diagnosis:       Injury of kidney, unspecified laterality, sequela      (Injury of kidney, unspecified laterality, sequela [S37.009S])    Surgeons: Emani Snell MD Responsible Provider: Christie Aranda MD    Anesthesia Type: general ASA Status: 3          Anesthesia Type: No value filed.     Quinn Phase I: Quinn Score: 10    Quinn Phase II:        Anesthesia Post Evaluation    Patient location during evaluation: PACU  Patient participation: complete - patient participated  Level of consciousness: awake (@baseline)  Pain score: 1  Airway patency: patent  Nausea & Vomiting: no nausea and no vomiting  Complications: no  Cardiovascular status: blood pressure returned to baseline  Respiratory status: acceptable  Hydration status: euvolemic

## 2023-04-28 PROCEDURE — 99232 SBSQ HOSP IP/OBS MODERATE 35: CPT | Performed by: PHYSICAL MEDICINE & REHABILITATION

## 2023-04-28 PROCEDURE — 6370000000 HC RX 637 (ALT 250 FOR IP): Performed by: PHYSICAL MEDICINE & REHABILITATION

## 2023-04-28 PROCEDURE — 90935 HEMODIALYSIS ONE EVALUATION: CPT

## 2023-04-28 PROCEDURE — 6370000000 HC RX 637 (ALT 250 FOR IP): Performed by: INTERNAL MEDICINE

## 2023-04-28 PROCEDURE — 6360000002 HC RX W HCPCS: Performed by: INTERNAL MEDICINE

## 2023-04-28 PROCEDURE — 97530 THERAPEUTIC ACTIVITIES: CPT

## 2023-04-28 PROCEDURE — 97535 SELF CARE MNGMENT TRAINING: CPT

## 2023-04-28 PROCEDURE — 94761 N-INVAS EAR/PLS OXIMETRY MLT: CPT

## 2023-04-28 PROCEDURE — 1280000000 HC REHAB R&B

## 2023-04-28 PROCEDURE — 97110 THERAPEUTIC EXERCISES: CPT

## 2023-04-28 RX ADMIN — LEVOTHYROXINE SODIUM 150 MCG: 0.07 TABLET ORAL at 05:22

## 2023-04-28 RX ADMIN — MIDODRINE HYDROCHLORIDE 10 MG: 5 TABLET ORAL at 17:41

## 2023-04-28 RX ADMIN — MIDODRINE HYDROCHLORIDE 10 MG: 5 TABLET ORAL at 12:02

## 2023-04-28 RX ADMIN — ACETAMINOPHEN 650 MG: 325 TABLET ORAL at 08:32

## 2023-04-28 RX ADMIN — ROSUVASTATIN CALCIUM 10 MG: 5 TABLET, FILM COATED ORAL at 20:37

## 2023-04-28 RX ADMIN — ACETAMINOPHEN 650 MG: 325 TABLET ORAL at 20:37

## 2023-04-28 RX ADMIN — APIXABAN 5 MG: 5 TABLET, FILM COATED ORAL at 08:33

## 2023-04-28 RX ADMIN — FLUOXETINE 20 MG: 20 CAPSULE ORAL at 08:33

## 2023-04-28 RX ADMIN — EPOETIN ALFA-EPBX 10000 UNITS: 10000 INJECTION, SOLUTION INTRAVENOUS; SUBCUTANEOUS at 14:31

## 2023-04-28 RX ADMIN — PANTOPRAZOLE SODIUM 40 MG: 40 TABLET, DELAYED RELEASE ORAL at 05:22

## 2023-04-28 RX ADMIN — APIXABAN 5 MG: 5 TABLET, FILM COATED ORAL at 20:38

## 2023-04-28 RX ADMIN — FLUOXETINE 20 MG: 20 CAPSULE ORAL at 20:38

## 2023-04-28 RX ADMIN — MIDODRINE HYDROCHLORIDE 10 MG: 5 TABLET ORAL at 08:32

## 2023-04-28 ASSESSMENT — PAIN DESCRIPTION - DESCRIPTORS
DESCRIPTORS: ACHING
DESCRIPTORS: ACHING

## 2023-04-28 ASSESSMENT — PAIN DESCRIPTION - ORIENTATION: ORIENTATION: RIGHT;LEFT

## 2023-04-28 ASSESSMENT — PAIN DESCRIPTION - LOCATION
LOCATION: BACK
LOCATION: GENERALIZED

## 2023-04-28 ASSESSMENT — PAIN SCALES - GENERAL
PAINLEVEL_OUTOF10: 1
PAINLEVEL_OUTOF10: 4

## 2023-04-28 NOTE — CARE COORDINATION
Spoke with Jo Rice with [facility]. She stated that the patient is on a waiting list for [facility] [employer]. They currently have no chairs available for patient. Patient has been set up with [facility] on [place] for 05/08/2023 with a 2:00 PM chair time. AVS updated. Received a call from Jo Rice the patient has been accepted at [address] [place] [address], Sat with a 6:00 AM chair time starting 05/09/2023. AVS updated. Thelma Thorpe DO was notified via perfect serve.

## 2023-04-29 PROCEDURE — 94761 N-INVAS EAR/PLS OXIMETRY MLT: CPT

## 2023-04-29 PROCEDURE — 1280000000 HC REHAB R&B

## 2023-04-29 PROCEDURE — 97150 GROUP THERAPEUTIC PROCEDURES: CPT

## 2023-04-29 PROCEDURE — 6370000000 HC RX 637 (ALT 250 FOR IP): Performed by: PHYSICAL MEDICINE & REHABILITATION

## 2023-04-29 PROCEDURE — 97535 SELF CARE MNGMENT TRAINING: CPT

## 2023-04-29 PROCEDURE — 97110 THERAPEUTIC EXERCISES: CPT

## 2023-04-29 PROCEDURE — 6370000000 HC RX 637 (ALT 250 FOR IP): Performed by: INTERNAL MEDICINE

## 2023-04-29 PROCEDURE — 97542 WHEELCHAIR MNGMENT TRAINING: CPT

## 2023-04-29 PROCEDURE — 36556 INSERT NON-TUNNEL CV CATH: CPT

## 2023-04-29 PROCEDURE — 97530 THERAPEUTIC ACTIVITIES: CPT

## 2023-04-29 PROCEDURE — 97112 NEUROMUSCULAR REEDUCATION: CPT

## 2023-04-29 RX ADMIN — ACETAMINOPHEN 650 MG: 325 TABLET ORAL at 09:18

## 2023-04-29 RX ADMIN — APIXABAN 5 MG: 5 TABLET, FILM COATED ORAL at 21:24

## 2023-04-29 RX ADMIN — FLUOXETINE 20 MG: 20 CAPSULE ORAL at 21:24

## 2023-04-29 RX ADMIN — MIDODRINE HYDROCHLORIDE 10 MG: 5 TABLET ORAL at 11:56

## 2023-04-29 RX ADMIN — BUMETANIDE 2 MG: 1 TABLET ORAL at 09:17

## 2023-04-29 RX ADMIN — PANTOPRAZOLE SODIUM 40 MG: 40 TABLET, DELAYED RELEASE ORAL at 05:46

## 2023-04-29 RX ADMIN — ACETAMINOPHEN 650 MG: 325 TABLET ORAL at 21:23

## 2023-04-29 RX ADMIN — LEVOTHYROXINE SODIUM 150 MCG: 0.07 TABLET ORAL at 05:46

## 2023-04-29 RX ADMIN — APIXABAN 5 MG: 5 TABLET, FILM COATED ORAL at 09:17

## 2023-04-29 RX ADMIN — FLUOXETINE 20 MG: 20 CAPSULE ORAL at 09:17

## 2023-04-29 RX ADMIN — ROSUVASTATIN CALCIUM 10 MG: 5 TABLET, FILM COATED ORAL at 21:24

## 2023-04-29 RX ADMIN — MIDODRINE HYDROCHLORIDE 10 MG: 5 TABLET ORAL at 09:17

## 2023-04-29 RX ADMIN — MIDODRINE HYDROCHLORIDE 10 MG: 5 TABLET ORAL at 16:11

## 2023-04-29 ASSESSMENT — PAIN SCALES - GENERAL
PAINLEVEL_OUTOF10: 4
PAINLEVEL_OUTOF10: 3
PAINLEVEL_OUTOF10: 0

## 2023-04-29 ASSESSMENT — PAIN DESCRIPTION - LOCATION
LOCATION: GENERALIZED
LOCATION: NECK

## 2023-04-29 ASSESSMENT — PAIN DESCRIPTION - ORIENTATION: ORIENTATION: MID

## 2023-04-29 ASSESSMENT — PAIN - FUNCTIONAL ASSESSMENT: PAIN_FUNCTIONAL_ASSESSMENT: PREVENTS OR INTERFERES SOME ACTIVE ACTIVITIES AND ADLS

## 2023-04-29 ASSESSMENT — PAIN DESCRIPTION - DESCRIPTORS: DESCRIPTORS: ACHING

## 2023-04-30 VITALS
DIASTOLIC BLOOD PRESSURE: 53 MMHG | RESPIRATION RATE: 18 BRPM | BODY MASS INDEX: 29.42 KG/M2 | WEIGHT: 176.59 LBS | HEART RATE: 63 BPM | HEIGHT: 65 IN | TEMPERATURE: 98.2 F | OXYGEN SATURATION: 97 % | SYSTOLIC BLOOD PRESSURE: 138 MMHG

## 2023-04-30 PROCEDURE — 6370000000 HC RX 637 (ALT 250 FOR IP): Performed by: INTERNAL MEDICINE

## 2023-04-30 PROCEDURE — 36556 INSERT NON-TUNNEL CV CATH: CPT

## 2023-04-30 PROCEDURE — 1280000000 HC REHAB R&B

## 2023-04-30 PROCEDURE — 94761 N-INVAS EAR/PLS OXIMETRY MLT: CPT

## 2023-04-30 PROCEDURE — 6370000000 HC RX 637 (ALT 250 FOR IP): Performed by: PHYSICAL MEDICINE & REHABILITATION

## 2023-04-30 RX ADMIN — FLUOXETINE 20 MG: 20 CAPSULE ORAL at 08:54

## 2023-04-30 RX ADMIN — ACETAMINOPHEN 650 MG: 325 TABLET ORAL at 08:56

## 2023-04-30 RX ADMIN — MIDODRINE HYDROCHLORIDE 10 MG: 5 TABLET ORAL at 13:00

## 2023-04-30 RX ADMIN — APIXABAN 5 MG: 5 TABLET, FILM COATED ORAL at 20:53

## 2023-04-30 RX ADMIN — MIDODRINE HYDROCHLORIDE 10 MG: 5 TABLET ORAL at 18:09

## 2023-04-30 RX ADMIN — FLUOXETINE 20 MG: 20 CAPSULE ORAL at 20:53

## 2023-04-30 RX ADMIN — ROSUVASTATIN CALCIUM 10 MG: 5 TABLET, FILM COATED ORAL at 20:53

## 2023-04-30 RX ADMIN — PANTOPRAZOLE SODIUM 40 MG: 40 TABLET, DELAYED RELEASE ORAL at 05:45

## 2023-04-30 RX ADMIN — APIXABAN 5 MG: 5 TABLET, FILM COATED ORAL at 08:54

## 2023-04-30 RX ADMIN — MIDODRINE HYDROCHLORIDE 10 MG: 5 TABLET ORAL at 08:53

## 2023-04-30 RX ADMIN — LEVOTHYROXINE SODIUM 150 MCG: 0.07 TABLET ORAL at 05:45

## 2023-04-30 ASSESSMENT — PAIN SCALES - WONG BAKER
WONGBAKER_NUMERICALRESPONSE: 0

## 2023-04-30 ASSESSMENT — PAIN DESCRIPTION - LOCATION: LOCATION: GENERALIZED

## 2023-04-30 ASSESSMENT — PAIN SCALES - GENERAL
PAINLEVEL_OUTOF10: 0
PAINLEVEL_OUTOF10: 4
PAINLEVEL_OUTOF10: 0

## 2023-04-30 ASSESSMENT — PAIN DESCRIPTION - DESCRIPTORS: DESCRIPTORS: ACHING

## 2023-05-01 PROBLEM — R26.9 GAIT DISTURBANCE: Status: ACTIVE | Noted: 2023-05-01

## 2023-05-01 PROBLEM — E11.65 UNCONTROLLED TYPE 2 DIABETES MELLITUS WITH HYPERGLYCEMIA (HCC): Status: ACTIVE | Noted: 2023-05-01

## 2023-05-01 PROBLEM — Z99.2 END-STAGE RENAL DISEASE ON HEMODIALYSIS (HCC): Status: ACTIVE | Noted: 2023-04-14

## 2023-05-01 PROBLEM — N17.0 ACUTE KIDNEY INJURY (AKI) WITH ACUTE TUBULAR NECROSIS (ATN) (HCC): Status: ACTIVE | Noted: 2020-11-09

## 2023-05-01 PROBLEM — J41.0 SIMPLE CHRONIC BRONCHITIS (HCC): Status: ACTIVE | Noted: 2023-05-01

## 2023-05-01 PROBLEM — R53.1 GENERALIZED WEAKNESS: Status: ACTIVE | Noted: 2023-05-01

## 2023-05-01 PROBLEM — I48.0 PAROXYSMAL ATRIAL FIBRILLATION (HCC): Status: ACTIVE | Noted: 2023-05-01

## 2023-05-01 PROBLEM — I44.30 HEART BLOCK, AV: Status: ACTIVE | Noted: 2023-05-01

## 2023-05-01 PROCEDURE — 6370000000 HC RX 637 (ALT 250 FOR IP): Performed by: PHYSICAL MEDICINE & REHABILITATION

## 2023-05-01 PROCEDURE — 97542 WHEELCHAIR MNGMENT TRAINING: CPT

## 2023-05-01 PROCEDURE — 1280000000 HC REHAB R&B

## 2023-05-01 PROCEDURE — 97535 SELF CARE MNGMENT TRAINING: CPT

## 2023-05-01 PROCEDURE — 94761 N-INVAS EAR/PLS OXIMETRY MLT: CPT

## 2023-05-01 PROCEDURE — 6370000000 HC RX 637 (ALT 250 FOR IP): Performed by: INTERNAL MEDICINE

## 2023-05-01 PROCEDURE — 6360000002 HC RX W HCPCS: Performed by: INTERNAL MEDICINE

## 2023-05-01 PROCEDURE — 99232 SBSQ HOSP IP/OBS MODERATE 35: CPT | Performed by: PHYSICAL MEDICINE & REHABILITATION

## 2023-05-01 PROCEDURE — 97116 GAIT TRAINING THERAPY: CPT

## 2023-05-01 PROCEDURE — 90935 HEMODIALYSIS ONE EVALUATION: CPT

## 2023-05-01 PROCEDURE — 97530 THERAPEUTIC ACTIVITIES: CPT

## 2023-05-01 PROCEDURE — 97110 THERAPEUTIC EXERCISES: CPT

## 2023-05-01 RX ADMIN — PANTOPRAZOLE SODIUM 40 MG: 40 TABLET, DELAYED RELEASE ORAL at 06:02

## 2023-05-01 RX ADMIN — MIDODRINE HYDROCHLORIDE 10 MG: 5 TABLET ORAL at 17:18

## 2023-05-01 RX ADMIN — EPOETIN ALFA-EPBX 10000 UNITS: 10000 INJECTION, SOLUTION INTRAVENOUS; SUBCUTANEOUS at 15:22

## 2023-05-01 RX ADMIN — FLUOXETINE 20 MG: 20 CAPSULE ORAL at 08:16

## 2023-05-01 RX ADMIN — FLUOXETINE 20 MG: 20 CAPSULE ORAL at 20:37

## 2023-05-01 RX ADMIN — ACETAMINOPHEN 650 MG: 325 TABLET ORAL at 12:28

## 2023-05-01 RX ADMIN — ACETAMINOPHEN 650 MG: 325 TABLET ORAL at 20:36

## 2023-05-01 RX ADMIN — MIDODRINE HYDROCHLORIDE 10 MG: 5 TABLET ORAL at 08:15

## 2023-05-01 RX ADMIN — APIXABAN 5 MG: 5 TABLET, FILM COATED ORAL at 20:37

## 2023-05-01 RX ADMIN — MIDODRINE HYDROCHLORIDE 10 MG: 5 TABLET ORAL at 12:29

## 2023-05-01 RX ADMIN — ROSUVASTATIN CALCIUM 10 MG: 5 TABLET, FILM COATED ORAL at 20:36

## 2023-05-01 RX ADMIN — APIXABAN 5 MG: 5 TABLET, FILM COATED ORAL at 08:16

## 2023-05-01 RX ADMIN — LEVOTHYROXINE SODIUM 150 MCG: 0.07 TABLET ORAL at 06:02

## 2023-05-01 RX ADMIN — ACETAMINOPHEN 650 MG: 325 TABLET ORAL at 06:02

## 2023-05-01 ASSESSMENT — PAIN SCALES - GENERAL
PAINLEVEL_OUTOF10: 0
PAINLEVEL_OUTOF10: 4
PAINLEVEL_OUTOF10: 1
PAINLEVEL_OUTOF10: 2
PAINLEVEL_OUTOF10: 0

## 2023-05-01 ASSESSMENT — PAIN - FUNCTIONAL ASSESSMENT
PAIN_FUNCTIONAL_ASSESSMENT: ACTIVITIES ARE NOT PREVENTED

## 2023-05-01 ASSESSMENT — PAIN DESCRIPTION - LOCATION
LOCATION: BACK
LOCATION: BACK

## 2023-05-01 ASSESSMENT — PAIN DESCRIPTION - DESCRIPTORS
DESCRIPTORS: ACHING
DESCRIPTORS: ACHING
DESCRIPTORS: ACHING;DISCOMFORT;CRAMPING

## 2023-05-01 ASSESSMENT — PAIN DESCRIPTION - FREQUENCY
FREQUENCY: INTERMITTENT
FREQUENCY: INTERMITTENT

## 2023-05-01 ASSESSMENT — PAIN DESCRIPTION - ONSET
ONSET: ON-GOING
ONSET: ON-GOING

## 2023-05-01 ASSESSMENT — PAIN DESCRIPTION - ORIENTATION
ORIENTATION: RIGHT;LEFT;UPPER
ORIENTATION: RIGHT;LEFT;UPPER
ORIENTATION: RIGHT;LEFT;MID;LOWER

## 2023-05-01 NOTE — CARE COORDINATION
Spoke with patient about discharge plans and to obtain [address] choice. No choice has been made at this time. Notified her that her current chair time at discharge with [facility] will be 6:00 AM. She stated that was fine.

## 2023-05-02 PROCEDURE — 97110 THERAPEUTIC EXERCISES: CPT

## 2023-05-02 PROCEDURE — 6370000000 HC RX 637 (ALT 250 FOR IP): Performed by: INTERNAL MEDICINE

## 2023-05-02 PROCEDURE — 1280000000 HC REHAB R&B

## 2023-05-02 PROCEDURE — 6370000000 HC RX 637 (ALT 250 FOR IP): Performed by: PHYSICAL MEDICINE & REHABILITATION

## 2023-05-02 PROCEDURE — 97530 THERAPEUTIC ACTIVITIES: CPT

## 2023-05-02 PROCEDURE — 94761 N-INVAS EAR/PLS OXIMETRY MLT: CPT

## 2023-05-02 PROCEDURE — 97116 GAIT TRAINING THERAPY: CPT

## 2023-05-02 PROCEDURE — 99232 SBSQ HOSP IP/OBS MODERATE 35: CPT | Performed by: PHYSICAL MEDICINE & REHABILITATION

## 2023-05-02 PROCEDURE — 97535 SELF CARE MNGMENT TRAINING: CPT

## 2023-05-02 RX ADMIN — LEVOTHYROXINE SODIUM 150 MCG: 0.07 TABLET ORAL at 05:14

## 2023-05-02 RX ADMIN — FLUOXETINE 20 MG: 20 CAPSULE ORAL at 09:15

## 2023-05-02 RX ADMIN — ACETAMINOPHEN 650 MG: 325 TABLET ORAL at 05:14

## 2023-05-02 RX ADMIN — ACETAMINOPHEN 650 MG: 325 TABLET ORAL at 21:06

## 2023-05-02 RX ADMIN — PANTOPRAZOLE SODIUM 40 MG: 40 TABLET, DELAYED RELEASE ORAL at 05:14

## 2023-05-02 RX ADMIN — APIXABAN 5 MG: 5 TABLET, FILM COATED ORAL at 09:15

## 2023-05-02 RX ADMIN — MIDODRINE HYDROCHLORIDE 10 MG: 5 TABLET ORAL at 17:18

## 2023-05-02 RX ADMIN — ROSUVASTATIN CALCIUM 10 MG: 5 TABLET, FILM COATED ORAL at 21:06

## 2023-05-02 RX ADMIN — MIDODRINE HYDROCHLORIDE 10 MG: 5 TABLET ORAL at 09:15

## 2023-05-02 RX ADMIN — APIXABAN 5 MG: 5 TABLET, FILM COATED ORAL at 21:06

## 2023-05-02 RX ADMIN — FLUOXETINE 20 MG: 20 CAPSULE ORAL at 21:07

## 2023-05-02 RX ADMIN — MIDODRINE HYDROCHLORIDE 10 MG: 5 TABLET ORAL at 12:08

## 2023-05-02 ASSESSMENT — PAIN DESCRIPTION - ORIENTATION
ORIENTATION: RIGHT;LEFT;MID
ORIENTATION: LOWER

## 2023-05-02 ASSESSMENT — PAIN SCALES - GENERAL
PAINLEVEL_OUTOF10: 0
PAINLEVEL_OUTOF10: 3
PAINLEVEL_OUTOF10: 1
PAINLEVEL_OUTOF10: 0

## 2023-05-02 ASSESSMENT — PAIN DESCRIPTION - LOCATION
LOCATION: BACK
LOCATION: BACK

## 2023-05-02 ASSESSMENT — PAIN DESCRIPTION - DESCRIPTORS
DESCRIPTORS: ACHING
DESCRIPTORS: ACHING

## 2023-05-02 ASSESSMENT — PAIN DESCRIPTION - ONSET: ONSET: ON-GOING

## 2023-05-02 ASSESSMENT — PAIN DESCRIPTION - FREQUENCY: FREQUENCY: INTERMITTENT

## 2023-05-02 ASSESSMENT — PAIN DESCRIPTION - PAIN TYPE: TYPE: ACUTE PAIN

## 2023-05-02 NOTE — CARE COORDINATION
Met with patient and spouse to discuss discharge plans and [facility]. Referral will be made closer to discharge.

## 2023-05-03 PROCEDURE — 97530 THERAPEUTIC ACTIVITIES: CPT

## 2023-05-03 PROCEDURE — 97110 THERAPEUTIC EXERCISES: CPT

## 2023-05-03 PROCEDURE — 6370000000 HC RX 637 (ALT 250 FOR IP): Performed by: INTERNAL MEDICINE

## 2023-05-03 PROCEDURE — 97116 GAIT TRAINING THERAPY: CPT

## 2023-05-03 PROCEDURE — 90935 HEMODIALYSIS ONE EVALUATION: CPT

## 2023-05-03 PROCEDURE — 6370000000 HC RX 637 (ALT 250 FOR IP): Performed by: PHYSICAL MEDICINE & REHABILITATION

## 2023-05-03 PROCEDURE — 97535 SELF CARE MNGMENT TRAINING: CPT

## 2023-05-03 PROCEDURE — 94761 N-INVAS EAR/PLS OXIMETRY MLT: CPT

## 2023-05-03 PROCEDURE — 1280000000 HC REHAB R&B

## 2023-05-03 PROCEDURE — 99232 SBSQ HOSP IP/OBS MODERATE 35: CPT | Performed by: PHYSICAL MEDICINE & REHABILITATION

## 2023-05-03 PROCEDURE — 6360000002 HC RX W HCPCS: Performed by: INTERNAL MEDICINE

## 2023-05-03 RX ADMIN — ROSUVASTATIN CALCIUM 10 MG: 5 TABLET, FILM COATED ORAL at 20:55

## 2023-05-03 RX ADMIN — APIXABAN 5 MG: 5 TABLET, FILM COATED ORAL at 20:55

## 2023-05-03 RX ADMIN — ACETAMINOPHEN 650 MG: 325 TABLET ORAL at 10:33

## 2023-05-03 RX ADMIN — EPOETIN ALFA-EPBX 10000 UNITS: 10000 INJECTION, SOLUTION INTRAVENOUS; SUBCUTANEOUS at 13:24

## 2023-05-03 RX ADMIN — APIXABAN 5 MG: 5 TABLET, FILM COATED ORAL at 10:33

## 2023-05-03 RX ADMIN — MIDODRINE HYDROCHLORIDE 10 MG: 5 TABLET ORAL at 12:14

## 2023-05-03 RX ADMIN — MIDODRINE HYDROCHLORIDE 10 MG: 5 TABLET ORAL at 06:28

## 2023-05-03 RX ADMIN — FLUOXETINE 20 MG: 20 CAPSULE ORAL at 20:55

## 2023-05-03 RX ADMIN — PANTOPRAZOLE SODIUM 40 MG: 40 TABLET, DELAYED RELEASE ORAL at 06:28

## 2023-05-03 RX ADMIN — MIDODRINE HYDROCHLORIDE 10 MG: 5 TABLET ORAL at 17:22

## 2023-05-03 RX ADMIN — FLUOXETINE 20 MG: 20 CAPSULE ORAL at 10:33

## 2023-05-03 RX ADMIN — ACETAMINOPHEN 650 MG: 325 TABLET ORAL at 18:49

## 2023-05-03 RX ADMIN — ACETAMINOPHEN 650 MG: 325 TABLET ORAL at 06:28

## 2023-05-03 RX ADMIN — LEVOTHYROXINE SODIUM 150 MCG: 0.07 TABLET ORAL at 06:27

## 2023-05-03 ASSESSMENT — PAIN DESCRIPTION - LOCATION
LOCATION: CHEST
LOCATION: CHEST
LOCATION: BACK;ABDOMEN

## 2023-05-03 ASSESSMENT — PAIN DESCRIPTION - DESCRIPTORS
DESCRIPTORS: ACHING
DESCRIPTORS: ACHING;TENDER
DESCRIPTORS: ACHING

## 2023-05-03 ASSESSMENT — PAIN - FUNCTIONAL ASSESSMENT
PAIN_FUNCTIONAL_ASSESSMENT: ACTIVITIES ARE NOT PREVENTED

## 2023-05-03 ASSESSMENT — PAIN SCALES - GENERAL
PAINLEVEL_OUTOF10: 0
PAINLEVEL_OUTOF10: 3
PAINLEVEL_OUTOF10: 1
PAINLEVEL_OUTOF10: 6
PAINLEVEL_OUTOF10: 1

## 2023-05-03 ASSESSMENT — PAIN DESCRIPTION - ORIENTATION
ORIENTATION: RIGHT
ORIENTATION: LOWER;LEFT
ORIENTATION: RIGHT

## 2023-05-03 NOTE — PATIENT CARE CONFERENCE
ACUTE REHAB TEAM CONFERENCE SUMMARY   [facility]    NAME: Inocente Salamanca  : 1950 ADMIT DATE: 2023    Rehab Admitting Dx:Cardiac arrest ([facility] [I46.9]  Patient Comorbid Conditions: Active Hospital Problems    Diagnosis Date Noted    Generalized weakness [R53.1] 2023    Gait disturbance [R26.9] 2023    Paroxysmal atrial fibrillation (HCC) [I48.0] 2023    Heart block, AV [I44.30] 2023    Uncontrolled type 2 diabetes mellitus with hyperglycemia ([facility] [E11.65] 2023    Simple chronic bronchitis ([facility] [J41.0] 2023    Cardiac arrest ([facility] [I46.9] 2023    End-stage renal disease on hemodialysis ([facility] [N18.6, Z99.2] 2023    Acute kidney injury (LALITA) with acute tubular necrosis (ATN) ([facility] [N17.0] 2020     Date: 2023    CASE MANAGEMENT  Current issues/needs regarding patient and family discharge status: Patient plans to return home with [facility]. Patient and family goal: To return home with spouse. PHYSICAL THERAPY (Updated in QI)  Short Term Goals  Time Frame for Short Term Goals: 14-21 days STG=LTG  Short Term Goal 1: pt toribio perform all bed mobility with mod I  Short Term Goal 2: Pt will perform sit to stand, pivot transfers with mod I, car transfer with SBA  Short Term Goal 3: Pt will ambulate 50' on level surfaces and 10' on unlevel surfaces with supervision, 150' on level surfaces with min assist  Short Term Goal 4: pt will ascend/descned curb and 4 steps with CG, 12 steps with min assist  Short Term Goal 5: pt toribio  item from floor with 2ww and reacher with mod I  Additional Goals?: Yes  Short Term Goal 6: Pt will propel w/c 48' with mod I, 150' with min assist          Impairments/deficits, barriers:     Body Structures, Functions, Activity Limitations Requiring Skilled Therapeutic Intervention: Decreased functional mobility , Decreased strength, Decreased endurance, Decreased balance, Decreased high-level
assistance  Comment: SBA with bed rails with use of right UE (pt did well with maintaining pacemaker precautions  CARE Score: 4  Discharge Goal: Independent    Sit to Lying  Assistance Needed: Partial/moderate assistance  Comment: mod A for BLEs (bed rail used with right UE) (pt did well with maintaining pacemaker precautions)  CARE Score: 3    Lying to Sitting on Side of Bed  Assistance Needed: Supervision or touching assistance  Comment: SBA with bed in high fowlers and use of rail, transferred from high fowlers due to BP issues, pt with good tolerance  CARE Score: 4  Discharge Goal: Independent    Transfers:    Sit to Stand  Assistance Needed: Supervision or touching assistance  Comment: CGA from bed, WC, cues for COG fwd, req increased effort with fatigue but no more than CGA  CARE Score: 4  Discharge Goal: Independent    Chair/Bed-to-Chair Transfer  Assistance Needed: Supervision or touching assistance  Comment: CGA using RW, able to step over with feet and follwo through with RW for turn to [facility]  CARE Score: 4  Discharge Goal: Independent         Car Transfer  Assistance Needed: Supervision or touching assistance  Comment: CGA using RW for SPT, self assisted LEs into car using pant leg with R UE, req R UE on RW supported by therapist for sit<>stand, reports uses a car cane at baseline; req extra time for task  Reason if not Attempted: Not attempted due to medical condition or safety concerns  CARE Score: 4  Discharge Goal: Supervision or touching assistance    Ambulation:    Walking Ability  Does the Patient Walk?: No     Walk 10 Feet  Assistance Needed: Supervision or touching assistance  Comment: CGA with RW with close WC follow. Pt able to amb to pre-measured distance, slight shaking in B LEs but no buckling.  BP after amb 111/55, pt asymptomatic  CARE Score: 4  Discharge Goal: Supervision or touching assistance     Walk 50 Feet with Two Turns  Reason if not Attempted: Not attempted due to medical condition or

## 2023-05-04 PROCEDURE — 6370000000 HC RX 637 (ALT 250 FOR IP): Performed by: INTERNAL MEDICINE

## 2023-05-04 PROCEDURE — 94761 N-INVAS EAR/PLS OXIMETRY MLT: CPT

## 2023-05-04 PROCEDURE — 6370000000 HC RX 637 (ALT 250 FOR IP): Performed by: PHYSICAL MEDICINE & REHABILITATION

## 2023-05-04 PROCEDURE — 97530 THERAPEUTIC ACTIVITIES: CPT

## 2023-05-04 PROCEDURE — 97116 GAIT TRAINING THERAPY: CPT

## 2023-05-04 PROCEDURE — 97542 WHEELCHAIR MNGMENT TRAINING: CPT

## 2023-05-04 PROCEDURE — 1280000000 HC REHAB R&B

## 2023-05-04 PROCEDURE — 97110 THERAPEUTIC EXERCISES: CPT

## 2023-05-04 PROCEDURE — 97535 SELF CARE MNGMENT TRAINING: CPT

## 2023-05-04 RX ORDER — MIDODRINE HYDROCHLORIDE 5 MG/1
10 TABLET ORAL
Status: DISCONTINUED | OUTPATIENT
Start: 2023-05-04 | End: 2023-05-06 | Stop reason: HOSPADM

## 2023-05-04 RX ADMIN — BUMETANIDE 2 MG: 1 TABLET ORAL at 08:53

## 2023-05-04 RX ADMIN — APIXABAN 5 MG: 5 TABLET, FILM COATED ORAL at 21:02

## 2023-05-04 RX ADMIN — PANTOPRAZOLE SODIUM 40 MG: 40 TABLET, DELAYED RELEASE ORAL at 06:19

## 2023-05-04 RX ADMIN — MIDODRINE HYDROCHLORIDE 10 MG: 5 TABLET ORAL at 11:41

## 2023-05-04 RX ADMIN — LEVOTHYROXINE SODIUM 150 MCG: 0.07 TABLET ORAL at 06:19

## 2023-05-04 RX ADMIN — ACETAMINOPHEN 650 MG: 325 TABLET ORAL at 21:04

## 2023-05-04 RX ADMIN — FLUOXETINE 20 MG: 20 CAPSULE ORAL at 21:02

## 2023-05-04 RX ADMIN — MIDODRINE HYDROCHLORIDE 10 MG: 5 TABLET ORAL at 16:43

## 2023-05-04 RX ADMIN — ROSUVASTATIN CALCIUM 10 MG: 5 TABLET, FILM COATED ORAL at 21:02

## 2023-05-04 RX ADMIN — FLUOXETINE 20 MG: 20 CAPSULE ORAL at 08:53

## 2023-05-04 RX ADMIN — MIDODRINE HYDROCHLORIDE 10 MG: 5 TABLET ORAL at 08:53

## 2023-05-04 RX ADMIN — ACETAMINOPHEN 650 MG: 325 TABLET ORAL at 06:18

## 2023-05-04 RX ADMIN — APIXABAN 5 MG: 5 TABLET, FILM COATED ORAL at 08:53

## 2023-05-04 ASSESSMENT — PAIN - FUNCTIONAL ASSESSMENT: PAIN_FUNCTIONAL_ASSESSMENT: ACTIVITIES ARE NOT PREVENTED

## 2023-05-04 ASSESSMENT — PAIN DESCRIPTION - PAIN TYPE: TYPE: ACUTE PAIN

## 2023-05-04 ASSESSMENT — PAIN DESCRIPTION - LOCATION
LOCATION: ARM
LOCATION: BACK;SHOULDER;NECK

## 2023-05-04 ASSESSMENT — PAIN SCALES - GENERAL
PAINLEVEL_OUTOF10: 2
PAINLEVEL_OUTOF10: 6
PAINLEVEL_OUTOF10: 2

## 2023-05-04 ASSESSMENT — PAIN DESCRIPTION - DESCRIPTORS
DESCRIPTORS: ACHING
DESCRIPTORS: ACHING

## 2023-05-04 ASSESSMENT — PAIN DESCRIPTION - ORIENTATION
ORIENTATION: RIGHT;LEFT
ORIENTATION: RIGHT;LEFT

## 2023-05-04 NOTE — CARE COORDINATION
LSW met with patient and provided written communication following Care Conference. LSW informed patient of recommendations for 2WW, WC, SC, BSC, HHC PT, OT, and Nursing. Patient has all recommended DME. Patient verbalized understanding. Whiteboard updated. D/C Plan:  Estimated Date: May 6  DME: Has needed DME  HHC:  PT, OT, Nursing [facility])  To:   Home with spouse (family will transport

## 2023-05-05 LAB
ANION GAP SERPL CALCULATED.3IONS-SCNC: 11 MMOL/L (ref 4–16)
BUN SERPL-MCNC: 35 MG/DL (ref 6–23)
CALCIUM SERPL-MCNC: 8.8 MG/DL (ref 8.3–10.6)
CHLORIDE BLD-SCNC: 100 MMOL/L (ref 99–110)
CO2: 29 MMOL/L (ref 21–32)
CREAT SERPL-MCNC: 4.7 MG/DL (ref 0.6–1.1)
GFR SERPL CREATININE-BSD FRML MDRD: 9 ML/MIN/1.73M2
GLUCOSE SERPL-MCNC: 152 MG/DL (ref 70–99)
HCT VFR BLD CALC: 34.9 % (ref 37–47)
HEMOGLOBIN: 10.3 GM/DL (ref 12.5–16)
MCH RBC QN AUTO: 30.5 PG (ref 27–31)
MCHC RBC AUTO-ENTMCNC: 29.5 % (ref 32–36)
MCV RBC AUTO: 103.3 FL (ref 78–100)
PDW BLD-RTO: 15.4 % (ref 11.7–14.9)
PLATELET # BLD: 158 K/CU MM (ref 140–440)
PMV BLD AUTO: 10 FL (ref 7.5–11.1)
POTASSIUM SERPL-SCNC: 3.9 MMOL/L (ref 3.5–5.1)
RBC # BLD: 3.38 M/CU MM (ref 4.2–5.4)
SODIUM BLD-SCNC: 140 MMOL/L (ref 135–145)
WBC # BLD: 7.2 K/CU MM (ref 4–10.5)

## 2023-05-05 PROCEDURE — 36415 COLL VENOUS BLD VENIPUNCTURE: CPT

## 2023-05-05 PROCEDURE — 90935 HEMODIALYSIS ONE EVALUATION: CPT

## 2023-05-05 PROCEDURE — 6360000002 HC RX W HCPCS: Performed by: INTERNAL MEDICINE

## 2023-05-05 PROCEDURE — 97542 WHEELCHAIR MNGMENT TRAINING: CPT

## 2023-05-05 PROCEDURE — 85027 COMPLETE CBC AUTOMATED: CPT

## 2023-05-05 PROCEDURE — 97110 THERAPEUTIC EXERCISES: CPT

## 2023-05-05 PROCEDURE — 97116 GAIT TRAINING THERAPY: CPT

## 2023-05-05 PROCEDURE — 1280000000 HC REHAB R&B

## 2023-05-05 PROCEDURE — 97530 THERAPEUTIC ACTIVITIES: CPT

## 2023-05-05 PROCEDURE — 6370000000 HC RX 637 (ALT 250 FOR IP): Performed by: INTERNAL MEDICINE

## 2023-05-05 PROCEDURE — 6370000000 HC RX 637 (ALT 250 FOR IP): Performed by: PHYSICAL MEDICINE & REHABILITATION

## 2023-05-05 PROCEDURE — 80048 BASIC METABOLIC PNL TOTAL CA: CPT

## 2023-05-05 PROCEDURE — 97535 SELF CARE MNGMENT TRAINING: CPT

## 2023-05-05 RX ORDER — LEVOTHYROXINE SODIUM 0.15 MG/1
150 TABLET ORAL DAILY
Qty: 30 TABLET | Refills: 0 | Status: SHIPPED | OUTPATIENT
Start: 2023-05-06

## 2023-05-05 RX ORDER — BUMETANIDE 2 MG/1
TABLET ORAL
Qty: 12 TABLET | Refills: 0 | Status: SHIPPED | OUTPATIENT
Start: 2023-05-05

## 2023-05-05 RX ORDER — MIDODRINE HYDROCHLORIDE 10 MG/1
10 TABLET ORAL
Qty: 90 TABLET | Refills: 0 | Status: SHIPPED | OUTPATIENT
Start: 2023-05-05

## 2023-05-05 RX ORDER — ROSUVASTATIN CALCIUM 10 MG/1
10 TABLET, COATED ORAL NIGHTLY
Qty: 30 TABLET | Refills: 0 | Status: SHIPPED | OUTPATIENT
Start: 2023-05-05

## 2023-05-05 RX ORDER — PANTOPRAZOLE SODIUM 40 MG/1
40 TABLET, DELAYED RELEASE ORAL
Qty: 30 TABLET | Refills: 0 | Status: SHIPPED | OUTPATIENT
Start: 2023-05-06

## 2023-05-05 RX ADMIN — ACETAMINOPHEN 650 MG: 325 TABLET ORAL at 06:31

## 2023-05-05 RX ADMIN — FLUOXETINE 20 MG: 20 CAPSULE ORAL at 21:40

## 2023-05-05 RX ADMIN — MIDODRINE HYDROCHLORIDE 10 MG: 5 TABLET ORAL at 16:21

## 2023-05-05 RX ADMIN — LEVOTHYROXINE SODIUM 150 MCG: 0.07 TABLET ORAL at 06:29

## 2023-05-05 RX ADMIN — ACETAMINOPHEN 650 MG: 325 TABLET ORAL at 09:53

## 2023-05-05 RX ADMIN — EPOETIN ALFA-EPBX 10000 UNITS: 10000 INJECTION, SOLUTION INTRAVENOUS; SUBCUTANEOUS at 13:55

## 2023-05-05 RX ADMIN — APIXABAN 5 MG: 5 TABLET, FILM COATED ORAL at 21:40

## 2023-05-05 RX ADMIN — ROSUVASTATIN CALCIUM 10 MG: 5 TABLET, FILM COATED ORAL at 21:40

## 2023-05-05 RX ADMIN — ACETAMINOPHEN 650 MG: 325 TABLET ORAL at 16:21

## 2023-05-05 RX ADMIN — APIXABAN 5 MG: 5 TABLET, FILM COATED ORAL at 09:52

## 2023-05-05 RX ADMIN — FLUOXETINE 20 MG: 20 CAPSULE ORAL at 09:52

## 2023-05-05 RX ADMIN — MIDODRINE HYDROCHLORIDE 10 MG: 5 TABLET ORAL at 11:20

## 2023-05-05 RX ADMIN — MIDODRINE HYDROCHLORIDE 10 MG: 5 TABLET ORAL at 06:30

## 2023-05-05 RX ADMIN — PANTOPRAZOLE SODIUM 40 MG: 40 TABLET, DELAYED RELEASE ORAL at 06:30

## 2023-05-05 ASSESSMENT — PAIN DESCRIPTION - LOCATION
LOCATION: CHEST
LOCATION: CHEST
LOCATION: BACK

## 2023-05-05 ASSESSMENT — PAIN DESCRIPTION - ORIENTATION
ORIENTATION: RIGHT
ORIENTATION: RIGHT
ORIENTATION: UPPER

## 2023-05-05 ASSESSMENT — PAIN SCALES - GENERAL
PAINLEVEL_OUTOF10: 3
PAINLEVEL_OUTOF10: 2
PAINLEVEL_OUTOF10: 3
PAINLEVEL_OUTOF10: 1
PAINLEVEL_OUTOF10: 0
PAINLEVEL_OUTOF10: 0

## 2023-05-05 ASSESSMENT — PAIN DESCRIPTION - DESCRIPTORS
DESCRIPTORS: ACHING

## 2023-05-05 NOTE — DISCHARGE INSTRUCTIONS
Your information:  Name: Etta Rodgers  : 1950    Your instructions:    ***    Pt is discharging to home with [address]  [phone]  A representative from [facility] will contact you at home to schedule your home care needs    What to do after you leave the hospital:    Recommended diet: {diet:83050}    Recommended activity: {discharge activity:04348}        The following personal items were collected during your admission and were returned to you:    Belongings  Dental Appliances: None  Vision - Corrective Lenses: None  Hearing Aid: None  Clothing: Sent home  Jewelry: None  Body Piercings Removed: N/A  Electronic Devices: Cell Phone,   Weapons (Notify Protective Services/Security): None  Other Valuables: Sent home  Home Medications: None  Valuables Given To: Family (Comment) ()  Provide Name(s) of Who Valuable(s) Were Given To: Steve Mcfarland obtained by:  By signing below, I understand that if any problems occur once I leave the hospital I am to contact ***. I understand and acknowledge receipt of the instructions indicated above.

## 2023-05-05 NOTE — CARE COORDINATION
Discussed discharge with patient and provided a copy of the Important Message from Medicare form signed upon admission. Patient verbalized understanding. ARU  Discharge Summary    D/C Date: 05/06/2023    Patient discharged to: Home    Transported by: Family    Referrals made to: [facility]    Additional information: Follow up appointment scheduled with [facility] Tuesday May 9, 2023 (Tues,Thurs, Sat 6:00 AM chair time), Soo George MD Monday Jun 5, 2023 3:55 PM. Left message with Colton Mars at office of SARAH Quiles CNP to notify of discharge and to call patient directly to schedule appointment. Patient notified, AVS updated.      Caregiver training: none requested    Discharge BIMS completed:  [x]

## 2023-05-05 NOTE — DISCHARGE INSTR - ACTIVITY
Pt is discharging to home with [address] [facility] [address]  [phone]  A representative from [facility] will contact you at home to schedule your home care needs

## 2023-05-06 VITALS
HEIGHT: 65 IN | DIASTOLIC BLOOD PRESSURE: 51 MMHG | WEIGHT: 175.93 LBS | RESPIRATION RATE: 18 BRPM | BODY MASS INDEX: 29.31 KG/M2 | HEART RATE: 61 BPM | OXYGEN SATURATION: 96 % | TEMPERATURE: 97.9 F | SYSTOLIC BLOOD PRESSURE: 118 MMHG

## 2023-05-06 PROCEDURE — 6370000000 HC RX 637 (ALT 250 FOR IP): Performed by: INTERNAL MEDICINE

## 2023-05-06 PROCEDURE — 6370000000 HC RX 637 (ALT 250 FOR IP): Performed by: PHYSICAL MEDICINE & REHABILITATION

## 2023-05-06 PROCEDURE — 94761 N-INVAS EAR/PLS OXIMETRY MLT: CPT

## 2023-05-06 RX ADMIN — ACETAMINOPHEN 650 MG: 325 TABLET ORAL at 06:18

## 2023-05-06 RX ADMIN — APIXABAN 5 MG: 5 TABLET, FILM COATED ORAL at 08:41

## 2023-05-06 RX ADMIN — LEVOTHYROXINE SODIUM 150 MCG: 0.07 TABLET ORAL at 06:19

## 2023-05-06 RX ADMIN — FLUOXETINE 20 MG: 20 CAPSULE ORAL at 08:41

## 2023-05-06 RX ADMIN — BUMETANIDE 2 MG: 1 TABLET ORAL at 08:41

## 2023-05-06 RX ADMIN — PANTOPRAZOLE SODIUM 40 MG: 40 TABLET, DELAYED RELEASE ORAL at 06:19

## 2023-05-06 RX ADMIN — MIDODRINE HYDROCHLORIDE 10 MG: 5 TABLET ORAL at 06:19

## 2023-05-06 RX ADMIN — MIDODRINE HYDROCHLORIDE 10 MG: 5 TABLET ORAL at 12:02

## 2023-05-06 ASSESSMENT — PAIN SCALES - GENERAL: PAINLEVEL_OUTOF10: 3

## 2023-05-06 ASSESSMENT — PAIN DESCRIPTION - DESCRIPTORS: DESCRIPTORS: ACHING

## 2023-05-06 ASSESSMENT — PAIN DESCRIPTION - ORIENTATION: ORIENTATION: RIGHT;LEFT

## 2023-05-06 ASSESSMENT — PAIN DESCRIPTION - LOCATION: LOCATION: BACK;SHOULDER

## 2023-05-06 NOTE — PLAN OF CARE
Problem: Discharge Planning  Goal: Discharge to home or other facility with appropriate resources  4/27/2023 1129 by Francene Kayser, LPN  Outcome: Progressing  4/27/2023 0128 by Miguel Sue LPN  Outcome: Progressing     Problem: Safety - Adult  Goal: Free from fall injury  4/27/2023 0128 by Miguel Sue LPN  Outcome: Progressing     Problem: Chronic Conditions and Co-morbidities  Goal: Patient's chronic conditions and co-morbidity symptoms are monitored and maintained or improved  4/27/2023 0128 by Miguel Sue LPN  Outcome: Progressing     Problem: Skin/Tissue Integrity  Goal: Absence of new skin breakdown  Description: 1. Monitor for areas of redness and/or skin breakdown  2. Assess vascular access sites hourly  3. Every 4-6 hours minimum:  Change oxygen saturation probe site  4. Every 4-6 hours:  If on nasal continuous positive airway pressure, respiratory therapy assess nares and determine need for appliance change or resting period.   4/27/2023 0128 by Miguel Sue LPN  Outcome: Progressing
Problem: Discharge Planning  Goal: Discharge to home or other facility with appropriate resources  5/1/2023 0820 by Gilford Lagos, LPN  Outcome: Progressing  4/30/2023 2329 by Emperatriz Grace LPN  Outcome: Progressing     Problem: Safety - Adult  Goal: Free from fall injury  5/1/2023 0820 by Gilford Lagos, LPN  Outcome: Progressing  4/30/2023 2329 by Emperatriz Grace LPN  Outcome: Progressing     Problem: Chronic Conditions and Co-morbidities  Goal: Patient's chronic conditions and co-morbidity symptoms are monitored and maintained or improved  5/1/2023 0820 by Gilford Lagos, LPN  Outcome: Progressing  4/30/2023 2329 by Emperatriz Grace LPN  Outcome: Progressing     Problem: Skin/Tissue Integrity  Goal: Absence of new skin breakdown  Description: 1. Monitor for areas of redness and/or skin breakdown  2. Assess vascular access sites hourly  3. Every 4-6 hours minimum:  Change oxygen saturation probe site  4. Every 4-6 hours:  If on nasal continuous positive airway pressure, respiratory therapy assess nares and determine need for appliance change or resting period.   5/1/2023 0820 by Gilford Lagos, LPN  Outcome: Progressing  4/30/2023 2329 by Emperatriz Grace LPN  Outcome: Progressing     Problem: Nutrition Deficit:  Goal: Optimize nutritional status  5/1/2023 0820 by Gilford Lagos, LPN  Outcome: Progressing  4/30/2023 2329 by Emperatriz Grace LPN  Outcome: Progressing     Problem: Pain  Goal: Verbalizes/displays adequate comfort level or baseline comfort level  5/1/2023 0820 by Gilford Lagos, LPN  Outcome: Progressing  4/30/2023 2329 by Emperatriz Grace LPN  Outcome: Progressing
Problem: Discharge Planning  Goal: Discharge to home or other facility with appropriate resources  Outcome: Progressing
Problem: Discharge Planning  Goal: Discharge to home or other facility with appropriate resources  Outcome: Progressing
Problem: Discharge Planning  Goal: Discharge to home or other facility with appropriate resources  Outcome: Progressing     Problem: Safety - Adult  Goal: Free from fall injury  Outcome: Progressing     Problem: Chronic Conditions and Co-morbidities  Goal: Patient's chronic conditions and co-morbidity symptoms are monitored and maintained or improved  Outcome: Progressing
Problem: Discharge Planning  Goal: Discharge to home or other facility with appropriate resources  Outcome: Progressing     Problem: Safety - Adult  Goal: Free from fall injury  Outcome: Progressing     Problem: Chronic Conditions and Co-morbidities  Goal: Patient's chronic conditions and co-morbidity symptoms are monitored and maintained or improved  Outcome: Progressing
Problem: Discharge Planning  Goal: Discharge to home or other facility with appropriate resources  Outcome: Progressing     Problem: Safety - Adult  Goal: Free from fall injury  Outcome: Progressing     Problem: Chronic Conditions and Co-morbidities  Goal: Patient's chronic conditions and co-morbidity symptoms are monitored and maintained or improved  Outcome: Progressing     Problem: Skin/Tissue Integrity  Goal: Absence of new skin breakdown  Description: 1. Monitor for areas of redness and/or skin breakdown  2. Assess vascular access sites hourly  3. Every 4-6 hours minimum:  Change oxygen saturation probe site  4. Every 4-6 hours:  If on nasal continuous positive airway pressure, respiratory therapy assess nares and determine need for appliance change or resting period.   Outcome: Progressing
Problem: Discharge Planning  Goal: Discharge to home or other facility with appropriate resources  Outcome: Progressing     Problem: Safety - Adult  Goal: Free from fall injury  Outcome: Progressing     Problem: Chronic Conditions and Co-morbidities  Goal: Patient's chronic conditions and co-morbidity symptoms are monitored and maintained or improved  Outcome: Progressing     Problem: Skin/Tissue Integrity  Goal: Absence of new skin breakdown  Description: 1. Monitor for areas of redness and/or skin breakdown  2. Assess vascular access sites hourly  3. Every 4-6 hours minimum:  Change oxygen saturation probe site  4. Every 4-6 hours:  If on nasal continuous positive airway pressure, respiratory therapy assess nares and determine need for appliance change or resting period.   Outcome: Progressing     Problem: Nutrition Deficit:  Goal: Optimize nutritional status  Outcome: Progressing     Problem: Pain  Goal: Verbalizes/displays adequate comfort level or baseline comfort level  Outcome: Progressing
measures, DVT prophylaxis and cautious pain management. We must provide care for her renal dysfunction, diabetes and cardiac arrhythmia. Caregiver education will be emphasized. Outpatient follow-up with cardiology and her PCP as planned.y  DVT prophylaxis: The Eliquis she requires for her atrial arrhythmia will be protective against new DVT. However, this places her at risk for spontaneous hemorrhaging and got bleeding. Therefore, I must monitor her hemoglobin regularly while on this medication and prescribe a PPI. Paroxysmal atrial fibrillation: Daily weights to detect any decompensation of CHF. Anticoagulation with Eliquis. Rate controlled off medication. Bumex to manage fluid retention. Vital signs checked at rest and with activity. End-stage renal disease on hemodialysis: Hemodialysis will continue initially through the catheter in her upper chest.  PD is the eventual plan which may take place in the weeks to come yet. Periodic monitoring of her chemistries. Cautious ministration of fluids. Uncontrolled diabetes type 2 with hyperglycemia: Patient requires a diet modified for carbohydrates. Diet control was the norm at home. Periodic monitoring of her blood glucose here with institution of medications as needed. COPD with asthma: Monitoring O2 saturations at rest and with activity. Cautious diuretic use. Bronchodilators and aggressive pulmonary hygiene measures. Anticipated discharge destination:    [] Home Independently   [x] Home with supervision    []SNF     [] Other       This plan has been reviewed with me in a language I understand.  I have had the opportunity to include my input with my therapy team.    ________________________________________________   ______________________  Patient/Significant Other      Date    I have reviewed this initial plan of care and agree with its contents:    Title   Name    Date    Time    Physician: Efren Zamora MD 4/26/2023 8:22 AM    Case Mgmt:

## 2023-05-06 NOTE — PROGRESS NOTES
04/25/23 1351   Encounter Summary   Encounter Overview/Reason  Attempted Encounter   Service Provided For: Patient   Referral/Consult From: Rounding   Support System Spouse; Children   Last Encounter  04/25/23  (Patient was not in the room.  Left a message with the nurse)   Complexity of Encounter Low   Begin Time 1350   End Time  1352   Total Time Calculated 2 min   Encounter    Type Follow up   Spiritual/Emotional needs   Type Spiritual Support   Assessment/Intervention/Outcome   Assessment Unable to assess
04/26/23 1101   Encounter Summary   Encounter Overview/Reason  Spiritual/Emotional Needs   Service Provided For: Patient and family together   Referral/Consult From: [facility]   Support System Spouse; Children   Last Encounter  04/26/23  (Offered prayer and spiritual support. Told patient and family that other  ask me to keep her in prayer as well.)   Complexity of Encounter Low   Begin Time 1100   End Time  1111   Total Time Calculated 11 min   Encounter    Type Follow up   Spiritual/Emotional needs   Type Spiritual Support   Assessment/Intervention/Outcome   Assessment Peaceful; Hopeful   Intervention Active listening;Nurtured Hope;Sustaining Presence/Ministry of presence   Outcome Engaged in conversation;Expressed Gratitude   Plan and Referrals   Plan/Referrals Continue Support (comment)  (As needed)
05/01/23 1225   Encounter Summary   Encounter Overview/Reason  Spiritual/Emotional Needs   Service Provided For: Patient   Referral/Consult From: [facility]   Support System Spouse; Children   Last Encounter  05/01/23  (Delightful visit with [place] [facility], she is feeling much better and is optimistic, wearing a smile. Sharied inder stories and had prayer together.)   Complexity of Encounter Moderate   Begin Time 1200   End Time  1231   Total Time Calculated 31 min   Encounter    Type Follow up   Spiritual/Emotional needs   Type Spiritual Support   Assessment/Intervention/Outcome   Assessment Calm;Coping; Hopeful  (Patient sitting up in bed, ready to have lunch. Patient is receptive of visit.)   Intervention Active listening;Discussed relationship with God;Discussed meaning/purpose;Empowerment;Explored Coping Skills/Resources;Nurtured Hope;Prayer (assurance of)/[place];Read/Provided Scripture;Sustaining Presence/Ministry of presence  (Allowed patient to share about her journey and review support resources. Patient empowered by having support tools to fall back on.)   Outcome Acceptance;Comfort;Coping;Encouraged;Engaged in conversation;Expressed feelings, needs, and concerns;Expressed feelings of Rina, Peace and/or Love;Expressed Gratitude  (Patient smiling and is more optimistic than on previous visits. Patient reminded how to contact  as needed.)   Plan and Referrals   Plan/Referrals Continue Support (comment)  (Visit as requested;  Patient informed how to contact [address] [place] [address] Road)
4 Eyes Skin Assessment     NAME:  Alyce Baumann  YOB: 1950  MEDICAL RECORD NUMBER:  [id number]    The patient is being assessed for  Admission    I agree that at lease one RN has performed a thorough Head to Toe Skin Assessment on the patient. ALL assessment sites listed below have been assessed. Areas assessed by both nurses:    Head, Face, Ears, Shoulders, Back, Chest, Arms, Elbows, Hands, Sacrum. Buttock, Coccyx, Ischium, Legs. Feet and Heels, and Under Medical Devices         Does the Patient have a Wound?  No noted wound(s) redness to coccyx blanchable       Juan Alberto Prevention initiated by RN: Yes  Wound Care Orders initiated by RN: No    Pressure Injury (Stage 3,4, Unstageable, DTI, NWPT, and Complex wounds) if present, place referral order by RN under : No    New Ostomies, if present place, referral order under : No     Nurse 1 eSignature: Electronically signed by Laila Spence LPN on 0/16/87 at 2:13 PM EDT    **SHARE this note so that the co-signing nurse can place an eSignature**    Nurse 2 eSignature: {Esignature:[id number]}
[address] [place] [address] [address] [place] [address]  Phone: [phone]  Office Hours: 8:30AM - 4:30PM  Monday - Friday      Nephrology  Dialysis Note        PROCEDURE:  Patient seen for  hemodialysis      PHYSICIAN:  CHRISTA      INDICATION:  Congestive heart failure, End-stage renal disease      RX:  See dialysis flowsheet for specifics on access, blood flow rate, dialysate baths, duration of dialysis, anticoagulation and other technical information.       COMMENTS:  stable
ARU Admission Assessment - [facility]      A Complete drug regimen review was completed for this patient this date. [x]  No clinically significant medication issue was identified   []  Yes, a clinically significant medication issue was identified     []  Adverse Drug Event:    []  Allergy:    []  Side Effect:    []  Ineffective Therapy:    []  Drug interaction:     []  Duplicate Therapy:    []  Untreated Indication:    []  Non-adherence:    []  Other:  Nursing contacted the physician:       Date:                Time:    Actions recommended by physician were completed:   Date:                 Time:  Action(s) Taken:             []  New Physician Order Received    []  Issue Noted by Physician; However No Action Required    []  Other:        Ethnicity  \"Are you of , /a, or Turkmen origin? \"  Check all that apply:  [x] A. No, not of , /a, or [place] Origin  [] B.  Yes, [place], [place] American, Chicano/a  [] C.  Yes, [address] [place] [address]  [] D.  Yes, [place]  [] E.  Yes, another , , or Turkmen origin  [] X. Patient unable to respond    Race  \"What is your race? \"  Check all that apply:  [x] A. White  [] B. Black or   [] C. American [place] [facility] or [place] Native  [] D.  [place] [facility]  [] E. [place]  [] F. Montenegrin  [] G. [place]  [] Tej Armenta  [] CATHI Cardenas  [] J.  Other   [] K.   [] L. Bruneian or Chelsie  [] M. Gambian  [] N. Other [facility]  [] X. Patient unable to respond    Language  A. \"What is your preferred language? \"   English     B. \"Do you need or want an  to communicate with a doctor or health care staff? \"  Check only one:  [x] 0. No  [] 1. Yes  [] 9. Unable to determine    Transportation  \"In the past 6-12 months, has lack of transportation kept you from medical appointments, meetings, work, or from getting things needed for daily living? \"  Check all that apply:  [] A.  Yes, it has kept me from
Antoinette Rubin    : 1950  Acct #: [de-identified]  MRN: [id number]              PM&R Progress Note      Admitting diagnosis: ***    Comorbid diagnoses impacting rehabilitation: ***    Chief complaint: ***    Prior (baseline) level of function: Independent.     Current level of function:         Current  IRF-CAMILA and Goals:   Occupational Therapy:    Short Term Goals  Time Frame for Short Term Goals: STGs=LTGs :   Long Term Goals  Time Frame for Long Term Goals : 10 days or until d/c  Long Term Goal 1: Pt will complete grooming tasks Ind  Long Term Goal 2: Pt will complete total body bathing mod I  Long Term Goal 3: Pt will complete UB dressing Ind  Long Term Goal 4: Pt will complete LB dressing mod I using AE PRN  Long Term Goal 5: Pt will doff/don footwear mod I using AE PRN  Additional Goals?: Yes  Long Term Goal 6: Pt will complete toileting mod I  Long Term Goal 7: Pt will complete functional transfers (bed, chair, toilet, shower) c DME PRN and mod I  Long Term Goal 8: Pt will perform therex/therax to facilitate increased strength/endurance/ax tolerance (c emphasis on dynamic standing balance/tolerance >8 mins) c SBA  Long Term Goal 9: Pt will complete simple homemaking tasks c DME PRN and mod I :                                       Eating: Eating  Assistance Needed: Independent  Comment: Pt able to open packages/containers  CARE Score: 6  Discharge Goal: Independent       Oral Hygiene: Oral Hygiene  Assistance Needed: Independent  Comment: seated at sink  CARE Score: 6  Discharge Goal: Independent    UB/LB Bathing: Shower/Bathe Self  Assistance Needed: Supervision or touching assistance  Comment: SBA in stance to wash bottom  CARE Score: 4  Discharge Goal: Independent    UB Dressing: Upper Body Dressing  Assistance Needed: Independent  Comment: able to use RW to retrieve items from closet, Ind to don shirt  CARE Score: 6  Discharge Goal: Independent         LB Dressing: Lower Body Dressing  Assistance
BEING DC'ED TODAY  NEXT OUTPT HD IS Monday AT 10:30AM AT [address] [place] [address]    Patient Active Problem List    Diagnosis Date Noted    Generalized weakness 05/01/2023    Gait disturbance 05/01/2023    Paroxysmal atrial fibrillation ([address] 05/01/2023    Heart block, AV 05/01/2023    Uncontrolled type 2 diabetes mellitus with hyperglycemia ([address] 05/01/2023    Simple chronic bronchitis ([address] 05/01/2023    Moderate malnutrition ([address] 04/24/2023    Cardiac arrest ([address] 04/24/2023    End-stage renal disease on hemodialysis ([address] 04/14/2023    Personal history of colonic polyps     Benign neoplasm of ascending colon     Benign neoplasm of transverse colon     Benign neoplasm of descending colon     Bradycardia 09/20/2021    Diabetic nephropathy associated with type 2 diabetes mellitus ([address] 11/16/2020    Chronic kidney disease-mineral and bone disorder 11/16/2020    CKD (chronic kidney disease), stage IV ([address] 11/09/2020    Acute kidney injury (LALITA) with acute tubular necrosis (ATN) ([address] 11/09/2020    Other proteinuria 11/09/2020    Type 2 diabetes mellitus with chronic kidney disease ([address] 11/09/2020    Hypertensive renal disease 11/09/2020    Abnormal nuclear stress test 12/11/2019    SOB (shortness of breath) 11/19/2019    Hypothyroidism     Essential hypertension     Hyperlipidemia     Family history of colon cancer 05/04/2016    Chronic asthmatic bronchitis ([address] 10/08/2013    JARAD on CPAP 10/08/2013
BP doing ok  Tolerated 1L UF yesterday  Obtain BMP and CBC pre-hd tomorrow am    Thank you    Patient Active Problem List    Diagnosis Date Noted    Generalized weakness 05/01/2023    Gait disturbance 05/01/2023    Paroxysmal atrial fibrillation ([address] 05/01/2023    Heart block, AV 05/01/2023    Uncontrolled type 2 diabetes mellitus with hyperglycemia ([address] 05/01/2023    Simple chronic bronchitis ([address] 05/01/2023    Moderate malnutrition ([address] 04/24/2023    Cardiac arrest ([address] 04/24/2023    End-stage renal disease on hemodialysis ([address] 04/14/2023    Personal history of colonic polyps     Benign neoplasm of ascending colon     Benign neoplasm of transverse colon     Benign neoplasm of descending colon     Bradycardia 09/20/2021    Diabetic nephropathy associated with type 2 diabetes mellitus ([address] 11/16/2020    Chronic kidney disease-mineral and bone disorder 11/16/2020    CKD (chronic kidney disease), stage IV ([address] 11/09/2020    Acute kidney injury (LALITA) with acute tubular necrosis (ATN) ([address] 11/09/2020    Other proteinuria 11/09/2020    Type 2 diabetes mellitus with chronic kidney disease ([address] 11/09/2020    Hypertensive renal disease 11/09/2020    Abnormal nuclear stress test 12/11/2019    SOB (shortness of breath) 11/19/2019    Hypothyroidism     Essential hypertension     Hyperlipidemia     Family history of colon cancer 05/04/2016    Chronic asthmatic bronchitis ([address] 10/08/2013    JARAD on CPAP 10/08/2013
BP stable  Plan is to dialyze her Monday   Will set her up as OP on [facility] starting 8th of May anticipating her discharge on the 5th
Blood pressure after dialysis was 91/47 so gave midrin. Also complaining soreness in abdomen so requesting tylenol.
Chao Betts    : 1950  Acct #: [de-identified]  MRN: [id number]              PM&R Progress Note      Admitting diagnosis: ***    Comorbid diagnoses impacting rehabilitation: ***    Chief complaint: ***    Prior (baseline) level of function: Independent. Current level of function:         Current  IRF-CAMILA and Goals:   Occupational Therapy:    Short Term Goals  Time Frame for Short Term Goals: STGs=LTGs :   Long Term Goals  Time Frame for Long Term Goals : 10 days or until d/c  Long Term Goal 1: Pt will complete grooming tasks Ind  Long Term Goal 2: Pt will complete total body bathing mod I  Long Term Goal 3: Pt will complete UB dressing Ind  Long Term Goal 4: Pt will complete LB dressing mod I using AE PRN  Long Term Goal 5: Pt will doff/don footwear mod I using AE PRN  Additional Goals?: Yes  Long Term Goal 6: Pt will complete toileting mod I  Long Term Goal 7: Pt will complete functional transfers (bed, chair, toilet, shower) c DME PRN and mod I  Long Term Goal 8: Pt will perform therex/therax to facilitate increased strength/endurance/ax tolerance (c emphasis on dynamic standing balance/tolerance >8 mins) c SBA  Long Term Goal 9: Pt will complete simple homemaking tasks c DME PRN and mod I :                                       Eating: Eating  Assistance Needed: Independent  Comment: X  CARE Score: 6  Discharge Goal: Independent       Oral Hygiene: Oral Hygiene  Assistance Needed: Independent  Comment: X  CARE Score: 6  Discharge Goal: Independent    UB/LB Bathing: Shower/Bathe Self  Assistance Needed: Independent  Comment:  Mod I seated majority  CARE Score: 6  Discharge Goal: Independent    UB Dressing: Upper Body Dressing  Assistance Needed: Independent  Comment: X  CARE Score: 6  Discharge Goal: Independent         LB Dressing: Lower Body Dressing  Assistance Needed: Independent  Comment: X  CARE Score: 6  Discharge Goal: Independent    Donning and [place] Footwear: Putting On/Taking Off
Comprehensive Nutrition Assessment    Type and Reason for Visit:  Initial, Consult (oral nutrition supplement)    Nutrition Recommendations/Plan:   Continue current carb controlled diet  Renal diet restrictions as needed  May offer daily renal oral nutrition supplement as needed   Will continue to follow up during stay     Malnutrition Assessment:  Malnutrition Status:  Insufficient data (recent dx moderate malnutrition) (04/25/23 3796)    Context:  Acute Illness       Nutrition Assessment:    Admit to acute rehab unit with recent hx cardiac arrest, pacemaker placed, ESRD with HD but plan for PD long term. Needing vent support during acute stay. Able to tolerate carb controlled diet at this time with hx DM. Recent meal intake %. Varying weights during acute stay. Will follow at moderate nutrition risk at this time with increased needs. Nutrition Related Findings:    asleep in bed on visit, HD with plan to transition to PD, no glucose POCT Wound Type:  (red coccyx)       Current Nutrition Intake & Therapies:    Average Meal Intake: %  Average Supplements Intake: None Ordered  ADULT DIET; Regular; 5 carb choices (75 gm/meal)    Anthropometric Measures:  Height: 5' 5\" (165.1 cm)  Ideal Body Weight (IBW): 125 lbs (57 kg)    Admission Body Weight: 192 lb 0.3 oz (87.1 kg) (acute stay)  Current Body Weight: 175 lb 4.3 oz (79.5 kg), 140.2 % IBW. Weight Source: Bed Scale  Current BMI (kg/m2): 29.2  Usual Body Weight: 193 lb (87.5 kg) (hx November 2022)  % Weight Change (Calculated): -9.2  Weight Adjustment For: No Adjustment                 BMI Categories: Overweight (BMI 25.0-29. 9)    Estimated Daily Nutrient Needs:  Energy Requirements Based On: Kcal/kg  Weight Used for Energy Requirements: Ideal  Energy (kcal/day): 5772-1702 (30-35 tej/kg)  Weight Used for Protein Requirements: Ideal  Protein (g/day): 68-74 (1.2-1.3 g/kg)  Method Used for Fluid Requirements: Standard Renal  Fluid (ml/day):      Nutrition
Comprehensive Nutrition Assessment    Type and Reason for Visit:  Reassess    Nutrition Recommendations/Plan:   Continue carb controlled diet   Fluid restriction as needed, current 1800 ml/day  May offer renal oral nutrition supplement as needed  Will continue to follow up during stay      Malnutrition Assessment:  Malnutrition Status:  Insufficient data (recent dx moderate malnutrition) (04/25/23 2257)    Context:  Acute Illness       Nutrition Assessment:    Appetite has been good this week, % at meals. Not feeling well at lunch today and ate less lunch. Remains on carb controlled diet with 1800 ml fluid restriction. Will continue to follow at moderate nutrition risk at this time, increased needs. Nutrition Related Findings:    resting in bed, feeling more weak with therapy today Wound Type:  (red coccyx)       Current Nutrition Intake & Therapies:    Average Meal Intake: %  Average Supplements Intake: None Ordered  ADULT DIET; Regular; 5 carb choices (75 gm/meal); 1800 ml    Anthropometric Measures:  Height: 5' 5\" (165.1 cm)  Ideal Body Weight (IBW): 125 lbs (57 kg)    Admission Body Weight: 192 lb 0.3 oz (87.1 kg) (acute stay)  Current Body Weight: 175 lb 4.3 oz (79.5 kg), 140.2 % IBW. Weight Source: Bed Scale  Current BMI (kg/m2): 29.2  Usual Body Weight: 193 lb (87.5 kg) (hx November 2022)  % Weight Change (Calculated): -9.2  Weight Adjustment For: No Adjustment                 BMI Categories: Overweight (BMI 25.0-29. 9)    Estimated Daily Nutrient Needs:  Energy Requirements Based On: Kcal/kg  Weight Used for Energy Requirements: Ideal  Energy (kcal/day): 3607-0104 (30-35 tej/kg)  Weight Used for Protein Requirements: Ideal  Protein (g/day): 68-74 (1.2-1.3 g/kg)  Method Used for Fluid Requirements: Standard Renal  Fluid (ml/day):      Nutrition Diagnosis:   Predicted inadequate energy intake related to increase demand for energy/nutrients, renal dysfunction as evidenced by dialysis    Nutrition
Comprehensive Nutrition Assessment    Type and Reason for Visit:  Reassess    Nutrition Recommendations/Plan:   Continue carb controlled diet with fluid restriction as needed   Will continue to follow up during stay      Malnutrition Assessment:  Malnutrition Status:  Insufficient data (recent dx moderate malnutrition) (04/25/23 1547)    Context:  Acute Illness       Nutrition Assessment:    Meal intake remains % on carb controlled diet. Fluid restriction per nephrology, 1800 ml/day. Consistent intake at meals this week, reasonable meal orders. Will continue to follow at moderate nutrition risk at this time. Nutrition Related Findings:    out of room/off unit for dialysis,   hx DM no insulin/OHA at this time no POCT Wound Type:  (red coccyx)       Current Nutrition Intake & Therapies:    Average Meal Intake: %  Average Supplements Intake: None Ordered  ADULT DIET; Regular; 5 carb choices (75 gm/meal); 1800 ml    Anthropometric Measures:  Height: 5' 5\" (165.1 cm)  Ideal Body Weight (IBW): 125 lbs (57 kg)    Admission Body Weight: 192 lb 0.3 oz (87.1 kg) (acute stay)  Current Body Weight: 174 lb 9.7 oz (79.2 kg), 140.2 % IBW. Weight Source: Bed Scale  Current BMI (kg/m2): 29.1  Usual Body Weight: 193 lb (87.5 kg) (hx November 2022)  % Weight Change (Calculated): -9.2  Weight Adjustment For: No Adjustment                 BMI Categories: Overweight (BMI 25.0-29. 9)    Estimated Daily Nutrient Needs:  Energy Requirements Based On: Kcal/kg  Weight Used for Energy Requirements: Ideal  Energy (kcal/day): 8596-7967 (30-35 tej/kg)  Weight Used for Protein Requirements: Ideal  Protein (g/day): 68-74 (1.2-1.3 g/kg)  Method Used for Fluid Requirements: Standard Renal  Fluid (ml/day):      Nutrition Diagnosis:   Predicted inadequate energy intake related to increase demand for energy/nutrients, renal dysfunction as evidenced by dialysis    Nutrition Interventions:   Food and/or Nutrient Delivery: Continue Current
Jacques Perry    : 1950  Acct #: [de-identified]  MRN: [id number]              PM&R Progress Note      Admitting diagnosis: Cardiac arrest ( [address])     Comorbid diagnoses impacting rehabilitation: Generalized weakness, gait disturbance, paroxysmal atrial fibrillation, end-stage renal disease on hemodialysis, bradycardia with AV block, uncontrolled diabetes type 2 with hyperglycemia, COPD, mixed hyperlipidemia    Chief complaint: Fatigue with activity. Minor abdominal discomfort (PD site). Prior (baseline) level of function: Independent.     Current level of function:         Current  IRF-CAMILA and Goals:   Occupational Therapy:    Short Term Goals  Time Frame for Short Term Goals: STGs=LTGs :   Long Term Goals  Time Frame for Long Term Goals : 10 days or until d/c  Long Term Goal 1: Pt will complete grooming tasks Ind  Long Term Goal 2: Pt will complete total body bathing mod I  Long Term Goal 3: Pt will complete UB dressing Ind  Long Term Goal 4: Pt will complete LB dressing mod I using AE PRN  Long Term Goal 5: Pt will doff/don footwear mod I using AE PRN  Additional Goals?: Yes  Long Term Goal 6: Pt will complete toileting mod I  Long Term Goal 7: Pt will complete functional transfers (bed, chair, toilet, shower) c DME PRN and mod I  Long Term Goal 8: Pt will perform therex/therax to facilitate increased strength/endurance/ax tolerance (c emphasis on dynamic standing balance/tolerance >8 mins) c SBA  Long Term Goal 9: Pt will complete simple homemaking tasks c DME PRN and mod I :                                       Eating: Eating  Assistance Needed: Independent  Comment: able to open packages/containers  CARE Score: 6  Discharge Goal: Independent       Oral Hygiene: Oral Hygiene  Assistance Needed: Supervision or touching assistance  Comment: performed seated 2* fatigue  CARE Score: 4  Discharge Goal: Independent    UB/LB Bathing: Shower/Bathe Self  Assistance Needed: Partial/moderate
Jeremiah Gudino    : 1950  Acct #: [de-identified]  MRN: [id number]              PM&R Progress Note      Admitting diagnosis: Cardiac arrest ( [address])     Comorbid diagnoses impacting rehabilitation: Generalized weakness, gait disturbance, paroxysmal atrial fibrillation, end-stage renal disease on hemodialysis, bradycardia with AV block, uncontrolled diabetes type 2 with hyperglycemia, COPD, mixed hyperlipidemia     Chief complaint: No chest pain. Some positional dizziness today. Prior (baseline) level of function: Independent.     Current level of function:         Current  IRF-CAMILA and Goals:   Occupational Therapy:    Short Term Goals  Time Frame for Short Term Goals: STGs=LTGs :   Long Term Goals  Time Frame for Long Term Goals : 10 days or until d/c  Long Term Goal 1: Pt will complete grooming tasks Ind  Long Term Goal 2: Pt will complete total body bathing mod I  Long Term Goal 3: Pt will complete UB dressing Ind  Long Term Goal 4: Pt will complete LB dressing mod I using AE PRN  Long Term Goal 5: Pt will doff/don footwear mod I using AE PRN  Additional Goals?: Yes  Long Term Goal 6: Pt will complete toileting mod I  Long Term Goal 7: Pt will complete functional transfers (bed, chair, toilet, shower) c DME PRN and mod I  Long Term Goal 8: Pt will perform therex/therax to facilitate increased strength/endurance/ax tolerance (c emphasis on dynamic standing balance/tolerance >8 mins) c SBA  Long Term Goal 9: Pt will complete simple homemaking tasks c DME PRN and mod I :                                       Eating: Eating  Assistance Needed: Independent  Comment: Pt able to open packages/containers  CARE Score: 6  Discharge Goal: Independent       Oral Hygiene: Oral Hygiene  Assistance Needed: Independent  Comment: seated sink side  CARE Score: 6  Discharge Goal: Independent    UB/LB Bathing: Shower/Bathe Self  Assistance Needed: Supervision or touching assistance  Comment: SBA in stance to wash
Mercedez Wooten    : 1950  Acct #: [de-identified]  MRN: [id number]              PM&R Progress Note      Admitting diagnosis: ***    Comorbid diagnoses impacting rehabilitation: ***    Chief complaint: ***    Prior (baseline) level of function: Independent.     Current level of function:         Current  IRF-CAMILA and Goals:   Occupational Therapy:    Short Term Goals  Time Frame for Short Term Goals: STGs=LTGs :   Long Term Goals  Time Frame for Long Term Goals : 10 days or until d/c  Long Term Goal 1: Pt will complete grooming tasks Ind  Long Term Goal 2: Pt will complete total body bathing mod I  Long Term Goal 3: Pt will complete UB dressing Ind  Long Term Goal 4: Pt will complete LB dressing mod I using AE PRN  Long Term Goal 5: Pt will doff/don footwear mod I using AE PRN  Additional Goals?: Yes  Long Term Goal 6: Pt will complete toileting mod I  Long Term Goal 7: Pt will complete functional transfers (bed, chair, toilet, shower) c DME PRN and mod I  Long Term Goal 8: Pt will perform therex/therax to facilitate increased strength/endurance/ax tolerance (c emphasis on dynamic standing balance/tolerance >8 mins) c SBA  Long Term Goal 9: Pt will complete simple homemaking tasks c DME PRN and mod I :                                       Eating: Eating  Assistance Needed: Independent  Comment: able to open packages/containers  CARE Score: 6  Discharge Goal: Independent       Oral Hygiene: Oral Hygiene  Assistance Needed: Independent  Comment: seated  CARE Score: 6  Discharge Goal: Independent    UB/LB Bathing: Shower/Bathe Self  Assistance Needed: Partial/moderate assistance  Comment: to thoroughly bathe distal BLEs  CARE Score: 3  Discharge Goal: Independent    UB Dressing: Upper Body Dressing  Assistance Needed: Supervision or touching assistance  Comment: to don shirt while seated EOB  CARE Score: 4  Discharge Goal: Independent         LB Dressing: Lower Body Dressing  Assistance Needed: Supervision or
Nephrology Progress Note        [address], [address] [address]  Phone: [phone]  Office Hours: 8:30AM - 4:30PM  Monday - Friday        5/3/2023 8:11 AM  Subjective:   Admit Date: 4/24/2023  PCP: SARAH Guerra - CNP  Interval History:   On room air  Good bp    Diet: ADULT DIET; Regular; 5 carb choices (75 gm/meal); 1800 ml      Data:   Scheduled Meds:   midodrine  10 mg Oral TID WC    bumetanide  2 mg Oral Once per day on Tue Thu Sat    epoetin madison-epbx  10,000 Units IntraVENous Once per day on Mon Wed Fri    apixaban  5 mg Oral BID    FLUoxetine  20 mg Oral BID    levothyroxine  150 mcg Oral Daily    pantoprazole  40 mg Oral QAM AC    rosuvastatin  10 mg Oral Nightly     Continuous Infusions:  PRN Meds:midodrine, acetaminophen **OR** [DISCONTINUED] acetaminophen, albuterol sulfate HFA, ipratropium, ondansetron **OR** ondansetron, acetaminophen, polyethylene glycol  I/O last 3 completed shifts: In: 564 [P. O.:564]  Out: -   No intake/output data recorded. Intake/Output Summary (Last 24 hours) at 5/3/2023 0811  Last data filed at 5/3/2023 0630  Gross per 24 hour   Intake 564 ml   Output --   Net 564 ml         ABGs:   Lab Results   Component Value Date/Time    PO2ART 257 04/14/2023 05:15 PM    [id number] 32.0 04/14/2023 05:15 PM     INR: No results for input(s): INR in the last 72 hours.     Objective:   Vitals: /65   Pulse 61   Temp 97.9 °F (36.6 °C) (Oral)   Resp 16   Ht 5' 5\" (1.651 m)   Wt 174 lb 2.6 oz (79 kg)   SpO2 97%   BMI 28.98 kg/m²   General appearance: alert and cooperative with exam, in no acute distress  HEENT: normocephalic, atraumatic,   Neck: supple, trachea midline  Lungs: clear to auscultation bilaterally, breathing comfortably on room air  Extremities: extremities atraumatic, no cyanosis or edema  Neurologic: alert, oriented, follows commands, interactive    MEDICAL DECISION MAKING     Patient Active Problem List    Diagnosis Date Noted
Nephrology Progress Note        [address], [address] [address]  Phone: [phone]  Office Hours: 8:30AM - 4:30PM  Monday - Friday 5/2/2023 8:12 AM  Subjective:   Admit Date: 4/24/2023  PCP: Anya Grewal, SARAH - CNP  Interval History:   BP is doing well  S/p PD cath flush yesterday    Diet: ADULT DIET; Regular; 5 carb choices (75 gm/meal); 1800 ml      Data:   Scheduled Meds:   midodrine  10 mg Oral TID WC    bumetanide  2 mg Oral Once per day on Tue Thu Sat    epoetin madison-epbx  10,000 Units IntraVENous Once per day on Mon Wed Fri    apixaban  5 mg Oral BID    FLUoxetine  20 mg Oral BID    levothyroxine  150 mcg Oral Daily    pantoprazole  40 mg Oral QAM AC    rosuvastatin  10 mg Oral Nightly     Continuous Infusions:  PRN Meds:midodrine, acetaminophen **OR** [DISCONTINUED] acetaminophen, albuterol sulfate HFA, ipratropium, ondansetron **OR** ondansetron, acetaminophen, polyethylene glycol  I/O last 3 completed shifts: In: 1424 [P.O.:924]  Out: 1500   No intake/output data recorded.     Intake/Output Summary (Last 24 hours) at 5/2/2023 9207  Last data filed at 5/2/2023 0225  Gross per 24 hour   Intake 1060 ml   Output 1500 ml   Net -440 ml         Objective:   Vitals: /60   Pulse 59   Temp 98.1 °F (36.7 °C) (Oral)   Resp 18   Ht 5' 5\" (1.651 m)   Wt 176 lb 2.4 oz (79.9 kg)   SpO2 95%   BMI 29.31 kg/m²   General appearance: alert and cooperative with exam, in no acute distress  HEENT: normocephalic, atraumatic,   Neck: supple, trachea midline  Lungs: breathing comfortably on room air  Heart[de-identified] regular rate and rhythm, S1, S2 normal,  Abdomen: soft, non-tender; non distended, pd cath in place  Extremities: extremities atraumatic, no cyanosis or edema  Neurologic: alert, oriented, follows commands, interactive    MEDICAL DECISION MAKING     Patient Active Problem List    Diagnosis Date Noted    Generalized weakness 05/01/2023    Gait disturbance 05/01/2023
Nephrology Progress Note        [address], [address] [address]  Phone: [phone]  Office Hours: 8:30AM - 4:30PM  Monday - Friday 4/25/2023 7:50 AM  Subjective:   Admit Date: 4/24/2023  PCP: SARAH Rider - CNP  Interval History: doing well    Diet: ADULT DIET; Regular; 5 carb choices (75 gm/meal)      Data:   Scheduled Meds:   apixaban  5 mg Oral BID    bumetanide  2 mg Oral Daily    epoetin madison-epbx  10,000 Units IntraVENous Once per day on Tue Thu Sat    FLUoxetine  20 mg Oral BID    levothyroxine  150 mcg Oral Daily    pantoprazole  40 mg Oral QAM AC    rosuvastatin  10 mg Oral Nightly     Continuous Infusions:  PRN Meds:acetaminophen **OR** [DISCONTINUED] acetaminophen, albuterol sulfate HFA, ipratropium, ondansetron **OR** ondansetron, acetaminophen, polyethylene glycol  I/O last 3 completed shifts: In: 400 [P.O.:400]  Out: -   No intake/output data recorded. Intake/Output Summary (Last 24 hours) at 4/25/2023 0750  Last data filed at 4/25/2023 0540  Gross per 24 hour   Intake 400 ml   Output --   Net 400 ml       CBC:   Recent Labs     04/22/23  1135 04/23/23  0600 04/24/23  0547   WBC 6.5 4.9 5.5   HGB 9.2* 8.7* 9.5*   * 96* 127*       BMP:    Recent Labs     04/22/23  1135 04/23/23  0600 04/24/23  0547    135 137   K 3.2* 3.7 3.7   CL 98* 98* 99   CO2 28 28 27   BUN 13 24* 33*   CREATININE 1.7* 3.3* 4.1*   GLUCOSE 131* 158* 170*     Hepatic: No results for input(s): AST, ALT, ALB, BILITOT, ALKPHOS in the last 72 hours. Troponin: No results for input(s): TROPONINI in the last 72 hours. BNP: No results for input(s): BNP in the last 72 hours. Lipids: No results for input(s): CHOL, HDL in the last 72 hours. Invalid input(s): LDLCALCU  ABGs:   Lab Results   Component Value Date/Time    PO2ART 257 04/14/2023 05:15 PM    [id number] 32.0 04/14/2023 05:15 PM     INR: No results for input(s): INR in the last 72 hours.     Objective:   Vitals: BP (!)
Nephrology Progress Note        [address], [address] [address]  Phone: [phone]  Office Hours: 8:30AM - 4:30PM  Monday - Friday 4/26/2023 7:23 AM  Subjective:   Admit Date: 4/24/2023  PCP: SARAH Ruiz - CNP  Interval History: doing ok  On room air  Reports low BP after standing for a while    Diet: ADULT DIET; Regular; 5 carb choices (75 gm/meal); 1800 ml      Data:   Scheduled Meds:   [START ON 4/27/2023] bumetanide  2 mg Oral Once per day on Tue Thu Sat    midodrine  5 mg Oral TID WC    apixaban  5 mg Oral BID    epoetin madison-epbx  10,000 Units IntraVENous Once per day on Tue Thu Sat    FLUoxetine  20 mg Oral BID    levothyroxine  150 mcg Oral Daily    pantoprazole  40 mg Oral QAM AC    rosuvastatin  10 mg Oral Nightly     Continuous Infusions:  PRN Meds:midodrine, acetaminophen **OR** [DISCONTINUED] acetaminophen, albuterol sulfate HFA, ipratropium, ondansetron **OR** ondansetron, acetaminophen, polyethylene glycol  I/O last 3 completed shifts: In: 900 [P.O.:400]  Out: 500   No intake/output data recorded. Intake/Output Summary (Last 24 hours) at 4/26/2023 0723  Last data filed at 4/25/2023 1628  Gross per 24 hour   Intake 500 ml   Output 500 ml   Net 0 ml       CBC:   Recent Labs     04/24/23  0547   WBC 5.5   HGB 9.5*   *       BMP:    Recent Labs     04/24/23  0547      K 3.7   CL 99   CO2 27   BUN 33*   CREATININE 4.1*   GLUCOSE 170*     Hepatic: No results for input(s): AST, ALT, ALB, BILITOT, ALKPHOS in the last 72 hours. Troponin: No results for input(s): TROPONINI in the last 72 hours. BNP: No results for input(s): BNP in the last 72 hours. Lipids: No results for input(s): CHOL, HDL in the last 72 hours. Invalid input(s): LDLCALCU  ABGs:   Lab Results   Component Value Date/Time    PO2ART 257 04/14/2023 05:15 PM    [id number] 32.0 04/14/2023 05:15 PM     INR: No results for input(s): INR in the last 72 hours.     Objective:   Vitals: BP
Nephrology Progress Note        [address], [address] [address]  Phone: [phone]  Office Hours: 8:30AM - 4:30PM  Monday - Friday 5/1/2023 8:03 AM  Subjective:   Admit Date: 4/24/2023  PCP: SARAH Locke - CNP  Interval History:   Resting on room air  Good BP    Diet: ADULT DIET; Regular; 5 carb choices (75 gm/meal); 1800 ml      Data:   Scheduled Meds:   midodrine  10 mg Oral TID WC    bumetanide  2 mg Oral Once per day on Tue Thu Sat    epoetin madison-epbx  10,000 Units IntraVENous Once per day on Mon Wed Fri    apixaban  5 mg Oral BID    FLUoxetine  20 mg Oral BID    levothyroxine  150 mcg Oral Daily    pantoprazole  40 mg Oral QAM AC    rosuvastatin  10 mg Oral Nightly     Continuous Infusions:  PRN Meds:midodrine, acetaminophen **OR** [DISCONTINUED] acetaminophen, albuterol sulfate HFA, ipratropium, ondansetron **OR** ondansetron, acetaminophen, polyethylene glycol  I/O last 3 completed shifts: In: 9724 [P.O.:1354]  Out: -   No intake/output data recorded.       Objective:   Vitals: BP (!) 134/55   Pulse 61   Temp 98.1 °F (36.7 °C) (Oral)   Resp 18   Ht 5' 5\" (1.651 m)   Wt 173 lb 11.6 oz (78.8 kg)   SpO2 94%   BMI 28.91 kg/m²   General appearance: in no acute distress  HEENT: normocephalic, atraumatic,   Neck: supple, trachea midline  Lungs: breathing comfortably on room air  Extremities: extremities atraumatic, no cyanosis or edema  Neurologic: drowsy    MEDICAL DECISION MAKING     Patient Active Problem List    Diagnosis Date Noted    Generalized weakness 05/01/2023    Gait disturbance 05/01/2023    Paroxysmal atrial fibrillation ([address] 05/01/2023    Heart block, AV 05/01/2023    Uncontrolled type 2 diabetes mellitus with hyperglycemia ([address] 05/01/2023    Simple chronic bronchitis ([address] 05/01/2023    Moderate malnutrition ([address] 04/24/2023    Cardiac arrest ([facility] 04/24/2023    End-stage renal disease on hemodialysis ([facility] 04/14/2023    Personal history of
Nephrology Progress Note        [address], [address] [address]  Phone: [phone]  Office Hours: 8:30AM - 4:30PM  Monday - Friday 4/27/2023 8:28 AM  Subjective:   Admit Date: 4/24/2023  PCP: SARAH Garcia CNP  Interval History:   Doing ok    Diet: ADULT DIET; Regular; 5 carb choices (75 gm/meal); 1800 ml      Data:   Scheduled Meds:   midodrine  10 mg Oral TID WC    [START ON 4/29/2023] bumetanide  2 mg Oral Once per day on Tue Thu Sat    [START ON 4/28/2023] epoetin madison-epbx  10,000 Units IntraVENous Once per day on Mon Wed Fri    apixaban  5 mg Oral BID    FLUoxetine  20 mg Oral BID    levothyroxine  150 mcg Oral Daily    pantoprazole  40 mg Oral QAM AC    rosuvastatin  10 mg Oral Nightly     Continuous Infusions:  PRN Meds:midodrine, acetaminophen **OR** [DISCONTINUED] acetaminophen, albuterol sulfate HFA, ipratropium, ondansetron **OR** ondansetron, acetaminophen, polyethylene glycol  No intake/output data recorded. No intake/output data recorded. No intake or output data in the 24 hours ending 04/27/23 0828    CBC: No results for input(s): WBC, HGB, PLT in the last 72 hours. BMP:  No results for input(s): NA, K, CL, CO2, BUN, CREATININE, GLUCOSE in the last 72 hours. Invalid input(s):  CALCIUM,  PHOSPHORUS  Hepatic: No results for input(s): AST, ALT, ALB, BILITOT, ALKPHOS in the last 72 hours. Troponin: No results for input(s): TROPONINI in the last 72 hours. BNP: No results for input(s): BNP in the last 72 hours. Lipids: No results for input(s): CHOL, HDL in the last 72 hours. Invalid input(s): LDLCALCU  ABGs:   Lab Results   Component Value Date/Time    PO2ART 257 04/14/2023 05:15 PM    VOW2QCK 32.0 04/14/2023 05:15 PM     INR: No results for input(s): INR in the last 72 hours.     Objective:   Vitals: /62   Pulse 59   Temp 97.8 °F (36.6 °C) (Oral)   Resp 15   Ht 5' 5\" (1.651 m)   Wt 174 lb 13.2 oz (79.3 kg)   SpO2 97%   BMI 29.09
Nephrology Progress Note        [address], [address] [address]  Phone: [phone]  Office Hours: 8:30AM - 4:30PM  Monday - Friday 5/5/2023 6:43 AM  Subjective:   Admit Date: 4/24/2023  PCP: Cherie Hightower, SARAH - CNP  Interval History:   Slept well  Krishna [address] to go home tomorrow  Good BP    Diet: ADULT DIET; Regular; 5 carb choices (75 gm/meal); 1800 ml      Data:   Scheduled Meds:   midodrine  10 mg Oral TID AC    bumetanide  2 mg Oral Once per day on Tue Thu Sat    epoetin madison-epbx  10,000 Units IntraVENous Once per day on Mon Wed Fri    apixaban  5 mg Oral BID    FLUoxetine  20 mg Oral BID    levothyroxine  150 mcg Oral Daily    pantoprazole  40 mg Oral QAM AC    rosuvastatin  10 mg Oral Nightly     Continuous Infusions:  PRN Meds:midodrine, acetaminophen **OR** [DISCONTINUED] acetaminophen, albuterol sulfate HFA, ipratropium, ondansetron **OR** ondansetron, acetaminophen, polyethylene glycol  I/O last 3 completed shifts: In: 600 [P.O.:100]  Out: 1500   No intake/output data recorded. No intake or output data in the 24 hours ending 05/05/23 0643    CBC: No results for input(s): WBC, HGB, PLT in the last 72 hours. BMP:  No results for input(s): NA, K, CL, CO2, BUN, CREATININE, GLUCOSE in the last 72 hours. Invalid input(s):  CALCIUM,  PHOSPHORUS  Hepatic: No results for input(s): AST, ALT, ALB, BILITOT, ALKPHOS in the last 72 hours. Troponin: No results for input(s): TROPONINI in the last 72 hours. BNP: No results for input(s): BNP in the last 72 hours. Lipids: No results for input(s): CHOL, HDL in the last 72 hours. Invalid input(s): LDLCALCU  ABGs:   Lab Results   Component Value Date/Time    PO2ART 257 04/14/2023 05:15 PM    [id number] 32.0 04/14/2023 05:15 PM     INR: No results for input(s): INR in the last 72 hours.     Objective:   Vitals: BP (!) 130/55   Pulse 59   Temp 97.9 °F (36.6 °C) (Oral)   Resp 18   Ht 5' 5\" (1.651 m)   Wt 175 lb 0.7 oz (79.4 kg)
Occupational Therapy                              [facility] ARU OCCUPATIONAL THERAPY EVALUATION    Chart Review:  Past Medical History:   Diagnosis Date    Asthma     Bradycardia     Bronchitis     COPD (chronic obstructive pulmonary disease) ([facility]     H/O echocardiogram 2019    EF 55-60%, Grade II Diastolic Dysfunction, Left atrium is moderately dilated, no significant valvular disease, Mild Pulm HTN, No pericardial effusion     History of nuclear stress test 2019    ABN, Moderate inferior and lateral wall ischemia of a large territory, EF 55%    Hypercholesteremia     Hyperlipidemia     Hypertension     Hyperthyroidism     Hypothyroidism     Kidney disease     Pneumonia     Type II or unspecified type diabetes mellitus without mention of complication, not stated as uncontrolled     Unspecified sleep apnea      Past Surgical History:   Procedure Laterality Date    APPENDECTOMY      CARDIAC CATHETERIZATION       SECTION      CHOLECYSTECTOMY      COLONOSCOPY      COLONOSCOPY N/A 2021    COLONOSCOPY W/ ENDOSCOPIC MUCOSAL RESECTION WITH ORISE INJECTION, HEMACLIP PLACEMENT X 2 POST-POLYPECTOMY, SPOT INK TO LEXY 1.2CM POLYP HEPATIC FLEXURE, POLYPECTOMY performed by Cameron Velasco MD at [place] [address]    IR TUNNELED [address] 5 YEARS  2023    IR TUNNELED CATHETER PLACEMENT GREATER THAN 5 YEARS 2023 [facility] SPECIAL PROCEDURES     Social History:  Social/Functional History  Lives With: Spouse  Type of Home: House  Home Layout: Two level, Bed/Bath upstairs (full flight to 2nd floor c bilateral hand rails)  Home Access: Ramped entrance  Bathroom Shower/Tub: Tub/Shower unit, Shower chair with back  [employer]: Handicap height (has been using C frame over toilet)  Bathroom Equipment: Shower chair, 3-in-1 commode  Bathroom Accessibility: Walker accessible  Home Equipment: Rollator, Walker, [address] [facility] bed, Wheelchair-manual, Grab bars, [place], [place]  Has the
Occupational Therapy    Physical Rehabilitation: OCCUPATIONAL THERAPY     [x] daily progress note       [] discharge       Patient Name:  Alfredo Hammond   :  1950 MRN: [id number]  Room:  [address] [place], [place] [address] Date of Admission: 2023  Rehabilitation Diagnosis:   Cardiac arrest [facility]) [I46.9]       Date 2023       Day of ARU Week:  1   Time IN/OUT 1741-2717  1201-2782   Individual Tx Minutes 60+60   Group Tx Minutes    Co-Treat Minutes    Concurrent Tx Minutes    TOTAL Tx Time Mins 120   Variance Time    Variance Time []   Refusal due to:     []   Medical hold/reason:    []   Illness   []   Off Unit for test/procedure  []   Extra time needed to complete task  []   Therapeutic need  []   Other (specify):   Restrictions Restrictions/Precautions: General Precautions, Fall Risk, Surgical Protocols (new peritoneal dialysis catheter (watch gait belt placement, can't shower for 2 weeks), pacemaker precautions)         Communication with other providers: [x]   OK to see per nursing:     []   Spoke with team member regarding:      Subjective observations and cognitive status: Pt resting in bed on approach; pleasant and agreeable to therapy. BP was taken sitting EOB-112/57. Pt not symptomatic. During sinkside ADL, BP taken- 104/52. PM: Received pt in therapy gym; pleasant and agreeable.       Pain level/location:    /10       Location:    Discharge recommendations  Anticipated discharge date:    Destination: []home alone   []home alone w assist prn   [x] home w/ family    [] Continuous supervision       []SNF    [] Assisted living     [] Other:   Continued therapy: [x]HHC OT  []OUTPATIENT  OT   [] No Further OT  Equipment needs: none        ADLs:    Eating: Eating  Assistance Needed: Independent  Comment: Pt able to open packages/containers  CARE Score: 6  Discharge Goal: Independent       Oral Hygiene: Oral Hygiene  Assistance Needed: Independent  Comment: seated sink side  CARE Score: 6  Discharge Goal:
Occupational Therapy    Physical Rehabilitation: OCCUPATIONAL THERAPY     [x] daily progress note       [] discharge       Patient Name:  Jeremiah Gudino   :  1950 MRN: [id number]  Room:  [address] [place], [place] [address] Date of Admission: 2023  Rehabilitation Diagnosis:   Cardiac arrest [facility]) [I46.9]       Date 5/3/2023       Day of ARU Week:  3   Time IN//1030   Individual Tx Minutes 60   Group Tx Minutes    Co-Treat Minutes    Concurrent Tx Minutes    TOTAL Tx Time Mins 60   Variance Time    Variance Time []   Refusal due to:     []   Medical hold/reason:    []   Illness   []   Off Unit for test/procedure  []   Extra time needed to complete task  []   Therapeutic need  []   Other (specify):   Restrictions Restrictions/Precautions: General Precautions, Fall Risk, Surgical Protocols (new peritoneal dialysis catheter (watch gait belt placement, can't shower for 2 weeks), pacemaker precautions)         Communication with other providers: [x]   OK to see per nursing:     []   Spoke with team member regarding:      Subjective observations and cognitive status: Pt seated in W/C on approach, agreeable to tx session, requesting to get dressed.      Pain level/location:    /10       Location: Denied   Discharge recommendations  Anticipated discharge date:    Destination: []home alone   []home alone w assist prn   [x] home w/ family    [] Continuous supervision       []SNF    [] Assisted living     [] Other:   Continued therapy: [x]HHC OT  []OUTPATIENT  OT   [] No Further OT  Equipment needs: none        ADLs:      Oral Hygiene: Oral Hygiene  Assistance Needed: Independent  Comment: seated at sink  CARE Score: 6  Discharge Goal: Independent     UB Dressing: Upper Body Dressing  Assistance Needed: Setup or clean-up assistance  Comment: to don T shirt  CARE Score: 5  Discharge Goal: Independent         LB Dressing: Lower Body Dressing  Assistance Needed: Supervision or touching assistance  Comment: to don Depends and
Patient Name: Alfredo Hammond  Patient : 1950  MRN: [id number]     Acct: [de-identified]  Date of Admission: 2023  Room/Bed: 1024/1024-A  Code Status:  Full Code  Allergies: Allergies   Allergen Reactions    Empagliflozin Hives    Lorazepam Other (See Comments)     Confusion      Diagnosis:    Patient Active Problem List   Diagnosis    Chronic asthmatic bronchitis (HCC)    AJRAD on CPAP    Family history of colon cancer    Hypothyroidism    Essential hypertension    Hyperlipidemia    SOB (shortness of breath)    Abnormal nuclear stress test    CKD (chronic kidney disease), stage IV (HCC)    Acute kidney injury (LALITA) with acute tubular necrosis (ATN) (HCC)    Other proteinuria    Type 2 diabetes mellitus with chronic kidney disease (HCC)    Hypertensive renal disease    Diabetic nephropathy associated with type 2 diabetes mellitus (HCC)    Chronic kidney disease-mineral and bone disorder    Bradycardia    Personal history of colonic polyps    Benign neoplasm of ascending colon    Benign neoplasm of transverse colon    Benign neoplasm of descending colon    End-stage renal disease on hemodialysis (HCC)    Moderate malnutrition (HCC)    Cardiac arrest (HCC)    Generalized weakness    Gait disturbance    Paroxysmal atrial fibrillation (HCC)    Heart block, AV    Uncontrolled type 2 diabetes mellitus with hyperglycemia ([facility]    Simple chronic bronchitis (HCC)         Treatment:  Hemodialysis 1:1  Priority: Routine  Location: Acute Room    Diabetic: Yes  NPO: No  Isolation Precautions: Dialysis     Consent for Treatment Verified: Yes  Blood Consent Verified: Not Applicable     Safety Verified: Identify (I), Consent (C), Equipment (E), HepB Status (B), Orders Complete (O), Access Verified (A), and Timeliness (T)  Time out performed prior to access at 1250 hours. Report Received from Primary RN at 1215 hours.   Primary RN (First Initial, Last Name, Title): Yaima King RN  Incapacitated Nurse Education Completed: Not
Patient tolerated a 3 hour HD treatment with a 3 K bath. 1000 ml of fluid removed. Accessed HD catheter without problem,   Post procedure: blood returned to patient, both lumen flushed with normal saline, and alcohol caps applied. Patient Name: Yeni Shelton  Patient : 1950  MRN: [id number]     Acct: [de-identified]  Date of Admission: 2023  Room/Bed: 1024/1024-A  Code Status:  Full Code  Allergies:    Allergies   Allergen Reactions    Empagliflozin Hives    Lorazepam Other (See Comments)     Confusion      Diagnosis:    Patient Active Problem List   Diagnosis    Chronic asthmatic bronchitis (HCC)    JARAD on CPAP    Family history of colon cancer    Hypothyroidism    Essential hypertension    Hyperlipidemia    SOB (shortness of breath)    Abnormal nuclear stress test    CKD (chronic kidney disease), stage IV (HCC)    Acute kidney injury (LALITA) with acute tubular necrosis (ATN) (HCC)    Other proteinuria    Type 2 diabetes mellitus with chronic kidney disease ([facility]    Hypertensive renal disease    Diabetic nephropathy associated with type 2 diabetes mellitus (HCC)    Chronic kidney disease-mineral and bone disorder    Bradycardia    Personal history of colonic polyps    Benign neoplasm of ascending colon    Benign neoplasm of transverse colon    Benign neoplasm of descending colon    End-stage renal disease on hemodialysis (HCC)    Moderate malnutrition (HCC)    Cardiac arrest (HCC)    Generalized weakness    Gait disturbance    Paroxysmal atrial fibrillation (HCC)    Heart block, AV    Uncontrolled type 2 diabetes mellitus with hyperglycemia (HCC)    Simple chronic bronchitis (HCC)         Treatment:  Hemodialysis 1:1  Priority: Routine  Location: Acute Room    Diabetic: Yes  NPO: No  Isolation Precautions: None     Consent for Treatment Verified: Yes  Blood Consent Verified: Not Applicable     Safety Verified: Identify (I), Consent (C), Equipment (E), HepB Status (B), Orders Complete (O),
Physical Rehabilitation: OCCUPATIONAL THERAPY     [x] daily progress note       [] discharge       Patient Name:  Rachel Burkett   :  1950 MRN: [id number]  Room:  [address] [place], [place] [address]A Date of Admission: 2023  Rehabilitation Diagnosis:   Cardiac arrest [facility]) [I46.9]       Date 2023       Day of ARU Week:  5   Time IN//740   Individual Tx Minutes 60   Group Tx Minutes    Co-Treat Minutes    Concurrent Tx Minutes    TOTAL Tx Time Mins 60   Variance Time    Variance Time []   Refusal due to:     []   Medical hold/reason:    []   Illness   []   Off Unit for test/procedure  []   Extra time needed to complete task  []   Therapeutic need  []   Other (specify):   Restrictions Restrictions/Precautions: General Precautions, Fall Risk, Surgical Protocols (new peritoneal dialysis catheter (watch gait belt placement, can't shower for 2 weeks), pacemaker precautions)         Communication with other providers: [x]   OK to see per nursing:     []   Spoke with team member regarding:      Subjective observations and cognitive status: Patient lying in supine upon approach, watching tv, pleasant and agreeable to therapy, had medidrine at 630 also states she may not shower for another two weeks, patient reassured a wash up at sink is what is expected      Pain level/location:    /10       Location: per patient only achy had tylenol to manage    Discharge recommendations  Anticipated discharge date:    Destination: []home alone   []home alone w assist prn   [x] home w/ family    [] Continuous supervision       []SNF    [] Assisted living     [] Other:   Continued therapy: [x]HHC OT  []OUTPATIENT  OT   [] No Further OT  Equipment needs: None      ADLs:    Eating: Eating  Assistance Needed: Independent  Comment: Pt able to open packages/containers  CARE Score: 6  Discharge Goal: Independent       Oral Hygiene: Oral Hygiene  Assistance Needed: Independent  Comment: seated at sink  CARE Score: 6  Discharge Goal:
Physical Therapy      [x] daily progress note       [] discharge       Patient Name:  Alyce Baumann   :  1950 MRN: [id number]  Room:  [address] [place], [place] [address] Date of Admission: 2023  Rehabilitation Diagnosis:   Cardiac arrest [facility]) [I46.9]       Date 2023       Day of ARU Week:  5   Time IN/OUT 6577-0379   Individual Tx Minutes 75   TOTAL Tx Time Mins 75   Variance Time    Variance Time []   Refusal due to:     []   Medical hold/reason:    []   Illness   []   Off Unit for test/procedure  []   Extra time needed to complete task  []   Therapeutic need  []   Other (specify):   Restrictions Restrictions/Precautions  Restrictions/Precautions: General Precautions, Fall Risk, Surgical Protocols (new peritoneal dialysis catheter (watch gait belt placement, can't shower for 2 weeks), pacemaker precautions)  Implants present? : Pacemaker      Communication with other providers: [x]   OK to see per nursing:     []   Spoke with team member regarding:      Subjective observations and cognitive status: Pt sitting up in [facility] 23, willing to participate. Reports starting to feel \"a little spacey\", reports feeling very weak; BP 90/52; After LE therx in sitting BP 95/52. With standing 82/45; After sitting ~ 5' BP 95/53; second stand 95/50; Pt symptomatic with multiple c/o weakness; Pt unable to perform standing or WC mobility out of room due to c/o weakness and low BP with sitting / standing. Pt returned to supine with BP 1260; able to perform LE therx in bed; Time spent monitoring VS and rest breaks between ax's due to pt fatigue and sx's.       Pain level/location: 0/10       Location:    Discharge recommendations  Anticipated discharge date:    Destination: []home alone   []home with assist PRN     [] home with continuous supervision     []SNF      [] Assisted living     [] Other:   Continued therapy: []HHC PT  []OUTPATIENT  PT   [] No Further PT  Equipment needs: TBD     Bed Mobility:           []   Pt received out of bed
Physical Therapy      [x] daily progress note       [] discharge       Patient Name:  Antonino Barba   :  1950 MRN: [id number]  Room:  [address] [place], [place] [address] Date of Admission: 2023  Rehabilitation Diagnosis:   Cardiac arrest [facility]) [I46.9]       Date 2023       Day of ARU Week:  2   Time IN/OUT 7990-9245  5399-9724   Individual Tx Minutes 60+60   TOTAL Tx Time Mins 120   Variance Time    Variance Time []   Refusal due to:     []   Medical hold/reason:    []   Illness   []   Off Unit for test/procedure  []   Extra time needed to complete task  []   Therapeutic need  []   Other (specify):   Restrictions Restrictions/Precautions  Restrictions/Precautions: General Precautions, Fall Risk, Surgical Protocols (new peritoneal dialysis catheter (watch gait belt placement, can't shower for 2 weeks), pacemaker precautions)  Implants present? : Pacemaker      Communication with other providers: [x]   OK to see per nursing:     []   Spoke with team member regarding:      Subjective observations and cognitive status: Pt resting in bed, BP meds not yet received, RN notified; Pt states \"I'm not feeling as energetic as yesterday but I'll get there. \" BP in semi fowlers 115/56; sitting /52; standing 91/54 BP meds given during session. Pt with c/o increased weakness in sitting, returned to supine for LE therx.    PM: Pt up in [facility], reports feeling better than this AM. /47 in sitting   Pain level/location: Mild pain in B shoulders described as \"muscle ache\", did not rate   Discharge recommendations  Anticipated discharge date:    Destination: []home alone   []home with assist PRN     [] home with continuous supervision     []SNF      [] Assisted living     [] Other:   Continued therapy: []HHC PT  []OUTPATIENT  PT   [] No Further PT  Equipment needs: none anticipated, pt has 2WW and manual WC     Bed Mobility:           []   Pt received out of bed   Rolling R/L:  Indep  Scooting:  Indep supine scoot to L  Supine -->
Physical Therapy      [x] daily progress note       [] discharge       Patient Name:  Kaye Jovel   :  1950 MRN: [id number]  Room:  [address] [place], [place] [address] Date of Admission: 2023  Rehabilitation Diagnosis:   Cardiac arrest [facility]) [I46.9]       Date 2023       Day of ARU Week:  3   Time IN/OUT 1730-9031  8952-6259   Individual Tx Minutes 10+60   TOTAL Tx Time Mins 70   Variance Time    Variance Time []   Refusal due to:     []   Medical hold/reason:    []   Illness   []   Off Unit for test/procedure  []   Extra time needed to complete task  []   Therapeutic need  []   Other (specify):   Restrictions Restrictions/Precautions  Restrictions/Precautions: General Precautions, Fall Risk, Surgical Protocols (new peritoneal dialysis catheter (watch gait belt placement, can't shower for 2 weeks), pacemaker precautions)  Implants present? : Pacemaker      Communication with other providers: [x]   OK to see per nursing:     []   Spoke with team member regarding:      Subjective observations and cognitive status: Pt states \"I didn't do too well this morning, I felt like I was done when I stood up\". Pt reports havnig decreased BP with earlier therapy session  PM: Pt resting in bed, HOB 24 degrees with /49; pt reports fatigue but no lightheadedness. Pt motivated but voices frustration with BP/lightheadedness, reports feeling \"anxious\" about it. With bed in high fowlers /49; with bed in chair position 111/49; Sitting /53; After sitting EOB 7' 122/54    Pain level/location: 0/10       Location:    Discharge recommendations  Anticipated discharge date:  TBD  Destination: []home alone   []home with assist PRN     [] home with continuous supervision     []SNF      [] Assisted living     [] Other:   Continued therapy: []HHC PT  []OUTPATIENT  PT   [] No Further PT  Equipment needs: TBD     Bed Mobility:           []   Pt received out of bed   Scooting:   Max A with A performed by RN for pt to scoot to [facility] in
Physical Therapy      [x] daily progress note       [] discharge       Patient Name:  Tonie Kayser   :  1950 MRN: [id number]  Room:  [address] [place], [place] [address]A Date of Admission: 2023  Rehabilitation Diagnosis:   Cardiac arrest [facility]) [I46.9]       Date 2023       Day of ARU Week:  4   Time IN/OUT 8209-2226   Individual Tx Minutes 120   TOTAL Tx Time Mins 120   Variance Time    Variance Time []   Refusal due to:     []   Medical hold/reason:    []   Illness   []   Off Unit for test/procedure  []   Extra time needed to complete task  []   Therapeutic need  []   Other (specify):   Restrictions Restrictions/Precautions  Restrictions/Precautions: General Precautions, Fall Risk, Surgical Protocols (new peritoneal dialysis catheter (watch gait belt placement, can't shower for 2 weeks), pacemaker precautions)  Implants present? : Pacemaker      Communication with other providers: [x]   OK to see per nursing:     []   Spoke with team member regarding:      Subjective observations and cognitive status: Pt asleep in bed but easily awakened. Reports felt good to be up in a chair and out of the room. Reports BP lowered \"a little bit\" after ex with OT. Reports feeling like shes \"starting to recover\" since laying down for rest break. Reports tolerating Xavier hose OK. BP in semi fowlers: 120/56; in high fowlers, 128/60; sitting /57; In standing EOB 95/53; Once sitting again after ~ 3' /58; after SPT  with /57; after 10' amb  111/55; after amb uneven surface 108/53 ( asymptomatic) and returned to 122/57 with rest; Pt with improved ax tolerance today, c/o general weakness but no lightheadedness this session. Pt did req multiple rest breaks for fatigue with ax. Pt wanting to stay up in recliner at end of session. Recommended to pt to stay up for at least one hour then use call light to get back into bed. Pt agreed, wanting back up in chair for dinner.  Discussed with RN and STNA,     Pain level/location:   0
Physical Therapy    [x] daily progress note       [] discharge       Patient Name:  Chao Betts   :  1950 MRN: [id number]  Room:  [address] [place], [place] [address] Date of Admission: 2023  Rehabilitation Diagnosis:   Cardiac arrest [facility]) [I46.9]       Date 2023       Day of ARU Week:  1   Time IN/OUT 4826-1956   Individual Tx Minutes 60   TOTAL Tx Time Mins 60   Variance Time    Variance Time []   Refusal due to:     []   Medical hold/reason:    []   Illness   []   Off Unit for test/procedure  []   Extra time needed to complete task  []   Therapeutic need  []   Other (specify):   Restrictions Restrictions/Precautions  Restrictions/Precautions: General Precautions, Fall Risk, Surgical Protocols (new peritoneal dialysis catheter (watch gait belt placement, can't shower for 2 weeks), pacemaker precautions)  Implants present? : Pacemaker      Communication with other providers: [x]   OK to see per nursing:     []   Spoke with team member regarding:      Subjective observations and cognitive status: Pt up in [facility], states \"I feel pretty good\", reports sitting up in recliner all day yesterday, \" I think I did real good with that. \"   BP in sittin/51; in standing 85/49; BP did improve with ambulation, although pt did c/o feeling \"fuzzy and weak\". Req frequent rest breaks with ax and extra time. Pain level/location: 0/10 , reports having back pain earlier but Tylenol received.    Discharge recommendations  Anticipated discharge date:    Destination: []home alone   []home with assist PRN     [] home with continuous supervision     []SNF      [] Assisted living     [] Other:   Continued therapy: []C PT  []OUTPATIENT  PT   [] No Further PT  Equipment needs: TBD     Bed Mobility:           [x]   Pt received out of bed        Transfers:    Sit--> Stand:  CGA from WC, at times req second attempt before successful, able to get into stand withcues for COG fwd, NWB L UE  Stand --> Sit:   CG-SBA  Assistive device required
Physical Therapy    [x] daily progress note       [] discharge       Patient Name:  Jennifer Valentine   :  1950 MRN: [id number]  Room:  [address] [place], [place] [address] Date of Admission: 2023  Rehabilitation Diagnosis:   Cardiac arrest [facility]) [I46.9]       Date 2023       Day of ARU Week:  6   Time IN/OUT 2796-1142  7557-5623   Individual Tx Minutes 60   Group Tx Minutes 60   Co-Treat Minutes    Concurrent Tx Minutes    TOTAL Tx Time Mins 120   Variance Time    Variance Time []   Refusal due to:     []   Medical hold/reason:    []   Illness   []   Off Unit for test/procedure  []   Extra time needed to complete task  []   Therapeutic need  []   Other (specify):   Restrictions Restrictions/Precautions  Restrictions/Precautions: General Precautions, Fall Risk, Surgical Protocols (new peritoneal dialysis catheter (watch gait belt placement, can't shower for 2 weeks), pacemaker precautions)  Implants present? : Pacemaker      Communication with other providers:    OK to see per nursing:     [x]   Spoke with team member regarding:      Subjective observations and cognitive status: Upon arrival pt sitting in w/c with friend visiting.  Pt agreeable to tx  Vitals taken during tx seated 117/56, standing 88/50     Pain level/location:    5/10       Location: LLE and L shoulder   Discharge recommendations  Anticipated discharge date:  TBD  Destination: []home alone   []home alone with assist PRN     [] home w/ family      [] Continuous supervision  []SNF    [] Assisted living     [] Other:   Continued therapy: []HHC PT  []OUTPATIENT  PT   [] No Further PT  Equipment needs: TBD         Bed Mobility:           [x]   Pt received out of bed       Transfers:    Sit--> Stand:  Min A from low surface and CGA from higher surface  Stand --> Sit:   CGA     Assistive device required for transfer:   FWW          Wheelchair Propulsion:  Distance:  35 ft and 65 ft    Assistance:  CGA to Max A as pt fatigue  Extremities Used:   BUE and BLE  Type:
Physical Therapy  [facility] ARU PHYSICAL THERAPY EVALUATION    Chart Review:  Past Medical History:   Diagnosis Date    Asthma     Bradycardia     Bronchitis     COPD (chronic obstructive pulmonary disease) ([facility] [address]     H/O echocardiogram 2019    EF 55-60%, Grade II Diastolic Dysfunction, Left atrium is moderately dilated, no significant valvular disease, Mild Pulm HTN, No pericardial effusion     History of nuclear stress test 2019    ABN, Moderate inferior and lateral wall ischemia of a large territory, EF 55%    Hypercholesteremia     Hyperlipidemia     Hypertension     Hyperthyroidism     Hypothyroidism     Kidney disease     Pneumonia     Type II or unspecified type diabetes mellitus without mention of complication, not stated as uncontrolled     Unspecified sleep apnea      Past Surgical History:   Procedure Laterality Date    APPENDECTOMY      CARDIAC CATHETERIZATION       SECTION      CHOLECYSTECTOMY      COLONOSCOPY      COLONOSCOPY N/A 2021    COLONOSCOPY W/ ENDOSCOPIC MUCOSAL RESECTION WITH ORISE INJECTION, HEMACLIP PLACEMENT X 2 POST-POLYPECTOMY, SPOT INK TO LEXY 1.2CM POLYP HEPATIC FLEXURE, POLYPECTOMY performed by Vinod Spann MD at [place] [address]    IR TUNNELED [address] 5 YEARS  2023    IR TUNNELED CATHETER PLACEMENT GREATER THAN 5 YEARS 2023 [facility] SPECIAL PROCEDURES     Fall History:   Social History:  Social/Functional History  Lives With: Spouse  Type of Home: House  Home Layout: Two level, Bed/Bath upstairs (full flight to 2nd floor c bilateral hand rails)  Home Access: Ramped entrance  Bathroom Shower/Tub: Tub/Shower unit, Shower chair with back  [employer]: Handicap height (has been using [facility] frame over toilet)  Bathroom Equipment: Shower chair, 3-in-1 commode  Bathroom Accessibility: Walker accessible  Home Equipment: Rollator, Walker, [facility] bed, Wheelchair-manual, Grab bars, [place], Reacher  Has the patient had two or
Physical Therapy  [x] daily progress note       [] discharge       Patient Name:  Wade Alberts   :  1950 MRN: [id number]  Room:  [address] [place], [place] [address] Date of Admission: 2023  Rehabilitation Diagnosis:   Cardiac arrest [facility]) [I46.9]       Date 2023       Day of ARU Week:  4   Time IN//920, 7944/63503107/1039   Individual Tx Minutes 50, 32, 38   Group Tx Minutes    Co-Treat Minutes    Concurrent Tx Minutes    TOTAL Tx Time Mins 120   Variance Time    Variance Time []   Refusal due to:     []   Medical hold/reason:    []   Illness   []   Off Unit for test/procedure  []   Extra time needed to complete task  []   Therapeutic need  []   Other (specify):   Restrictions Restrictions/Precautions  Restrictions/Precautions: General Precautions, Fall Risk, Surgical Protocols (new peritoneal dialysis catheter (watch gait belt placement, can't shower for 2 weeks), pacemaker precautions)  Implants present? : Pacemaker      Interdisciplinary communication [x]   Cleared for therapy per nursing     [x]   RN notified about issues during session  [x]   RN updated on pt performance  []   Spoke with   []   Spoke with OT  []   Spoke with MD  []   Other:    Subjective observations and cognitive status: First session: Pt in bed, states she did not receive a breakfast tray. Spoke with nurse manager who is following up with dietary. Pt agreeable to perform LE ex/ Pt also had not received proamitine yet and nursing was called to administer. Dietary came to take breakfast order during session, then nursing came to administer meds. Pt continued ex during these interruptions. 2nd session: OT reported to PT that BP had dropped during their session even after donning CLYDE hose and was slowly recovering with pt in recliner with feet elevated. Upon entering pt stated she felt a little better but still not 100%, though wanting to participate.  Agreed with pt to start with w/c mobility to minimize standing and increase
Physical Therapy  [x] daily progress note       [x] discharge       Patient Name:  Jeremiah Gudino   :  1950 MRN: [id number]  Room:  [address] [place], [place] [address] Date of Admission: 2023  Rehabilitation Diagnosis:   Cardiac arrest [facility]) [I46.9]       Date 5/3/2023       Day of ARU Week:  3   Time IN//930, 1100/1200   Individual Tx Minutes 60+60   Group Tx Minutes    Co-Treat Minutes    Concurrent Tx Minutes    TOTAL Tx Time Mins 120   Variance Time    Variance Time []   Refusal due to:     []   Medical hold/reason:    []   Illness   []   Off Unit for test/procedure  []   Extra time needed to complete task  []   Therapeutic need  []   Other (specify):   Restrictions Restrictions/Precautions  Restrictions/Precautions: General Precautions, Fall Risk, Surgical Protocols (new peritoneal dialysis catheter (watch gait belt placement, can't shower for 2 weeks), pacemaker precautions)  Implants present? : Pacemaker      Interdisciplinary communication [x]   Cleared for therapy per nursing     []   RN notified about issues during session  []   RN updated on pt performance  []   Spoke with   []   Spoke with OT  []   Spoke with MD  []   Other:    Subjective observations and cognitive status: [facility] has had her am meds and states she is feeling good today. CLYDE hose donned prior to sitting EOB. Pt BP and energy level very good today except for one time of feeling she needed to sit at end of 100' walk.  (Vitals WFL, just fatigued)     Pain level/location:  0  /10       Location:    Discharge recommendations  Anticipated discharge date:    Destination: []home alone   []home alone with assist PRN     [x] home w/ family      [] Continuous supervision  []SNF    [] Assisted living     [] Other:  Continued therapy: [x]HHC PT  []OUTPATIENT PT   [] No Further PT  []SNF PT  Caregiver training recommended: []Yes  [] No   Equipment needs: none anticipated     PT IRF-CAMILA scores and goals for
Physical Therapy  [x] daily progress note       [x] discharge       Patient Name:  Loi Sanford   :  1950 MRN: [id number]  Room:  [address] [place], [place] [address] Date of Admission: 2023  Rehabilitation Diagnosis:   Cardiac arrest [facility]) [I46.9]       Date 2023       Day of ARU Week:  5   Time IN//1130   Individual Tx Minutes 120   Group Tx Minutes    Co-Treat Minutes    Concurrent Tx Minutes    TOTAL Tx Time Mins 120   Variance Time    Variance Time []   Refusal due to:     []   Medical hold/reason:    []   Illness   []   Off Unit for test/procedure  []   Extra time needed to complete task  []   Therapeutic need  []   Other (specify):   Restrictions Restrictions/Precautions  Restrictions/Precautions: General Precautions, Fall Risk, Surgical Protocols (new peritoneal dialysis catheter (watch gait belt placement, can't shower for 2 weeks), pacemaker precautions)  Implants present? : Pacemaker      Interdisciplinary communication [x]   Cleared for therapy per nursing     []   RN notified about issues during session  []   RN updated on pt performance  []   Spoke with   []   Spoke with OT  []   Spoke with MD  []   Other:    Subjective observations and cognitive status: Pt had received midodrine on time this am and had good session with OT. Pt in w/c and agreeable to therapy.  Feels she can do 2 hours straight with pacing of activity     Pain level/location:   3 /10       Location: chest(non-cardiac) nurse administered tylenol during session   Discharge recommendations  Anticipated discharge date:    Destination: []home alone   []home alone with assist PRN     [x] home w/ family      [] Continuous supervision  []SNF    [] Assisted living     [] Other:  Continued therapy: [x]HHC PT  []OUTPATIENT PT   [] No Further PT  []SNF PT  Caregiver training recommended: []Yes  [x] No   Equipment needs: has needed equipment     PT IRF-CAMILA scores and goals for discharge assessment:   Roll Left and Right  Assistance
Removed 1 L fluid. Flushed PD catheter with 500 ml of 1.5% dianeal.             Patient Name: Antonino Barba  Patient : 1950  MRN: [id number]     Acct: [de-identified]  Date of Admission: 2023  Room/Bed: 1024/1024-A  Code Status:  Full Code  Allergies: Allergies   Allergen Reactions    Empagliflozin Hives    Lorazepam Other (See Comments)     Confusion      Diagnosis:    Patient Active Problem List   Diagnosis    Chronic asthmatic bronchitis (HCC)    JARAD on CPAP    Family history of colon cancer    Hypothyroidism    Essential hypertension    Hyperlipidemia    SOB (shortness of breath)    Abnormal nuclear stress test    CKD (chronic kidney disease), stage IV (HCC)    Acute kidney injury (LALITA) with acute tubular necrosis (ATN) (HCC)    Other proteinuria    Type 2 diabetes mellitus with chronic kidney disease (HCC)    Hypertensive renal disease    Diabetic nephropathy associated with type 2 diabetes mellitus (HCC)    Chronic kidney disease-mineral and bone disorder    Bradycardia    Personal history of colonic polyps    Benign neoplasm of ascending colon    Benign neoplasm of transverse colon    Benign neoplasm of descending colon    End-stage renal disease on hemodialysis (HCC)    Moderate malnutrition (HCC)    Cardiac arrest (HCC)    Generalized weakness    Gait disturbance    Paroxysmal atrial fibrillation (HCC)    Heart block, AV    Uncontrolled type 2 diabetes mellitus with hyperglycemia ([facility] [address]    Simple chronic bronchitis (HCC)         Treatment:  Hemodilaysis 2:1  Priority: Routine  Location: Acute Room    Diabetic: Yes  NPO: No  Isolation Precautions: None     Consent for Treatment Verified: Yes  Blood Consent Verified: Not Applicable     Safety Verified: Identify (I), Consent (C), Equipment (E), HepB Status (B), Orders Complete (O), Access Verified (A), and Timeliness (T)  Time out performed prior to access at 1310 hours. Report Received from Primary RN at 1230 hours.   Primary RN (First
Reviewed discharge instructions with patient and . Answered all questions. Patient did have a concern that the bottom of dressing was not sealing. Attempted to call dialysis without answer. Called house supervisor and was instructed to reinforce bottom of dressing with fresh piece of tegaderm. Escorted out to [facility] and patient able to transfer independently into [facility]. Very appreciative for care and services provided.
Column 2   Little interest or pleasure in doing things   [x] 0. No  [] 1. Yes  [] 9. No Response [x] 0. Never or one day  [] 1.  2-6 days (several days)  [] 2.  7-11 days (half or more of days)  [] 3.  12-14 days (nearly every day)     Feeling down, depressed, or hopeless   [x] 0. No  [] 1. Yes  [] 9. No Response [x] 0. Never or one day  [] 1.  2-6 days (several days)  [] 2.  7-11 days (half or more of days)  [] 3.  12-14 days (nearly every day)   **If Question A or B in column 2 is coded 2 or 3, CONTINUE asking the questions below in box. If not, SKIP down to [facility]" question and continue**       Trouble falling or staying asleep, or sleeping too much   [] 0. No  [] 1. Yes  [] 9. No Response [] 0. Never or one day  [] 1.  2-6 days (several days)  [] 2.  7-11 days (half or more of days)  [] 3.  12-14 days (nearly every day   Feeling tired or having little energy   [] 0. No  [] 1. Yes  [] 9. No Response [] 0. Never or one day  [] 1.  2-6 days (several days)  [] 2.  7-11 days (half or more of days)  [] 3.  12-14 days (nearly every day   Poor appetite or overeating     [] 0. No  [] 1. Yes  [] 9. No Response [] 0. Never or one day  [] 1.  2-6 days (several days)  [] 2.  7-11 days (half or more of days)  [] 3.  12-14 days (nearly every day   Feeling bad about yourself - or that you are a failure or have let yourself or your family down   [] 0. No  [] 1. Yes  [] 9. No Response [] 0. Never or one day  [] 1.  2-6 days (several days)  [] 2.  7-11 days (half or more of days)  [] 3.  12-14 days (nearly every day   Trouble concentrating on things, such as reading the newspaper or watching television   [] 0. No  [] 1. Yes  [] 9. No Response [] 0. Never or one day  [] 1.  2-6 days (several days)  [] 2.  7-11 days (half or more of days)  [] 3.  12-14 days (nearly every day   Moving or speaking so slowly that other people could have noticed.   Or the opposite- being so fidgety or restless that
assistance  Comment: SBA with bed in high fowlers and use of rail, transferred from high fowlers due to BP issues, pt with good tolerance  CARE Score: 4  Discharge Goal: Independent    Transfers:    Sit to Stand  Assistance Needed: Supervision or touching assistance  Comment: CGA from bed, WC, cues for COG fwd, req increased effort with fatigue but no more than CGA  CARE Score: 4  Discharge Goal: Independent  Chair/Bed-to-Chair Transfer  Assistance Needed: Supervision or touching assistance  Comment: CGA using RW, able to step over with feet and follwo through with RW for turn to [facility]  CARE Score: 4  Discharge Goal: Independent     Car Transfer  Assistance Needed: Supervision or touching assistance  Comment: CGA using RW for SPT, self assisted LEs into car using pant leg with R UE, req R UE on RW supported by therapist for sit<>stand, reports uses a car cane at baseline; req extra time for task  Reason if not Attempted: Not attempted due to medical condition or safety concerns  CARE Score: 4  Discharge Goal: Supervision or touching assistance    Ambulation:    Walking Ability  Does the Patient Walk?: No     Walk 10 Feet  Assistance Needed: Supervision or touching assistance  Comment: CGA with RW with close WC follow. Pt able to amb to pre-measured distance, slight shaking in B LEs but no buckling.  BP after amb 111/55, pt asymptomatic  CARE Score: 4  Discharge Goal: Supervision or touching assistance     Walk 50 Feet with Two Turns  Reason if not Attempted: Not attempted due to medical condition or safety concerns  CARE Score: 88  Discharge Goal: Supervision or touching assistance     Walk 150 Feet  Reason if not Attempted: Not attempted due to medical condition or safety concerns  CARE Score: 88  Discharge Goal: Partial/moderate assistance     Walking 10 Feet on Uneven Surfaces  Assistance Needed: Supervision or touching assistance  Comment: CGA with RW and close WC follow, pt able to manage RW over obstacles after
performed to increase pt's strength, endurance, ax tolerance, and balance in prep for increased I c ADL/IADLs and functional transfers/mobility. Patient/Caregiver Education and Training:   []   [employer] Equipment Use  []   Bed Mobility/Transfer Technique/Safety  []   Energy Conservation Tips  []   Family training  []   Postural Awareness  []   Safety During Functional Activities  []   Reinforced Patient's Precautions   []   Progress was updated and reviewed in Rehabtracker with patient and/or family this         date. Treatment Plan for Next Session: Continue OT POC       Assessment: This pt demonstrated a positive response to today's treatment as evidenced by being able to participate in full session. The patient is making progress toward established goals as evidenced by QI scores. Ongoing deficits are observed in the areas of balance, functional transfers, activity tolerance for ADLs and continued focus on this is recommended.        Treatment/Activity Tolerance:   [x] Tolerated treatment with no adverse effects    [] Patient limited by fatigue  [] Patient limited by pain   [] Patient limited by medical complications:    [] Adverse reaction to Tx:   [] Significant change in status    Safety:       []  bed alarm set    [x]  chair alarm set    []  Pt refused alarms                []  Telesitter activated      [x]  Gait belt used during tx session      []other:       Number of Minutes/Billable Intervention  Therapeutic Exercise    ADL Self-care 60   Neuro Re-Ed    Therapeutic Activity    Group    Other:    TOTAL 60       Social History  Social/Functional History  Lives With: Spouse  Type of Home: House  Home Layout: Two level, Bed/Bath upstairs (full flight to 2nd floor c bilateral hand rails)  Home Access: Ramped entrance  Bathroom Shower/Tub: Tub/Shower unit, Shower chair with back  Bathroom Toilet: Handicap height (has been using [facility] frame over toilet)  Bathroom Equipment: Shower chair, 3-in-1
300 ml/min 470 ml/hr -70 mmHg 80 30 600 resting Yes   04/28/23 1330 300 ml/min 470 ml/hr -70 mmHg 80 30 600 gave warm blanket Yes   04/28/23 1345 300 ml/min 470 ml/hr -70 mmHg 80 20 600 resting Yes   04/28/23 1400 300 ml/min 470 ml/hr -90 mmHg 90 20 600 denies needs Yes   04/28/23 1415 300 ml/min 470 ml/hr -110 mmHg 80 20 600 no change Yes   04/28/23 1430 300 ml/min 470 ml/hr -80 mmHg 80 20 600 resting Yes   04/28/23 1445 300 ml/min 470 ml/hr -90 mmHg 70 10 600 resting Yes   04/28/23 1500 300 ml/min 470 ml/hr -70 mmHg 70 20 600 resting Yes   04/28/23 1515 300 ml/min 470 ml/hr -90 mmHg 60 20 600 resting Yes   04/28/23 1530 300 ml/min 470 ml/hr -80 mmHg 70 20 600 no complaints Yes   04/28/23 1545 300 ml/min 470 ml/hr -80 mmHg 70 20 600 no complaints Yes   04/28/23 1556 -- -- -- -- -- -- tx completed --     Vital Signs  Patient Vitals for the past 8 hrs:   BP Temp Pulse Resp SpO2   04/28/23 0832 116/68 97.8 °F (36.6 °C) 61 18 95 %   04/28/23 1247 110/61 -- -- -- --   04/28/23 1253 110/61 -- 60 -- --   04/28/23 1300 (!) 121/59 -- 59 -- --   04/28/23 1315 115/62 -- 61 -- --   04/28/23 1330 (!) 116/58 -- 60 -- --   04/28/23 1345 (!) 118/58 -- 59 -- --   04/28/23 1400 (!) 112/58 -- 59 -- --   04/28/23 1415 (!) 123/56 -- 61 -- --   04/28/23 1430 123/61 -- 59 -- --   04/28/23 1445 123/60 -- 60 -- --   04/28/23 1500 118/63 -- 60 -- --   04/28/23 1515 (!) 117/59 -- 59 -- --   04/28/23 1530 (!) 114/57 -- 59 -- --   04/28/23 1545 -- -- 61 -- --   04/28/23 1600 117/62 -- 60 -- --     Post-Dialysis  Arterial Catheter Locking Solution: Not Applicable  Venous Catheter Locking Solution: Not Applicable  Post-Treatment Procedures: Blood returned, Catheter Capped, clamped with Saline x2 ports  Machine Disinfection Process: Acid/Vinegar Clean, Heat Disinfect  Rinseback Volume (ml): 300 ml  Blood Volume Processed (Liters): 52.9 l/min  Dialyzer Clearance: Lightly streaked  Duration of Treatment (minutes): 180 minutes     Hemodialysis
Height   04/25/23 0915 113/61 -- --   04/25/23 1030 (!) 92/44 61 --   04/25/23 1319 (!) 123/57 61 --   04/25/23 1330 (!) 119/53 -- --   04/25/23 1345 (!) 119/53 -- --   04/25/23 1354 -- -- 5' 5\" (1.651 m)   04/25/23 1400 (!) 110/53 -- --   04/25/23 1415 (!) 102/43 -- --   04/25/23 1430 (!) 104/41 -- --   04/25/23 1445 (!) 97/47 -- --   04/25/23 1500 (!) 98/46 -- --   04/25/23 1515 101/65 -- --   04/25/23 1530 (!) 97/41 -- --   04/25/23 1545 (!) 100/41 -- --   04/25/23 1600 (!) 103/44 -- --   04/25/23 1615 97/64 -- --   04/25/23 1628 101/66 -- --     Post-Dialysis  Arterial Catheter Locking Solution: Not Applicable  Venous Catheter Locking Solution: Not Applicable  Post-Treatment Procedures: Blood returned, Catheter Capped, clamped with Saline x2 ports  Machine Disinfection Process: Acid/Vinegar Clean, Heat Disinfect, Exterior Machine Disinfection  Rinseback Volume (ml): 300 ml  Blood Volume Processed (Liters): 32.3 l/min  Dialyzer Clearance: Moderately streaked  Duration of Treatment (minutes): 180 minutes     Hemodialysis Intake (ml): 500 ml  Hemodialysis Output (ml): 500 ml     Tolerated Treatment: Good  Patient Response to Treatment: tolerated well. BP fluctuated some          Provider Notification        Handoff complete and report given to Primary RN at 1700 hours.   Primary RN (First Initial, Last Name, Title):  Osmany Schools          Electronically signed by Janet Nguyen RN on 4/25/2023 at 4:34 PM
Reacher  Has the patient had two or more falls in the past year or any fall with injury in the past year?: Yes (reports 4 falls in the past year, no serious injury)  Receives Help From: Family, Home health ([facility] therapy discontinued in march)  ADL Assistance: Independent ( sets up clothing sometimes)  Homemaking Responsibilities: No ( is primary performer)  Meal Prep Responsibility: Secondary (\"I've become the \")  Bill Paying/Finance Responsibility: No  Health Care Management: Primary (sets up pill box)  Ambulation Assistance: Independent (rollator on 1st floor of home, then cane upstairs to go to bathroom)  Transfer Assistance: Independent  Active : No ()  Mode of Transportation: [facility]  Additional Comments: Pt has been sleeping either in a hospital bed or her recliner on the 1st floor since an extensive SNF stay last year.     Objective                                                                                    Goals:  (Update in navigator)  Short Term Goals  Time Frame for Short Term Goals: STGs=LTGs:  Long Term Goals  Time Frame for Long Term Goals : 10 days or until d/c  Long Term Goal 1: Pt will complete grooming tasks Ind  Long Term Goal 2: Pt will complete total body bathing mod I  Long Term Goal 3: Pt will complete UB dressing Ind  Long Term Goal 4: Pt will complete LB dressing mod I using AE PRN  Long Term Goal 5: Pt will doff/don footwear mod I using AE PRN  Additional Goals?: Yes  Long Term Goal 6: Pt will complete toileting mod I  Long Term Goal 7: Pt will complete functional transfers (bed, chair, toilet, shower) c DME PRN and mod I  Long Term Goal 8: Pt will perform therex/therax to facilitate increased strength/endurance/ax tolerance (c emphasis on dynamic standing balance/tolerance >8 mins) c SBA  Long Term Goal 9: Pt will complete simple homemaking tasks c DME PRN and mod I:        Plan of Care
Score: 88  Discharge Goal: Supervision or touching assistance     4 Steps  Reason if not Attempted: Not attempted due to medical condition or safety concerns  CARE Score: 88  Discharge Goal: Supervision or touching assistance     12 Steps  Reason if not Attempted: Not attempted due to medical condition or safety concerns  CARE Score: 88  Discharge Goal: Partial/moderate assistance       Wheelchair:  w/c Ability: Wheelchair Ability  Uses a Wheelchair and/or Scooter?: Yes  Wheel 50 Feet with Two Turns  Assistance Needed: Partial/moderate assistance  Comment: 30' max effort due to fatigue in BLE, unable to use LUE to assist due to precautions  CARE Score: 3  Discharge Goal: Independent  Wheel 150 Feet  Assistance Needed: Substantial/maximal assistance  Comment: subsequent trials <20'  CARE Score: 2  Discharge Goal: Partial/moderate assistance          Balance:        Object: Picking Up Object  Comment: unable to tolerate standing to complete due to low BP  Reason if not Attempted: Not attempted due to medical condition or safety concerns  CARE Score: 88  Discharge Goal: Independent    I      Exam:    Blood pressure (!) 104/47, pulse 64, temperature 98 °F (36.7 °C), temperature source Oral, resp. rate 18, height 5' 5\" (1.651 m), weight 175 lb 4.3 oz (79.5 kg), SpO2 97 %. General: Observed sitting up in a wheelchair in therapy. Talkative. Easily distracted. Poor attention and reasoning. HEENT: Gazing right and left. Neck supple. No JVD. Pulmonary: Symmetric air exchange. Guarded respirations with her thoracic pain. Cardiac: Tender pacer site. Tenderness about the dialysis port. Occasional premature beat. Rate controlled. Abdomen: Patient's abdomen is soft and nondistended. Bowel sounds were present throughout. There was no rebound, guarding or masses noted. Upper extremities: Cautious movements of her upper limbs with the chest soreness. Clumsy . No reflexes.     Lower
become the \")  Bill Paying/Finance Responsibility: No  Health Care Management: Primary (sets up pill box)  Ambulation Assistance: Independent (rollator on 1st floor of home, then cane upstairs to go to bathroom)  Transfer Assistance: Independent  Active : No ()  Mode of Transportation: [facility]  Additional Comments: Pt has been sleeping either in a hospital bed or her recliner on the 1st floor since an extensive SNF stay last year. Objective                                                                                    Goals:  (Update in navigator)  Short Term Goals  Time Frame for Short Term Goals: STGs=LTGs:  Long Term Goals  Time Frame for Long Term Goals : 10 days or until d/c  Long Term Goal 1: Pt will complete grooming tasks Ind  Long Term Goal 2: Pt will complete total body bathing mod I  Long Term Goal 3: Pt will complete UB dressing Ind  Long Term Goal 4: Pt will complete LB dressing mod I using AE PRN  Long Term Goal 5: Pt will doff/don footwear mod I using AE PRN  Additional Goals?: Yes  Long Term Goal 6: Pt will complete toileting mod I  Long Term Goal 7: Pt will complete functional transfers (bed, chair, toilet, shower) c DME PRN and mod I  Long Term Goal 8: Pt will perform therex/therax to facilitate increased strength/endurance/ax tolerance (c emphasis on dynamic standing balance/tolerance >8 mins) c SBA  Long Term Goal 9: Pt will complete simple homemaking tasks c DME PRN and mod I:        Plan of Care                                                                              Times per week: 5 days per week for a minimum of 60 minutes/day plus group as appropriate for 60 minutes.   Treatment to include Occupational Therapy Plan  Current Treatment Recommendations: Strengthening, Balance training, Functional mobility training, Endurance training, Safety education & training, Patient/Caregiver education & training, Equipment evaluation, education, & procurement, Self-Care / ADL,
bed, Wheelchair-manual, Grab bars, Cane, Reacher  Has the patient had two or more falls in the past year or any fall with injury in the past year?: Yes (reports 4 falls in the past year, no serious injury)  Receives Help From: Family, Home health ([facility] therapy discontinued in march)  ADL Assistance: Independent ( sets up clothing sometimes)  Homemaking Responsibilities: No ( is primary performer)  Meal Prep Responsibility: Secondary (\"I've become the \")  Bill Paying/Finance Responsibility: No  Health Care Management: Primary (sets up pill box)  Ambulation Assistance: Independent (rollator on 1st floor of home, then cane upstairs to go to bathroom)  Transfer Assistance: Independent  Active : No ()  Mode of Transportation: [employer]  Additional Comments: Pt has been sleeping either in a hospital bed or her recliner on the 1st floor since an extensive SNF stay last year.     Objective                                                                                    Goals:  (Update in navigator)  Short Term Goals  Time Frame for Short Term Goals: STGs=LTGs:  Long Term Goals  Time Frame for Long Term Goals : 10 days or until d/c  Long Term Goal 1: Pt will complete grooming tasks Ind  Long Term Goal 2: Pt will complete total body bathing mod I  Long Term Goal 3: Pt will complete UB dressing Ind  Long Term Goal 4: Pt will complete LB dressing mod I using AE PRN  Long Term Goal 5: Pt will doff/don footwear mod I using AE PRN  Additional Goals?: Yes  Long Term Goal 6: Pt will complete toileting mod I  Long Term Goal 7: Pt will complete functional transfers (bed, chair, toilet, shower) c DME PRN and mod I  Long Term Goal 8: Pt will perform therex/therax to facilitate increased strength/endurance/ax tolerance (c emphasis on dynamic standing balance/tolerance >8 mins) c SBA  Long Term Goal 9: Pt will complete simple homemaking tasks c DME PRN and mod I:        Plan of Care
extensive SNF stay last year. Objective                                                                                    Goals:  (Update in navigator)  Short Term Goals  Time Frame for Short Term Goals: STGs=LTGs:  Long Term Goals  Time Frame for Long Term Goals : 10 days or until d/c  Long Term Goal 1: Pt will complete grooming tasks Ind  Long Term Goal 2: Pt will complete total body bathing mod I  Long Term Goal 3: Pt will complete UB dressing Ind  Long Term Goal 4: Pt will complete LB dressing mod I using AE PRN  Long Term Goal 5: Pt will doff/don footwear mod I using AE PRN  Additional Goals?: Yes  Long Term Goal 6: Pt will complete toileting mod I  Long Term Goal 7: Pt will complete functional transfers (bed, chair, toilet, shower) c DME PRN and mod I  Long Term Goal 8: Pt will perform therex/therax to facilitate increased strength/endurance/ax tolerance (c emphasis on dynamic standing balance/tolerance >8 mins) c SBA  Long Term Goal 9: Pt will complete simple homemaking tasks c DME PRN and mod I:        Plan of Care                                                                              Times per week: 5 days per week for a minimum of 60 minutes/day plus group as appropriate for 60 minutes. Treatment to include Occupational Therapy Plan  Current Treatment Recommendations: Strengthening, Balance training, Functional mobility training, Endurance training, Safety education & training, Patient/Caregiver education & training, Equipment evaluation, education, & procurement, Self-Care / ADL, Home management training    Electronically signed by   Lurdes Rose MS, OTR/L  License #[id number]  5/4/2023, 10:56 AM
rebound, guarding or masses noted. Upper extremities: Cautiously brings both hands up to meet mine. Symmetric  strength without tremor. No new bruising. Lower extremities: Calves soft. Trace edema. No signs of DVT. Sitting balance was fair+. Standing balance was fair-.    Lab Results   Component Value Date    WBC 5.5 04/24/2023    HGB 9.5 (L) 04/24/2023    HCT 31.0 (L) 04/24/2023    MCV 99.4 04/24/2023     (L) 04/24/2023     Lab Results   Component Value Date    INR 1.08 04/19/2023    INR 1.51 10/28/2022    INR 0.93 01/30/2020    PROTIME 13.7 04/19/2023    PROTIME 19.6 (H) 10/28/2022    PROTIME 11.2 (L) 01/30/2020     Lab Results   Component Value Date    CREATININE 4.1 (H) 04/24/2023    BUN 33 (H) 04/24/2023     04/24/2023    K 3.7 04/24/2023    CL 99 04/24/2023    CO2 27 04/24/2023     Lab Results   Component Value Date    ALT 21 04/14/2023    AST 34 04/14/2023    ALKPHOS 105 04/14/2023    BILITOT 0.4 04/14/2023       Expected length of stay  prior to a supervised level of function for discharge home with a walker and [address] OT/PT is 5/6/2023. Recommendations:    Cardiac arrest with generalized weakness/gait disturbance: She continues to fatigue quickly during her activities of the daily occupational and physical therapy. Speech-language pathology has signed off. With her impaired attention, strength and pain, she is at risk for fall with injury during weightbearing activities of self-care and mobility. Ongoing instruction on adaptive equipment use with gait retraining and overall strengthening exercises. Sitting from aggressive pulmonary hygiene measures, DVT prophylaxis and cautious pain management. Nephrology assisting with her renal dysfunction management. Expecting caregiver education will need to be emphasized. Outpatient follow-up with cardiology and her PCP as planned. Verbal cues and CGA-minimum physical assistance for transfers again today.   DVT prophylaxis: The
transfers (bed, chair, toilet, shower) c DME PRN and mod I  Long Term Goal 8: Pt will perform therex/therax to facilitate increased strength/endurance/ax tolerance (c emphasis on dynamic standing balance/tolerance >8 mins) c SBA  Long Term Goal 9: Pt will complete simple homemaking tasks c DME PRN and mod I:        Plan of Care                                                                              Times per week: 5 days per week for a minimum of 60 minutes/day plus group as appropriate for 60 minutes.   Treatment to include Occupational Therapy Plan  Current Treatment Recommendations: Strengthening, Balance training, Functional mobility training, Endurance training, Safety education & training, Patient/Caregiver education & training, Equipment evaluation, education, & procurement, Self-Care / ADL, Home management training    Electronically signed by   ELIO Brewer,  4/29/2023, 9:59 AM
with activity. Cautious diuretic use. Bronchodilators and aggressive pulmonary hygiene measures.

## 2023-05-09 ENCOUNTER — NURSE ONLY (OUTPATIENT)
Dept: CARDIOLOGY CLINIC | Age: 73
End: 2023-05-09

## 2023-05-09 ENCOUNTER — TELEPHONE (OUTPATIENT)
Dept: CARDIOLOGY CLINIC | Age: 73
End: 2023-05-09

## 2023-05-09 VITALS — TEMPERATURE: 97.5 F

## 2023-05-09 DIAGNOSIS — Z95.0 STATUS POST PLACEMENT OF CARDIAC PACEMAKER: Primary | ICD-10-CM

## 2023-05-09 PROCEDURE — 99024 POSTOP FOLLOW-UP VISIT: CPT | Performed by: INTERNAL MEDICINE

## 2023-05-09 NOTE — PROGRESS NOTES
Patient seen for site check post pacer implant Dressing removed. So sign or symptoms of infection or swelling noted. Edges well approximated. Patient has no complaints. Single steri strip applied. Patient instructed not to lift arm higher than shoulder level till after 30 days post op. Patent verbally voiced understanding.

## 2023-05-09 NOTE — TELEPHONE ENCOUNTER
Per Nubia Keen, called patient and requested a carelink transmission to be submitted for review. Patient voiced understanding and will send over.

## 2023-05-11 ENCOUNTER — TELEPHONE (OUTPATIENT)
Dept: CARDIOLOGY CLINIC | Age: 73
End: 2023-05-11

## 2023-05-22 ENCOUNTER — PROCEDURE VISIT (OUTPATIENT)
Dept: CARDIOLOGY CLINIC | Age: 73
End: 2023-05-22

## 2023-05-22 DIAGNOSIS — Z95.0 CARDIAC PACEMAKER IN SITU: Primary | ICD-10-CM

## 2023-05-24 ENCOUNTER — OFFICE VISIT (OUTPATIENT)
Dept: CARDIOLOGY CLINIC | Age: 73
End: 2023-05-24
Payer: MEDICARE

## 2023-05-24 VITALS
HEART RATE: 76 BPM | SYSTOLIC BLOOD PRESSURE: 130 MMHG | BODY MASS INDEX: 31.65 KG/M2 | HEIGHT: 65 IN | DIASTOLIC BLOOD PRESSURE: 68 MMHG | WEIGHT: 190 LBS

## 2023-05-24 DIAGNOSIS — I48.0 PAF (PAROXYSMAL ATRIAL FIBRILLATION) (HCC): ICD-10-CM

## 2023-05-24 DIAGNOSIS — Z95.0 CARDIAC PACEMAKER IN SITU: Primary | ICD-10-CM

## 2023-05-24 PROCEDURE — 93000 ELECTROCARDIOGRAM COMPLETE: CPT | Performed by: NURSE PRACTITIONER

## 2023-05-24 PROCEDURE — 99024 POSTOP FOLLOW-UP VISIT: CPT | Performed by: NURSE PRACTITIONER

## 2023-05-24 NOTE — PROGRESS NOTES
Patient is here today for follow-up on implantation of dual-chamber pacemaker  Patient reports that she is been feeling well. She states that she has started transitioning from hemodialysis to peritoneal dialysis. She states she is tolerating this well. She denies chest pain, palpitations, shortness of breath, lightheadedness, dizziness, edema or syncope. Left upper chest that is healed well. No redness no swelling no hematoma. Incision site is well approximated. Vitals:    05/24/23 1233   BP: 130/68   Pulse: 76   Weight: 190 lb (86.2 kg)   Height: 5' 5\" (1.651 m)      Device Assessment:    The device is Medtronic pacemaker - Dual Chamber chamber      MRI Compatible : yes    Device interrogation was performed. Mode: DDD     Sensing is normal. Impedence is normal.  Threshold is normal.     There has not been interval changes. Estimated battery life is 11.0 years     The underlying rhythm is AP,.  21.2 % atrial paced; 97.4 % ventricular paced. Atrial Arrhythmia : 34.3% burden. On eliquis    Non sustained VT episodes : No    Sustained VT episodes : No    Patient activity reported 0.7 hrs/day    The patient is pacemaker dependent.       PAF episodes noted on pacemaker interrogation  Patient is on Eliquis  We will start metoprolol tartrate 25 mg twice daily  Patient is asymptomatic at this time  Patient to follow-up in 2 months or sooner if needed

## 2023-06-05 ENCOUNTER — OFFICE VISIT (OUTPATIENT)
Dept: CARDIOLOGY CLINIC | Age: 73
End: 2023-06-05
Payer: MEDICARE

## 2023-06-05 VITALS
HEIGHT: 65 IN | HEART RATE: 68 BPM | SYSTOLIC BLOOD PRESSURE: 112 MMHG | BODY MASS INDEX: 30.92 KG/M2 | DIASTOLIC BLOOD PRESSURE: 56 MMHG | WEIGHT: 185.6 LBS

## 2023-06-05 DIAGNOSIS — I48.0 PAROXYSMAL ATRIAL FIBRILLATION (HCC): ICD-10-CM

## 2023-06-05 DIAGNOSIS — I46.9 CARDIAC ARREST (HCC): ICD-10-CM

## 2023-06-05 DIAGNOSIS — I10 ESSENTIAL HYPERTENSION: Primary | ICD-10-CM

## 2023-06-05 DIAGNOSIS — E78.2 MIXED HYPERLIPIDEMIA: ICD-10-CM

## 2023-06-05 DIAGNOSIS — I44.30 HEART BLOCK, AV: ICD-10-CM

## 2023-06-05 DIAGNOSIS — Z99.89 OSA ON CPAP: ICD-10-CM

## 2023-06-05 DIAGNOSIS — G47.33 OSA ON CPAP: ICD-10-CM

## 2023-06-05 DIAGNOSIS — E11.22 TYPE 2 DIABETES MELLITUS WITH DIABETIC CHRONIC KIDNEY DISEASE, UNSPECIFIED CKD STAGE, UNSPECIFIED WHETHER LONG TERM INSULIN USE (HCC): ICD-10-CM

## 2023-06-05 PROCEDURE — G8399 PT W/DXA RESULTS DOCUMENT: HCPCS | Performed by: INTERNAL MEDICINE

## 2023-06-05 PROCEDURE — 1123F ACP DISCUSS/DSCN MKR DOCD: CPT | Performed by: INTERNAL MEDICINE

## 2023-06-05 PROCEDURE — 3044F HG A1C LEVEL LT 7.0%: CPT | Performed by: INTERNAL MEDICINE

## 2023-06-05 PROCEDURE — 3017F COLORECTAL CA SCREEN DOC REV: CPT | Performed by: INTERNAL MEDICINE

## 2023-06-05 PROCEDURE — 1111F DSCHRG MED/CURRENT MED MERGE: CPT | Performed by: INTERNAL MEDICINE

## 2023-06-05 PROCEDURE — 99213 OFFICE O/P EST LOW 20 MIN: CPT | Performed by: INTERNAL MEDICINE

## 2023-06-05 PROCEDURE — G8417 CALC BMI ABV UP PARAM F/U: HCPCS | Performed by: INTERNAL MEDICINE

## 2023-06-05 PROCEDURE — 3074F SYST BP LT 130 MM HG: CPT | Performed by: INTERNAL MEDICINE

## 2023-06-05 PROCEDURE — 1090F PRES/ABSN URINE INCON ASSESS: CPT | Performed by: INTERNAL MEDICINE

## 2023-06-05 PROCEDURE — 3078F DIAST BP <80 MM HG: CPT | Performed by: INTERNAL MEDICINE

## 2023-06-05 PROCEDURE — 1036F TOBACCO NON-USER: CPT | Performed by: INTERNAL MEDICINE

## 2023-06-05 PROCEDURE — G8427 DOCREV CUR MEDS BY ELIG CLIN: HCPCS | Performed by: INTERNAL MEDICINE

## 2023-06-05 PROCEDURE — 2022F DILAT RTA XM EVC RTNOPTHY: CPT | Performed by: INTERNAL MEDICINE

## 2023-06-05 NOTE — PROGRESS NOTES
mouth daily      loratadine (CLARITIN) 10 MG tablet Take 1 tablet by mouth daily      FLUoxetine (PROZAC) 20 MG capsule Take 1 capsule by mouth 2 times daily      metoprolol tartrate (LOPRESSOR) 25 MG tablet Take 1 tablet by mouth 2 times daily (Patient not taking: Reported on 6/5/2023) 180 tablet 0    pantoprazole (PROTONIX) 40 MG tablet Take 1 tablet by mouth every morning (before breakfast) (Patient not taking: Reported on 6/5/2023) 30 tablet 0     No current facility-administered medications for this visit. Allergies: Empagliflozin and Lorazepam  Review of Systems:    Constitutional: Negative for diaphoresis and fatigue  Respiratory: Negative for shortness of breath  Cardiovascular: Negative for chest pain, dyspnea on exertion, claudication, edema, irregular heartbeat, murmur, palpitations or shortness of breath  Musculoskeletal: Negative for muscle pain, muscular weakness, negative for pain in arm and leg or swelling in foot and leg    Objective:  BP (!) 112/56 (Site: Left Upper Arm, Position: Sitting, Cuff Size: Medium Adult)   Pulse 68   Ht 5' 5\" (1.651 m)   Wt 185 lb 9.6 oz (84.2 kg)   BMI 30.89 kg/m²   Wt Readings from Last 3 Encounters:   06/05/23 185 lb 9.6 oz (84.2 kg)   05/24/23 190 lb (86.2 kg)   05/06/23 175 lb 14.8 oz (79.8 kg)     Body mass index is 30.89 kg/m². GENERAL - Alert, oriented, pleasant, in no apparent distress. EYES: No jaundice, no conjunctival pallor. Neck - Supple. No jugular venous distention noted. No carotid bruits. Cardiovascular - Normal S1 and S2 without obvious murmur or gallop. Extremities - No cyanosis, clubbing, or significant edema. Pulmonary - No respiratory distress. No wheezes or rales.       MEDICAL DECISION MAKING & DATA REVIEW:    Lab Review   Lab Results   Component Value Date/Time    TROPONINT 0.072 04/17/2023 06:14 AM    TROPONINT 0.067 04/17/2023 01:15 AM     Lab Results   Component Value Date/Time    PROBNP 28,155 04/14/2023 09:06 AM     Lab

## 2023-06-05 NOTE — PATIENT INSTRUCTIONS
CORONARY ARTERY DISEASE::None known  CATH 12/2019   No significant CAD. Left dominant system & Left side arteries   are all normal. RCA is non-dominant has minimal disease. Normal LV systolic function. LVEF is 50 to 55 %. HTN:Yes  well controlled on current medical regimen, see list above. - changes in  treatment:   no, not on any medications at this time. VHD: No significant VHD noted      ECHO 5/2022  1. Left ventricle: The cavity size was mildly to moderately      dilated. There was mild concentric hypertrophy. Systolic      function was normal. The calculated Ejection Fraction is 58%. Wall motion was normal; there were no regional wall motion      abnormalities. Doppler parameters are consistent with abnormal      left ventricular relaxation (grade 1 diastolic dysfunction). Internal dimension, ED (PLAX chordal): 6.2cm. Global      longitudinal strain rate of 17.70%. The longitudal strain study      is normal. The longitudal strain study is borderline abnormal.   2. Aortic valve: There was very mild stenosis. Peak velocity (S):      2.15m/sec. Mean gradient (S): 10mm Hg. VTI ratio of LVOT to      aortic valve: 0.57. Valve area (VTI): 1.9cm^2. 3. Mitral valve: There was mild regurgitation. Mean gradient (D):      3mm Hg. Valve area by pressure half-time: 2.3cm^2. Valve area by      continuity equation (using LVOT flow): 2.8cm^2. 4. Left atrium: The atrium was moderately to markedly dilated. 5. Right ventricle: The cavity size was normal. Wall thickness was      normal. Systolic function was normal.   6. Right atrium: The atrium was dilated. 7. Pulmonary arteries: Estimated PA peak pressure is 40mm Hg (S). 8. Pericardium, extracardiac: There was no pericardial effusion. DYSLIPIDEMIA: yes,   Patient's profile is at / near [place] [employer] [place] current medical regimen wellyes, takes Lipitor. See most recent Lab values in Labs section above.   Diabetes mellitis:yes,   BS under good
No

## 2023-06-20 ENCOUNTER — HOSPITAL ENCOUNTER (OUTPATIENT)
Dept: GENERAL RADIOLOGY | Age: 73
Discharge: HOME | End: 2023-06-20
Payer: MEDICARE

## 2023-06-20 ENCOUNTER — TELEPHONE (OUTPATIENT)
Dept: SURGERY | Age: 73
End: 2023-06-20

## 2023-06-20 ENCOUNTER — HOSPITAL ENCOUNTER (OUTPATIENT)
Age: 73
Discharge: HOME | End: 2023-06-20
Payer: MEDICARE

## 2023-06-20 DIAGNOSIS — R06.00 DYSPNEA, UNSPECIFIED TYPE: ICD-10-CM

## 2023-06-20 PROCEDURE — 71046 X-RAY EXAM CHEST 2 VIEWS: CPT

## 2023-06-21 NOTE — RESULT ENCOUNTER NOTE
TELL PT:  - THE CHEST XRAY FROM YESTERDAY SHOWS SOME FLUID ON THE OUTSIDE OF THE RIGHT LUNG//CAN NOT RULE OUT AN PNEUMONIA  - I RECOMMEND WE TREAT AS PNEUMONIA WITH AN ANTIBIOTIC CALLED DOXYCYCLINE AND IF YOU DO NOT IMPROVE THEN THE FLUID WILL HAVE TO BE DRAINED WITH A NEEDLE,  WHICH IS A PROCEDURE THAT WILL HAVE TO BE DONE BY INTERVENTIONAL RADIOLOGY IN [facility] BUT IT WOULD BE AN OUTPT PROCEDURE  -I NEED TO KNOW WHAT PHARMACY TO SEND THE ANTIBIOTIC TOO  [address]

## 2023-06-23 ENCOUNTER — TELEPHONE (OUTPATIENT)
Dept: CARDIOLOGY CLINIC | Age: 73
End: 2023-06-23

## 2023-06-28 ENCOUNTER — OFFICE VISIT (OUTPATIENT)
Dept: SURGERY | Age: 73
End: 2023-06-28

## 2023-06-28 VITALS
DIASTOLIC BLOOD PRESSURE: 66 MMHG | BODY MASS INDEX: 31.97 KG/M2 | HEIGHT: 65 IN | HEART RATE: 64 BPM | WEIGHT: 191.9 LBS | OXYGEN SATURATION: 98 % | SYSTOLIC BLOOD PRESSURE: 124 MMHG

## 2023-06-28 DIAGNOSIS — K42.9 UMBILICAL HERNIA WITHOUT OBSTRUCTION AND WITHOUT GANGRENE: Primary | ICD-10-CM

## 2023-06-28 PROCEDURE — 99024 POSTOP FOLLOW-UP VISIT: CPT | Performed by: SURGERY

## 2023-06-30 ENCOUNTER — APPOINTMENT (OUTPATIENT)
Dept: GENERAL RADIOLOGY | Age: 73
DRG: 291 | End: 2023-06-30
Payer: MEDICARE

## 2023-06-30 ENCOUNTER — HOSPITAL ENCOUNTER (INPATIENT)
Age: 73
LOS: 4 days | Discharge: HOME | DRG: 291 | End: 2023-07-04
Attending: STUDENT IN AN ORGANIZED HEALTH CARE EDUCATION/TRAINING PROGRAM | Admitting: STUDENT IN AN ORGANIZED HEALTH CARE EDUCATION/TRAINING PROGRAM
Payer: MEDICARE

## 2023-06-30 DIAGNOSIS — R06.00 DYSPNEA, UNSPECIFIED TYPE: Primary | ICD-10-CM

## 2023-06-30 DIAGNOSIS — N18.6 ESRD (END STAGE RENAL DISEASE) (HCC): ICD-10-CM

## 2023-06-30 DIAGNOSIS — E87.6 HYPOKALEMIA: ICD-10-CM

## 2023-06-30 DIAGNOSIS — J90 PLEURAL EFFUSION: ICD-10-CM

## 2023-06-30 LAB
ALBUMIN SERPL-MCNC: 3.1 GM/DL (ref 3.4–5)
ALP BLD-CCNC: 115 IU/L (ref 40–129)
ALT SERPL-CCNC: 35 U/L (ref 10–40)
ANION GAP SERPL CALCULATED.3IONS-SCNC: 14 MMOL/L (ref 4–16)
ANION GAP SERPL CALCULATED.3IONS-SCNC: 15 MMOL/L (ref 4–16)
AST SERPL-CCNC: 48 IU/L (ref 15–37)
B PARAP IS1001 DNA NPH QL NAA+NON-PROBE: NOT DETECTED
B PERT.PT PRMT NPH QL NAA+NON-PROBE: NOT DETECTED
BASE EXCESS MIXED: 2.8 (ref 0–2.3)
BASE EXCESS MIXED: 3.2 (ref 0–2.3)
BASOPHILS ABSOLUTE: 0 K/CU MM
BASOPHILS RELATIVE PERCENT: 0.7 % (ref 0–1)
BILIRUB SERPL-MCNC: 0.2 MG/DL (ref 0–1)
BUN SERPL-MCNC: 53 MG/DL (ref 6–23)
BUN SERPL-MCNC: 59 MG/DL (ref 6–23)
C PNEUM DNA NPH QL NAA+NON-PROBE: NOT DETECTED
CALCIUM SERPL-MCNC: 8 MG/DL (ref 8.3–10.6)
CALCIUM SERPL-MCNC: 8.7 MG/DL (ref 8.3–10.6)
CHLORIDE BLD-SCNC: 96 MMOL/L (ref 99–110)
CHLORIDE BLD-SCNC: 97 MMOL/L (ref 99–110)
CHP ED QC CHECK: NORMAL
CHP ED QC CHECK: NORMAL
CO2: 24 MMOL/L (ref 21–32)
CO2: 24 MMOL/L (ref 21–32)
COMMENT: ABNORMAL
COMMENT: ABNORMAL
CREAT SERPL-MCNC: 5 MG/DL (ref 0.6–1.1)
CREAT SERPL-MCNC: 5.1 MG/DL (ref 0.6–1.1)
DIFFERENTIAL TYPE: ABNORMAL
EKG ATRIAL RATE: 74 BPM
EKG DIAGNOSIS: NORMAL
EKG P AXIS: 0 DEGREES
EKG P-R INTERVAL: 134 MS
EKG Q-T INTERVAL: 412 MS
EKG QRS DURATION: 98 MS
EKG QTC CALCULATION (BAZETT): 457 MS
EKG R AXIS: 85 DEGREES
EKG T AXIS: -40 DEGREES
EKG VENTRICULAR RATE: 74 BPM
EOSINOPHILS ABSOLUTE: 0.3 K/CU MM
EOSINOPHILS RELATIVE PERCENT: 5.1 % (ref 0–3)
FLUAV H1 2009 PAN RNA NPH NAA+NON-PROBE: NOT DETECTED
FLUAV H1 RNA NPH QL NAA+NON-PROBE: NOT DETECTED
FLUAV H3 RNA NPH QL NAA+NON-PROBE: NOT DETECTED
FLUAV RNA NPH QL NAA+NON-PROBE: NOT DETECTED
FLUBV RNA NPH QL NAA+NON-PROBE: NOT DETECTED
FLUID TYPE: NORMAL INDEX
GFR SERPL CREATININE-BSD FRML MDRD: 8 ML/MIN/1.73M2
GFR SERPL CREATININE-BSD FRML MDRD: 9 ML/MIN/1.73M2
GLUCOSE BLD-MCNC: 108 MG/DL (ref 70–99)
GLUCOSE BLD-MCNC: 92 MG/DL
GLUCOSE BLD-MCNC: 92 MG/DL
GLUCOSE BLD-MCNC: 92 MG/DL (ref 70–99)
GLUCOSE SERPL-MCNC: 142 MG/DL (ref 70–99)
GLUCOSE SERPL-MCNC: 172 MG/DL (ref 70–99)
HADV DNA NPH QL NAA+NON-PROBE: NOT DETECTED
HCO3 VENOUS: 28.5 MMOL/L (ref 19–25)
HCO3 VENOUS: 31.5 MMOL/L (ref 19–25)
HCOV 229E RNA NPH QL NAA+NON-PROBE: NOT DETECTED
HCOV HKU1 RNA NPH QL NAA+NON-PROBE: NOT DETECTED
HCOV NL63 RNA NPH QL NAA+NON-PROBE: NOT DETECTED
HCOV OC43 RNA NPH QL NAA+NON-PROBE: NOT DETECTED
HCT VFR BLD CALC: 34.1 % (ref 37–47)
HEMOGLOBIN: 10.3 GM/DL (ref 12.5–16)
HMPV RNA NPH QL NAA+NON-PROBE: NOT DETECTED
HPIV1 RNA NPH QL NAA+NON-PROBE: NOT DETECTED
HPIV2 RNA NPH QL NAA+NON-PROBE: NOT DETECTED
HPIV3 RNA NPH QL NAA+NON-PROBE: NOT DETECTED
HPIV4 RNA NPH QL NAA+NON-PROBE: NOT DETECTED
IMMATURE NEUTROPHIL %: 0 % (ref 0–0.43)
LYMPHOCYTES ABSOLUTE: 2.1 K/CU MM
LYMPHOCYTES RELATIVE PERCENT: 36.3 % (ref 24–44)
LYMPHOCYTES, BODY FLUID: 15 %
M PNEUMO DNA NPH QL NAA+NON-PROBE: NOT DETECTED
MCH RBC QN AUTO: 30.3 PG (ref 27–31)
MCHC RBC AUTO-ENTMCNC: 30.2 % (ref 32–36)
MCV RBC AUTO: 100.3 FL (ref 78–100)
MESOTHELIAL FLUID: 0 /100 WBC
MONOCYTE, FLUID: 80 %
MONOCYTES ABSOLUTE: 0.6 K/CU MM
MONOCYTES RELATIVE PERCENT: 9.5 % (ref 0–4)
NEUTROPHIL, FLUID: 3 %
NUCLEATED RBC %: 0 %
O2 SAT, VEN: 86.2 % (ref 50–70)
O2 SAT, VEN: 93.6 % (ref 50–70)
OTHER CELLS FLUID: 2
PCO2, VEN: 47 MMHG (ref 38–52)
PCO2, VEN: 64 MMHG (ref 38–52)
PDW BLD-RTO: 15.7 % (ref 11.7–14.9)
PH VENOUS: 7.3 (ref 7.32–7.42)
PH VENOUS: 7.39 (ref 7.32–7.42)
PLATELET # BLD: 120 K/CU MM (ref 140–440)
PMV BLD AUTO: 9.8 FL (ref 7.5–11.1)
PO2, VEN: 59 MMHG (ref 28–48)
PO2, VEN: 92 MMHG (ref 28–48)
POTASSIUM SERPL-SCNC: 2.7 MMOL/L (ref 3.5–5.1)
POTASSIUM SERPL-SCNC: 3.6 MMOL/L (ref 3.5–5.1)
PRO-BNP: 7608 PG/ML
PROCALCITONIN SERPL-MCNC: 0.25 NG/ML
RBC # BLD: 3.4 M/CU MM (ref 4.2–5.4)
RBC FLUID: 130 /CU MM
RSV RNA NPH QL NAA+NON-PROBE: NOT DETECTED
RV+EV RNA NPH QL NAA+NON-PROBE: NOT DETECTED
SARS-COV-2 RNA NPH QL NAA+NON-PROBE: NOT DETECTED
SEGMENTED NEUTROPHILS ABSOLUTE COUNT: 2.8 K/CU MM
SEGMENTED NEUTROPHILS RELATIVE PERCENT: 48.4 % (ref 36–66)
SODIUM BLD-SCNC: 135 MMOL/L (ref 135–145)
SODIUM BLD-SCNC: 135 MMOL/L (ref 135–145)
TOTAL IMMATURE NEUTOROPHIL: 0 K/CU MM
TOTAL NUCLEATED RBC: 0 K/CU MM
TOTAL PROTEIN: 6.2 GM/DL (ref 6.4–8.2)
TROPONIN T: 0.05 NG/ML
TROPONIN T: 0.05 NG/ML
TSH SERPL DL<=0.005 MIU/L-ACNC: 0.26 UIU/ML (ref 0.27–4.2)
WBC # BLD: 5.9 K/CU MM (ref 4–10.5)
WBC FLUID: 190 /CU MM

## 2023-06-30 PROCEDURE — 94761 N-INVAS EAR/PLS OXIMETRY MLT: CPT

## 2023-06-30 PROCEDURE — 84157 ASSAY OF PROTEIN OTHER: CPT

## 2023-06-30 PROCEDURE — 82962 GLUCOSE BLOOD TEST: CPT

## 2023-06-30 PROCEDURE — 2060000000 HC ICU INTERMEDIATE R&B

## 2023-06-30 PROCEDURE — 6370000000 HC RX 637 (ALT 250 FOR IP): Performed by: NURSE PRACTITIONER

## 2023-06-30 PROCEDURE — 89051 BODY FLUID CELL COUNT: CPT

## 2023-06-30 PROCEDURE — 84443 ASSAY THYROID STIM HORMONE: CPT

## 2023-06-30 PROCEDURE — 87205 SMEAR GRAM STAIN: CPT

## 2023-06-30 PROCEDURE — 76937 US GUIDE VASCULAR ACCESS: CPT

## 2023-06-30 PROCEDURE — 87070 CULTURE OTHR SPECIMN AEROBIC: CPT

## 2023-06-30 PROCEDURE — 6370000000 HC RX 637 (ALT 250 FOR IP): Performed by: PHYSICIAN ASSISTANT

## 2023-06-30 PROCEDURE — 84145 PROCALCITONIN (PCT): CPT

## 2023-06-30 PROCEDURE — 85025 COMPLETE CBC W/AUTO DIFF WBC: CPT

## 2023-06-30 PROCEDURE — 2580000003 HC RX 258: Performed by: NURSE PRACTITIONER

## 2023-06-30 PROCEDURE — 80048 BASIC METABOLIC PNL TOTAL CA: CPT

## 2023-06-30 PROCEDURE — 36415 COLL VENOUS BLD VENIPUNCTURE: CPT

## 2023-06-30 PROCEDURE — 71045 X-RAY EXAM CHEST 1 VIEW: CPT

## 2023-06-30 PROCEDURE — 80053 COMPREHEN METABOLIC PANEL: CPT

## 2023-06-30 PROCEDURE — 83880 ASSAY OF NATRIURETIC PEPTIDE: CPT

## 2023-06-30 PROCEDURE — 94664 DEMO&/EVAL PT USE INHALER: CPT

## 2023-06-30 PROCEDURE — 93010 ELECTROCARDIOGRAM REPORT: CPT | Performed by: INTERNAL MEDICINE

## 2023-06-30 PROCEDURE — 83615 LACTATE (LD) (LDH) ENZYME: CPT

## 2023-06-30 PROCEDURE — 84484 ASSAY OF TROPONIN QUANT: CPT

## 2023-06-30 PROCEDURE — 93005 ELECTROCARDIOGRAM TRACING: CPT | Performed by: NURSE PRACTITIONER

## 2023-06-30 PROCEDURE — 6370000000 HC RX 637 (ALT 250 FOR IP): Performed by: INTERNAL MEDICINE

## 2023-06-30 PROCEDURE — 99285 EMERGENCY DEPT VISIT HI MDM: CPT

## 2023-06-30 PROCEDURE — 6360000002 HC RX W HCPCS: Performed by: PHYSICIAN ASSISTANT

## 2023-06-30 PROCEDURE — 82805 BLOOD GASES W/O2 SATURATION: CPT

## 2023-06-30 PROCEDURE — 94640 AIRWAY INHALATION TREATMENT: CPT

## 2023-06-30 PROCEDURE — 93005 ELECTROCARDIOGRAM TRACING: CPT | Performed by: PHYSICIAN ASSISTANT

## 2023-06-30 PROCEDURE — 0202U NFCT DS 22 TRGT SARS-COV-2: CPT

## 2023-06-30 RX ORDER — SODIUM CHLORIDE 9 MG/ML
500 INJECTION, SOLUTION INTRAVENOUS PRN
Status: DISCONTINUED | OUTPATIENT
Start: 2023-06-30 | End: 2023-07-04 | Stop reason: HOSPADM

## 2023-06-30 RX ORDER — GUAIFENESIN/DEXTROMETHORPHAN 100-10MG/5
5 SYRUP ORAL ONCE
Status: DISCONTINUED | OUTPATIENT
Start: 2023-06-30 | End: 2023-06-30

## 2023-06-30 RX ORDER — ROSUVASTATIN CALCIUM 20 MG/1
20 TABLET, COATED ORAL NIGHTLY
COMMUNITY
Start: 2023-06-21

## 2023-06-30 RX ORDER — INSULIN LISPRO 100 [IU]/ML
0-4 INJECTION, SOLUTION INTRAVENOUS; SUBCUTANEOUS NIGHTLY
Status: DISCONTINUED | OUTPATIENT
Start: 2023-06-30 | End: 2023-07-04 | Stop reason: HOSPADM

## 2023-06-30 RX ORDER — ACETAMINOPHEN 325 MG/1
650 TABLET ORAL EVERY 6 HOURS PRN
Status: DISCONTINUED | OUTPATIENT
Start: 2023-06-30 | End: 2023-07-04 | Stop reason: HOSPADM

## 2023-06-30 RX ORDER — POTASSIUM CHLORIDE 7.45 MG/ML
10 INJECTION INTRAVENOUS ONCE
Status: COMPLETED | OUTPATIENT
Start: 2023-06-30 | End: 2023-06-30

## 2023-06-30 RX ORDER — BUMETANIDE 1 MG/1
2 TABLET ORAL DAILY
Status: DISCONTINUED | OUTPATIENT
Start: 2023-07-01 | End: 2023-07-04 | Stop reason: HOSPADM

## 2023-06-30 RX ORDER — HYDRALAZINE HYDROCHLORIDE 20 MG/ML
5 INJECTION INTRAMUSCULAR; INTRAVENOUS EVERY 6 HOURS PRN
Status: DISCONTINUED | OUTPATIENT
Start: 2023-06-30 | End: 2023-07-04 | Stop reason: HOSPADM

## 2023-06-30 RX ORDER — SODIUM CHLORIDE 0.9 % (FLUSH) 0.9 %
5-40 SYRINGE (ML) INJECTION PRN
Status: DISCONTINUED | OUTPATIENT
Start: 2023-06-30 | End: 2023-07-04 | Stop reason: HOSPADM

## 2023-06-30 RX ORDER — POTASSIUM CHLORIDE 20 MEQ/1
40 TABLET, EXTENDED RELEASE ORAL ONCE
Status: DISCONTINUED | OUTPATIENT
Start: 2023-06-30 | End: 2023-06-30

## 2023-06-30 RX ORDER — POTASSIUM CHLORIDE 20 MEQ/1
40 TABLET, EXTENDED RELEASE ORAL ONCE
Status: COMPLETED | OUTPATIENT
Start: 2023-06-30 | End: 2023-06-30

## 2023-06-30 RX ORDER — GUAIFENESIN/DEXTROMETHORPHAN 100-10MG/5
5 SYRUP ORAL ONCE
Status: COMPLETED | OUTPATIENT
Start: 2023-06-30 | End: 2023-06-30

## 2023-06-30 RX ORDER — INSULIN GLARGINE 100 [IU]/ML
15 INJECTION, SOLUTION SUBCUTANEOUS NIGHTLY
Status: ON HOLD | COMMUNITY
End: 2023-07-04 | Stop reason: SDUPTHER

## 2023-06-30 RX ORDER — ROSUVASTATIN CALCIUM 5 MG/1
10 TABLET, COATED ORAL NIGHTLY
Status: DISCONTINUED | OUTPATIENT
Start: 2023-06-30 | End: 2023-07-04 | Stop reason: HOSPADM

## 2023-06-30 RX ORDER — POTASSIUM CHLORIDE 7.45 MG/ML
10 INJECTION INTRAVENOUS
Status: DISPENSED | OUTPATIENT
Start: 2023-06-30 | End: 2023-06-30

## 2023-06-30 RX ORDER — POLYETHYLENE GLYCOL 3350 17 G/17G
17 POWDER, FOR SOLUTION ORAL DAILY PRN
Status: DISCONTINUED | OUTPATIENT
Start: 2023-06-30 | End: 2023-07-04 | Stop reason: HOSPADM

## 2023-06-30 RX ORDER — SODIUM CHLORIDE 0.9 % (FLUSH) 0.9 %
5-40 SYRINGE (ML) INJECTION EVERY 12 HOURS SCHEDULED
Status: DISCONTINUED | OUTPATIENT
Start: 2023-06-30 | End: 2023-07-04 | Stop reason: HOSPADM

## 2023-06-30 RX ORDER — ACETAMINOPHEN 650 MG/1
650 SUPPOSITORY RECTAL EVERY 6 HOURS PRN
Status: DISCONTINUED | OUTPATIENT
Start: 2023-06-30 | End: 2023-07-04 | Stop reason: HOSPADM

## 2023-06-30 RX ORDER — ROSUVASTATIN CALCIUM 20 MG/1
20 TABLET, COATED ORAL NIGHTLY
Status: DISCONTINUED | OUTPATIENT
Start: 2023-06-30 | End: 2023-06-30 | Stop reason: DRUGHIGH

## 2023-06-30 RX ORDER — INSULIN GLARGINE 100 [IU]/ML
15 INJECTION, SOLUTION SUBCUTANEOUS NIGHTLY
Status: DISCONTINUED | OUTPATIENT
Start: 2023-06-30 | End: 2023-07-04

## 2023-06-30 RX ORDER — DEXTROSE MONOHYDRATE 100 MG/ML
1000 INJECTION, SOLUTION INTRAVENOUS CONTINUOUS PRN
Status: DISCONTINUED | OUTPATIENT
Start: 2023-06-30 | End: 2023-07-04 | Stop reason: HOSPADM

## 2023-06-30 RX ORDER — B COMPLEX, C NO.20/FOLIC ACID 1 MG
1 CAPSULE ORAL DAILY
COMMUNITY
Start: 2023-06-21

## 2023-06-30 RX ORDER — ALBUTEROL SULFATE 90 UG/1
2 AEROSOL, METERED RESPIRATORY (INHALATION) ONCE
Status: COMPLETED | OUTPATIENT
Start: 2023-06-30 | End: 2023-06-30

## 2023-06-30 RX ORDER — FLUOXETINE HYDROCHLORIDE 20 MG/1
20 CAPSULE ORAL 2 TIMES DAILY
Status: DISCONTINUED | OUTPATIENT
Start: 2023-06-30 | End: 2023-06-30

## 2023-06-30 RX ORDER — FLUOXETINE HYDROCHLORIDE 20 MG/1
20 CAPSULE ORAL DAILY
Status: DISCONTINUED | OUTPATIENT
Start: 2023-06-30 | End: 2023-07-04 | Stop reason: HOSPADM

## 2023-06-30 RX ORDER — ALBUTEROL SULFATE 90 UG/1
2 AEROSOL, METERED RESPIRATORY (INHALATION) EVERY 4 HOURS PRN
Status: DISCONTINUED | OUTPATIENT
Start: 2023-06-30 | End: 2023-07-04 | Stop reason: HOSPADM

## 2023-06-30 RX ORDER — LEVOTHYROXINE SODIUM 0.15 MG/1
150 TABLET ORAL DAILY
Status: DISCONTINUED | OUTPATIENT
Start: 2023-07-01 | End: 2023-07-04 | Stop reason: HOSPADM

## 2023-06-30 RX ORDER — GLUCAGON 1 MG/ML
1 KIT INJECTION PRN
Status: DISCONTINUED | OUTPATIENT
Start: 2023-06-30 | End: 2023-07-04 | Stop reason: HOSPADM

## 2023-06-30 RX ORDER — INSULIN LISPRO 100 [IU]/ML
0-4 INJECTION, SOLUTION INTRAVENOUS; SUBCUTANEOUS
Status: DISCONTINUED | OUTPATIENT
Start: 2023-06-30 | End: 2023-07-04 | Stop reason: HOSPADM

## 2023-06-30 RX ADMIN — GUAIFENESIN AND DEXTROMETHORPHAN 5 ML: 100; 10 SYRUP ORAL at 15:25

## 2023-06-30 RX ADMIN — IPRATROPIUM BROMIDE 2 PUFF: 17 AEROSOL, METERED RESPIRATORY (INHALATION) at 13:12

## 2023-06-30 RX ADMIN — ROSUVASTATIN CALCIUM 10 MG: 5 TABLET, FILM COATED ORAL at 21:23

## 2023-06-30 RX ADMIN — POTASSIUM CHLORIDE 40 MEQ: 1500 TABLET, EXTENDED RELEASE ORAL at 15:25

## 2023-06-30 RX ADMIN — POTASSIUM CHLORIDE 10 MEQ: 7.46 INJECTION, SOLUTION INTRAVENOUS at 15:35

## 2023-06-30 RX ADMIN — FLUOXETINE 20 MG: 20 CAPSULE ORAL at 21:23

## 2023-06-30 RX ADMIN — POTASSIUM BICARBONATE 40 MEQ: 782 TABLET, EFFERVESCENT ORAL at 21:23

## 2023-06-30 RX ADMIN — SODIUM CHLORIDE, PRESERVATIVE FREE 10 ML: 5 INJECTION INTRAVENOUS at 21:24

## 2023-06-30 RX ADMIN — ALBUTEROL SULFATE 2 PUFF: 90 AEROSOL, METERED RESPIRATORY (INHALATION) at 13:11

## 2023-06-30 RX ADMIN — POTASSIUM CHLORIDE 10 MEQ: 7.46 INJECTION, SOLUTION INTRAVENOUS at 17:11

## 2023-06-30 ASSESSMENT — PAIN - FUNCTIONAL ASSESSMENT: PAIN_FUNCTIONAL_ASSESSMENT: NONE - DENIES PAIN

## 2023-06-30 ASSESSMENT — LIFESTYLE VARIABLES
HOW MANY STANDARD DRINKS CONTAINING ALCOHOL DO YOU HAVE ON A TYPICAL DAY: PATIENT DOES NOT DRINK
HOW OFTEN DO YOU HAVE A DRINK CONTAINING ALCOHOL: NEVER

## 2023-07-01 LAB
ANION GAP SERPL CALCULATED.3IONS-SCNC: 13 MMOL/L (ref 4–16)
BACTERIA: NEGATIVE /HPF
BASOPHILS ABSOLUTE: 0 K/CU MM
BASOPHILS RELATIVE PERCENT: 1.1 % (ref 0–1)
BILIRUBIN URINE: NEGATIVE MG/DL
BLOOD, URINE: ABNORMAL
BUN SERPL-MCNC: 62 MG/DL (ref 6–23)
CALCIUM SERPL-MCNC: 8.2 MG/DL (ref 8.3–10.6)
CHLORIDE BLD-SCNC: 101 MMOL/L (ref 99–110)
CLARITY: CLEAR
CO2: 24 MMOL/L (ref 21–32)
COLOR: YELLOW
CREAT SERPL-MCNC: 5.4 MG/DL (ref 0.6–1.1)
DIFFERENTIAL TYPE: ABNORMAL
EOSINOPHILS ABSOLUTE: 0.2 K/CU MM
EOSINOPHILS RELATIVE PERCENT: 5.3 % (ref 0–3)
GFR SERPL CREATININE-BSD FRML MDRD: 8 ML/MIN/1.73M2
GLUCOSE BLD-MCNC: 108 MG/DL (ref 70–99)
GLUCOSE BLD-MCNC: 123 MG/DL (ref 70–99)
GLUCOSE BLD-MCNC: 142 MG/DL (ref 70–99)
GLUCOSE BLD-MCNC: 231 MG/DL (ref 70–99)
GLUCOSE SERPL-MCNC: 109 MG/DL (ref 70–99)
GLUCOSE, URINE: NEGATIVE MG/DL
HCT VFR BLD CALC: 29.3 % (ref 37–47)
HEMOGLOBIN: 9.2 GM/DL (ref 12.5–16)
IMMATURE NEUTROPHIL %: 0.3 % (ref 0–0.43)
KETONES, URINE: NEGATIVE MG/DL
LEUKOCYTE ESTERASE, URINE: ABNORMAL
LYMPHOCYTES ABSOLUTE: 1.7 K/CU MM
LYMPHOCYTES RELATIVE PERCENT: 46.3 % (ref 24–44)
MCH RBC QN AUTO: 30.7 PG (ref 27–31)
MCHC RBC AUTO-ENTMCNC: 31.4 % (ref 32–36)
MCV RBC AUTO: 97.7 FL (ref 78–100)
MONOCYTES ABSOLUTE: 0.4 K/CU MM
MONOCYTES RELATIVE PERCENT: 9.6 % (ref 0–4)
MUCUS: ABNORMAL HPF
NITRITE URINE, QUANTITATIVE: NEGATIVE
NUCLEATED RBC %: 0 %
PDW BLD-RTO: 15.5 % (ref 11.7–14.9)
PH, URINE: 6.5 (ref 5–8)
PHOSPHORUS: 5.1 MG/DL (ref 2.5–4.9)
PLATELET # BLD: 92 K/CU MM (ref 140–440)
PMV BLD AUTO: 9.8 FL (ref 7.5–11.1)
POTASSIUM SERPL-SCNC: 3.7 MMOL/L (ref 3.5–5.1)
PROTEIN UA: 100 MG/DL
RBC # BLD: 3 M/CU MM (ref 4.2–5.4)
RBC URINE: 152 /HPF (ref 0–6)
SEGMENTED NEUTROPHILS ABSOLUTE COUNT: 1.4 K/CU MM
SEGMENTED NEUTROPHILS RELATIVE PERCENT: 37.4 % (ref 36–66)
SODIUM BLD-SCNC: 138 MMOL/L (ref 135–145)
SPECIFIC GRAVITY UA: 1.01 (ref 1–1.03)
TOTAL IMMATURE NEUTOROPHIL: 0.01 K/CU MM
TOTAL NUCLEATED RBC: 0 K/CU MM
TRICHOMONAS: ABNORMAL /HPF
TROPONIN T: 0.04 NG/ML
TROPONIN T: 0.05 NG/ML
UROBILINOGEN, URINE: 0.2 MG/DL (ref 0.2–1)
WBC # BLD: 3.7 K/CU MM (ref 4–10.5)
WBC CLUMP: ABNORMAL /HPF
WBC UA: 912 /HPF (ref 0–5)

## 2023-07-01 PROCEDURE — 6370000000 HC RX 637 (ALT 250 FOR IP): Performed by: NURSE PRACTITIONER

## 2023-07-01 PROCEDURE — 2580000003 HC RX 258: Performed by: NURSE PRACTITIONER

## 2023-07-01 PROCEDURE — 94761 N-INVAS EAR/PLS OXIMETRY MLT: CPT

## 2023-07-01 PROCEDURE — 84484 ASSAY OF TROPONIN QUANT: CPT

## 2023-07-01 PROCEDURE — 81001 URINALYSIS AUTO W/SCOPE: CPT

## 2023-07-01 PROCEDURE — 36415 COLL VENOUS BLD VENIPUNCTURE: CPT

## 2023-07-01 PROCEDURE — 6370000000 HC RX 637 (ALT 250 FOR IP): Performed by: INTERNAL MEDICINE

## 2023-07-01 PROCEDURE — 2060000000 HC ICU INTERMEDIATE R&B

## 2023-07-01 PROCEDURE — 90945 DIALYSIS ONE EVALUATION: CPT

## 2023-07-01 PROCEDURE — 80048 BASIC METABOLIC PNL TOTAL CA: CPT

## 2023-07-01 PROCEDURE — 3E1M39Z IRRIGATION OF PERITONEAL CAVITY USING DIALYSATE, PERCUTANEOUS APPROACH: ICD-10-PCS | Performed by: INTERNAL MEDICINE

## 2023-07-01 PROCEDURE — 82962 GLUCOSE BLOOD TEST: CPT

## 2023-07-01 PROCEDURE — 84100 ASSAY OF PHOSPHORUS: CPT

## 2023-07-01 PROCEDURE — 85025 COMPLETE CBC W/AUTO DIFF WBC: CPT

## 2023-07-01 RX ORDER — SIMETHICONE 80 MG
80 TABLET,CHEWABLE ORAL EVERY 6 HOURS PRN
Status: DISCONTINUED | OUTPATIENT
Start: 2023-07-01 | End: 2023-07-04 | Stop reason: HOSPADM

## 2023-07-01 RX ORDER — GENTAMICIN SULFATE 1 MG/G
CREAM TOPICAL DAILY PRN
Status: DISCONTINUED | OUTPATIENT
Start: 2023-07-02 | End: 2023-07-04 | Stop reason: HOSPADM

## 2023-07-01 RX ORDER — POTASSIUM CHLORIDE 20 MEQ/1
20 TABLET, EXTENDED RELEASE ORAL 2 TIMES DAILY WITH MEALS
Status: COMPLETED | OUTPATIENT
Start: 2023-07-01 | End: 2023-07-01

## 2023-07-01 RX ORDER — POTASSIUM CHLORIDE 20 MEQ/1
40 TABLET, EXTENDED RELEASE ORAL ONCE
Status: COMPLETED | OUTPATIENT
Start: 2023-07-01 | End: 2023-07-01

## 2023-07-01 RX ADMIN — FLUOXETINE 20 MG: 20 CAPSULE ORAL at 11:09

## 2023-07-01 RX ADMIN — ACETAMINOPHEN 650 MG: 325 TABLET ORAL at 11:09

## 2023-07-01 RX ADMIN — ROSUVASTATIN CALCIUM 10 MG: 5 TABLET, FILM COATED ORAL at 19:46

## 2023-07-01 RX ADMIN — INSULIN GLARGINE 15 UNITS: 100 INJECTION, SOLUTION SUBCUTANEOUS at 19:46

## 2023-07-01 RX ADMIN — POTASSIUM CHLORIDE 40 MEQ: 1500 TABLET, EXTENDED RELEASE ORAL at 06:50

## 2023-07-01 RX ADMIN — LEVOTHYROXINE SODIUM 150 MCG: 0.15 TABLET ORAL at 06:50

## 2023-07-01 RX ADMIN — SODIUM CHLORIDE, PRESERVATIVE FREE 10 ML: 5 INJECTION INTRAVENOUS at 19:46

## 2023-07-01 RX ADMIN — SODIUM CHLORIDE, PRESERVATIVE FREE 10 ML: 5 INJECTION INTRAVENOUS at 11:08

## 2023-07-01 RX ADMIN — BUMETANIDE 2 MG: 1 TABLET ORAL at 11:10

## 2023-07-01 RX ADMIN — POTASSIUM CHLORIDE 20 MEQ: 1500 TABLET, EXTENDED RELEASE ORAL at 18:14

## 2023-07-01 RX ADMIN — POTASSIUM CHLORIDE 20 MEQ: 1500 TABLET, EXTENDED RELEASE ORAL at 11:10

## 2023-07-02 LAB
ANION GAP SERPL CALCULATED.3IONS-SCNC: 13 MMOL/L (ref 4–16)
BUN SERPL-MCNC: 60 MG/DL (ref 6–23)
CALCIUM SERPL-MCNC: 8.8 MG/DL (ref 8.3–10.6)
CHLORIDE BLD-SCNC: 101 MMOL/L (ref 99–110)
CO2: 25 MMOL/L (ref 21–32)
CREAT SERPL-MCNC: 5.1 MG/DL (ref 0.6–1.1)
GFR SERPL CREATININE-BSD FRML MDRD: 8 ML/MIN/1.73M2
GLUCOSE BLD-MCNC: 130 MG/DL (ref 70–99)
GLUCOSE BLD-MCNC: 133 MG/DL (ref 70–99)
GLUCOSE BLD-MCNC: 159 MG/DL (ref 70–99)
GLUCOSE BLD-MCNC: 70 MG/DL (ref 70–99)
GLUCOSE SERPL-MCNC: 102 MG/DL (ref 70–99)
POTASSIUM SERPL-SCNC: 4.6 MMOL/L (ref 3.5–5.1)
SODIUM BLD-SCNC: 139 MMOL/L (ref 135–145)

## 2023-07-02 PROCEDURE — 82962 GLUCOSE BLOOD TEST: CPT

## 2023-07-02 PROCEDURE — 6370000000 HC RX 637 (ALT 250 FOR IP): Performed by: NURSE PRACTITIONER

## 2023-07-02 PROCEDURE — 80048 BASIC METABOLIC PNL TOTAL CA: CPT

## 2023-07-02 PROCEDURE — 90945 DIALYSIS ONE EVALUATION: CPT

## 2023-07-02 PROCEDURE — 36415 COLL VENOUS BLD VENIPUNCTURE: CPT

## 2023-07-02 PROCEDURE — 94761 N-INVAS EAR/PLS OXIMETRY MLT: CPT

## 2023-07-02 PROCEDURE — 2580000003 HC RX 258: Performed by: NURSE PRACTITIONER

## 2023-07-02 PROCEDURE — 6370000000 HC RX 637 (ALT 250 FOR IP): Performed by: INTERNAL MEDICINE

## 2023-07-02 PROCEDURE — 2060000000 HC ICU INTERMEDIATE R&B

## 2023-07-02 RX ADMIN — SODIUM CHLORIDE, PRESERVATIVE FREE 10 ML: 5 INJECTION INTRAVENOUS at 20:54

## 2023-07-02 RX ADMIN — SODIUM CHLORIDE, PRESERVATIVE FREE 10 ML: 5 INJECTION INTRAVENOUS at 08:45

## 2023-07-02 RX ADMIN — LEVOTHYROXINE SODIUM 150 MCG: 0.15 TABLET ORAL at 05:42

## 2023-07-02 RX ADMIN — BUMETANIDE 2 MG: 1 TABLET ORAL at 08:44

## 2023-07-02 RX ADMIN — FLUOXETINE 20 MG: 20 CAPSULE ORAL at 08:44

## 2023-07-02 RX ADMIN — GENTAMICIN SULFATE: 1 CREAM TOPICAL at 19:27

## 2023-07-02 RX ADMIN — ROSUVASTATIN CALCIUM 10 MG: 5 TABLET, FILM COATED ORAL at 20:54

## 2023-07-02 ASSESSMENT — PAIN SCALES - GENERAL: PAINLEVEL_OUTOF10: 0

## 2023-07-03 ENCOUNTER — APPOINTMENT (OUTPATIENT)
Dept: INTERVENTIONAL RADIOLOGY/VASCULAR | Age: 73
DRG: 291 | End: 2023-07-03
Payer: MEDICARE

## 2023-07-03 ENCOUNTER — APPOINTMENT (OUTPATIENT)
Dept: GENERAL RADIOLOGY | Age: 73
DRG: 291 | End: 2023-07-03
Payer: MEDICARE

## 2023-07-03 LAB
ANION GAP SERPL CALCULATED.3IONS-SCNC: 10 MMOL/L (ref 4–16)
APTT: 29 SECONDS (ref 25.1–37.1)
BUN SERPL-MCNC: 64 MG/DL (ref 6–23)
CALCIUM SERPL-MCNC: 8.8 MG/DL (ref 8.3–10.6)
CHLORIDE BLD-SCNC: 101 MMOL/L (ref 99–110)
CO2: 27 MMOL/L (ref 21–32)
CREAT SERPL-MCNC: 5.2 MG/DL (ref 0.6–1.1)
EKG ATRIAL RATE: 78 BPM
EKG DIAGNOSIS: NORMAL
EKG P AXIS: 92 DEGREES
EKG P-R INTERVAL: 180 MS
EKG Q-T INTERVAL: 524 MS
EKG QRS DURATION: 206 MS
EKG QTC CALCULATION (BAZETT): 597 MS
EKG R AXIS: -81 DEGREES
EKG T AXIS: 105 DEGREES
EKG VENTRICULAR RATE: 78 BPM
FLUID TYPE: NORMAL INDEX
GFR SERPL CREATININE-BSD FRML MDRD: 8 ML/MIN/1.73M2
GLUCOSE BLD-MCNC: 123 MG/DL (ref 70–99)
GLUCOSE BLD-MCNC: 133 MG/DL (ref 70–99)
GLUCOSE BLD-MCNC: 155 MG/DL (ref 70–99)
GLUCOSE BLD-MCNC: 182 MG/DL (ref 70–99)
GLUCOSE SERPL-MCNC: 164 MG/DL (ref 70–99)
GLUCOSE, FLUID: 206 MG/DL
GLUCOSE, FLUID: 643 MG/DL
INR BLD: 1.1 INDEX
LACTATE DEHYDROGENASE, FLUID: 10 IU/L
LACTATE DEHYDROGENASE, FLUID: 39 IU/L
LACTATE DEHYDROGENASE: 188 IU/L (ref 120–246)
LYMPHOCYTES, BODY FLUID: 33 %
MESOTHELIAL FLUID: 28 /100 WBC
MONOCYTE, FLUID: 4 %
NEUTROPHIL, FLUID: 27 %
OTHER CELLS FLUID: NORMAL
PH FLUID: 7.5
PH FLUID: 8
POTASSIUM SERPL-SCNC: 4.3 MMOL/L (ref 3.5–5.1)
PROTEIN FLUID: 0.7 G/DL
PROTEIN FLUID: <0.2 GM/DL
PROTHROMBIN TIME: 14.6 SECONDS (ref 11.7–14.5)
RBC FLUID: 26 /CU MM
SODIUM BLD-SCNC: 138 MMOL/L (ref 135–145)
TOTAL PROTEIN: 5.6 GM/DL (ref 6.4–8.2)
WBC FLUID: 36 /CU MM

## 2023-07-03 PROCEDURE — 82945 GLUCOSE OTHER FLUID: CPT

## 2023-07-03 PROCEDURE — 32555 ASPIRATE PLEURA W/ IMAGING: CPT

## 2023-07-03 PROCEDURE — 94761 N-INVAS EAR/PLS OXIMETRY MLT: CPT

## 2023-07-03 PROCEDURE — 6370000000 HC RX 637 (ALT 250 FOR IP): Performed by: NURSE PRACTITIONER

## 2023-07-03 PROCEDURE — 2580000003 HC RX 258: Performed by: NURSE PRACTITIONER

## 2023-07-03 PROCEDURE — 84157 ASSAY OF PROTEIN OTHER: CPT

## 2023-07-03 PROCEDURE — 2709999900 IR GUIDED THORACENTESIS PLEURAL

## 2023-07-03 PROCEDURE — 0W993ZZ DRAINAGE OF RIGHT PLEURAL CAVITY, PERCUTANEOUS APPROACH: ICD-10-PCS | Performed by: STUDENT IN AN ORGANIZED HEALTH CARE EDUCATION/TRAINING PROGRAM

## 2023-07-03 PROCEDURE — 85610 PROTHROMBIN TIME: CPT

## 2023-07-03 PROCEDURE — 89051 BODY FLUID CELL COUNT: CPT

## 2023-07-03 PROCEDURE — 84155 ASSAY OF PROTEIN SERUM: CPT

## 2023-07-03 PROCEDURE — 82962 GLUCOSE BLOOD TEST: CPT

## 2023-07-03 PROCEDURE — 83615 LACTATE (LD) (LDH) ENZYME: CPT

## 2023-07-03 PROCEDURE — 85730 THROMBOPLASTIN TIME PARTIAL: CPT

## 2023-07-03 PROCEDURE — 36415 COLL VENOUS BLD VENIPUNCTURE: CPT

## 2023-07-03 PROCEDURE — 83986 ASSAY PH BODY FLUID NOS: CPT

## 2023-07-03 PROCEDURE — 71045 X-RAY EXAM CHEST 1 VIEW: CPT

## 2023-07-03 PROCEDURE — 93010 ELECTROCARDIOGRAM REPORT: CPT | Performed by: INTERNAL MEDICINE

## 2023-07-03 PROCEDURE — 80048 BASIC METABOLIC PNL TOTAL CA: CPT

## 2023-07-03 PROCEDURE — 2060000000 HC ICU INTERMEDIATE R&B

## 2023-07-03 RX ORDER — SODIUM CHLORIDE, SODIUM LACTATE, CALCIUM CHLORIDE, MAGNESIUM CHLORIDE AND DEXTROSE 2.5; 538; 448; 25.7; 5.08 G/100ML; MG/100ML; MG/100ML; MG/100ML; MG/100ML
INJECTION, SOLUTION INTRAPERITONEAL NIGHTLY
Status: DISCONTINUED | OUTPATIENT
Start: 2023-07-03 | End: 2023-07-03

## 2023-07-03 RX ADMIN — FLUOXETINE 20 MG: 20 CAPSULE ORAL at 08:53

## 2023-07-03 RX ADMIN — ROSUVASTATIN CALCIUM 10 MG: 5 TABLET, FILM COATED ORAL at 20:46

## 2023-07-03 RX ADMIN — SODIUM CHLORIDE, PRESERVATIVE FREE 10 ML: 5 INJECTION INTRAVENOUS at 08:53

## 2023-07-03 RX ADMIN — LEVOTHYROXINE SODIUM 150 MCG: 0.15 TABLET ORAL at 06:42

## 2023-07-03 RX ADMIN — SODIUM CHLORIDE, PRESERVATIVE FREE 10 ML: 5 INJECTION INTRAVENOUS at 20:47

## 2023-07-03 RX ADMIN — BUMETANIDE 2 MG: 1 TABLET ORAL at 08:53

## 2023-07-03 RX ADMIN — INSULIN GLARGINE 15 UNITS: 100 INJECTION, SOLUTION SUBCUTANEOUS at 20:46

## 2023-07-03 NOTE — PLAN OF CARE
Problem: Discharge Planning  Goal: Discharge to home or other facility with appropriate resources  7/3/2023 0259 by Daniel Haddad RN  Outcome: Progressing  7/2/2023 1713 by Irvin Chavarria RN  Outcome: Progressing     Problem: Safety - Adult  Goal: Free from fall injury  7/3/2023 0259 by Daniel Haddad RN  Outcome: Progressing  7/2/2023 1713 by Irvin Chavarria RN  Outcome: Progressing     Problem: Skin/Tissue Integrity  Goal: Absence of new skin breakdown  Description: 1. Monitor for areas of redness and/or skin breakdown  2. Assess vascular access sites hourly  3. Every 4-6 hours minimum:  Change oxygen saturation probe site  4. Every 4-6 hours:  If on nasal continuous positive airway pressure, respiratory therapy assess nares and determine need for appliance change or resting period.   7/3/2023 0259 by Daniel Haddad RN  Outcome: Progressing  7/2/2023 1713 by Irvin Chavarria RN  Outcome: Progressing

## 2023-07-03 NOTE — FLOWSHEET NOTE
07/03/23 1945   Vitals   /79   Pulse 83   Respirations 15   SpO2 96 %   Peritoneal Dialysis Catheter Left lower abdomen   No placement date or time found.    Present on Admission/Arrival: Yes  Catheter Location: Left lower abdomen   Status Accessed   Site Condition Clean, dry, intact   Dressing Status New dressing applied   Dressing Gauze   Dialysis Type Continuous cycling   Exit Site Condition unremarkable   Catheter Care Given Yes   Cycler   Verification of Prescription CCPD   Informed Consent  Yes   Total Volume Programmed 7500 mL   Therapy Time (Hours:Minutes) 7   Cycler Type [facility]   Fill Volume 1200 mL   Last Fill Volume 0 mL   Dwell Time (min) 60   I Drain Alarm 0 mL   Number of Cycles 7   Bag Volume 2000 mL   Number of Bags Used 4   Dianeal Solution Dextrose 2.5% in 2500 mL     PD CYCLER TREATMENT STARTED WITHOUT DIFF, DRESSING CHANGED, SITE UNREMARKABLE, FLOOR RN EDUCATED ON ALARMS AND RESETTING MACHINE AND PATIENT GIVEN ACCESS TO MACHINE TO RESET ALARMS, PATIENT VERY KNOWLEDGEABLE ABOUT TREATMENTS

## 2023-07-03 NOTE — PROCEDURES
PROCEDURE PERFORMED: Right Thoracentesis    PRIMARY INDICATION FOR PROCEDURE: Pleural effusion    INFORMED CONSENT:  Obtained prior to procedure. Consent placed in chart. ASSESSMENT: Pt alert and oriented x4. Pt verbalizes understanding of procedure    STAFF PRESENT: Dr. Kristy Acuna RN, Cain Castaneda RN, Rashmi Scales @ [address]:               Pt transferred to the table and positioned for Procedure and expresses comfort. Warm blankets given. Pt placed on Cardiac Monitor. Pt prepped and draped in a sterile fashion with chlorhexidine. TIME OUT:  1421    PROCEDURE STARTED: 1423     PAIN/LOCAL ANESTHESIA/SEDATION MANAGEMENT:           Local: Lidocaine 1% given by Dr. Karon De @ [phone]           INTRAOPERATIVE:           ACCESS: One step          ACCESS TIME: 1401 [address]: 1443          US/FLUORO: 3          CATHETER USED:           FINAL IMAGE TAKEN TO CONFIRM PLACEMENT OF:           CONTRAST/CC:     STERILE DRESSINGS: 4x4 and tegaderm    SPECIMENS: clear yellow fluid 750cc sent to lab total of 1275cc    EBL:      <1    FOLLOW- UP X-RAY: Stat bedside chest x-ray    PROCEDURE ENDED: 7336    COMPLICATIONS: pt BP dropped 81/41 with pt complaining of pain in right chest and lightheadedness. Procedure aborted. Pt repositioned back in bed. BP WNL.  Pt now asymptomatic     REPORT GIVEN TO: bedside nurse Claudy Torres

## 2023-07-04 VITALS
TEMPERATURE: 98.1 F | HEART RATE: 68 BPM | BODY MASS INDEX: 31.22 KG/M2 | WEIGHT: 187.39 LBS | SYSTOLIC BLOOD PRESSURE: 127 MMHG | RESPIRATION RATE: 14 BRPM | HEIGHT: 65 IN | OXYGEN SATURATION: 96 % | DIASTOLIC BLOOD PRESSURE: 55 MMHG

## 2023-07-04 LAB
ANION GAP SERPL CALCULATED.3IONS-SCNC: 11 MMOL/L (ref 4–16)
BUN SERPL-MCNC: 66 MG/DL (ref 6–23)
CALCIUM SERPL-MCNC: 8.2 MG/DL (ref 8.3–10.6)
CHLORIDE BLD-SCNC: 103 MMOL/L (ref 99–110)
CO2: 25 MMOL/L (ref 21–32)
CREAT SERPL-MCNC: 4.9 MG/DL (ref 0.6–1.1)
CULTURE: ABNORMAL
GFR SERPL CREATININE-BSD FRML MDRD: 9 ML/MIN/1.73M2
GLUCOSE BLD-MCNC: 110 MG/DL (ref 70–99)
GLUCOSE BLD-MCNC: 130 MG/DL (ref 70–99)
GLUCOSE BLD-MCNC: 65 MG/DL (ref 70–99)
GLUCOSE SERPL-MCNC: 65 MG/DL (ref 70–99)
GRAM SMEAR: ABNORMAL
Lab: ABNORMAL
POTASSIUM SERPL-SCNC: 3.8 MMOL/L (ref 3.5–5.1)
SODIUM BLD-SCNC: 139 MMOL/L (ref 135–145)
SPECIMEN: ABNORMAL

## 2023-07-04 PROCEDURE — 2580000003 HC RX 258: Performed by: NURSE PRACTITIONER

## 2023-07-04 PROCEDURE — 82962 GLUCOSE BLOOD TEST: CPT

## 2023-07-04 PROCEDURE — 6370000000 HC RX 637 (ALT 250 FOR IP): Performed by: NURSE PRACTITIONER

## 2023-07-04 PROCEDURE — 36415 COLL VENOUS BLD VENIPUNCTURE: CPT

## 2023-07-04 PROCEDURE — 6370000000 HC RX 637 (ALT 250 FOR IP): Performed by: INTERNAL MEDICINE

## 2023-07-04 PROCEDURE — 80048 BASIC METABOLIC PNL TOTAL CA: CPT

## 2023-07-04 RX ORDER — INSULIN GLARGINE 100 [IU]/ML
8 INJECTION, SOLUTION SUBCUTANEOUS NIGHTLY
Status: DISCONTINUED | OUTPATIENT
Start: 2023-07-04 | End: 2023-07-04 | Stop reason: HOSPADM

## 2023-07-04 RX ORDER — INSULIN GLARGINE 100 [IU]/ML
10 INJECTION, SOLUTION SUBCUTANEOUS NIGHTLY
Qty: 10 ML | Refills: 3 | Status: SHIPPED | OUTPATIENT
Start: 2023-07-04 | End: 2023-07-06 | Stop reason: HOSPADM

## 2023-07-04 RX ORDER — POTASSIUM CHLORIDE 20 MEQ/1
40 TABLET, EXTENDED RELEASE ORAL ONCE
Status: COMPLETED | OUTPATIENT
Start: 2023-07-04 | End: 2023-07-04

## 2023-07-04 RX ORDER — POTASSIUM CHLORIDE 20 MEQ/1
20 TABLET, EXTENDED RELEASE ORAL DAILY
Qty: 30 TABLET | Refills: 1 | Status: SHIPPED | OUTPATIENT
Start: 2023-07-04

## 2023-07-04 RX ADMIN — LEVOTHYROXINE SODIUM 150 MCG: 0.15 TABLET ORAL at 05:55

## 2023-07-04 RX ADMIN — BUMETANIDE 2 MG: 1 TABLET ORAL at 09:12

## 2023-07-04 RX ADMIN — FLUOXETINE 20 MG: 20 CAPSULE ORAL at 09:13

## 2023-07-04 RX ADMIN — SODIUM CHLORIDE, PRESERVATIVE FREE 10 ML: 5 INJECTION INTRAVENOUS at 09:13

## 2023-07-04 RX ADMIN — POTASSIUM CHLORIDE 40 MEQ: 1500 TABLET, EXTENDED RELEASE ORAL at 09:12

## 2023-07-04 NOTE — PLAN OF CARE
Problem: Discharge Planning  Goal: Discharge to home or other facility with appropriate resources  Outcome: Completed     Problem: Safety - Adult  Goal: Free from fall injury  Outcome: Completed     Problem: Skin/Tissue Integrity  Goal: Absence of new skin breakdown  Description: 1. Monitor for areas of redness and/or skin breakdown  2. Assess vascular access sites hourly  3. Every 4-6 hours minimum:  Change oxygen saturation probe site  4. Every 4-6 hours:  If on nasal continuous positive airway pressure, respiratory therapy assess nares and determine need for appliance change or resting period.   Outcome: Completed     Problem: Chronic Conditions and Co-morbidities  Goal: Patient's chronic conditions and co-morbidity symptoms are monitored and maintained or improved  Outcome: Completed

## 2023-07-04 NOTE — PLAN OF CARE
Problem: Discharge Planning  Goal: Discharge to home or other facility with appropriate resources  Outcome: Completed     Problem: Safety - Adult  Goal: Free from fall injury  Outcome: Completed     Problem: Chronic Conditions and Co-morbidities  Goal: Patient's chronic conditions and co-morbidity symptoms are monitored and maintained or improved  Outcome: Completed     Discharged

## 2023-07-04 NOTE — DISCHARGE SUMMARY
V2.0  Discharge Summary    Name:  Sharita Sweeney /Age/Sex: [id number] (74 y.o. female)   Admit Date: 2023  Discharge Date: 23    MRN & CSN:  [id number] & [id number] Encounter Date and Time 23 11:44 AM EDT    Attending:  No att. providers found Discharging Provider: Nay Leong MD       Hospital Course:     Brief HPI: Sharita Sweeney is a 74 y.o. female  with history of Paroxysmal atrial fibrillation on AC, mild VHD, essential hypertension, hyperlipidemia, end-stage renal disease on PD, hypothyroidism, chronic anemia who presents with shortness of breath. The patient has been on PD over the past month or so. Patient reports progressively worsening shortness of breath over the past week. She states shortness of breath is worse on exertion and laying flat. She reports increased lower extremity swelling today. She denies chest pain nausea vomiting diaphoresis. She reports compliance with PD; denies any issues with filling or draining. She denies cloudy effluent. She denies fever or chills;has occasional non productive cough. She was given antibiotics for suspected respiratory infection of the past week and she states her symptoms seemed to somewhat improve until completed antibiotics. Patient denies any other acute issues. She was advised by Nephrology to come to ED for eval    Brief Problem Based Course:     # Dyspnea with right pleural effusion-chest x-ray showed increased right pleural effusion as compared to imaging 2023. Pt on RA sats 95 %. Respiratory panel negative. IR consulted and patient underwent a thoracentesis with vast improvement in her symptoms. Was continued on dialysis by nephrology. Cleared by nephrology with follow-up at the time of discharge. # Hypokalemia -resolved   # Elevated trop - Chronically elevated,  downtrending. EKG showing atrially paced and frequently been showing paced rhythm.   [address] 2019 showed no significant CAD RCA nondominant with

## 2023-07-04 NOTE — DISCHARGE INSTRUCTIONS
Please decrease your lantus dosing to 10 units nightly. If you experience similar symptoms please return to the ER.

## 2023-07-05 ENCOUNTER — HOSPITAL ENCOUNTER (OUTPATIENT)
Dept: GENERAL RADIOLOGY | Age: 73
Discharge: HOME | End: 2023-07-05
Payer: MEDICARE

## 2023-07-05 ENCOUNTER — HOSPITAL ENCOUNTER (OUTPATIENT)
Age: 73
Discharge: HOME | End: 2023-07-05
Payer: MEDICARE

## 2023-07-05 DIAGNOSIS — T85.621A DISPLACEMENT OF INTRAPERITONEAL DIALYSIS CATHETER, INITIAL ENCOUNTER (HCC): ICD-10-CM

## 2023-07-05 PROCEDURE — 74019 RADEX ABDOMEN 2 VIEWS: CPT

## 2023-07-06 ENCOUNTER — TELEPHONE (OUTPATIENT)
Dept: SURGERY | Age: 73
End: 2023-07-06

## 2023-07-06 RX ORDER — INSULIN GLARGINE 100 [IU]/ML
10 INJECTION, SOLUTION SUBCUTANEOUS NIGHTLY
Qty: 5 ADJUSTABLE DOSE PRE-FILLED PEN SYRINGE | Refills: 3 | Status: SHIPPED | OUTPATIENT
Start: 2023-07-06

## 2023-07-06 RX ORDER — PEN NEEDLE, DIABETIC 31 G X1/4"
1 NEEDLE, DISPOSABLE MISCELLANEOUS DAILY
Qty: 100 EACH | Refills: 3 | Status: SHIPPED | OUTPATIENT
Start: 2023-07-06

## 2023-07-06 NOTE — TELEPHONE ENCOUNTER
Spoke with Dr. Donnie Wesley regarding possible drainage issues with the intraperitoneal catheter. He will take a look at the XR of ABD and speak with Jolly at [facility] to decide on treatment and care of [address]

## 2023-07-06 NOTE — TELEPHONE ENCOUNTER
I spoke with Meera Duran this evening. Her PD catheter is not draining appropriately. We discussed robotic manipulation of her PD catheter with possible catheter replacement and performing umbilical hernia repair at the same time. We will try to arrange for surgery Monday morning.     Electronically signed by Doris Schultz MD on 7/6/2023 at 6:30 PM

## 2023-07-07 ENCOUNTER — TELEPHONE (OUTPATIENT)
Dept: SURGERY | Age: 73
End: 2023-07-07

## 2023-07-07 NOTE — TELEPHONE ENCOUNTER
SPOKE TO [address] [place] [address] Street (YONAS FLOWERS, PD CATH ADJUSTMENT WITH POSS PD CATH REPLACEMENT) SCHEDULED @ [facility].  NOTIFIED OF DATES, TIMES AND LOCATION    PHONE ASSESSMENT   SURGERY - 7/10/23 @ 930  P/O - 7/19/23 @ 130    NPO AFTER MIDNIGHT  HOLD BLOOD THINNERS - HOLD 48HRS PRIOR

## 2023-07-10 ENCOUNTER — HOSPITAL ENCOUNTER (OUTPATIENT)
Age: 73
Setting detail: OUTPATIENT SURGERY
Discharge: HOME | End: 2023-07-10
Attending: SURGERY | Admitting: SURGERY
Payer: MEDICARE

## 2023-07-10 ENCOUNTER — ANESTHESIA EVENT (OUTPATIENT)
Dept: OPERATING ROOM | Age: 73
End: 2023-07-10
Payer: MEDICARE

## 2023-07-10 ENCOUNTER — ANESTHESIA (OUTPATIENT)
Dept: OPERATING ROOM | Age: 73
End: 2023-07-10
Payer: MEDICARE

## 2023-07-10 VITALS
WEIGHT: 187 LBS | TEMPERATURE: 97.5 F | HEIGHT: 65 IN | DIASTOLIC BLOOD PRESSURE: 58 MMHG | HEART RATE: 71 BPM | OXYGEN SATURATION: 94 % | RESPIRATION RATE: 16 BRPM | BODY MASS INDEX: 31.16 KG/M2 | SYSTOLIC BLOOD PRESSURE: 141 MMHG

## 2023-07-10 DIAGNOSIS — N18.4 CKD (CHRONIC KIDNEY DISEASE), STAGE IV (HCC): Primary | ICD-10-CM

## 2023-07-10 DIAGNOSIS — K42.9 UMBILICAL HERNIA WITHOUT OBSTRUCTION AND WITHOUT GANGRENE: ICD-10-CM

## 2023-07-10 DIAGNOSIS — T85.611A PERITONEAL DIALYSIS CATHETER DYSFUNCTION, INITIAL ENCOUNTER (HCC): ICD-10-CM

## 2023-07-10 LAB
ANION GAP SERPL CALCULATED.3IONS-SCNC: 12 MMOL/L (ref 4–16)
BUN SERPL-MCNC: 41 MG/DL (ref 6–23)
CALCIUM SERPL-MCNC: 8.7 MG/DL (ref 8.3–10.6)
CHLORIDE BLD-SCNC: 101 MMOL/L (ref 99–110)
CO2: 27 MMOL/L (ref 21–32)
CREAT SERPL-MCNC: 4.6 MG/DL (ref 0.6–1.1)
GFR SERPL CREATININE-BSD FRML MDRD: 9 ML/MIN/1.73M2
GLUCOSE BLD-MCNC: 144 MG/DL (ref 70–99)
GLUCOSE SERPL-MCNC: 149 MG/DL (ref 70–99)
POTASSIUM SERPL-SCNC: 3.2 MMOL/L (ref 3.5–5.1)
SODIUM BLD-SCNC: 140 MMOL/L (ref 135–145)

## 2023-07-10 PROCEDURE — 82962 GLUCOSE BLOOD TEST: CPT

## 2023-07-10 PROCEDURE — 7100000000 HC PACU RECOVERY - FIRST 15 MIN: Performed by: SURGERY

## 2023-07-10 PROCEDURE — 7100000010 HC PHASE II RECOVERY - FIRST 15 MIN: Performed by: SURGERY

## 2023-07-10 PROCEDURE — 2720000010 HC SURG SUPPLY STERILE: Performed by: SURGERY

## 2023-07-10 PROCEDURE — 3600000009 HC SURGERY ROBOT BASE: Performed by: SURGERY

## 2023-07-10 PROCEDURE — 80048 BASIC METABOLIC PNL TOTAL CA: CPT

## 2023-07-10 PROCEDURE — 7100000001 HC PACU RECOVERY - ADDTL 15 MIN: Performed by: SURGERY

## 2023-07-10 PROCEDURE — 3600000019 HC SURGERY ROBOT ADDTL 15MIN: Performed by: SURGERY

## 2023-07-10 PROCEDURE — 2580000003 HC RX 258

## 2023-07-10 PROCEDURE — 6360000002 HC RX W HCPCS: Performed by: SURGERY

## 2023-07-10 PROCEDURE — 49591 RPR AA HRN 1ST < 3 CM RDC: CPT | Performed by: SURGERY

## 2023-07-10 PROCEDURE — 6360000002 HC RX W HCPCS: Performed by: ANESTHESIOLOGY

## 2023-07-10 PROCEDURE — 7100000011 HC PHASE II RECOVERY - ADDTL 15 MIN: Performed by: SURGERY

## 2023-07-10 PROCEDURE — S2900 ROBOTIC SURGICAL SYSTEM: HCPCS | Performed by: SURGERY

## 2023-07-10 PROCEDURE — 3700000000 HC ANESTHESIA ATTENDED CARE: Performed by: SURGERY

## 2023-07-10 PROCEDURE — 3700000001 HC ADD 15 MINUTES (ANESTHESIA): Performed by: SURGERY

## 2023-07-10 PROCEDURE — C9399 UNCLASSIFIED DRUGS OR BIOLOG: HCPCS

## 2023-07-10 PROCEDURE — 6360000002 HC RX W HCPCS

## 2023-07-10 PROCEDURE — C1781 MESH (IMPLANTABLE): HCPCS | Performed by: SURGERY

## 2023-07-10 PROCEDURE — C1750 CATH, HEMODIALYSIS,LONG-TERM: HCPCS | Performed by: SURGERY

## 2023-07-10 PROCEDURE — 2709999900 HC NON-CHARGEABLE SUPPLY: Performed by: SURGERY

## 2023-07-10 PROCEDURE — 2580000003 HC RX 258: Performed by: SURGERY

## 2023-07-10 PROCEDURE — 2500000003 HC RX 250 WO HCPCS

## 2023-07-10 PROCEDURE — 2580000003 HC RX 258: Performed by: ANESTHESIOLOGY

## 2023-07-10 PROCEDURE — C2628 CATHETER, OCCLUSION: HCPCS | Performed by: SURGERY

## 2023-07-10 DEVICE — MESH HERN W7.5XL15CM INGUINAL POLYPR SYN FLAT L PORE MFIL: Type: IMPLANTABLE DEVICE | Site: ABDOMEN | Status: FUNCTIONAL

## 2023-07-10 RX ORDER — DEXAMETHASONE SODIUM PHOSPHATE 4 MG/ML
INJECTION, SOLUTION INTRA-ARTICULAR; INTRALESIONAL; INTRAMUSCULAR; INTRAVENOUS; SOFT TISSUE PRN
Status: DISCONTINUED | OUTPATIENT
Start: 2023-07-10 | End: 2023-07-10 | Stop reason: SDUPTHER

## 2023-07-10 RX ORDER — DIPHENHYDRAMINE HYDROCHLORIDE 50 MG/ML
12.5 INJECTION INTRAMUSCULAR; INTRAVENOUS
Status: DISCONTINUED | OUTPATIENT
Start: 2023-07-10 | End: 2023-07-10 | Stop reason: HOSPADM

## 2023-07-10 RX ORDER — ONDANSETRON 2 MG/ML
4 INJECTION INTRAMUSCULAR; INTRAVENOUS
Status: DISCONTINUED | OUTPATIENT
Start: 2023-07-10 | End: 2023-07-10 | Stop reason: HOSPADM

## 2023-07-10 RX ORDER — PROPOFOL 10 MG/ML
INJECTION, EMULSION INTRAVENOUS PRN
Status: DISCONTINUED | OUTPATIENT
Start: 2023-07-10 | End: 2023-07-10 | Stop reason: SDUPTHER

## 2023-07-10 RX ORDER — FENTANYL CITRATE 50 UG/ML
25 INJECTION, SOLUTION INTRAMUSCULAR; INTRAVENOUS EVERY 5 MIN PRN
Status: DISCONTINUED | OUTPATIENT
Start: 2023-07-10 | End: 2023-07-10 | Stop reason: HOSPADM

## 2023-07-10 RX ORDER — SODIUM CHLORIDE, SODIUM LACTATE, POTASSIUM CHLORIDE, CALCIUM CHLORIDE 600; 310; 30; 20 MG/100ML; MG/100ML; MG/100ML; MG/100ML
INJECTION, SOLUTION INTRAVENOUS CONTINUOUS
Status: DISCONTINUED | OUTPATIENT
Start: 2023-07-10 | End: 2023-07-10 | Stop reason: HOSPADM

## 2023-07-10 RX ORDER — DROPERIDOL 2.5 MG/ML
0.62 INJECTION, SOLUTION INTRAMUSCULAR; INTRAVENOUS EVERY 10 MIN PRN
Status: DISCONTINUED | OUTPATIENT
Start: 2023-07-10 | End: 2023-07-10 | Stop reason: HOSPADM

## 2023-07-10 RX ORDER — ONDANSETRON 2 MG/ML
INJECTION INTRAMUSCULAR; INTRAVENOUS PRN
Status: DISCONTINUED | OUTPATIENT
Start: 2023-07-10 | End: 2023-07-10 | Stop reason: SDUPTHER

## 2023-07-10 RX ORDER — PHENYLEPHRINE HCL IN 0.9% NACL 1 MG/10 ML
SYRINGE (ML) INTRAVENOUS PRN
Status: DISCONTINUED | OUTPATIENT
Start: 2023-07-10 | End: 2023-07-10 | Stop reason: SDUPTHER

## 2023-07-10 RX ORDER — ROCURONIUM BROMIDE 10 MG/ML
INJECTION, SOLUTION INTRAVENOUS PRN
Status: DISCONTINUED | OUTPATIENT
Start: 2023-07-10 | End: 2023-07-10 | Stop reason: SDUPTHER

## 2023-07-10 RX ORDER — SODIUM CHLORIDE 0.9 % (FLUSH) 0.9 %
5-40 SYRINGE (ML) INJECTION EVERY 12 HOURS SCHEDULED
Status: DISCONTINUED | OUTPATIENT
Start: 2023-07-10 | End: 2023-07-10 | Stop reason: HOSPADM

## 2023-07-10 RX ORDER — FENTANYL CITRATE 50 UG/ML
INJECTION, SOLUTION INTRAMUSCULAR; INTRAVENOUS PRN
Status: DISCONTINUED | OUTPATIENT
Start: 2023-07-10 | End: 2023-07-10 | Stop reason: SDUPTHER

## 2023-07-10 RX ORDER — HYDRALAZINE HYDROCHLORIDE 20 MG/ML
10 INJECTION INTRAMUSCULAR; INTRAVENOUS
Status: DISCONTINUED | OUTPATIENT
Start: 2023-07-10 | End: 2023-07-10 | Stop reason: HOSPADM

## 2023-07-10 RX ORDER — OXYCODONE HYDROCHLORIDE AND ACETAMINOPHEN 5; 325 MG/1; MG/1
1 TABLET ORAL EVERY 6 HOURS PRN
Qty: 14 TABLET | Refills: 0 | Status: SHIPPED | OUTPATIENT
Start: 2023-07-10 | End: 2023-07-15

## 2023-07-10 RX ORDER — LIDOCAINE HYDROCHLORIDE 20 MG/ML
INJECTION, SOLUTION EPIDURAL; INFILTRATION; INTRACAUDAL; PERINEURAL PRN
Status: DISCONTINUED | OUTPATIENT
Start: 2023-07-10 | End: 2023-07-10 | Stop reason: SDUPTHER

## 2023-07-10 RX ORDER — LABETALOL HYDROCHLORIDE 5 MG/ML
10 INJECTION, SOLUTION INTRAVENOUS
Status: DISCONTINUED | OUTPATIENT
Start: 2023-07-10 | End: 2023-07-10 | Stop reason: HOSPADM

## 2023-07-10 RX ORDER — DOCUSATE SODIUM 100 MG/1
100 CAPSULE, LIQUID FILLED ORAL 2 TIMES DAILY
Qty: 60 CAPSULE | Refills: 0 | COMMUNITY
Start: 2023-07-10 | End: 2023-07-24

## 2023-07-10 RX ORDER — OXYCODONE HYDROCHLORIDE 5 MG/1
5 TABLET ORAL
Status: DISCONTINUED | OUTPATIENT
Start: 2023-07-10 | End: 2023-07-10 | Stop reason: HOSPADM

## 2023-07-10 RX ORDER — BUPIVACAINE HYDROCHLORIDE 5 MG/ML
INJECTION, SOLUTION EPIDURAL; INTRACAUDAL
Status: COMPLETED | OUTPATIENT
Start: 2023-07-10 | End: 2023-07-10

## 2023-07-10 RX ORDER — SODIUM CHLORIDE 0.9 % (FLUSH) 0.9 %
5-40 SYRINGE (ML) INJECTION PRN
Status: DISCONTINUED | OUTPATIENT
Start: 2023-07-10 | End: 2023-07-10 | Stop reason: HOSPADM

## 2023-07-10 RX ADMIN — ROCURONIUM BROMIDE 50 MG: 10 INJECTION, SOLUTION INTRAVENOUS at 10:43

## 2023-07-10 RX ADMIN — Medication 0.1 MG: at 10:52

## 2023-07-10 RX ADMIN — PHENYLEPHRINE HYDROCHLORIDE 50 MCG/MIN: 10 INJECTION INTRAVENOUS at 11:06

## 2023-07-10 RX ADMIN — Medication 0.2 MG: at 11:06

## 2023-07-10 RX ADMIN — FENTANYL CITRATE 25 MCG: 50 INJECTION, SOLUTION INTRAMUSCULAR; INTRAVENOUS at 12:49

## 2023-07-10 RX ADMIN — FENTANYL CITRATE 50 MCG: 50 INJECTION, SOLUTION INTRAMUSCULAR; INTRAVENOUS at 10:43

## 2023-07-10 RX ADMIN — LIDOCAINE HYDROCHLORIDE 100 MG: 20 INJECTION, SOLUTION EPIDURAL; INFILTRATION; INTRACAUDAL; PERINEURAL at 10:43

## 2023-07-10 RX ADMIN — ROCURONIUM BROMIDE 30 MG: 10 INJECTION, SOLUTION INTRAVENOUS at 11:06

## 2023-07-10 RX ADMIN — SODIUM CHLORIDE, POTASSIUM CHLORIDE, SODIUM LACTATE AND CALCIUM CHLORIDE: 600; 310; 30; 20 INJECTION, SOLUTION INTRAVENOUS at 08:37

## 2023-07-10 RX ADMIN — Medication 0.1 MG: at 11:01

## 2023-07-10 RX ADMIN — CEFAZOLIN 2000 MG: 2 INJECTION, POWDER, FOR SOLUTION INTRAMUSCULAR; INTRAVENOUS at 10:47

## 2023-07-10 RX ADMIN — Medication 0.2 MG: at 10:56

## 2023-07-10 RX ADMIN — SUGAMMADEX 200 MG: 100 INJECTION, SOLUTION INTRAVENOUS at 12:23

## 2023-07-10 RX ADMIN — DEXAMETHASONE SODIUM PHOSPHATE 8 MG: 4 INJECTION INTRA-ARTICULAR; INTRALESIONAL; INTRAMUSCULAR; INTRAVENOUS; SOFT TISSUE at 10:48

## 2023-07-10 RX ADMIN — PROPOFOL 100 MG: 10 INJECTION, EMULSION INTRAVENOUS at 10:43

## 2023-07-10 RX ADMIN — Medication 0.1 MG: at 11:27

## 2023-07-10 RX ADMIN — Medication 0.1 MG: at 10:49

## 2023-07-10 RX ADMIN — ONDANSETRON 4 MG: 2 INJECTION INTRAMUSCULAR; INTRAVENOUS at 10:48

## 2023-07-10 ASSESSMENT — PAIN - FUNCTIONAL ASSESSMENT
PAIN_FUNCTIONAL_ASSESSMENT: PREVENTS OR INTERFERES SOME ACTIVE ACTIVITIES AND ADLS
PAIN_FUNCTIONAL_ASSESSMENT: PREVENTS OR INTERFERES SOME ACTIVE ACTIVITIES AND ADLS
PAIN_FUNCTIONAL_ASSESSMENT: 0-10

## 2023-07-10 ASSESSMENT — PAIN DESCRIPTION - ONSET
ONSET: ON-GOING
ONSET: ON-GOING

## 2023-07-10 ASSESSMENT — PAIN SCALES - GENERAL
PAINLEVEL_OUTOF10: 4
PAINLEVEL_OUTOF10: 3
PAINLEVEL_OUTOF10: 6
PAINLEVEL_OUTOF10: 3

## 2023-07-10 ASSESSMENT — PAIN DESCRIPTION - FREQUENCY
FREQUENCY: CONTINUOUS
FREQUENCY: CONTINUOUS

## 2023-07-10 ASSESSMENT — PAIN DESCRIPTION - PAIN TYPE
TYPE: SURGICAL PAIN
TYPE: SURGICAL PAIN

## 2023-07-10 ASSESSMENT — PAIN DESCRIPTION - DESCRIPTORS
DESCRIPTORS: DISCOMFORT
DESCRIPTORS: ACHING;BURNING
DESCRIPTORS: DISCOMFORT

## 2023-07-10 ASSESSMENT — PAIN DESCRIPTION - ORIENTATION
ORIENTATION: MID
ORIENTATION: MID

## 2023-07-10 ASSESSMENT — PAIN DESCRIPTION - LOCATION
LOCATION: ABDOMEN

## 2023-07-10 NOTE — ANESTHESIA POSTPROCEDURE EVALUATION
Department of Anesthesiology  Postprocedure Note    Patient: Belén Madden  MRN: [id number]  YOB: 1950  Date of evaluation: 7/10/2023      Procedure Summary     Date: 07/10/23 Room / Location: [address] [place] [address]    Anesthesia Start: 0493 Anesthesia Stop: 8175    Procedures: HERNIA UMBILICAL REPAIR LAPAROSCOPIC ROBOTIC (Abdomen)      CATHETER ADJUSTMENT PERITONEAL DIALYSIS LAPAROSCOPIC ROBOTIC (Abdomen) Diagnosis:       Umbilical hernia without obstruction and without gangrene      Displacement of intraperitoneal dialysis catheter, initial encounter ([address])      (Umbilical hernia without obstruction and without gangrene [K42.9])      (Displacement of intraperitoneal dialysis catheter, initial encounter [facility]) [Z09.997D])    Surgeons: Vanessa Gold MD Responsible Provider: May Ferreira MD    Anesthesia Type: general ASA Status: 4          Anesthesia Type: No value filed.     Quinn Phase I: Quinn Score: 8    Quinn Phase II: Quinn Score: 10      Anesthesia Post Evaluation    Patient location during evaluation: PACU  Patient participation: complete - patient participated  Level of consciousness: awake and alert  Pain score: 2  Airway patency: patent  Nausea & Vomiting: no nausea and no vomiting  Complications: no  Cardiovascular status: blood pressure returned to baseline  Respiratory status: acceptable  Hydration status: euvolemic

## 2023-07-10 NOTE — H&P
H&P  CC: PD catheter malfunction, umbilical hernia      SUBJECTIVE:  HPI: Patient is here with complaints of umbilical hernia. She is status post peritoneal dialysis catheter placement on 2023. She has done fine postop. She does report some recent bloating especially after eating vegetables. This is improved with Gas-X. She has had several good nights in a row that her home dialysis was run without having any issues. She reports her umbilical hernia has been small and asymptomatic for years. 7/10/2023  Seen and examined again today. Here for robotic PD catheter repositioning or replacement with umbilical hernia repair. Denies changes in their health since last seen. Denies questions regarding surgery today.       Past Surgical History:   Procedure Laterality Date    APPENDECTOMY      CARDIAC CATHETERIZATION       SECTION      CHOLECYSTECTOMY      COLONOSCOPY      COLONOSCOPY N/A 2021    COLONOSCOPY W/ ENDOSCOPIC MUCOSAL RESECTION WITH ORISE INJECTION, HEMACLIP PLACEMENT X 2 POST-POLYPECTOMY, SPOT INK TO LEXY 1.2CM POLYP HEPATIC FLEXURE, POLYPECTOMY performed by Tennille Castillo MD at [address] [place] [address] [address] N/A 2023    CATHETER INSERTION PERITONEAL DIALYSIS performed by Isabelle Morgan MD at [address] [place] [address]    IR TUNNELED C/Casia 10 5 YEARS  2023    IR TUNNELED CATHETER PLACEMENT GREATER THAN 5 YEARS 2023 [facility] SPECIAL PROCEDURES     Past Medical History:   Diagnosis Date    Asthma     Bradycardia     Bronchitis     COPD (chronic obstructive pulmonary disease) ([address])     H/O echocardiogram 2019    EF 55-60%, Grade II Diastolic Dysfunction, Left atrium is moderately dilated, no significant valvular disease, Mild Pulm HTN, No pericardial effusion     History of nuclear stress test 2019    ABN, Moderate inferior and lateral wall ischemia of a large territory, EF 55%    Hypercholesteremia     Hyperlipidemia     Hypertension

## 2023-07-10 NOTE — PROGRESS NOTES
1235 Patient arrived to PACU from OR. Monitors applied and alarms on. No drainage from sites. Report from Paola Nobles. 1250 Patient turned and repositioned in bed.  1256 Patient tolerating ice chips appropriately. 1312 Patient transferred out of PACU to same day surgery. Report to Debbie.

## 2023-07-10 NOTE — PROGRESS NOTES
1400:  Pt discharged to home via wheelchair alert and oriented with c/0 3/10 abdominal discomfort. Abd incisions x3 well approx with surgical glue in place no bleeding noted. PD cath covered with gauze/opsite. Pt advised per Dr Jose Armando Henning to resume Budd Barbone starting tomorrow. Pt tolerating PO, VSS.

## 2023-07-10 NOTE — DISCHARGE INSTRUCTIONS
Patient Discharge Instructions  Dr. Florencia Griffith  [address], [address] [place] [address] 140    [address] [place] [address], [address]  [facility]    [phone]         RESUME ACTIVITY:      BATHING:   OK to shower but no bath tub or submerging incision under water. Pat the incisions dry with a towel after showers. Wound:    Keep wound dry and clean. May shower as instructed above. Dressing: Your wounds are sealed with surgical glue. This will wear off in 1-2 weeks. No need for a dressing over the glue. If oozing occurs from any of your incisions you may place a dry dressing over the incision and change this daily. Once the dressing comes off clean without drainage you may again leave your incision open to air. DRIVING:   No driving for at least 24 hours after your procedure. No driving until off narcotic pain medications and walking comfortably    RETURN TO WORK: When cleared after your follow up office visit    WALKING:    As tolerated     STAIRS:    As tolerated    LIFTING:   No heavy lifting for 6 weeks    DIET:    Amina Hamilton diet on day of surgery then regular diet    SPECIAL INSTRUCTIONS:     Call the office at [phone]  if you have a fever greater than or equal to 101 oF or if your incision becomes red, tender, or has drainage of pus. If follow up appointment was not given to you, call the Surgical Clinic at [phone] for follow up appointment with Dr. Naomy Stout in:  1-2 weeks.

## 2023-07-10 NOTE — ANESTHESIA PRE PROCEDURE
Department of Anesthesiology  Preprocedure Note       Name:  Anika Wells   Age:  74 y.o.  :  1950                                          MRN:  [id number]         Date:  7/10/2023      Surgeon: Faye Byrnes):  Lary Sahu MD    Procedure: Procedure(s): HERNIA UMBILICAL REPAIR LAPAROSCOPIC ROBOTIC  CATHETER ADJUSTMENT PERITONEAL DIALYSIS LAPAROSCOPIC POSSIBLE PD CATH REPLACEMENT    Medications prior to admission:   Prior to Admission medications    Medication Sig Start Date End Date Taking? Authorizing Provider   insulin glargine (LANTUS SOLOSTAR) 100 UNIT/ML injection pen Inject 10 Units into the skin nightly 23   Tomasa Santos MD   Insulin Pen Needle ([employer] PEN NEEDLES) 31G X 6 MM MISC 1 each by Does not apply route daily 23   Christian Cameron MD   potassium chloride (KLOR-CON M) 20 MEQ extended release tablet Take 1 tablet by mouth daily 23   Christian Cameron MD   B Complex-C-Folic Acid [facility]) 1 MG CAPS Take 1 capsule by mouth daily 23   Historical Provider, MD   rosuvastatin (CRESTOR) 20 MG tablet Take 1 tablet by mouth nightly 23   Historical Provider, MD   Simethicone (GAS-X PO) Take by mouth    Historical Provider, MD   Cyanocobalamin (VITAMIN B 12) 100 MCG LOZG   TAKE ONE CAPSULE BY MOUTH EVERY DAY, # 90 EA, 3 total refill(s), Acute, JOSH [Federal Rx: #90 last filled 23] 3/23/23 3/21/24  Historical Provider, MD   apixaban (ELIQUIS) 5 MG TABS tablet Take 1 tablet by mouth 2 times daily 23   Umm Redmond MD   bumetanide Othella Ply) 2 MG tablet Once per day on Tuesday, Thursday and Saturday. Patient taking differently: 1 tablet daily 23   VASU Middleton MD   midodrine (PROAMATINE) 10 MG tablet Take 1 tablet by mouth 3 times daily (before meals)  Patient taking differently: Take 1 tablet by mouth 3 times daily as needed 23 Patient states she only now takes this as needed.  Now on peritoneal dialysis 23   VASU Vazquez

## 2023-07-10 NOTE — OP NOTE
Operative Note      Patient: Aida Dickson  YOB: 1950  MRN: [id number]    Date of Procedure: 7/10/2023    Pre-Op Diagnosis Codes:     * Umbilical hernia without obstruction and without gangrene [K42.9]     * Displacement of intraperitoneal dialysis catheter, initial encounter ([address] ) [U56.670D]    Post-Op Diagnosis: Same       Procedure(s): HERNIA UMBILICAL REPAIR LAPAROSCOPIC ROBOTIC  CATHETER ADJUSTMENT PERITONEAL DIALYSIS LAPAROSCOPIC ROBOTIC    Surgeon(s):  Zaira Yusuf MD    Assistant:   First Assistant: Mir Barrientos    Anesthesia: General    Estimated Blood Loss (mL): 62CR    Complications: None    Specimens:   * No specimens in log *    Implants:  Implant Name Type Inv. Item Serial No.  Lot No. LRB No. Used Action   MESH MURRAY [id number] INGUINAL POLYPR SYN FLAT L PORE MFIL - [id number]  MESH MURRAY [id number] INGUINAL POLYPR SYN FLAT L PORE MFIL  BARD DAVOL-WD [id number] N/A 1 Implanted         Drains:   [REMOVED] Urinary Catheter 07/10/23 [place]son (Removed)       Findings: some adhesive disease tethering PD catheter in the RLQ and old blood/clot within the catheter tubing. 4.0CP umbilical hernia present        Detailed Description of Procedure:   Procedure Details: The patient was seen again in the Holding Room. The risks, benefits, complications, treatment options, and expected outcomes were discussed with the patient. The possibilities of reaction to medication, pulmonary aspiration, perforation of viscus, bleeding, recurrent infection, the need for additional procedures, and development of a complication requiring transfusion or further operation were discussed with the patient and/or family. There was concurrence with the proposed plan, and informed consent was obtained. The site of surgery was properly noted/marked. The patient was taken to the Operating Room, identified as Aida Dickson, and the procedure verified.  A Time Out was held and the above information

## 2023-07-11 ENCOUNTER — TELEPHONE (OUTPATIENT)
Dept: SURGERY | Age: 73
End: 2023-07-11

## 2023-07-11 NOTE — TELEPHONE ENCOUNTER
Post-op Phone Call Follow-up  ROVERTO Calvin is 1 days s/p . HERNIA UMBILICAL REPAIR LAPAROSCOPIC ROBOTIC N/A General   CATHETER ADJUSTMENT PERITONEAL DIALYSIS LAPAROSCOPIC ROBOTIC          I called the pt today to see how they were doing post-operatively. The pt's pain is  well controlled with current (Percocet) pain control regimen. Pt's incisions are doing well. Denies erythema, swelling or drainage. Pt is tolerating eating without N/V, and is  having bowel movements. I reviewed the post-operative instructions, addressed any concerns and answered the patient's questions.      I confirmed the patient's post-op appointment on 7/19/2023        Vivienne Dunn MA

## 2023-07-14 ENCOUNTER — HOSPITAL ENCOUNTER (OUTPATIENT)
Dept: GENERAL RADIOLOGY | Age: 73
Discharge: HOME | End: 2023-07-14
Payer: MEDICARE

## 2023-07-14 ENCOUNTER — HOSPITAL ENCOUNTER (OUTPATIENT)
Age: 73
Discharge: HOME | End: 2023-07-14
Payer: MEDICARE

## 2023-07-14 ENCOUNTER — HOSPITAL ENCOUNTER (OUTPATIENT)
Age: 73
Setting detail: SPECIMEN
Discharge: HOME | End: 2023-07-14
Payer: MEDICARE

## 2023-07-14 DIAGNOSIS — K59.09 OTHER CONSTIPATION: ICD-10-CM

## 2023-07-14 DIAGNOSIS — N18.6 ESRD (END STAGE RENAL DISEASE) (HCC): ICD-10-CM

## 2023-07-14 LAB
ALBUMIN SERPL-MCNC: 2.9 GM/DL (ref 3.4–5)
ALP BLD-CCNC: 94 IU/L (ref 40–129)
ALT SERPL-CCNC: 10 U/L (ref 10–40)
ANION GAP SERPL CALCULATED.3IONS-SCNC: 14 MMOL/L (ref 4–16)
AST SERPL-CCNC: 45 IU/L (ref 15–37)
BASOPHILS ABSOLUTE: 0 K/CU MM
BASOPHILS RELATIVE PERCENT: 0.9 % (ref 0–1)
BILIRUB SERPL-MCNC: 0.2 MG/DL (ref 0–1)
BUN SERPL-MCNC: 65 MG/DL (ref 6–23)
CALCIUM SERPL-MCNC: 8.5 MG/DL (ref 8.3–10.6)
CHLORIDE BLD-SCNC: 99 MMOL/L (ref 99–110)
CO2: 26 MMOL/L (ref 21–32)
CREAT SERPL-MCNC: 5.1 MG/DL (ref 0.6–1.1)
DIFFERENTIAL TYPE: ABNORMAL
EOSINOPHILS ABSOLUTE: 0.3 K/CU MM
EOSINOPHILS RELATIVE PERCENT: 6.7 % (ref 0–3)
GFR SERPL CREATININE-BSD FRML MDRD: 8 ML/MIN/1.73M2
GLUCOSE SERPL-MCNC: 269 MG/DL (ref 70–99)
HCT VFR BLD CALC: 32.4 % (ref 37–47)
HEMOGLOBIN: 9.9 GM/DL (ref 12.5–16)
IMMATURE NEUTROPHIL %: 0.2 % (ref 0–0.43)
LYMPHOCYTES ABSOLUTE: 1.4 K/CU MM
LYMPHOCYTES RELATIVE PERCENT: 31.9 % (ref 24–44)
MCH RBC QN AUTO: 30.6 PG (ref 27–31)
MCHC RBC AUTO-ENTMCNC: 30.6 % (ref 32–36)
MCV RBC AUTO: 100 FL (ref 78–100)
MONOCYTES ABSOLUTE: 0.3 K/CU MM
MONOCYTES RELATIVE PERCENT: 7.1 % (ref 0–4)
NUCLEATED RBC %: 0 %
PDW BLD-RTO: 15 % (ref 11.7–14.9)
PLATELET # BLD: 135 K/CU MM (ref 140–440)
PMV BLD AUTO: 10.3 FL (ref 7.5–11.1)
POTASSIUM SERPL-SCNC: 3.1 MMOL/L (ref 3.5–5.1)
RBC # BLD: 3.24 M/CU MM (ref 4.2–5.4)
SEGMENTED NEUTROPHILS ABSOLUTE COUNT: 2.4 K/CU MM
SEGMENTED NEUTROPHILS RELATIVE PERCENT: 53.2 % (ref 36–66)
SODIUM BLD-SCNC: 139 MMOL/L (ref 135–145)
TOTAL IMMATURE NEUTOROPHIL: 0.01 K/CU MM
TOTAL NUCLEATED RBC: 0 K/CU MM
TOTAL PROTEIN: 5.1 GM/DL (ref 6.4–8.2)
WBC # BLD: 4.5 K/CU MM (ref 4–10.5)

## 2023-07-14 PROCEDURE — 74018 RADEX ABDOMEN 1 VIEW: CPT

## 2023-07-14 PROCEDURE — 85025 COMPLETE CBC W/AUTO DIFF WBC: CPT

## 2023-07-14 PROCEDURE — 80053 COMPREHEN METABOLIC PANEL: CPT

## 2023-07-18 NOTE — PROGRESS NOTES
IR Procedure at [facility]:  Spoke with patient's  Jeffrey Morales and patient will arrive at 0900 at [facility] on 7/19/2023 for her procedure at 1030. Also went over below instructions. Per patient's  last dose of eliquis was on 714/2023. NPO at [address]      2. Follow your directions as prescribed by the doctor for your procedure and medications. 3.   Consult your provider as to when to stop blood thinner  4. Do not take any pain medication within 6 hours of your procedure  5. Do not drink any alcoholic beverages or use any street drugs 24 hours before procedure. 6.   Please wear simple, loose fitting clothing to the hospital.  Do not bring valuables (money,             credit cards, checkbooks, etc.)     7. If you  have a Living Will and Durable Power of  for Healthcare, please bring in a copy. 8.   Please bring picture ID,  insurance card, paperwork from the doctors office            (H & P, Consent,  & card for implantable devices). 9.   Report to the information desk on the ground floor. 10. Take a shower the night before or morning of your procedure, do not apply any lotion, oil or powder. 11. If you are going to be sedated for the procedure, you will need a responsible adult to drive you home.

## 2023-07-19 ENCOUNTER — HOSPITAL ENCOUNTER (OUTPATIENT)
Dept: INTERVENTIONAL RADIOLOGY/VASCULAR | Age: 73
Discharge: HOME | End: 2023-07-19
Payer: MEDICARE

## 2023-07-19 ENCOUNTER — HOSPITAL ENCOUNTER (OUTPATIENT)
Age: 73
Setting detail: SPECIMEN
Discharge: HOME | End: 2023-07-19

## 2023-07-19 VITALS
DIASTOLIC BLOOD PRESSURE: 60 MMHG | RESPIRATION RATE: 16 BRPM | TEMPERATURE: 97.6 F | WEIGHT: 194 LBS | OXYGEN SATURATION: 97 % | SYSTOLIC BLOOD PRESSURE: 158 MMHG | HEIGHT: 65 IN | BODY MASS INDEX: 32.32 KG/M2 | HEART RATE: 60 BPM

## 2023-07-19 DIAGNOSIS — N18.6 ESRD (END STAGE RENAL DISEASE) (HCC): ICD-10-CM

## 2023-07-19 LAB
APTT: 33.8 SECONDS (ref 25.1–37.1)
HCT VFR BLD CALC: 30.1 % (ref 37–47)
HEMOGLOBIN: 9.3 GM/DL (ref 12.5–16)
INR BLD: 1.1 INDEX
MCH RBC QN AUTO: 30.1 PG (ref 27–31)
MCHC RBC AUTO-ENTMCNC: 30.9 % (ref 32–36)
MCV RBC AUTO: 97.4 FL (ref 78–100)
PDW BLD-RTO: 15.2 % (ref 11.7–14.9)
PLATELET # BLD: 102 K/CU MM (ref 140–440)
PMV BLD AUTO: 9.9 FL (ref 7.5–11.1)
POTASSIUM SERPL-SCNC: 3.1 MMOL/L (ref 3.5–5.1)
PROTHROMBIN TIME: 14.4 SECONDS (ref 11.7–14.5)
RBC # BLD: 3.09 M/CU MM (ref 4.2–5.4)
WBC # BLD: 5.1 K/CU MM (ref 4–10.5)

## 2023-07-19 PROCEDURE — 2500000003 HC RX 250 WO HCPCS

## 2023-07-19 PROCEDURE — 36558 INSERT TUNNELED CV CATH: CPT

## 2023-07-19 PROCEDURE — 2500000003 HC RX 250 WO HCPCS: Performed by: RADIOLOGY

## 2023-07-19 PROCEDURE — C1769 GUIDE WIRE: HCPCS

## 2023-07-19 PROCEDURE — 85027 COMPLETE CBC AUTOMATED: CPT

## 2023-07-19 PROCEDURE — 76937 US GUIDE VASCULAR ACCESS: CPT

## 2023-07-19 PROCEDURE — 84132 ASSAY OF SERUM POTASSIUM: CPT

## 2023-07-19 PROCEDURE — 85730 THROMBOPLASTIN TIME PARTIAL: CPT

## 2023-07-19 PROCEDURE — 77001 FLUOROGUIDE FOR VEIN DEVICE: CPT

## 2023-07-19 PROCEDURE — 85610 PROTHROMBIN TIME: CPT

## 2023-07-19 PROCEDURE — 6360000002 HC RX W HCPCS

## 2023-07-19 RX ORDER — LIDOCAINE HYDROCHLORIDE 10 MG/ML
INJECTION, SOLUTION EPIDURAL; INFILTRATION; INTRACAUDAL; PERINEURAL PRN
Status: COMPLETED | OUTPATIENT
Start: 2023-07-19 | End: 2023-07-19

## 2023-07-19 RX ORDER — SODIUM CHLORIDE 0.9 % (FLUSH) 0.9 %
10 SYRINGE (ML) INJECTION PRN
Status: DISCONTINUED | OUTPATIENT
Start: 2023-07-19 | End: 2023-07-20 | Stop reason: HOSPADM

## 2023-07-19 RX ADMIN — LIDOCAINE HYDROCHLORIDE 7 ML: 10 INJECTION, SOLUTION EPIDURAL; INFILTRATION; INTRACAUDAL; PERINEURAL at 10:58

## 2023-07-19 RX ADMIN — LIDOCAINE HYDROCHLORIDE 2 ML: 10 INJECTION, SOLUTION EPIDURAL; INFILTRATION; INTRACAUDAL; PERINEURAL at 11:05

## 2023-07-19 RX ADMIN — Medication 10 ML: at 09:36

## 2023-07-19 ASSESSMENT — PAIN SCALES - GENERAL
PAINLEVEL_OUTOF10: 0
PAINLEVEL_OUTOF10: 0

## 2023-07-19 ASSESSMENT — PAIN - FUNCTIONAL ASSESSMENT: PAIN_FUNCTIONAL_ASSESSMENT: 0-10

## 2023-07-19 NOTE — PROGRESS NOTES
TRANSFER - OUT REPORT:    Verbal report given to [facility] RN on Lorenzo Gonzalez being transferred to [facility](unit) for routine post-op       Report consisted of patient's Situation, Background, Assessment and   Recommendations(SBAR). Information from the following report(s) Nurse Handoff Report was reviewed with the receiving nurse. Opportunity for questions and clarification was provided.       Patient transported with:   Registered Nurse

## 2023-07-19 NOTE — PROGRESS NOTES
1135 Report received from [facility]  Patient returned to room from  DOUGLAS DAVISON+Lisy, VSS (see doc flow), assessment completed as per doc flow. Patient has no c/o pain, cath site clean, dry, and intact, no drainage/swelling noted. Patient denies any needs at this time. Beverage offered. Call light in reach, bed in low position. RN to continue to monitor. Spouse at bedside. 1210 Patient discharge instructions and prescriptions reviewed and verified. Patient advised to restart Eliquis tomw. RN reviewed d/c instructions with patient spouse. All questions answered. Discharge paperwork signed by RN and patient's spouse. 1216 Discharged to car  via wheelchair, home with  spouse.

## 2023-07-19 NOTE — H&P
HERNIA REPAIR N/A     HERNIA UMBILICAL REPAIR LAPAROSCOPIC ROBOTIC [facility] History:  Social History     Socioeconomic History    Marital status:      Spouse name: Not on file    Number of children: Not on file    Years of education: Not on file    Highest education level: Not on file   Occupational History    Not on file   Tobacco Use    Smoking status: Former     Packs/day: 1.00     Years: 20.00     Pack years: 20.00     Types: Cigarettes     Quit date: 1991     Years since quittin.5    Smokeless tobacco: Never   Vaping Use    Vaping Use: Never used   Substance and Sexual Activity    Alcohol use: Not Currently    Drug use: Never    Sexual activity: Yes     Partners: Male   Other Topics Concern    Not on file   Social History Narrative    Not on file     Social Determinants of Health     Financial Resource Strain: Not on file   Food Insecurity: Not on file   Transportation Needs: Not on file   Physical Activity: Not on file   Stress: Not on file   Social Connections: Not on file   Intimate Partner Violence: Not on file   Housing Stability: Not on file       Family History:  Family History   Problem Relation Age of Onset    Stroke Mother     Cancer Father         colon cancer    Colon Cancer Other     Depression Other     Diabetes Other     Hypertension Other        Allergies:   Allergies   Allergen Reactions    Empagliflozin Hives    Lorazepam Other (See Comments)     Confusion        Medications:  Current Outpatient Medications on File Prior to Encounter   Medication Sig Dispense Refill    docusate sodium (COLACE) 100 MG capsule Take 1 capsule by mouth 2 times daily for 14 days 60 capsule 0    insulin glargine (LANTUS SOLOSTAR) 100 UNIT/ML injection pen Inject 10 Units into the skin nightly 5 Adjustable Dose Pre-filled Pen Syringe 3    Insulin Pen Needle ([employer] PEN NEEDLES) 31G X 6 MM MISC 1 each by Does not apply route daily 100 each 3

## 2023-07-19 NOTE — PROGRESS NOTES
1126 Pt. Brought back to unit, bedside report from Radha Celeste. Pt. Is A&O, VSS, pt. Denies pain. Site is C/D/I. Pt. Denies pain. Call light in reach. Lunch report to JUAN Celeste.

## 2023-07-26 ENCOUNTER — OFFICE VISIT (OUTPATIENT)
Dept: SURGERY | Age: 73
End: 2023-07-26

## 2023-07-26 VITALS
OXYGEN SATURATION: 98 % | HEART RATE: 61 BPM | WEIGHT: 194 LBS | HEIGHT: 65 IN | DIASTOLIC BLOOD PRESSURE: 66 MMHG | SYSTOLIC BLOOD PRESSURE: 98 MMHG | BODY MASS INDEX: 32.32 KG/M2

## 2023-07-26 DIAGNOSIS — Z48.89 POSTOPERATIVE VISIT: Primary | ICD-10-CM

## 2023-07-26 PROCEDURE — 99024 POSTOP FOLLOW-UP VISIT: CPT | Performed by: SURGERY

## 2023-07-26 ASSESSMENT — PATIENT HEALTH QUESTIONNAIRE - PHQ9
SUM OF ALL RESPONSES TO PHQ QUESTIONS 1-9: 0
SUM OF ALL RESPONSES TO PHQ9 QUESTIONS 1 & 2: 0
SUM OF ALL RESPONSES TO PHQ QUESTIONS 1-9: 0
SUM OF ALL RESPONSES TO PHQ QUESTIONS 1-9: 0
2. FEELING DOWN, DEPRESSED OR HOPELESS: 0
1. LITTLE INTEREST OR PLEASURE IN DOING THINGS: 0
SUM OF ALL RESPONSES TO PHQ QUESTIONS 1-9: 0

## 2023-07-26 NOTE — PROGRESS NOTES
Post-Operative Clinic Note    Chief Complaint   Patient presents with    Post-Op Check     1st PO UMB Hernia repair and catheter adjustment @ [facility] 7/10/23         SUBJECTIVE:  Patient is here today for a post-operative visit. Patient is s/p adjustment of peritoneal dialysis catheter and repair of umbilical hernia. She reports improvement in pain from her surgery. She has had intermittent success with using the PD catheter. Initially there was problems with the catheter plugging and not instilling or draining properly. For the past 2 days it has been functioning well. She has been started on hemodialysis temporarily due to the problems with the PD catheter. This is going okay but making her very drowsy afterwards. .     Past Surgical History:   Procedure Laterality Date    [facility]    COLONOSCOPY      COLONOSCOPY N/A 11/16/2021    COLONOSCOPY W/ ENDOSCOPIC MUCOSAL RESECTION WITH ORISE INJECTION, HEMACLIP PLACEMENT X 2 POST-POLYPECTOMY, SPOT INK TO LEXY 1.2CM POLYP HEPATIC FLEXURE, POLYPECTOMY performed by Vesta Junior MD at [address] [place] [address] N/A 7/10/2023    CATHETER ADJUSTMENT PERITONEAL DIALYSIS LAPAROSCOPIC ROBOTIC performed by Delmi Hernández MD at [address] [place] [address] N/A 4/24/2023    CATHETER INSERTION PERITONEAL DIALYSIS performed by Delmi Hernández MD at [address] [place] [address]    IR TUNNELED C/Casia 10 5 YEARS  4/19/2023    IR TUNNELED CATHETER PLACEMENT GREATER THAN 5 YEARS 4/19/2023 [address] [place] [address] SPECIAL PROCEDURES    IR TUNNELED CATHETER PLACEMENT GREATER THAN 5 YEARS  7/19/2023    IR TUNNELED CATHETER PLACEMENT GREATER THAN 5 YEARS [facility] SPECIAL PROCEDURES    UMBILICAL HERNIA REPAIR N/A 0/25/9152    HERNIA UMBILICAL REPAIR LAPAROSCOPIC ROBOTIC performed by Delmi Hernández MD at [address] [place] [address]     Past Medical History:   Diagnosis Date    Asthma     Bradycardia

## 2023-07-28 ENCOUNTER — OFFICE VISIT (OUTPATIENT)
Dept: CARDIOLOGY CLINIC | Age: 73
End: 2023-07-28

## 2023-07-28 VITALS
DIASTOLIC BLOOD PRESSURE: 64 MMHG | HEART RATE: 65 BPM | WEIGHT: 187 LBS | SYSTOLIC BLOOD PRESSURE: 110 MMHG | OXYGEN SATURATION: 98 % | BODY MASS INDEX: 31.16 KG/M2 | HEIGHT: 65 IN

## 2023-07-28 DIAGNOSIS — Z95.0 S/P PLACEMENT OF CARDIAC PACEMAKER: ICD-10-CM

## 2023-07-28 DIAGNOSIS — I45.9 HEART BLOCK: ICD-10-CM

## 2023-07-28 DIAGNOSIS — I10 ESSENTIAL HYPERTENSION: ICD-10-CM

## 2023-07-28 DIAGNOSIS — D68.69 HYPERCOAGULABLE STATE DUE TO PAROXYSMAL ATRIAL FIBRILLATION (HCC): ICD-10-CM

## 2023-07-28 DIAGNOSIS — I48.0 PAROXYSMAL ATRIAL FIBRILLATION (HCC): Primary | ICD-10-CM

## 2023-07-28 DIAGNOSIS — I48.0 HYPERCOAGULABLE STATE DUE TO PAROXYSMAL ATRIAL FIBRILLATION (HCC): ICD-10-CM

## 2023-07-28 RX ORDER — DOCUSATE SODIUM 100 MG/1
100 CAPSULE, LIQUID FILLED ORAL 2 TIMES DAILY
COMMUNITY

## 2023-07-28 ASSESSMENT — ENCOUNTER SYMPTOMS
NAUSEA: 0
ABDOMINAL DISTENTION: 0
BACK PAIN: 0
COUGH: 0
WHEEZING: 0
ABDOMINAL PAIN: 0
EYE PAIN: 0
COLOR CHANGE: 0
DIARRHEA: 0
SHORTNESS OF BREATH: 0
CONSTIPATION: 0
BLOOD IN STOOL: 0
PHOTOPHOBIA: 0
VOMITING: 0

## 2023-07-28 NOTE — PROGRESS NOTES
right bundle branch block- s/p dual chamber pacemaker  HTN    Pacemaker interrogated. Patient has not had any more A-fib  Patient denies issues with bleeding  We will continue Eliquis 5 mg twice daily  Patient's metoprolol due to low blood pressure. Discussed with patient that we could restart low dose extended release beta-blocker however we will wait at this time    Blood pressures stable. Continue ProAmatine 10 mg 3 times daily    We will continue to monitor transmissions as scheduled  Patient to follow-up with cardiology as scheduled    Device Assessment:    The device is Medtronic pacemaker - Dual Chamber chamber      MRI Compatible : yes    Device interrogation was performed. Mode: DDD     Sensing is normal. Impedence is normal.  Threshold is normal.     There has not been interval changes. Estimated battery life is 12.5 years     The underlying rhythm is AS,.  26.1 % atrial paced; 97.3 % ventricular paced. Atrial Arrhythmia : No    Non sustained VT episodes : No    Sustained VT episodes : No    Patient activity reported 0.7 hrs/day    The patient is pacemaker dependent. Thanks again for allowing me to participate in care of this patient. Please call me if you have any questions. With best regards. Dianna Dos Santos, APRN - CNP, 7/28/2023 12:58 PM     Please note this report has been partially produced using speech recognition software and may contain errors related to that system including errors in grammar, punctuation, and spelling, as well as words and phrases that may be inappropriate. If there are any questions or concerns please feel free to contact the dictating provider for clarification.

## 2023-08-15 ENCOUNTER — HOSPITAL ENCOUNTER (OUTPATIENT)
Age: 73
Setting detail: SPECIMEN
Discharge: HOME | End: 2023-08-15
Payer: MEDICARE

## 2023-08-15 LAB — POTASSIUM SERPL-SCNC: 3.8 MMOL/L (ref 3.5–5.1)

## 2023-08-15 PROCEDURE — 84132 ASSAY OF SERUM POTASSIUM: CPT

## 2023-08-17 ENCOUNTER — APPOINTMENT (OUTPATIENT)
Dept: CT IMAGING | Age: 73
End: 2023-08-17
Payer: MEDICARE

## 2023-08-17 ENCOUNTER — APPOINTMENT (OUTPATIENT)
Dept: GENERAL RADIOLOGY | Age: 73
End: 2023-08-17
Payer: MEDICARE

## 2023-08-17 ENCOUNTER — HOSPITAL ENCOUNTER (OUTPATIENT)
Age: 73
Setting detail: OBSERVATION
Discharge: HOME HEALTH | End: 2023-08-18
Attending: EMERGENCY MEDICINE | Admitting: STUDENT IN AN ORGANIZED HEALTH CARE EDUCATION/TRAINING PROGRAM
Payer: MEDICARE

## 2023-08-17 DIAGNOSIS — R07.9 CHEST PAIN IN ADULT: Primary | ICD-10-CM

## 2023-08-17 PROBLEM — R10.13 EPIGASTRIC PAIN: Status: ACTIVE | Noted: 2023-08-17

## 2023-08-17 LAB
ALBUMIN SERPL-MCNC: 3.3 GM/DL (ref 3.4–5)
ALP BLD-CCNC: 124 IU/L (ref 40–129)
ALT SERPL-CCNC: 39 U/L (ref 10–40)
ANION GAP SERPL CALCULATED.3IONS-SCNC: 14 MMOL/L (ref 4–16)
AST SERPL-CCNC: 46 IU/L (ref 15–37)
BILIRUB SERPL-MCNC: 0.3 MG/DL (ref 0–1)
BUN SERPL-MCNC: 52 MG/DL (ref 6–23)
CALCIUM SERPL-MCNC: 8.8 MG/DL (ref 8.3–10.6)
CHLORIDE BLD-SCNC: 98 MMOL/L (ref 99–110)
CO2: 24 MMOL/L (ref 21–32)
CREAT SERPL-MCNC: 4.7 MG/DL (ref 0.6–1.1)
D DIMER: 1.32 UG/ML (FEU)
GFR SERPL CREATININE-BSD FRML MDRD: 9 ML/MIN/1.73M2
GLUCOSE BLD-MCNC: 156 MG/DL (ref 70–99)
GLUCOSE SERPL-MCNC: 111 MG/DL (ref 70–99)
HCT VFR BLD CALC: 34.6 % (ref 37–47)
HEMOGLOBIN: 10.7 GM/DL (ref 12.5–16)
LIPASE: 73 IU/L (ref 13–60)
MAGNESIUM: 2.5 MG/DL (ref 1.8–2.4)
MCH RBC QN AUTO: 30.6 PG (ref 27–31)
MCHC RBC AUTO-ENTMCNC: 30.9 % (ref 32–36)
MCV RBC AUTO: 98.9 FL (ref 78–100)
PDW BLD-RTO: 15.3 % (ref 11.7–14.9)
PLATELET # BLD: 115 K/CU MM (ref 140–440)
PMV BLD AUTO: 10 FL (ref 7.5–11.1)
POTASSIUM SERPL-SCNC: 3.8 MMOL/L (ref 3.5–5.1)
PRO-BNP: 7116 PG/ML
RBC # BLD: 3.5 M/CU MM (ref 4.2–5.4)
SODIUM BLD-SCNC: 136 MMOL/L (ref 135–145)
TOTAL PROTEIN: 5.7 GM/DL (ref 6.4–8.2)
TROPONIN T: 0.05 NG/ML
WBC # BLD: 6.7 K/CU MM (ref 4–10.5)

## 2023-08-17 PROCEDURE — 84484 ASSAY OF TROPONIN QUANT: CPT

## 2023-08-17 PROCEDURE — 96374 THER/PROPH/DIAG INJ IV PUSH: CPT

## 2023-08-17 PROCEDURE — 71045 X-RAY EXAM CHEST 1 VIEW: CPT

## 2023-08-17 PROCEDURE — 85379 FIBRIN DEGRADATION QUANT: CPT

## 2023-08-17 PROCEDURE — 83880 ASSAY OF NATRIURETIC PEPTIDE: CPT

## 2023-08-17 PROCEDURE — 99285 EMERGENCY DEPT VISIT HI MDM: CPT

## 2023-08-17 PROCEDURE — 6370000000 HC RX 637 (ALT 250 FOR IP): Performed by: EMERGENCY MEDICINE

## 2023-08-17 PROCEDURE — 85027 COMPLETE CBC AUTOMATED: CPT

## 2023-08-17 PROCEDURE — 93005 ELECTROCARDIOGRAM TRACING: CPT | Performed by: PHYSICIAN ASSISTANT

## 2023-08-17 PROCEDURE — G0378 HOSPITAL OBSERVATION PER HR: HCPCS

## 2023-08-17 PROCEDURE — 6360000004 HC RX CONTRAST MEDICATION: Performed by: EMERGENCY MEDICINE

## 2023-08-17 PROCEDURE — 96375 TX/PRO/DX INJ NEW DRUG ADDON: CPT

## 2023-08-17 PROCEDURE — 71275 CT ANGIOGRAPHY CHEST: CPT

## 2023-08-17 PROCEDURE — 6360000002 HC RX W HCPCS: Performed by: EMERGENCY MEDICINE

## 2023-08-17 PROCEDURE — 83735 ASSAY OF MAGNESIUM: CPT

## 2023-08-17 PROCEDURE — 82962 GLUCOSE BLOOD TEST: CPT

## 2023-08-17 PROCEDURE — 80053 COMPREHEN METABOLIC PANEL: CPT

## 2023-08-17 PROCEDURE — 83690 ASSAY OF LIPASE: CPT

## 2023-08-17 RX ORDER — ONDANSETRON 2 MG/ML
4 INJECTION INTRAMUSCULAR; INTRAVENOUS ONCE
Status: COMPLETED | OUTPATIENT
Start: 2023-08-17 | End: 2023-08-17

## 2023-08-17 RX ORDER — SODIUM CHLORIDE 0.9 % (FLUSH) 0.9 %
5-40 SYRINGE (ML) INJECTION PRN
Status: DISCONTINUED | OUTPATIENT
Start: 2023-08-17 | End: 2023-08-18 | Stop reason: HOSPADM

## 2023-08-17 RX ORDER — INSULIN GLARGINE 100 [IU]/ML
15 INJECTION, SOLUTION SUBCUTANEOUS NIGHTLY
Status: DISCONTINUED | OUTPATIENT
Start: 2023-08-17 | End: 2023-08-18 | Stop reason: HOSPADM

## 2023-08-17 RX ORDER — MORPHINE SULFATE 4 MG/ML
4 INJECTION, SOLUTION INTRAMUSCULAR; INTRAVENOUS
Status: COMPLETED | OUTPATIENT
Start: 2023-08-17 | End: 2023-08-17

## 2023-08-17 RX ORDER — ONDANSETRON 4 MG/1
4 TABLET, ORALLY DISINTEGRATING ORAL EVERY 8 HOURS PRN
Status: DISCONTINUED | OUTPATIENT
Start: 2023-08-17 | End: 2023-08-18 | Stop reason: HOSPADM

## 2023-08-17 RX ORDER — DEXTROSE MONOHYDRATE 100 MG/ML
INJECTION, SOLUTION INTRAVENOUS CONTINUOUS PRN
Status: DISCONTINUED | OUTPATIENT
Start: 2023-08-17 | End: 2023-08-18 | Stop reason: HOSPADM

## 2023-08-17 RX ORDER — SODIUM CHLORIDE 0.9 % (FLUSH) 0.9 %
5-40 SYRINGE (ML) INJECTION EVERY 12 HOURS SCHEDULED
Status: DISCONTINUED | OUTPATIENT
Start: 2023-08-18 | End: 2023-08-18 | Stop reason: HOSPADM

## 2023-08-17 RX ORDER — SENNA AND DOCUSATE SODIUM 50; 8.6 MG/1; MG/1
2 TABLET, FILM COATED ORAL 2 TIMES DAILY
Status: DISCONTINUED | OUTPATIENT
Start: 2023-08-17 | End: 2023-08-18 | Stop reason: HOSPADM

## 2023-08-17 RX ORDER — ROSUVASTATIN CALCIUM 20 MG/1
20 TABLET, COATED ORAL NIGHTLY
Status: DISCONTINUED | OUTPATIENT
Start: 2023-08-18 | End: 2023-08-18 | Stop reason: HOSPADM

## 2023-08-17 RX ORDER — SODIUM CHLORIDE 9 MG/ML
INJECTION, SOLUTION INTRAVENOUS PRN
Status: DISCONTINUED | OUTPATIENT
Start: 2023-08-17 | End: 2023-08-18 | Stop reason: HOSPADM

## 2023-08-17 RX ORDER — GLUCAGON 1 MG/ML
1 KIT INJECTION PRN
Status: DISCONTINUED | OUTPATIENT
Start: 2023-08-17 | End: 2023-08-18 | Stop reason: HOSPADM

## 2023-08-17 RX ORDER — MORPHINE SULFATE 2 MG/ML
2 INJECTION, SOLUTION INTRAMUSCULAR; INTRAVENOUS ONCE
Status: DISCONTINUED | OUTPATIENT
Start: 2023-08-17 | End: 2023-08-18 | Stop reason: HOSPADM

## 2023-08-17 RX ORDER — MAGNESIUM SULFATE IN WATER 40 MG/ML
2000 INJECTION, SOLUTION INTRAVENOUS PRN
Status: DISCONTINUED | OUTPATIENT
Start: 2023-08-17 | End: 2023-08-18 | Stop reason: HOSPADM

## 2023-08-17 RX ORDER — BUMETANIDE 1 MG/1
2 TABLET ORAL DAILY
Status: DISCONTINUED | OUTPATIENT
Start: 2023-08-18 | End: 2023-08-18 | Stop reason: HOSPADM

## 2023-08-17 RX ORDER — FLUOXETINE 10 MG/1
20 CAPSULE ORAL 2 TIMES DAILY
Status: DISCONTINUED | OUTPATIENT
Start: 2023-08-18 | End: 2023-08-18 | Stop reason: HOSPADM

## 2023-08-17 RX ORDER — ACETAMINOPHEN 500 MG
1000 TABLET ORAL
Status: COMPLETED | OUTPATIENT
Start: 2023-08-17 | End: 2023-08-17

## 2023-08-17 RX ORDER — ONDANSETRON 2 MG/ML
4 INJECTION INTRAMUSCULAR; INTRAVENOUS EVERY 6 HOURS PRN
Status: DISCONTINUED | OUTPATIENT
Start: 2023-08-17 | End: 2023-08-18 | Stop reason: HOSPADM

## 2023-08-17 RX ORDER — ACETAMINOPHEN 650 MG/1
650 SUPPOSITORY RECTAL EVERY 6 HOURS PRN
Status: DISCONTINUED | OUTPATIENT
Start: 2023-08-17 | End: 2023-08-18 | Stop reason: HOSPADM

## 2023-08-17 RX ORDER — INSULIN LISPRO 100 [IU]/ML
0-4 INJECTION, SOLUTION INTRAVENOUS; SUBCUTANEOUS
Status: DISCONTINUED | OUTPATIENT
Start: 2023-08-18 | End: 2023-08-18 | Stop reason: HOSPADM

## 2023-08-17 RX ORDER — POTASSIUM CHLORIDE 7.45 MG/ML
10 INJECTION INTRAVENOUS PRN
Status: DISCONTINUED | OUTPATIENT
Start: 2023-08-17 | End: 2023-08-18 | Stop reason: HOSPADM

## 2023-08-17 RX ORDER — ACETAMINOPHEN 500 MG
500 TABLET ORAL
Status: DISCONTINUED | OUTPATIENT
Start: 2023-08-18 | End: 2023-08-18 | Stop reason: HOSPADM

## 2023-08-17 RX ORDER — SIMETHICONE 80 MG
80 TABLET,CHEWABLE ORAL 4 TIMES DAILY
Status: DISCONTINUED | OUTPATIENT
Start: 2023-08-17 | End: 2023-08-18 | Stop reason: HOSPADM

## 2023-08-17 RX ORDER — ACETAMINOPHEN 325 MG/1
650 TABLET ORAL EVERY 6 HOURS PRN
Status: DISCONTINUED | OUTPATIENT
Start: 2023-08-17 | End: 2023-08-18 | Stop reason: HOSPADM

## 2023-08-17 RX ORDER — INSULIN LISPRO 100 [IU]/ML
0-4 INJECTION, SOLUTION INTRAVENOUS; SUBCUTANEOUS NIGHTLY
Status: DISCONTINUED | OUTPATIENT
Start: 2023-08-17 | End: 2023-08-18 | Stop reason: HOSPADM

## 2023-08-17 RX ORDER — POLYETHYLENE GLYCOL 3350 17 G/17G
17 POWDER, FOR SOLUTION ORAL 2 TIMES DAILY
Status: DISCONTINUED | OUTPATIENT
Start: 2023-08-17 | End: 2023-08-18 | Stop reason: HOSPADM

## 2023-08-17 RX ORDER — DOCUSATE SODIUM 100 MG/1
100 CAPSULE, LIQUID FILLED ORAL 2 TIMES DAILY
Status: DISCONTINUED | OUTPATIENT
Start: 2023-08-17 | End: 2023-08-18 | Stop reason: HOSPADM

## 2023-08-17 RX ADMIN — ONDANSETRON 4 MG: 2 INJECTION INTRAMUSCULAR; INTRAVENOUS at 22:19

## 2023-08-17 RX ADMIN — ACETAMINOPHEN 1000 MG: 500 TABLET ORAL at 22:19

## 2023-08-17 RX ADMIN — MORPHINE SULFATE 4 MG: 4 INJECTION, SOLUTION INTRAMUSCULAR; INTRAVENOUS at 22:18

## 2023-08-17 RX ADMIN — IOPAMIDOL 75 ML: 755 INJECTION, SOLUTION INTRAVENOUS at 22:34

## 2023-08-17 ASSESSMENT — PAIN DESCRIPTION - DESCRIPTORS: DESCRIPTORS: ACHING

## 2023-08-17 ASSESSMENT — PAIN SCALES - GENERAL
PAINLEVEL_OUTOF10: 6
PAINLEVEL_OUTOF10: 4
PAINLEVEL_OUTOF10: 8

## 2023-08-17 ASSESSMENT — PAIN - FUNCTIONAL ASSESSMENT
PAIN_FUNCTIONAL_ASSESSMENT: 0-10
PAIN_FUNCTIONAL_ASSESSMENT: ACTIVITIES ARE NOT PREVENTED

## 2023-08-17 ASSESSMENT — PAIN DESCRIPTION - PAIN TYPE: TYPE: ACUTE PAIN

## 2023-08-17 ASSESSMENT — PAIN DESCRIPTION - ONSET: ONSET: ON-GOING

## 2023-08-17 ASSESSMENT — PAIN DESCRIPTION - LOCATION
LOCATION: CHEST
LOCATION: BACK

## 2023-08-17 ASSESSMENT — PAIN DESCRIPTION - FREQUENCY: FREQUENCY: CONTINUOUS

## 2023-08-17 ASSESSMENT — PAIN DESCRIPTION - ORIENTATION: ORIENTATION: UPPER

## 2023-08-17 NOTE — ED PROVIDER NOTES
Emergency Department Encounter    Patient: Naaman Cranker  MRN: [id number]  : 1950  Date of Evaluation: 2023  ED Provider:  Everett Bui PA-C    Triage Chief Complaint:   Chest Pain (Chest pain, SOB readiates to back, left arm and hand since 2pm today)    [place]:  Naaman Cranker is a 74 y.o. female past medical with past medical history of bradycardia status post pacemaker, hypothyroidism, hypertension, hyperlipidemia with an EF last measured at 54 to 60% that presents presents emergency department chief complaint of chest pain with shortness of breath, has been ongoing since 1400 hrs. local while at rest.  Reports the chest pain is left-sided just underneath her left breast feels like a stabbing sensation is unrelenting. Has tried Tylenol approximately 2-1/2 hours ago which did not improve the pain. Reports that the pain does not go into her abdomen, is associated with a gas-like sensation like she needs to burp or vomit and nausea. Denies ripping tearing sensation. Has not had a cath placed in the past, and has not undergone cardiac bypass surgery did have an echocardiogram and perfusion test are performed in 2019. Nuclear stress test did show moderate inferior lateral wall ischemia with ejection fraction 55%    ROS - see HPI, below listed is current ROS at time of my eval:  10 systems reviewed and negative except as above.      Past Medical History:   Diagnosis Date    Asthma     Bradycardia     Bronchitis     COPD (chronic obstructive pulmonary disease) ([address])     H/O echocardiogram 2019    EF 55-60%, Grade II Diastolic Dysfunction, Left atrium is moderately dilated, no significant valvular disease, Mild Pulm HTN, No pericardial effusion     History of nuclear stress test 2019    ABN, Moderate inferior and lateral wall ischemia of a large territory, EF 55%    Hypercholesteremia     Hyperlipidemia     Hypertension     Hyperthyroidism     Hypothyroidism     Kidney disease Impression:  1. Chest pain in adult      Disposition referral (if applicable):  Mandy Constantino MD  [address] [facility] [address]  [phone]    Follow up in 1 week(s)  Need for possible heart cath    Troy SARAH Rice - FELIPE  [address] [facility]  [phone]    Follow up in 1 week(s)  Hospital follow up visit. Disposition medications (if applicable):  Discharge Medication List as of 8/18/2023  2:59 PM        ED Provider Disposition Time  DISPOSITION Admitted 08/17/2023 10:56:50 PM      Comment: Please note this report has been produced using speech recognition software and may contain errors related to that system including errors in grammar, punctuation, and spelling, as well as words and phrases that may be inappropriate. Efforts were made to edit the dictations.        Scott Desouza PA-C  08/17/23 [id number]  [address] [facility] PA[facility]  08/25/23 1049

## 2023-08-17 NOTE — ED PROVIDER NOTES
I independently examined and evaluated Werner Perez. In brief, patient presents emergency department with chest pain. Focused exam revealed lungs are clear. CC/HPI Summary, DDx, ED Course, and Reassessment: Patient with a history of chronic kidney disease and peritoneal dialysis    Concern for pulmonary embolism is raised she has had pleuritic chest pain and has an elevated D-dimer    She also has significant risk factors for heart disease so whether she has a pulmonary embolism or not the plan is to keep her in the hospital    Heart score of 4         History from : Family patient and family        Patient was given the following medications:  Medications   morphine (PF) injection 2 mg (2 mg IntraVENous Not Given 8/17/23 2235)   morphine sulfate (PF) injection 4 mg (4 mg IntraVENous Given 8/17/23 2218)   ondansetron (ZOFRAN) injection 4 mg (4 mg IntraVENous Given 8/17/23 2219)   acetaminophen (TYLENOL) tablet 1,000 mg (1,000 mg Oral Given 8/17/23 2219)   iopamidol (ISOVUE-370) 76 % injection 75 mL (75 mLs IntraVENous Given 8/17/23 2234)       EKG (if obtained): (All EKG's are interpreted by myself in the absence of a cardiologist) EKG does not demonstrate any evidence of ischemia or acute changes from previous EKG    Chronic conditions affecting care: Hypertension, hyperkalemia, chronic kidney disease                Disposition Considerations (tests considered but not done, Shared Decision Making, Pt Expectation of Test or Tx.): Patient is to admit be admitted to the hospital for further treatment and care    I am the Primary Clinician of Record. All diagnostic, treatment, and disposition decisions were made by myself in conjunction with the advanced practice provider. I personally saw the patient and performed a substantive portion of the visit including all aspects of the medical decision making.      For all further details of the patient's emergency department visit, please see the

## 2023-08-18 ENCOUNTER — APPOINTMENT (OUTPATIENT)
Dept: NUCLEAR MEDICINE | Age: 73
End: 2023-08-18
Payer: MEDICARE

## 2023-08-18 VITALS
SYSTOLIC BLOOD PRESSURE: 109 MMHG | DIASTOLIC BLOOD PRESSURE: 46 MMHG | WEIGHT: 202.1 LBS | OXYGEN SATURATION: 100 % | RESPIRATION RATE: 16 BRPM | HEIGHT: 65 IN | BODY MASS INDEX: 33.67 KG/M2 | HEART RATE: 63 BPM | TEMPERATURE: 98 F

## 2023-08-18 LAB
ANION GAP SERPL CALCULATED.3IONS-SCNC: 14 MMOL/L (ref 4–16)
BASOPHILS ABSOLUTE: 0 K/CU MM
BASOPHILS RELATIVE PERCENT: 0.5 % (ref 0–1)
BUN SERPL-MCNC: 56 MG/DL (ref 6–23)
CALCIUM SERPL-MCNC: 8.1 MG/DL (ref 8.3–10.6)
CHLORIDE BLD-SCNC: 101 MMOL/L (ref 99–110)
CO2: 20 MMOL/L (ref 21–32)
CREAT SERPL-MCNC: 4.9 MG/DL (ref 0.6–1.1)
DIFFERENTIAL TYPE: ABNORMAL
EKG ATRIAL RATE: 60 BPM
EKG ATRIAL RATE: 68 BPM
EKG DIAGNOSIS: NORMAL
EKG DIAGNOSIS: NORMAL
EKG P AXIS: 96 DEGREES
EKG P-R INTERVAL: 168 MS
EKG P-R INTERVAL: 180 MS
EKG Q-T INTERVAL: 508 MS
EKG Q-T INTERVAL: 532 MS
EKG QRS DURATION: 172 MS
EKG QRS DURATION: 198 MS
EKG QTC CALCULATION (BAZETT): 532 MS
EKG QTC CALCULATION (BAZETT): 540 MS
EKG R AXIS: -72 DEGREES
EKG R AXIS: -79 DEGREES
EKG T AXIS: 87 DEGREES
EKG T AXIS: 93 DEGREES
EKG VENTRICULAR RATE: 60 BPM
EKG VENTRICULAR RATE: 68 BPM
EOSINOPHILS ABSOLUTE: 0 K/CU MM
EOSINOPHILS RELATIVE PERCENT: 0.3 % (ref 0–3)
ESTIMATED AVERAGE GLUCOSE: 120 MG/DL
GFR SERPL CREATININE-BSD FRML MDRD: 9 ML/MIN/1.73M2
GLUCOSE BLD-MCNC: 120 MG/DL (ref 70–99)
GLUCOSE SERPL-MCNC: 124 MG/DL (ref 70–99)
HBA1C MFR BLD: 5.8 % (ref 4.2–6.3)
HCT VFR BLD CALC: 31.6 % (ref 37–47)
HEMOGLOBIN: 9.9 GM/DL (ref 12.5–16)
IMMATURE NEUTROPHIL %: 0.3 % (ref 0–0.43)
LV EF: 43 %
LV EF: 45 %
LVEF MODALITY: NORMAL
LVEF MODALITY: NORMAL
LYMPHOCYTES ABSOLUTE: 1.5 K/CU MM
LYMPHOCYTES RELATIVE PERCENT: 23.2 % (ref 24–44)
MCH RBC QN AUTO: 31.1 PG (ref 27–31)
MCHC RBC AUTO-ENTMCNC: 31.3 % (ref 32–36)
MCV RBC AUTO: 99.4 FL (ref 78–100)
MONOCYTES ABSOLUTE: 0.8 K/CU MM
MONOCYTES RELATIVE PERCENT: 12 % (ref 0–4)
NUCLEATED RBC %: 0 %
PDW BLD-RTO: 15.4 % (ref 11.7–14.9)
PLATELET # BLD: 93 K/CU MM (ref 140–440)
PMV BLD AUTO: 10.3 FL (ref 7.5–11.1)
POTASSIUM SERPL-SCNC: 4.6 MMOL/L (ref 3.5–5.1)
RBC # BLD: 3.18 M/CU MM (ref 4.2–5.4)
SEGMENTED NEUTROPHILS ABSOLUTE COUNT: 4.2 K/CU MM
SEGMENTED NEUTROPHILS RELATIVE PERCENT: 63.7 % (ref 36–66)
SODIUM BLD-SCNC: 135 MMOL/L (ref 135–145)
TOTAL IMMATURE NEUTOROPHIL: 0.02 K/CU MM
TOTAL NUCLEATED RBC: 0 K/CU MM
TROPONIN T: 0.05 NG/ML
WBC # BLD: 6.6 K/CU MM (ref 4–10.5)

## 2023-08-18 PROCEDURE — 6370000000 HC RX 637 (ALT 250 FOR IP): Performed by: STUDENT IN AN ORGANIZED HEALTH CARE EDUCATION/TRAINING PROGRAM

## 2023-08-18 PROCEDURE — A9500 TC99M SESTAMIBI: HCPCS

## 2023-08-18 PROCEDURE — 93017 CV STRESS TEST TRACING ONLY: CPT

## 2023-08-18 PROCEDURE — 80048 BASIC METABOLIC PNL TOTAL CA: CPT

## 2023-08-18 PROCEDURE — 6360000002 HC RX W HCPCS: Performed by: INTERNAL MEDICINE

## 2023-08-18 PROCEDURE — 3430000000 HC RX DIAGNOSTIC RADIOPHARMACEUTICAL

## 2023-08-18 PROCEDURE — 93010 ELECTROCARDIOGRAM REPORT: CPT | Performed by: INTERNAL MEDICINE

## 2023-08-18 PROCEDURE — 85025 COMPLETE CBC W/AUTO DIFF WBC: CPT

## 2023-08-18 PROCEDURE — 93308 TTE F-UP OR LMTD: CPT

## 2023-08-18 PROCEDURE — 94761 N-INVAS EAR/PLS OXIMETRY MLT: CPT

## 2023-08-18 PROCEDURE — 82962 GLUCOSE BLOOD TEST: CPT

## 2023-08-18 PROCEDURE — 83036 HEMOGLOBIN GLYCOSYLATED A1C: CPT

## 2023-08-18 PROCEDURE — 93005 ELECTROCARDIOGRAM TRACING: CPT | Performed by: INTERNAL MEDICINE

## 2023-08-18 PROCEDURE — 36415 COLL VENOUS BLD VENIPUNCTURE: CPT

## 2023-08-18 PROCEDURE — 96376 TX/PRO/DX INJ SAME DRUG ADON: CPT

## 2023-08-18 PROCEDURE — G0378 HOSPITAL OBSERVATION PER HR: HCPCS

## 2023-08-18 PROCEDURE — 6360000002 HC RX W HCPCS: Performed by: FAMILY MEDICINE

## 2023-08-18 PROCEDURE — 78452 HT MUSCLE IMAGE SPECT MULT: CPT

## 2023-08-18 PROCEDURE — 6360000004 HC RX CONTRAST MEDICATION

## 2023-08-18 PROCEDURE — 99223 1ST HOSP IP/OBS HIGH 75: CPT | Performed by: INTERNAL MEDICINE

## 2023-08-18 PROCEDURE — 84484 ASSAY OF TROPONIN QUANT: CPT

## 2023-08-18 RX ORDER — METOPROLOL SUCCINATE 25 MG/1
12.5 TABLET, EXTENDED RELEASE ORAL DAILY
Qty: 30 TABLET | Refills: 3 | Status: SHIPPED | OUTPATIENT
Start: 2023-08-18

## 2023-08-18 RX ORDER — METOPROLOL SUCCINATE 25 MG/1
12.5 TABLET, EXTENDED RELEASE ORAL DAILY
Qty: 30 TABLET | Refills: 3 | Status: SHIPPED | OUTPATIENT
Start: 2023-08-18 | End: 2023-08-18 | Stop reason: SDUPTHER

## 2023-08-18 RX ORDER — MORPHINE SULFATE 2 MG/ML
2 INJECTION, SOLUTION INTRAMUSCULAR; INTRAVENOUS ONCE
Status: COMPLETED | OUTPATIENT
Start: 2023-08-18 | End: 2023-08-18

## 2023-08-18 RX ORDER — REGADENOSON 0.08 MG/ML
0.4 INJECTION, SOLUTION INTRAVENOUS
Status: COMPLETED | OUTPATIENT
Start: 2023-08-18 | End: 2023-08-18

## 2023-08-18 RX ORDER — TETRAKIS(2-METHOXYISOBUTYLISOCYANIDE)COPPER(I) TETRAFLUOROBORATE 1 MG/ML
30 INJECTION, POWDER, LYOPHILIZED, FOR SOLUTION INTRAVENOUS
Status: COMPLETED | OUTPATIENT
Start: 2023-08-18 | End: 2023-08-18

## 2023-08-18 RX ORDER — METOPROLOL SUCCINATE 25 MG/1
12.5 TABLET, EXTENDED RELEASE ORAL DAILY
Status: DISCONTINUED | OUTPATIENT
Start: 2023-08-18 | End: 2023-08-18 | Stop reason: HOSPADM

## 2023-08-18 RX ORDER — TETRAKIS(2-METHOXYISOBUTYLISOCYANIDE)COPPER(I) TETRAFLUOROBORATE 1 MG/ML
10 INJECTION, POWDER, LYOPHILIZED, FOR SOLUTION INTRAVENOUS
Status: COMPLETED | OUTPATIENT
Start: 2023-08-18 | End: 2023-08-18

## 2023-08-18 RX ADMIN — SENNOSIDES AND DOCUSATE SODIUM 2 TABLET: 50; 8.6 TABLET ORAL at 00:25

## 2023-08-18 RX ADMIN — ACETAMINOPHEN 500 MG: 500 TABLET ORAL at 06:27

## 2023-08-18 RX ADMIN — ACETAMINOPHEN 650 MG: 325 TABLET ORAL at 00:26

## 2023-08-18 RX ADMIN — APIXABAN 5 MG: 5 TABLET, FILM COATED ORAL at 00:54

## 2023-08-18 RX ADMIN — LEVOTHYROXINE SODIUM 125 MCG: 25 TABLET ORAL at 06:25

## 2023-08-18 RX ADMIN — PERFLUTREN: 6.52 INJECTION, SUSPENSION INTRAVENOUS at 08:55

## 2023-08-18 RX ADMIN — ROSUVASTATIN CALCIUM 20 MG: 20 TABLET, FILM COATED ORAL at 00:26

## 2023-08-18 RX ADMIN — MORPHINE SULFATE 2 MG: 2 INJECTION, SOLUTION INTRAMUSCULAR; INTRAVENOUS at 04:19

## 2023-08-18 RX ADMIN — KIT FOR THE PREPARATION OF TECHNETIUM TC99M SESTAMIBI 10 MILLICURIE: 1 INJECTION, POWDER, LYOPHILIZED, FOR SOLUTION PARENTERAL at 10:20

## 2023-08-18 RX ADMIN — KIT FOR THE PREPARATION OF TECHNETIUM TC99M SESTAMIBI 30 MILLICURIE: 1 INJECTION, POWDER, LYOPHILIZED, FOR SOLUTION PARENTERAL at 11:35

## 2023-08-18 RX ADMIN — DOCUSATE SODIUM 100 MG: 100 CAPSULE, LIQUID FILLED ORAL at 00:26

## 2023-08-18 RX ADMIN — ACETAMINOPHEN 650 MG: 325 TABLET ORAL at 08:37

## 2023-08-18 RX ADMIN — REGADENOSON 0.4 MG: 0.08 INJECTION, SOLUTION INTRAVENOUS at 11:29

## 2023-08-18 SDOH — ECONOMIC STABILITY: FOOD INSECURITY: WITHIN THE PAST 12 MONTHS, THE FOOD YOU BOUGHT JUST DIDN'T LAST AND YOU DIDN'T HAVE MONEY TO GET MORE.: NEVER TRUE

## 2023-08-18 SDOH — ECONOMIC STABILITY: HOUSING INSECURITY
IN THE LAST 12 MONTHS, WAS THERE A TIME WHEN YOU DID NOT HAVE A STEADY PLACE TO SLEEP OR SLEPT IN A SHELTER (INCLUDING NOW)?: NO

## 2023-08-18 SDOH — ECONOMIC STABILITY: HOUSING INSECURITY: IN THE LAST 12 MONTHS, HOW MANY PLACES HAVE YOU LIVED?: 1

## 2023-08-18 SDOH — ECONOMIC STABILITY: FOOD INSECURITY: WITHIN THE PAST 12 MONTHS, YOU WORRIED THAT YOUR FOOD WOULD RUN OUT BEFORE YOU GOT MONEY TO BUY MORE.: NEVER TRUE

## 2023-08-18 SDOH — ECONOMIC STABILITY: INCOME INSECURITY: IN THE LAST 12 MONTHS, WAS THERE A TIME WHEN YOU WERE NOT ABLE TO PAY THE MORTGAGE OR RENT ON TIME?: NO

## 2023-08-18 ASSESSMENT — PAIN DESCRIPTION - PAIN TYPE
TYPE: ACUTE PAIN
TYPE: ACUTE PAIN

## 2023-08-18 ASSESSMENT — PATIENT HEALTH QUESTIONNAIRE - PHQ9
1. LITTLE INTEREST OR PLEASURE IN DOING THINGS: NOT AT ALL
SUM OF ALL RESPONSES TO PHQ9 QUESTIONS 1 & 2: 0
2. FEELING DOWN, DEPRESSED OR HOPELESS: NOT AT ALL

## 2023-08-18 ASSESSMENT — PAIN - FUNCTIONAL ASSESSMENT
PAIN_FUNCTIONAL_ASSESSMENT: ACTIVITIES ARE NOT PREVENTED
PAIN_FUNCTIONAL_ASSESSMENT: PREVENTS OR INTERFERES SOME ACTIVE ACTIVITIES AND ADLS

## 2023-08-18 ASSESSMENT — PAIN DESCRIPTION - LOCATION
LOCATION: CHEST
LOCATION: CHEST
LOCATION: CHEST;BACK

## 2023-08-18 ASSESSMENT — PAIN DESCRIPTION - DESCRIPTORS
DESCRIPTORS: THROBBING
DESCRIPTORS: DISCOMFORT;DULL;ACHING
DESCRIPTORS: PRESSURE

## 2023-08-18 ASSESSMENT — PAIN SCALES - GENERAL
PAINLEVEL_OUTOF10: 8
PAINLEVEL_OUTOF10: 3
PAINLEVEL_OUTOF10: 6
PAINLEVEL_OUTOF10: 7
PAINLEVEL_OUTOF10: 9

## 2023-08-18 ASSESSMENT — PAIN DESCRIPTION - ONSET
ONSET: PROGRESSIVE
ONSET: PROGRESSIVE

## 2023-08-18 ASSESSMENT — PAIN DESCRIPTION - ORIENTATION
ORIENTATION: ANTERIOR
ORIENTATION: UPPER
ORIENTATION: LEFT;MID

## 2023-08-18 ASSESSMENT — PAIN DESCRIPTION - FREQUENCY
FREQUENCY: CONTINUOUS
FREQUENCY: CONTINUOUS

## 2023-08-18 NOTE — PROGRESS NOTES
08/18/23 1109   Encounter Summary   Encounter Overview/Reason  Initial Encounter   Service Provided For: Patient   Referral/Consult From: [facility] Spouse; Children   Last Encounter  08/18/23  (Patient was in good spirit at the time of visit.)   Complexity of Encounter Low   Begin Time 1110   End Time  1126   Total Time Calculated 16 min   Spiritual/Emotional needs   Type Spiritual Support   Grief, Loss, and Adjustments   Type Adjustment to illness   Assessment/Intervention/Outcome   Assessment Coping   Intervention Sustaining Presence/Ministry of presence   Plan and Referrals   Plan/Referrals No future visits requested        08/18/23 1109   Encounter Summary   Encounter Overview/Reason  Initial Encounter   Service Provided For: Patient   Referral/Consult From: [facility] Spouse; Children   Last Encounter  08/18/23  (Patient was in good spirit at the time of visit.)   Complexity of Encounter Low   Begin Time 1110   End Time  1126   Total Time Calculated 16 min   Spiritual/Emotional needs   Type Spiritual Support   Grief, Loss, and Adjustments   Type Adjustment to illness   Assessment/Intervention/Outcome   Assessment Coping   Intervention Sustaining Presence/Ministry of presence   Plan and Referrals   Plan/Referrals No future visits requested

## 2023-08-18 NOTE — DISCHARGE SUMMARY
Administration route: I.V. Administration route: I.V. Injection Date:08/18/2023 10:20  Injection Date:08/18/2023 11:35  Scan Date:08/18/2023 11:05       Scan Date:08/18/2023 12:20   Technique:        SPECT          Technique:        Gated                                                     SPECT   Procedure Description   Upon patient arrival, the patient is identified using two identifiers and  the physician order is verified. An IV is established and 8-11mCi of 99mTc  Sestamibi is intravenously injected and followed with 10mL 0.9% Normal  Saline flush. A circulation period of 45 minutes occurs prior to resting  SPECT imaging. After imaging is complete the patient is escorted to the  stress lab. The patient is connected to the ECG and blood pressure is  measured. The RN starts the stress portion of the exam and rapidly  intravenously injects Lexiscan (regadenosine) 0.4mg over a period of 10 to15  seconds and follows with 5mL 0.9% Normal Saline flush. Immediately following  the Nuclear Technologist intravenously injects 22-33mCi of 99mTc Sestamibi  and 5mL 0.9% Normal Saline flush. After completion, recovery, and removal of  the IV, the patient rests during the second circulation period of 45  minutes. Final stress SPECT gated imaging is performed. The patient may  return home or to their room after stress imaging. The images are processed  and final charting is completed and sent to the appropriate cardiologist for  interpretation and reporting. Perfusion Interpretation   Normal tracer uptake in all segments of myocardium on stress and rest  images. No infarct or ischemia noted. Normal EF 43 % with normal ventricular contractility.   Imaging Results   Applied corrections   - Motion correction  applied     Summed scores     - Summed stress score: 10     - Summed rest score: 9     - Summed difference score:    1     Stress ejection    Ejection fraction:43 %    EDV :168 ml    ESV :96 ml    Stroke volume :72 Discharging patient 35 minutes    Electronically signed by SARAH Aleman CNP on 8/18/2023 at 1:16 PM

## 2023-08-18 NOTE — PROGRESS NOTES
4 Eyes Skin Assessment     NAME:  Anika Wells  YOB: 1950  MEDICAL RECORD NUMBER:  [id number]    The patient is being assessed for  Admission    I agree that at least one RN has performed a thorough Head to Toe Skin Assessment on the patient. ALL assessment sites listed below have been assessed. Areas assessed by both nurses:    Head, Face, Ears, Shoulders, Back, Chest, Arms, Elbows, Hands, Sacrum. Buttock, Coccyx, Ischium, and Legs. Feet and Heels        Does the Patient have a Wound?  No noted wound(s)       Juan Alberto Prevention initiated by RN: No  Wound Care Orders initiated by RN: No    Pressure Injury (Stage 3,4, Unstageable, DTI, NWPT, and Complex wounds) if present, place Wound referral order by RN under : No    New Ostomies, if present place, Ostomy referral order under : No     Nurse 1 eSignature: Electronically signed by Ros Servin RN on 8/18/23 at 1:47 AM EDT    **SHARE this note so that the co-signing nurse can place an eSignature**    Nurse 2 eSignature: Electronically signed by Kathie Jacobs RN on 8/18/23 at 4:33 PM EDT

## 2023-08-18 NOTE — PROGRESS NOTES
Pt c/o chest pressure. VSS. Sent message to cardiologist who was consulted for this pt to notify of pressure and inquire about prn pain med. Instructed to call hospitalist and he would see pt in the a.m. Notified NP, Lucita Her. Ekg obtained, labs ordered, o2 placed on pt. Will continue to monitor.

## 2023-08-18 NOTE — H&P
History and Physical      Name:  Setnhil Vincent /Age/Sex: 1950  (74 y.o. female)   MRN & CSN:  [id number] & [id number] Encounter Date/Time: 2023 9:23 PM EDT   Location:  ED17/ED-17 PCP: Bobby Flores [address] [place] [address]  Day: 1    Assessment and Plan:     Patient is a 78-year-old female who presented with epigastric pain. # Epigastric pain  - Endorsed persistent, nonexertional epigastric pain starting afternoon , improved with flatulence. Also reported some radiation to left shoulder, although attributes shoulder pain to improper sleeping position the night prior. Has chronic constipation, had very small BM on .   - Dull to percussion over LLQ. Labs unremarkable, CTPA negative. Scheduled Gas-X with scheduled bowel regimen. # Non-MI troponin elevation  - Denied any typical CP. LHC in 2019 with no significant CAD. - Initial Tn mildly elevated. ECG without acute ischemic changes. - Likely secondary to decreased clearance in setting of ESRD. Follow-up repeat Tn. On Eliquis and Crestor. # ESRD on PD  - Non-oliguric, has PD without acute issues with filling or draining.  - Labs stable. Continue Bumex.  - Consider Nephrology consult if has prolonged hospitalization. # Paroxysmal atrial fibrillation  - Continue Eliquis. # T2DM  - Last A1c 6.0% in 2023, repeat pending.   - Continue home Insulin Lantus 15 u qhs with LCSI. # Acquired hypothyroidism  - Continue Synthroid. Checklist:  Advanced directive: full  Diet: NPO past midnight pending Cardiology evaluation  DVT ppx: Eliquis  Sugar: BG goal of 140-180 while inpatient    Disposition: place in observation. Estimated discharge: 1-2 day(s). Current living situation: home. Expected disposition: home. Spoke with ED provider who recommended admission for the patient and I agree with that plan. Personally reviewed lab studies and imaging. EKG interpreted personally and results as stated above.   Imaging that was she only now takes this as needed. Now on peritoneal dialysis 5/5/23   VASU Piña MD   levothyroxine (SYNTHROID) 150 MCG tablet Take 1 tablet by mouth Daily  Patient taking differently: Take 125 mcg by mouth Daily 5/6/23   VASU Piña MD   acetaminophen (TYLENOL) 500 MG tablet Take 1 tablet by mouth three times daily    Historical Provider, MD   vitamin D (CHOLECALCIFEROL) 25 MCG (1000 UT) TABS tablet Take 2 tablets by mouth daily    Historical Provider, MD   FLUoxetine (PROZAC) 20 MG capsule Take 1 capsule by mouth 2 times daily    Historical Provider, MD       Medications:     Medications:    morphine  2 mg IntraVENous Once        Infusions:       PRN Meds:        Data:     CBC:   Recent Labs     08/17/23  1857   WBC 6.7   HGB 10.7*   *   MCV 98.9   RDW 15.3*     CMP:    Recent Labs     08/15/23  1400 08/17/23  1857   NA  --  136   K 3.8 3.8   CL  --  98*   CO2  --  24   BUN  --  52*   CREATININE  --  4.7*   GLUCOSE  --  111*   LABALBU  --  3.3*   CALCIUM  --  8.8   BILITOT  --  0.3   ALKPHOS  --  124   AST  --  46*   ALT  --  39     Lipids:   Lab Results   Component Value Date/Time    CHOL 89 04/14/2023 01:12 PM    HDL 45 04/14/2023 01:12 PM    TRIG 59 04/14/2023 01:12 PM     Hemoglobin A1C:   Lab Results   Component Value Date/Time    LABA1C 6.0 04/14/2023 01:12 PM     TSH: No results found for: TSH  Troponin:   Lab Results   Component Value Date/Time    TROPONINT 0.051 08/17/2023 06:57 PM    TROPONINT 0.046 07/01/2023 10:24 AM    TROPONINT 0.045 07/01/2023 03:32 AM     BNP:   Recent Labs     08/17/23  1857   PROBNP 7,116*     Lactic Acid: No results for input(s): LACTA in the last 72 hours.   UA:  Lab Results   Component Value Date/Time    NITRU NEGATIVE 07/01/2023 02:05 PM    COLORU YELLOW 07/01/2023 02:05 PM    [address] [facility] 912 07/01/2023 02:05 PM    RBCUA 152 07/01/2023 02:05 PM    MUCUS RARE 07/01/2023 02:05 PM    TRICHOMONAS NONE SEEN 07/01/2023 02:05 PM    BACTERIA NEGATIVE 07/01/2023 02:05 PM

## 2023-08-18 NOTE — CONSULTS
INPATIENT CARDIOLOGY CONSULT NOTE         Reason for consultation:   Epigastric pain    Chief Complaint   Patient presents with    Chest Pain     Chest pain, SOB readiates to back, left arm and hand since 2pm today       History of present illness:Malena is a 74 y. o.year old who is admitted with   Chief Complaint   Patient presents with    Chest Pain     Chest pain, SOB readiates to back, left arm and hand since 2pm today     Patient is a 49-year-old female who follows up with Dr. Juliano Epps as outpatient has prior medical history significant for paroxysmal atrial fibrillation severe pulm hypertension renal failure cardiomyopathy, s/p dual-chamber pacer for complete heart block, history of normal coronary angiogram in 2019     Patient is also on peritoneal dialysis at home every night    She presents to the hospital with chief complaint of epigastric pain cardiology consulted for evaluation  - Primary complaint is epigastric pain which is improved with increased flatulence. She did endorse some shoulder discomfort however is positional during sleep. Denies any orthopnea PND edema. No fever chills rigors noted.     Chronically elevated cardiac troponin in the setting of renal failure currently non-ACS trend  Electrocardiogram shows paced rhythm       Pertinent Lab Personally Review     Recent Labs     08/18/23  0425   WBC 6.6   HGB 9.9*   HCT 31.6*   PLT 93*      Recent Labs     08/18/23  0425      K 4.6      CO2 20*   BUN 56*   CREATININE 4.9*     Recent Labs     08/17/23  1857   AST 46*   ALT 39   BILITOT 0.3   ALKPHOS 124     Recent Labs     08/17/23  1857 08/18/23  0425   TROPONINT 0.051* 0.046*     Lab Results   Component Value Date    PROBNP 7,116 (H) 08/17/2023    PROBNP 7,608 (H) 06/30/2023    PROBNP 28,155 (H) 04/14/2023         Past medical history:    has a past medical history of Asthma, Bradycardia, Bronchitis, COPD (chronic obstructive pulmonary disease) ([address]), H/O echocardiogram, History echocardiogram, History of nuclear stress test, Hypercholesteremia, Hyperlipidemia, Hypertension, Hyperthyroidism, Hypothyroidism, Kidney disease, Pneumonia, Type II or unspecified type diabetes mellitus without mention of complication, not stated as uncontrolled, and Unspecified sleep apnea. Medical Decision Making :       Epigastric/abdominal pain  No typical chest pain  Cardiomyopathy  History of nonobstructive angiogram    Patient has prior history of normal stress test in 2019, normal coronary angiogram 2019. She however had an echocardiogram earlier this year which showed LV ejection fraction of around 35 to 40%. In this context, we will proceed with stress MPI to further risk stratify. History of atrial fibrillation  Complete heart block s/p perm pacemaker    On Eliquis continue.   Right controlled , with paced rhythm at 60 bpm    Abdominal pain: Defer management to primary team   End-stage renal disease on peritoneal dialysis  Diabetes mellitus         Prior documentations in EMR were personally reviewed at length  EKGs, labs, imaging were personally reviewed and interpreted  Case discussed with  Nursing Staff     Thank you for the consult       Dr. Steve Lester  8/18/2023 12:20 PM

## 2023-08-18 NOTE — PROGRESS NOTES
Stress test negative for ischemia. Patient may need further ischemic workup, but will defer to primary cardiologist Dr. Brodie Omer. Will have patient follow up in clinic in next 1-2 weeks. Echo showing reduced EF, but similar to prior.     Marli Gross PA-C 08/18/23 1:11 PM

## 2023-08-18 NOTE — CONSULTS
Nephrology Service Consultation      [address], [facility], [address] [place] [address]  Phone: [phone]  Office Hours: 8:30AM - 4:30PM  Monday - Friday        MEDICAL DECISION MAKING and Recommendations   -PLEURitic pain/chest pain  -ESRD on PD  -Small right and trace left pleural effusion  -Small pericardial effusion  -RECENT Pacemaker placement  -CHF hx  -Anemia of ESRD  -Constipation hx  -Cirrhosis     Suggest:  -Continue PD tonight if she is in an actual room.  PD CAN ONLY BE DONE ONCE SHE IS IN A ROOM AO HOPEFULLY SHE ENDS UP IN AN ACTUAL ROOM BY THE TIME PD IS DUE TODAY  -Cardiology consult  -Will follow  -Continue bumex  -Continue stool softener    Thank you    Patient Active Problem List    Diagnosis Date Noted    Epigastric pain 08/17/2023    Pleural effusion on right 06/30/2023    Generalized weakness 05/01/2023    Gait disturbance 05/01/2023    Paroxysmal atrial fibrillation ([address]) 05/01/2023    Heart block, AV 05/01/2023    Uncontrolled type 2 diabetes mellitus with hyperglycemia ([address]) 05/01/2023    Simple chronic bronchitis ([address]) 05/01/2023    Moderate malnutrition ([address]) 04/24/2023    Cardiac arrest ([address]) 04/24/2023    End-stage renal disease on hemodialysis ([address]) 04/14/2023    Personal history of colonic polyps     Benign neoplasm of ascending colon     Benign neoplasm of transverse colon     Benign neoplasm of descending colon     Bradycardia 09/20/2021    Diabetic nephropathy associated with type 2 diabetes mellitus ([address]) 11/16/2020    Chronic kidney disease-mineral and bone disorder 11/16/2020    CKD (chronic kidney disease), stage IV ([address]) 11/09/2020    Acute kidney injury ([facility]) with acute tubular necrosis (ATN) ([address]) 11/09/2020    Other proteinuria 11/09/2020    Type 2 diabetes mellitus with chronic kidney disease ([address]) 11/09/2020    Hypertensive renal disease 11/09/2020    Abnormal nuclear stress test 12/11/2019    SOB (shortness of breath) 11/19/2019    Hypothyroidism     Essential

## 2023-08-18 NOTE — PROGRESS NOTES
Patient seen and examined by  [facility] and I.    Cardiology consulted for chest/epigastric pain      Stress in 2019 didn't show any ischemia, however echo in April showing newly reduced EF  35-40%    On PD nightly    Plan:   Will obtain stress test and echo      Stephanie Armstrong PA-C 08/18/23 8:43 AM

## 2023-08-18 NOTE — ED NOTES
ED TO INPATIENT SBAR HANDOFF    Patient Name: Senthil Vincent   :  [id number]  74 y.o. Preferred Name    Family/Caregiver Present no   Restraints no   C-SSRS: Risk of Suicide: No Risk  Sitter no   Sepsis Risk Score Sepsis Risk Score: 1.25      Situation  Chief Complaint   Patient presents with    Chest Pain     Chest pain, SOB readiates to back, left arm and hand since 2pm today     Brief Description of Patient's Condition: Patient came complaining of chest pain that radiates to back, had been going on since 1400. Patient tried taking tylenol but that didn't relieve the pain. Patient does have a cardiac history with pacemaker. Mental Status: oriented, alert, coherent, logical, thought processes intact, and able to concentrate and follow conversation  Arrived from: home    Imaging:   CTA PULMONARY W CONTRAST   Final Result   1. No pulmonary emboli. Enlarged central pulmonary arteries suggestive of   pulmonary hypertension. 2. Cardiomegaly with small pericardial effusion. 3. Atherosclerosis with coronary artery involvement. 4. Small right and trace left pleural effusions, with bilateral atelectatic   changes though no other acute process. 5. Small hiatal hernia with trace reflux noted. 6. Subtle subcapsular nodularity suspect along the surface of the left   hepatic lobe which suggest possible cirrhosis. XR CHEST PORTABLE   Final Result   1. Bibasilar atelectasis, otherwise no acute cardiopulmonary process   identified.            Abnormal labs:   Abnormal Labs Reviewed   CBC - Abnormal; Notable for the following components:       Result Value    RBC 3.50 (*)     Hemoglobin 10.7 (*)     Hematocrit 34.6 (*)     MCHC 30.9 (*)     RDW 15.3 (*)     Platelets 595 (*)     All other components within normal limits   COMPREHENSIVE METABOLIC PANEL - Abnormal; Notable for the following components:    Chloride 98 (*)     BUN 52 (*)     Creatinine 4.7 (*)     Est, Glom Filt Rate 9 (*)     Glucose 111 (*) Albumin 3.3 (*)     Total Protein 5.7 (*)     AST 46 (*)     All other components within normal limits   TROPONIN - Abnormal; Notable for the following components:    Troponin T 0.051 (*)     All other components within normal limits   LIPASE - Abnormal; Notable for the following components:    Lipase 73 (*)     All other components within normal limits   MAGNESIUM - Abnormal; Notable for the following components:    Magnesium 2.5 (*)     All other components within normal limits   BRAIN NATRIURETIC PEPTIDE - Abnormal; Notable for the following components:    Pro-BNP 7,116 (*)     All other components within normal limits   D-DIMER, RAPID - Abnormal; Notable for the following components:    D-Dimer, Quant 1.32 (*)     All other components within normal limits       Background  History:   Past Medical History:   Diagnosis Date    Asthma     Bradycardia     Bronchitis     COPD (chronic obstructive pulmonary disease) ([facility])     H/O echocardiogram 12/04/2019    EF 55-60%, Grade II Diastolic Dysfunction, Left atrium is moderately dilated, no significant valvular disease, Mild Pulm HTN, No pericardial effusion     History of nuclear stress test 12/04/2019    ABN, Moderate inferior and lateral wall ischemia of a large territory, EF 55%    Hypercholesteremia     Hyperlipidemia     Hypertension     Hyperthyroidism     Hypothyroidism     Kidney disease     Pneumonia     Type II or unspecified type diabetes mellitus without mention of complication, not stated as uncontrolled     Unspecified sleep apnea        Assessment    Vitals/MEWS: MEWS Score: 1  Level of Consciousness: Alert (0)   Vitals:    08/17/23 2130 08/17/23 2200 08/17/23 2218 08/17/23 2230   BP: (!) 132/52 (!) 136/52  (!) 115/51   Pulse: 74 70  64   Resp:   16    Temp:       TempSrc:       SpO2: 97% 95%  94%     PO Status: Regular  O2 Flow Rate: O2 Device: None (Room air)    Cardiac Rhythm: NSR  Last documented pain medication administered: Morphine  NIH Score: NIH

## 2023-08-18 NOTE — PROGRESS NOTES
08/18/23 1109   Encounter Summary   Encounter Overview/Reason  Initial Encounter   Service Provided For: Patient   Referral/Consult From: [facility]   Support System Spouse; Children   Last Encounter  08/18/23   Complexity of Encounter Low   Begin Time 1110   Spiritual/Emotional needs   Type Spiritual Support   Grief, Loss, and Adjustments   Type Adjustment to illness   Assessment/Intervention/Outcome   Assessment Coping   Intervention Sustaining Presence/Ministry of presence   Plan and Referrals   Plan/Referrals No future visits requested

## 2023-08-21 ENCOUNTER — TELEPHONE (OUTPATIENT)
Dept: CARDIOLOGY CLINIC | Age: 73
End: 2023-08-21

## 2023-08-21 NOTE — TELEPHONE ENCOUNTER
called wife was seen in [facility] ED for chest pain .  She was told everything was ok , she is continuing to have sever left shoulder pain

## 2023-08-22 ENCOUNTER — OFFICE VISIT (OUTPATIENT)
Dept: CARDIOLOGY CLINIC | Age: 73
End: 2023-08-22
Payer: MEDICARE

## 2023-08-22 VITALS
WEIGHT: 189 LBS | HEIGHT: 65 IN | SYSTOLIC BLOOD PRESSURE: 86 MMHG | DIASTOLIC BLOOD PRESSURE: 60 MMHG | HEART RATE: 88 BPM | BODY MASS INDEX: 31.49 KG/M2

## 2023-08-22 DIAGNOSIS — N18.6 END-STAGE RENAL DISEASE ON HEMODIALYSIS (HCC): ICD-10-CM

## 2023-08-22 DIAGNOSIS — Z99.89 OSA ON CPAP: ICD-10-CM

## 2023-08-22 DIAGNOSIS — E11.22 TYPE 2 DIABETES MELLITUS WITH DIABETIC CHRONIC KIDNEY DISEASE, UNSPECIFIED CKD STAGE, UNSPECIFIED WHETHER LONG TERM INSULIN USE (HCC): ICD-10-CM

## 2023-08-22 DIAGNOSIS — G47.33 OSA ON CPAP: ICD-10-CM

## 2023-08-22 DIAGNOSIS — I48.0 PAROXYSMAL ATRIAL FIBRILLATION (HCC): ICD-10-CM

## 2023-08-22 DIAGNOSIS — I10 ESSENTIAL HYPERTENSION: Primary | ICD-10-CM

## 2023-08-22 DIAGNOSIS — I44.30 HEART BLOCK, AV: ICD-10-CM

## 2023-08-22 DIAGNOSIS — Z99.2 END-STAGE RENAL DISEASE ON HEMODIALYSIS (HCC): ICD-10-CM

## 2023-08-22 DIAGNOSIS — I46.9 CARDIAC ARREST (HCC): ICD-10-CM

## 2023-08-22 DIAGNOSIS — E78.2 MIXED HYPERLIPIDEMIA: ICD-10-CM

## 2023-08-22 PROCEDURE — G8417 CALC BMI ABV UP PARAM F/U: HCPCS | Performed by: INTERNAL MEDICINE

## 2023-08-22 PROCEDURE — 99214 OFFICE O/P EST MOD 30 MIN: CPT | Performed by: INTERNAL MEDICINE

## 2023-08-22 PROCEDURE — 3074F SYST BP LT 130 MM HG: CPT | Performed by: INTERNAL MEDICINE

## 2023-08-22 PROCEDURE — G8427 DOCREV CUR MEDS BY ELIG CLIN: HCPCS | Performed by: INTERNAL MEDICINE

## 2023-08-22 PROCEDURE — 3017F COLORECTAL CA SCREEN DOC REV: CPT | Performed by: INTERNAL MEDICINE

## 2023-08-22 PROCEDURE — 3078F DIAST BP <80 MM HG: CPT | Performed by: INTERNAL MEDICINE

## 2023-08-22 PROCEDURE — 2022F DILAT RTA XM EVC RTNOPTHY: CPT | Performed by: INTERNAL MEDICINE

## 2023-08-22 PROCEDURE — 1036F TOBACCO NON-USER: CPT | Performed by: INTERNAL MEDICINE

## 2023-08-22 PROCEDURE — 3044F HG A1C LEVEL LT 7.0%: CPT | Performed by: INTERNAL MEDICINE

## 2023-08-22 PROCEDURE — G8399 PT W/DXA RESULTS DOCUMENT: HCPCS | Performed by: INTERNAL MEDICINE

## 2023-08-22 PROCEDURE — 1090F PRES/ABSN URINE INCON ASSESS: CPT | Performed by: INTERNAL MEDICINE

## 2023-08-22 PROCEDURE — 1123F ACP DISCUSS/DSCN MKR DOCD: CPT | Performed by: INTERNAL MEDICINE

## 2023-08-22 RX ORDER — MIDODRINE HYDROCHLORIDE 10 MG/1
10 TABLET ORAL
Qty: 90 TABLET | Refills: 5 | Status: SHIPPED | OUTPATIENT
Start: 2023-08-22

## 2023-08-22 NOTE — PROGRESS NOTES
OFFICE PROGRESS NOTES      Nila Granados is a 74 y.o. female who has    CHIEF COMPLAINT AS FOLLOWS:  CHEST PAIN: Patient was hospitalized with  C/O chest pains recently. Pain was worse with taking deep breaths & chest was sore to palpation. Cardiolite perfusion imaging was normal.      SOB:  C/O SOB some times. LEG EDEMA: No leg edema   PALPITATIONS: Denies any C/O Palpitations   DIZZINESS: No C/O Dizziness   SYNCOPE: None   OTHER/ ADDITIONAL COMPLAINTS:                                     HPI: Patient is here for F/U on her Arrhythmia, HTN & Dyslipidemia problems. Arrhythmia: Patient has known  A-fib, CHB S/P PPM.  HTN: Patient has known essential HTN. Has been treated with guideline recommended medical / physical/ diet therapy as stated below. Dyslipidemia: Patient has known mixed dyslipidemia. Has been treated with guideline recommended medical / physical/ diet therapy as stated below. Current Outpatient Medications   Medication Sig Dispense Refill    metoprolol succinate (TOPROL XL) 25 MG extended release tablet Take 0.5 tablets by mouth daily 30 tablet 3    docusate sodium (COLACE) 100 MG capsule Take 1 capsule by mouth 2 times daily      insulin glargine (LANTUS SOLOSTAR) 100 UNIT/ML injection pen Inject 10 Units into the skin nightly 5 Adjustable Dose Pre-filled Pen Syringe 3    Insulin Pen Needle ([employer] PEN NEEDLES) 31G X 6 MM MISC 1 each by Does not apply route daily 100 each 3    potassium chloride (KLOR-CON M) 20 MEQ extended release tablet Take 1 tablet by mouth daily 30 tablet 1    B Complex-C-Folic Acid (WESCAPS) 1 MG CAPS Take 1 capsule by mouth daily      rosuvastatin (CRESTOR) 20 MG tablet Take 1 tablet by mouth nightly      Simethicone (GAS-X PO) Take by mouth      apixaban (ELIQUIS) 5 MG TABS tablet Take 1 tablet by mouth 2 times daily 60 tablet 5    bumetanide (BUMEX) 2 MG tablet Once per day on Tuesday, Thursday and Saturday.  (Patient taking differently: 1 tablet daily) 12

## 2023-08-22 NOTE — PATIENT INSTRUCTIONS
CORONARY ARTERY DISEASE:None known. Chest pain most likely non-cardiac  CATH 12/2019   No significant CAD. Left dominant system & Left side arteries   are all normal. RCA is non-dominant has minimal disease. Normal LV systolic function. LVEF is 50 to 55 %. 8/17/2023 Cardiolite perfusion imaging  Normal tracer uptake in all segments of myocardium on stress and rest   images. No infarct or ischemia noted. Normal EF 43 % with normal ventricular contractility. HTN:Patient is Hypotensive rather & she is on Midodrine. .     VHD: No significant VHD noted      ECHO 5/2022  1. Left ventricle: The cavity size was mildly to moderately      dilated. There was mild concentric hypertrophy. Systolic      function was normal. The calculated Ejection Fraction is 58%. Wall motion was normal; there were no regional wall motion      abnormalities. Doppler parameters are consistent with abnormal      left ventricular relaxation (grade 1 diastolic dysfunction). Internal dimension, ED (PLAX chordal): 6.2cm. Global      longitudinal strain rate of 17.70%. The longitudal strain study      is normal. The longitudal strain study is borderline abnormal.   2. Aortic valve: There was very mild stenosis. Peak velocity (S):      2.15m/sec. Mean gradient (S): 10mm Hg. VTI ratio of LVOT to      aortic valve: 0.57. Valve area (VTI): 1.9cm^2. 3. Mitral valve: There was mild regurgitation. Mean gradient (D):      3mm Hg. Valve area by pressure half-time: 2.3cm^2. Valve area by      continuity equation (using LVOT flow): 2.8cm^2. 4. Left atrium: The atrium was moderately to markedly dilated. 5. Right ventricle: The cavity size was normal. Wall thickness was      normal. Systolic function was normal.   6. Right atrium: The atrium was dilated. 7. Pulmonary arteries: Estimated PA peak pressure is 40mm Hg (S). 8. Pericardium, extracardiac: There was no pericardial effusion.      4/14/2023   Left ventricular systolic function

## 2023-08-22 NOTE — PROGRESS NOTES
When did it begin? Last week, Everyday  How long does it last? Hours  How severe 1-10? 10/10  Radiation? Shoulder and arm  Aggravating Factors? Leaning up on left side  Relieving Factors? Laying on back, Oxycontin  Associated Features? SOB  Tenderness to palp?  No

## 2023-08-28 ENCOUNTER — PROCEDURE VISIT (OUTPATIENT)
Dept: CARDIOLOGY CLINIC | Age: 73
End: 2023-08-28

## 2023-08-28 DIAGNOSIS — I49.5 SINUS NODE DYSFUNCTION (HCC): ICD-10-CM

## 2023-08-28 DIAGNOSIS — Z95.0 CARDIAC PACEMAKER IN SITU: Primary | ICD-10-CM

## 2023-08-29 NOTE — PROGRESS NOTES
IR Procedure at [facility]: Spoke with patient and she will arrive at 0900 at [facility] on 9/6/2023 for her procedure at  1030. Also went over below instructions. Last dose of eliquis 9/3/2023. Patient is going to call Dr Ludwig Every office, because she said the the last time she had the port removed was very painful and upsetting and that she is really worried about that happening again. NPO at [address]     2. Follow your directions as prescribed by the doctor for your procedure and medications. 3.   Consult your provider as to when to stop blood thinner  4. Do not take any pain medication within 6 hours of your procedure  5. Do not drink any alcoholic beverages or use any street drugs 24 hours before procedure. 6.   Please wear simple, loose fitting clothing to the hospital.  Do not bring valuables (money,             credit cards, checkbooks, etc.)     7. If you  have a Living Will and Durable Power of  for Healthcare, please bring in a copy. 8.   Please bring picture ID,  insurance card, paperwork from the doctors office            (H & P, Consent,  & card for implantable devices). 9.   Report to the information desk on the ground floor. 10. Take a shower the night before or morning of your procedure, do not apply any lotion, oil or powder. 11. If you are going to be sedated for the procedure, you will need a responsible adult to drive you home.

## 2023-09-06 ENCOUNTER — HOSPITAL ENCOUNTER (OUTPATIENT)
Dept: INTERVENTIONAL RADIOLOGY/VASCULAR | Age: 73
Discharge: HOME | End: 2023-09-06
Payer: MEDICARE

## 2023-09-06 VITALS
TEMPERATURE: 96.9 F | OXYGEN SATURATION: 97 % | SYSTOLIC BLOOD PRESSURE: 115 MMHG | RESPIRATION RATE: 16 BRPM | HEART RATE: 60 BPM | DIASTOLIC BLOOD PRESSURE: 56 MMHG

## 2023-09-06 DIAGNOSIS — Z45.2 ENCOUNTER FOR REMOVAL OF TUNNELED CENTRAL VENOUS CATHETER (CVC) WITH PORT: ICD-10-CM

## 2023-09-06 PROCEDURE — 36589 REMOVAL TUNNELED CV CATH: CPT

## 2023-09-06 PROCEDURE — 2500000003 HC RX 250 WO HCPCS: Performed by: RADIOLOGY

## 2023-09-06 PROCEDURE — 2709999900 IR REMOVAL OF TUNNELED PLEURAL CATH W CUFF

## 2023-09-06 PROCEDURE — 2709999900 IR REMOVE TUNNELED CVAD WO SQ PORT/PUMP

## 2023-09-06 RX ORDER — LIDOCAINE HYDROCHLORIDE 10 MG/ML
INJECTION, SOLUTION EPIDURAL; INFILTRATION; INTRACAUDAL; PERINEURAL PRN
Status: COMPLETED | OUTPATIENT
Start: 2023-09-06 | End: 2023-09-06

## 2023-09-06 RX ADMIN — LIDOCAINE HYDROCHLORIDE 8 ML: 10 INJECTION, SOLUTION EPIDURAL; INFILTRATION; INTRACAUDAL; PERINEURAL at 09:28

## 2023-09-06 ASSESSMENT — PAIN SCALES - GENERAL
PAINLEVEL_OUTOF10: 0
PAINLEVEL_OUTOF10: 0

## 2023-09-06 NOTE — PROGRESS NOTES
TRANSFER - OUT REPORT:    Verbal report given to SDS RN on James Rios being transferred to [facility](unit) for routine post-op       Report consisted of patient's Situation, Background, Assessment and   Recommendations(SBAR). Information from the following report(s) Nurse Handoff Report was reviewed with the receiving nurse. Opportunity for questions and clarification was provided.       Patient transported with:   Registered Nurse

## 2023-09-10 ENCOUNTER — APPOINTMENT (OUTPATIENT)
Dept: GENERAL RADIOLOGY | Age: 73
DRG: 853 | End: 2023-09-10
Attending: EMERGENCY MEDICINE
Payer: MEDICARE

## 2023-09-10 ENCOUNTER — HOSPITAL ENCOUNTER (INPATIENT)
Age: 73
DRG: 853 | End: 2023-09-10
Attending: STUDENT IN AN ORGANIZED HEALTH CARE EDUCATION/TRAINING PROGRAM | Admitting: STUDENT IN AN ORGANIZED HEALTH CARE EDUCATION/TRAINING PROGRAM
Payer: MEDICARE

## 2023-09-10 DIAGNOSIS — R53.83 OTHER FATIGUE: Primary | ICD-10-CM

## 2023-09-10 DIAGNOSIS — R93.89 ABNORMAL CXR: ICD-10-CM

## 2023-09-10 PROCEDURE — 96365 THER/PROPH/DIAG IV INF INIT: CPT

## 2023-09-10 PROCEDURE — 99285 EMERGENCY DEPT VISIT HI MDM: CPT

## 2023-09-10 PROCEDURE — 71046 X-RAY EXAM CHEST 2 VIEWS: CPT

## 2023-09-10 ASSESSMENT — PAIN - FUNCTIONAL ASSESSMENT: PAIN_FUNCTIONAL_ASSESSMENT: NONE - DENIES PAIN

## 2023-09-11 LAB
ALBUMIN SERPL-MCNC: 2.6 GM/DL (ref 3.4–5)
ALP BLD-CCNC: 133 IU/L (ref 40–128)
ALT SERPL-CCNC: 32 U/L (ref 10–40)
ANION GAP SERPL CALCULATED.3IONS-SCNC: 13 MMOL/L (ref 4–16)
AST SERPL-CCNC: 72 IU/L (ref 15–37)
B PARAP IS1001 DNA NPH QL NAA+NON-PROBE: NOT DETECTED
B PERT.PT PRMT NPH QL NAA+NON-PROBE: NOT DETECTED
BACTERIA: NEGATIVE /HPF
BASOPHILS ABSOLUTE: 0 K/CU MM
BASOPHILS RELATIVE PERCENT: 0.3 % (ref 0–1)
BILIRUB SERPL-MCNC: 0.4 MG/DL (ref 0–1)
BILIRUBIN URINE: NEGATIVE MG/DL
BLOOD, URINE: ABNORMAL
BUN SERPL-MCNC: 47 MG/DL (ref 6–23)
C PNEUM DNA NPH QL NAA+NON-PROBE: NOT DETECTED
CALCIUM SERPL-MCNC: 9 MG/DL (ref 8.3–10.6)
CHLORIDE BLD-SCNC: 95 MMOL/L (ref 99–110)
CLARITY: ABNORMAL
CO2: 23 MMOL/L (ref 21–32)
COLOR: YELLOW
CREAT SERPL-MCNC: 4.8 MG/DL (ref 0.6–1.1)
DIFFERENTIAL TYPE: ABNORMAL
EOSINOPHILS ABSOLUTE: 0 K/CU MM
EOSINOPHILS RELATIVE PERCENT: 0.2 % (ref 0–3)
FLUAV H1 2009 PAN RNA NPH NAA+NON-PROBE: NOT DETECTED
FLUAV H1 RNA NPH QL NAA+NON-PROBE: NOT DETECTED
FLUAV H3 RNA NPH QL NAA+NON-PROBE: NOT DETECTED
FLUAV RNA NPH QL NAA+NON-PROBE: NOT DETECTED
FLUBV RNA NPH QL NAA+NON-PROBE: NOT DETECTED
GFR SERPL CREATININE-BSD FRML MDRD: 9 ML/MIN/1.73M2
GLUCOSE BLD-MCNC: 114 MG/DL (ref 70–99)
GLUCOSE BLD-MCNC: 140 MG/DL
GLUCOSE BLD-MCNC: 140 MG/DL (ref 70–99)
GLUCOSE BLD-MCNC: 196 MG/DL (ref 70–99)
GLUCOSE SERPL-MCNC: 165 MG/DL (ref 70–99)
GLUCOSE, URINE: NEGATIVE MG/DL
HADV DNA NPH QL NAA+NON-PROBE: NOT DETECTED
HCOV 229E RNA NPH QL NAA+NON-PROBE: NOT DETECTED
HCOV HKU1 RNA NPH QL NAA+NON-PROBE: NOT DETECTED
HCOV NL63 RNA NPH QL NAA+NON-PROBE: NOT DETECTED
HCOV OC43 RNA NPH QL NAA+NON-PROBE: NOT DETECTED
HCT VFR BLD CALC: 35.8 % (ref 37–47)
HEMOGLOBIN: 11.3 GM/DL (ref 12.5–16)
HMPV RNA NPH QL NAA+NON-PROBE: NOT DETECTED
HPIV1 RNA NPH QL NAA+NON-PROBE: NOT DETECTED
HPIV2 RNA NPH QL NAA+NON-PROBE: NOT DETECTED
HPIV3 RNA NPH QL NAA+NON-PROBE: NOT DETECTED
HPIV4 RNA NPH QL NAA+NON-PROBE: NOT DETECTED
IMMATURE NEUTROPHIL %: 0.3 % (ref 0–0.43)
INFLUENZA A ANTIGEN: NOT DETECTED
INFLUENZA B ANTIGEN: NOT DETECTED
KETONES, URINE: NEGATIVE MG/DL
LACTIC ACID, SEPSIS: 1.5 MMOL/L (ref 0.5–1.9)
LEUKOCYTE ESTERASE, URINE: ABNORMAL
LYMPHOCYTES ABSOLUTE: 0.5 K/CU MM
LYMPHOCYTES RELATIVE PERCENT: 8 % (ref 24–44)
M PNEUMO DNA NPH QL NAA+NON-PROBE: NOT DETECTED
MCH RBC QN AUTO: 30.4 PG (ref 27–31)
MCHC RBC AUTO-ENTMCNC: 31.6 % (ref 32–36)
MCV RBC AUTO: 96.2 FL (ref 78–100)
MONOCYTES ABSOLUTE: 0.3 K/CU MM
MONOCYTES RELATIVE PERCENT: 5.4 % (ref 0–4)
NITRITE URINE, QUANTITATIVE: NEGATIVE
NUCLEATED RBC %: 0 %
PDW BLD-RTO: 14.6 % (ref 11.7–14.9)
PH, URINE: 7 (ref 5–8)
PLATELET # BLD: 111 K/CU MM (ref 140–440)
PMV BLD AUTO: 9.8 FL (ref 7.5–11.1)
POTASSIUM SERPL-SCNC: 3.9 MMOL/L (ref 3.5–5.1)
PROCALCITONIN SERPL-MCNC: 2.3 NG/ML
PROTEIN UA: 100 MG/DL
RBC # BLD: 3.72 M/CU MM (ref 4.2–5.4)
RBC URINE: 96 /HPF (ref 0–6)
RSV RNA NPH QL NAA+NON-PROBE: NOT DETECTED
RV+EV RNA NPH QL NAA+NON-PROBE: NOT DETECTED
SARS-COV-2 RDRP RESP QL NAA+PROBE: NOT DETECTED
SARS-COV-2 RNA NPH QL NAA+NON-PROBE: NOT DETECTED
SEGMENTED NEUTROPHILS ABSOLUTE COUNT: 5.3 K/CU MM
SEGMENTED NEUTROPHILS RELATIVE PERCENT: 85.8 % (ref 36–66)
SODIUM BLD-SCNC: 131 MMOL/L (ref 135–145)
SOURCE: NORMAL
SPECIFIC GRAVITY UA: 1.01 (ref 1–1.03)
TOTAL IMMATURE NEUTOROPHIL: 0.02 K/CU MM
TOTAL NUCLEATED RBC: 0 K/CU MM
TOTAL PROTEIN: 6.3 GM/DL (ref 6.4–8.2)
TRICHOMONAS: ABNORMAL /HPF
TSH SERPL DL<=0.005 MIU/L-ACNC: 1.17 UIU/ML (ref 0.27–4.2)
UROBILINOGEN, URINE: 1 MG/DL (ref 0.2–1)
WBC # BLD: 6.2 K/CU MM (ref 4–10.5)
WBC CLUMP: ABNORMAL /HPF
WBC UA: 2881 /HPF (ref 0–5)

## 2023-09-11 PROCEDURE — 87502 INFLUENZA DNA AMP PROBE: CPT

## 2023-09-11 PROCEDURE — 87186 SC STD MICRODIL/AGAR DIL: CPT

## 2023-09-11 PROCEDURE — 2580000003 HC RX 258: Performed by: STUDENT IN AN ORGANIZED HEALTH CARE EDUCATION/TRAINING PROGRAM

## 2023-09-11 PROCEDURE — 87040 BLOOD CULTURE FOR BACTERIA: CPT

## 2023-09-11 PROCEDURE — 85025 COMPLETE CBC W/AUTO DIFF WBC: CPT

## 2023-09-11 PROCEDURE — 87077 CULTURE AEROBIC IDENTIFY: CPT

## 2023-09-11 PROCEDURE — 6370000000 HC RX 637 (ALT 250 FOR IP): Performed by: PHYSICIAN ASSISTANT

## 2023-09-11 PROCEDURE — 6370000000 HC RX 637 (ALT 250 FOR IP): Performed by: STUDENT IN AN ORGANIZED HEALTH CARE EDUCATION/TRAINING PROGRAM

## 2023-09-11 PROCEDURE — 82962 GLUCOSE BLOOD TEST: CPT

## 2023-09-11 PROCEDURE — 81001 URINALYSIS AUTO W/SCOPE: CPT

## 2023-09-11 PROCEDURE — 84145 PROCALCITONIN (PCT): CPT

## 2023-09-11 PROCEDURE — 81003 URINALYSIS AUTO W/O SCOPE: CPT

## 2023-09-11 PROCEDURE — 94761 N-INVAS EAR/PLS OXIMETRY MLT: CPT

## 2023-09-11 PROCEDURE — 87086 URINE CULTURE/COLONY COUNT: CPT

## 2023-09-11 PROCEDURE — 0202U NFCT DS 22 TRGT SARS-COV-2: CPT

## 2023-09-11 PROCEDURE — 90945 DIALYSIS ONE EVALUATION: CPT

## 2023-09-11 PROCEDURE — 1200000000 HC SEMI PRIVATE

## 2023-09-11 PROCEDURE — 87635 SARS-COV-2 COVID-19 AMP PRB: CPT

## 2023-09-11 PROCEDURE — 83605 ASSAY OF LACTIC ACID: CPT

## 2023-09-11 PROCEDURE — 2580000003 HC RX 258: Performed by: PHYSICIAN ASSISTANT

## 2023-09-11 PROCEDURE — 6360000002 HC RX W HCPCS: Performed by: PHYSICIAN ASSISTANT

## 2023-09-11 PROCEDURE — 80053 COMPREHEN METABOLIC PANEL: CPT

## 2023-09-11 PROCEDURE — 3E1M39Z IRRIGATION OF PERITONEAL CAVITY USING DIALYSATE, PERCUTANEOUS APPROACH: ICD-10-PCS | Performed by: STUDENT IN AN ORGANIZED HEALTH CARE EDUCATION/TRAINING PROGRAM

## 2023-09-11 PROCEDURE — 84443 ASSAY THYROID STIM HORMONE: CPT

## 2023-09-11 RX ORDER — FLUOXETINE HYDROCHLORIDE 20 MG/1
20 CAPSULE ORAL 2 TIMES DAILY
Status: DISCONTINUED | OUTPATIENT
Start: 2023-09-11 | End: 2023-09-19 | Stop reason: HOSPADM

## 2023-09-11 RX ORDER — POTASSIUM CHLORIDE 7.45 MG/ML
10 INJECTION INTRAVENOUS PRN
Status: DISCONTINUED | OUTPATIENT
Start: 2023-09-11 | End: 2023-09-12

## 2023-09-11 RX ORDER — SODIUM CHLORIDE 9 MG/ML
INJECTION, SOLUTION INTRAVENOUS PRN
Status: DISCONTINUED | OUTPATIENT
Start: 2023-09-11 | End: 2023-09-19 | Stop reason: HOSPADM

## 2023-09-11 RX ORDER — ROSUVASTATIN CALCIUM 20 MG/1
20 TABLET, COATED ORAL NIGHTLY
Status: DISCONTINUED | OUTPATIENT
Start: 2023-09-11 | End: 2023-09-19 | Stop reason: HOSPADM

## 2023-09-11 RX ORDER — ACETAMINOPHEN 650 MG/1
650 SUPPOSITORY RECTAL EVERY 6 HOURS PRN
Status: DISCONTINUED | OUTPATIENT
Start: 2023-09-11 | End: 2023-09-19 | Stop reason: HOSPADM

## 2023-09-11 RX ORDER — INSULIN LISPRO 100 [IU]/ML
0-4 INJECTION, SOLUTION INTRAVENOUS; SUBCUTANEOUS
Status: DISCONTINUED | OUTPATIENT
Start: 2023-09-11 | End: 2023-09-19 | Stop reason: HOSPADM

## 2023-09-11 RX ORDER — METOPROLOL SUCCINATE 25 MG/1
12.5 TABLET, EXTENDED RELEASE ORAL DAILY
Status: DISCONTINUED | OUTPATIENT
Start: 2023-09-11 | End: 2023-09-18

## 2023-09-11 RX ORDER — GLUCAGON 1 MG/ML
1 KIT INJECTION PRN
Status: DISCONTINUED | OUTPATIENT
Start: 2023-09-11 | End: 2023-09-19 | Stop reason: HOSPADM

## 2023-09-11 RX ORDER — ONDANSETRON 2 MG/ML
4 INJECTION INTRAMUSCULAR; INTRAVENOUS EVERY 6 HOURS PRN
Status: DISCONTINUED | OUTPATIENT
Start: 2023-09-11 | End: 2023-09-19 | Stop reason: HOSPADM

## 2023-09-11 RX ORDER — ACETAMINOPHEN 325 MG/1
650 TABLET ORAL EVERY 6 HOURS PRN
Status: DISCONTINUED | OUTPATIENT
Start: 2023-09-11 | End: 2023-09-19 | Stop reason: HOSPADM

## 2023-09-11 RX ORDER — INSULIN LISPRO 100 [IU]/ML
0-4 INJECTION, SOLUTION INTRAVENOUS; SUBCUTANEOUS NIGHTLY
Status: DISCONTINUED | OUTPATIENT
Start: 2023-09-11 | End: 2023-09-19 | Stop reason: HOSPADM

## 2023-09-11 RX ORDER — SODIUM CHLORIDE 0.9 % (FLUSH) 0.9 %
5-40 SYRINGE (ML) INJECTION EVERY 12 HOURS SCHEDULED
Status: DISCONTINUED | OUTPATIENT
Start: 2023-09-11 | End: 2023-09-19 | Stop reason: HOSPADM

## 2023-09-11 RX ORDER — LEVOTHYROXINE SODIUM 0.12 MG/1
125 TABLET ORAL DAILY
Status: DISCONTINUED | OUTPATIENT
Start: 2023-09-11 | End: 2023-09-19 | Stop reason: HOSPADM

## 2023-09-11 RX ORDER — POLYETHYLENE GLYCOL 3350 17 G/17G
17 POWDER, FOR SOLUTION ORAL DAILY PRN
Status: DISCONTINUED | OUTPATIENT
Start: 2023-09-11 | End: 2023-09-19 | Stop reason: HOSPADM

## 2023-09-11 RX ORDER — SODIUM CHLORIDE 0.9 % (FLUSH) 0.9 %
5-40 SYRINGE (ML) INJECTION PRN
Status: DISCONTINUED | OUTPATIENT
Start: 2023-09-11 | End: 2023-09-19 | Stop reason: HOSPADM

## 2023-09-11 RX ORDER — INSULIN GLARGINE 100 [IU]/ML
10 INJECTION, SOLUTION SUBCUTANEOUS NIGHTLY
Status: DISCONTINUED | OUTPATIENT
Start: 2023-09-11 | End: 2023-09-19 | Stop reason: HOSPADM

## 2023-09-11 RX ORDER — POTASSIUM CHLORIDE 1.5 G/1.58G
40 POWDER, FOR SOLUTION ORAL PRN
Status: DISCONTINUED | OUTPATIENT
Start: 2023-09-11 | End: 2023-09-12

## 2023-09-11 RX ORDER — ONDANSETRON 4 MG/1
4 TABLET, ORALLY DISINTEGRATING ORAL EVERY 8 HOURS PRN
Status: DISCONTINUED | OUTPATIENT
Start: 2023-09-11 | End: 2023-09-19 | Stop reason: HOSPADM

## 2023-09-11 RX ORDER — MAGNESIUM SULFATE IN WATER 40 MG/ML
2000 INJECTION, SOLUTION INTRAVENOUS PRN
Status: DISCONTINUED | OUTPATIENT
Start: 2023-09-11 | End: 2023-09-12

## 2023-09-11 RX ORDER — DEXTROSE MONOHYDRATE 100 MG/ML
INJECTION, SOLUTION INTRAVENOUS CONTINUOUS PRN
Status: DISCONTINUED | OUTPATIENT
Start: 2023-09-11 | End: 2023-09-19 | Stop reason: HOSPADM

## 2023-09-11 RX ORDER — POTASSIUM CHLORIDE 20 MEQ/1
40 TABLET, EXTENDED RELEASE ORAL PRN
Status: DISCONTINUED | OUTPATIENT
Start: 2023-09-11 | End: 2023-09-12

## 2023-09-11 RX ORDER — 0.9 % SODIUM CHLORIDE 0.9 %
1000 INTRAVENOUS SOLUTION INTRAVENOUS ONCE
Status: COMPLETED | OUTPATIENT
Start: 2023-09-11 | End: 2023-09-11

## 2023-09-11 RX ORDER — HYDROCODONE BITARTRATE AND ACETAMINOPHEN 5; 325 MG/1; MG/1
1 TABLET ORAL ONCE
Status: COMPLETED | OUTPATIENT
Start: 2023-09-11 | End: 2023-09-11

## 2023-09-11 RX ADMIN — ROSUVASTATIN CALCIUM 20 MG: 20 TABLET, FILM COATED ORAL at 21:34

## 2023-09-11 RX ADMIN — APIXABAN 5 MG: 5 TABLET, FILM COATED ORAL at 09:44

## 2023-09-11 RX ADMIN — SODIUM CHLORIDE, PRESERVATIVE FREE 8 ML: 5 INJECTION INTRAVENOUS at 13:05

## 2023-09-11 RX ADMIN — FLUOXETINE 20 MG: 20 CAPSULE ORAL at 21:34

## 2023-09-11 RX ADMIN — ACETAMINOPHEN 650 MG: 325 TABLET ORAL at 19:52

## 2023-09-11 RX ADMIN — HYDROCODONE BITARTRATE AND ACETAMINOPHEN 1 TABLET: 5; 325 TABLET ORAL at 01:34

## 2023-09-11 RX ADMIN — FLUOXETINE 20 MG: 20 CAPSULE ORAL at 13:01

## 2023-09-11 RX ADMIN — SODIUM CHLORIDE, PRESERVATIVE FREE 10 ML: 5 INJECTION INTRAVENOUS at 21:34

## 2023-09-11 RX ADMIN — AZITHROMYCIN MONOHYDRATE 500 MG: 500 INJECTION, POWDER, LYOPHILIZED, FOR SOLUTION INTRAVENOUS at 05:57

## 2023-09-11 RX ADMIN — LEVOTHYROXINE SODIUM 125 MCG: 0.12 TABLET ORAL at 13:01

## 2023-09-11 RX ADMIN — CEFTRIAXONE SODIUM 1000 MG: 1 INJECTION, POWDER, FOR SOLUTION INTRAMUSCULAR; INTRAVENOUS at 03:55

## 2023-09-11 RX ADMIN — SODIUM CHLORIDE 1000 ML: 9 INJECTION, SOLUTION INTRAVENOUS at 03:55

## 2023-09-11 RX ADMIN — APIXABAN 5 MG: 5 TABLET, FILM COATED ORAL at 21:34

## 2023-09-11 RX ADMIN — INSULIN GLARGINE 10 UNITS: 100 INJECTION, SOLUTION SUBCUTANEOUS at 21:44

## 2023-09-11 RX ADMIN — METOPROLOL SUCCINATE 12.5 MG: 25 TABLET, FILM COATED, EXTENDED RELEASE ORAL at 13:01

## 2023-09-11 ASSESSMENT — PAIN SCALES - GENERAL
PAINLEVEL_OUTOF10: 4
PAINLEVEL_OUTOF10: 3

## 2023-09-11 ASSESSMENT — PAIN DESCRIPTION - LOCATION: LOCATION: GENERALIZED

## 2023-09-11 NOTE — H&P
History and Physical      Name:  Werner Perez /Age/Sex: 1950  (74 y.o. female)   MRN & CSN:  [id number] & [id number] Encounter Date/Time: 2023 4:49 AM EDT   Location:  ED15/ED-15 PCP: Bee Isaacs [address] [place] [address]  Day: 2    Assessment and Plan:     Patient is a 74 y.o. female  who presented with fatigue/generalized weakness     Fatigue and generalized weakness  - Patient presents with fatigue, generalized weakness, fevers and chills x5 days  -On admit, hemodynamically stable, afebrile and no leukocytosis. Rapid covid and flu negative   -CXR with RLL opacity however patient denies any clinical signs of PNA  -Unsure etiology of symptoms, suspected viral illness vs bacteremia following recent tunneled catheter removal although patient not septic or febrile    -Covered with Rocephin/azithro in ED, will not continue abx at this time as no source of infection identified and monitor symptoms   -RVP pending  -Blood cx pending     T2DM  - BS on admit 165  - Home Meds: Lantus 10 qhs     - Lantus 10u qhs and LCIS   - Hold PO meds    - -180    Afib  -Continue Eliquis and BB    HTN  -Continue Toprol    HLD  -Continue statin     ESRD on PD  -Nephro consult     Mood Disorder   -Continue prozac      Hypothyroidism   -Continue Synthroid        Checklist:  Advanced directive: full  Diet: cardiac  DVT ppx: Lovenox      Disposition: admit to inpatient. Estimated discharge: 3 day(s). Current living situation: home. Expected disposition: home. Spoke with ED provider who recommended admission for the patient and I agree with that plan. Personally reviewed lab studies and imaging. EKG interpreted personally and results as stated above. Imaging that was interpreted personally and results as stated above.     History of Present Illness:     Chief Complaint: fatigue    Patient is a 74 y.o. female with a PMHx of Afib on eliquis, ESRD on PD, HTN, T2DM, hypothyroidism who presented to the ED with MD  09/11/23 4:49 AM

## 2023-09-11 NOTE — ED PROVIDER NOTES
Triage Chief Complaint:   Fatigue    [place]:  Today in the ED I had the pleasure of caring for Fadumo Bender who is a 74 y.o. female that presents today to the emergency department complaining of fatigue, nausea, generalized weakness and chills. Patient states she has had a fever at home is 0.7 over the last several days. Over the last 3 days she has had slightly progressive fatigue however today it got profoundly worse which prompted her to come in for evaluation. Patient does have a history of ESRD. Dialysis dependent. Had a hair dialysis port removed several days ago. He has a peritoneal dialysis catheter placed recently over the last several months. She denies any abdominal pain. Denies any flank pain or urinary symptoms. Denies any focal weakness. No chest pain sensation of palpitations or shortness of breath. No sick contacts noted. No associated rashes per the patient. She has been eating and drinking eliminating baseline per patient. .    ROS:  REVIEW OF SYSTEMS    At least 10 systems reviewed      All other review of systems are negative  See HPI and nursing notes for additional information       Past Medical History:   Diagnosis Date    Asthma     Bradycardia     Bronchitis     COPD (chronic obstructive pulmonary disease) ([address])     H/O echocardiogram 12/04/2019    EF 55-60%, Grade II Diastolic Dysfunction, Left atrium is moderately dilated, no significant valvular disease, Mild Pulm HTN, No pericardial effusion     History of nuclear stress test 12/04/2019    ABN, Moderate inferior and lateral wall ischemia of a large territory, EF 55%    Hypercholesteremia     Hyperlipidemia     Hypertension     Hyperthyroidism     Hypothyroidism     Kidney disease     Pneumonia     Type II or unspecified type diabetes mellitus without mention of complication, not stated as uncontrolled     Unspecified sleep apnea      Past Surgical History:   Procedure Laterality Date    [address]

## 2023-09-11 NOTE — ED NOTES
Patients  to nurses station asking about room for admission. Updated him on the plan to wait for discharges upstairs. Denies any other needs, questions, concerns or complaints. Resps even and unlabored.       Alexandra Kern RN  09/11/23 6978

## 2023-09-11 NOTE — CARE COORDINATION
MCG criteria for Gen Admission for Weakness reviewed at this time, criteria supports OBSERVATION admission, PC to hopitalist.

## 2023-09-11 NOTE — ED NOTES
Medication History  [facility]    Patient Name: Twin Diego 1950     Medication history has been completed by: Alia Hutton CPhT    Source(s) of information: patient and insurance claims     Primary Care Physician: SARAH Koch CNP     Pharmacy: [facility]    Allergies as of 09/10/2023 - Fully Reviewed 09/10/2023   Allergen Reaction Noted    Empagliflozin Hives 07/17/2019    Lorazepam Other (See Comments) 07/17/2019        Prior to Admission medications    Medication Sig Start Date End Date Taking? Authorizing Provider   midodrine (PROAMATINE) 10 MG tablet Take 1 tablet by mouth 3 times daily (before meals) 8/22/23   Stacey Cooper MD   metoprolol succinate (TOPROL XL) 25 MG extended release tablet Take 0.5 tablets by mouth daily  Patient taking differently: Take 0.5 tablets by mouth daily 09/11/23 Per insurance claims last prescription filled Metoprolol Tartrate 25 mg BID 05/30/23.  Metoprolol Succinate last fill 08/31/2021 8/18/23   SARAH Pearson CNP   docusate sodium (COLACE) 100 MG capsule Take 1 capsule by mouth 2 times daily as needed 09/11/23 Only takes as needed    Historical Provider, MD   insulin glargine (LANTUS SOLOSTAR) 100 UNIT/ML injection pen  Inject 15-20 Units into the skin nightly 09/11/23 patient states she uses sliding scale 7/6/23   Lavnone Quintreo MD   Insulin Pen Needle ([employer] PEN NEEDLES) 31G X 6 MM MISC 1 each by Does not apply route daily 7/6/23   Lavonne Quintero MD   potassium chloride (KLOR-CON M) 20 MEQ extended release tablet Take 1 tablet by mouth daily 7/4/23   Lavonne Quintero MD   B Complex-C-Folic Acid [facility]) 1 MG CAPS Take 1 capsule by mouth daily 6/21/23   Historical Provider, MD   rosuvastatin (CRESTOR) 20 MG tablet Take 1 tablet by mouth nightly 6/21/23   Historical Provider, MD   Simethicone (GAS-X PO) Take by mouth    Historical Provider, MD   Cyanocobalamin (VITAMIN B 12) 100 MCG LOZG   TAKE ONE

## 2023-09-11 NOTE — ED NOTES
Attempted to straight catheter pt and was unable to go beyond the urethral opening, pt states that she has been having pain with urination for a long time.        Cristian Cohen RN  09/11/23 0072

## 2023-09-11 NOTE — ED NOTES
ED TO INPATIENT SBAR HANDOFF    Patient Name: Malena Handley   :  2[birth year]  74 y.o. Preferred Name  Cedric Sánchez  Family/Caregiver Present no   Restraints no   C-SSRS: Risk of Suicide: No Risk  Sitter no   Sepsis Risk Score Sepsis Risk Score: 5.55      Situation  Chief Complaint   Patient presents with    Fatigue     Brief Description of Patient's Condition: pt from home and lives at home with . Pt states that her  helps \"spot her\" when getting out of bed/chair to use her walker. Pt states she hasn't been getting out of bed the last few days. Pt has urine order pending with failed attempt at catheterization last night. Pt state that it takes awhile for urine to come out and only a little bit comes out at a time. Pt did not feel like eating this morning. Mental Status: oriented, alert, coherent, logical, thought processes intact, and able to concentrate and follow conversation  Arrived from: home    Imaging:   XR CHEST (2 VW)   Final Result   1. Cardiomegaly with mild pulmonary vascular congestion. May have a slight   interstitial edema and increased right pleural effusion compared with the   prior study. 2.  Linear opacity in the right lower lobe and medial basilar opacity could   relate to atelectasis but pneumonia is not excluded.            Abnormal labs:   Abnormal Labs Reviewed   CBC WITH AUTO DIFFERENTIAL - Abnormal; Notable for the following components:       Result Value    RBC 3.72 (*)     Hemoglobin 11.3 (*)     Hematocrit 35.8 (*)     MCHC 31.6 (*)     Platelets 272 (*)     Segs Relative 85.8 (*)     Lymphocytes % 8.0 (*)     Monocytes % 5.4 (*)     All other components within normal limits   COMPREHENSIVE METABOLIC PANEL - Abnormal; Notable for the following components:    Sodium 131 (*)     Chloride 95 (*)     BUN 47 (*)     Creatinine 4.8 (*)     Est, Glom Filt Rate 9 (*)     Glucose 165 (*)     Albumin 2.6 (*)     Total Protein 6.3 (*)     AST 72 (*)     Alkaline Phosphatase by: Electronically signed by Juliane Kirk RN on 9/11/2023 at 9:58 AM      Juliane Kirk, [address]  09/11/23 1000 Tamica Morgan, [address] [place] [address]  09/11/23 9951

## 2023-09-12 LAB
25(OH)D3 SERPL-MCNC: 28.25 NG/ML
ALBUMIN SERPL-MCNC: 2.6 GM/DL (ref 3.4–5)
ALP BLD-CCNC: 123 IU/L (ref 40–128)
ALT SERPL-CCNC: 50 U/L (ref 10–40)
ANION GAP SERPL CALCULATED.3IONS-SCNC: 16 MMOL/L (ref 4–16)
AST SERPL-CCNC: 106 IU/L (ref 15–37)
BASOPHILS ABSOLUTE: 0 K/CU MM
BASOPHILS RELATIVE PERCENT: 0.3 % (ref 0–1)
BILIRUB SERPL-MCNC: 0.3 MG/DL (ref 0–1)
BUN SERPL-MCNC: 50 MG/DL (ref 6–23)
CALCIUM SERPL-MCNC: 8.1 MG/DL (ref 8.3–10.6)
CHLORIDE BLD-SCNC: 97 MMOL/L (ref 99–110)
CO2: 21 MMOL/L (ref 21–32)
CREAT SERPL-MCNC: 5.2 MG/DL (ref 0.6–1.1)
DIFFERENTIAL TYPE: ABNORMAL
EOSINOPHILS ABSOLUTE: 0 K/CU MM
EOSINOPHILS RELATIVE PERCENT: 0.3 % (ref 0–3)
FLUID TYPE: NORMAL INDEX
FOLATE SERPL-MCNC: >20 NG/ML (ref 3.1–17.5)
GFR SERPL CREATININE-BSD FRML MDRD: 8 ML/MIN/1.73M2
GLUCOSE BLD-MCNC: 130 MG/DL (ref 70–99)
GLUCOSE BLD-MCNC: 132 MG/DL (ref 70–99)
GLUCOSE BLD-MCNC: 152 MG/DL (ref 70–99)
GLUCOSE BLD-MCNC: 204 MG/DL (ref 70–99)
GLUCOSE SERPL-MCNC: 158 MG/DL (ref 70–99)
HCT VFR BLD CALC: 41.1 % (ref 37–47)
HEMOGLOBIN: 11.8 GM/DL (ref 12.5–16)
IMMATURE NEUTROPHIL %: 0.3 % (ref 0–0.43)
INR BLD: 1.8 INDEX
LYMPHOCYTES ABSOLUTE: 0.7 K/CU MM
LYMPHOCYTES RELATIVE PERCENT: 20.5 % (ref 24–44)
LYMPHOCYTES, BODY FLUID: 32 %
MAGNESIUM: 1.8 MG/DL (ref 1.8–2.4)
MCH RBC QN AUTO: 30.6 PG (ref 27–31)
MCHC RBC AUTO-ENTMCNC: 28.7 % (ref 32–36)
MCV RBC AUTO: 106.5 FL (ref 78–100)
MESOTHELIAL FLUID: 0 /100 WBC
MONOCYTE, FLUID: NORMAL %
MONOCYTES ABSOLUTE: 0.3 K/CU MM
MONOCYTES RELATIVE PERCENT: 9.3 % (ref 0–4)
NEUTROPHIL, FLUID: 40 %
NUCLEATED RBC %: 0 %
OTHER CELLS FLUID: NORMAL
PDW BLD-RTO: 15 % (ref 11.7–14.9)
PLATELET # BLD: 105 K/CU MM (ref 140–440)
PMV BLD AUTO: 10.4 FL (ref 7.5–11.1)
POTASSIUM SERPL-SCNC: 3.3 MMOL/L (ref 3.5–5.1)
PROTHROMBIN TIME: 20.9 SECONDS (ref 11.7–14.5)
RBC # BLD: 3.86 M/CU MM (ref 4.2–5.4)
RBC FLUID: 3 /CU MM
SEGMENTED NEUTROPHILS ABSOLUTE COUNT: 2.2 K/CU MM
SEGMENTED NEUTROPHILS RELATIVE PERCENT: 69.3 % (ref 36–66)
SODIUM BLD-SCNC: 134 MMOL/L (ref 135–145)
TOTAL IMMATURE NEUTOROPHIL: 0.01 K/CU MM
TOTAL NUCLEATED RBC: 0 K/CU MM
TOTAL PROTEIN: 5 GM/DL (ref 6.4–8.2)
VITAMIN B-12: 1865 PG/ML (ref 211–911)
WBC # BLD: 3.2 K/CU MM (ref 4–10.5)
WBC FLUID: 3 /CU MM

## 2023-09-12 PROCEDURE — 85610 PROTHROMBIN TIME: CPT

## 2023-09-12 PROCEDURE — 2580000003 HC RX 258: Performed by: STUDENT IN AN ORGANIZED HEALTH CARE EDUCATION/TRAINING PROGRAM

## 2023-09-12 PROCEDURE — 80053 COMPREHEN METABOLIC PANEL: CPT

## 2023-09-12 PROCEDURE — 87070 CULTURE OTHR SPECIMN AEROBIC: CPT

## 2023-09-12 PROCEDURE — 90945 DIALYSIS ONE EVALUATION: CPT

## 2023-09-12 PROCEDURE — 6360000002 HC RX W HCPCS: Performed by: STUDENT IN AN ORGANIZED HEALTH CARE EDUCATION/TRAINING PROGRAM

## 2023-09-12 PROCEDURE — 82746 ASSAY OF FOLIC ACID SERUM: CPT

## 2023-09-12 PROCEDURE — 89051 BODY FLUID CELL COUNT: CPT

## 2023-09-12 PROCEDURE — 36415 COLL VENOUS BLD VENIPUNCTURE: CPT

## 2023-09-12 PROCEDURE — 6370000000 HC RX 637 (ALT 250 FOR IP): Performed by: STUDENT IN AN ORGANIZED HEALTH CARE EDUCATION/TRAINING PROGRAM

## 2023-09-12 PROCEDURE — 6370000000 HC RX 637 (ALT 250 FOR IP): Performed by: INTERNAL MEDICINE

## 2023-09-12 PROCEDURE — 87205 SMEAR GRAM STAIN: CPT

## 2023-09-12 PROCEDURE — 82607 VITAMIN B-12: CPT

## 2023-09-12 PROCEDURE — 1200000000 HC SEMI PRIVATE

## 2023-09-12 PROCEDURE — 51798 US URINE CAPACITY MEASURE: CPT

## 2023-09-12 PROCEDURE — 83735 ASSAY OF MAGNESIUM: CPT

## 2023-09-12 PROCEDURE — 85025 COMPLETE CBC W/AUTO DIFF WBC: CPT

## 2023-09-12 PROCEDURE — 82306 VITAMIN D 25 HYDROXY: CPT

## 2023-09-12 PROCEDURE — 82962 GLUCOSE BLOOD TEST: CPT

## 2023-09-12 RX ORDER — VITAMIN B COMPLEX
1000 TABLET ORAL DAILY
Status: DISCONTINUED | OUTPATIENT
Start: 2023-09-12 | End: 2023-09-19 | Stop reason: HOSPADM

## 2023-09-12 RX ORDER — POTASSIUM CHLORIDE 20 MEQ/1
20 TABLET, EXTENDED RELEASE ORAL ONCE
Status: COMPLETED | OUTPATIENT
Start: 2023-09-12 | End: 2023-09-12

## 2023-09-12 RX ORDER — POTASSIUM CHLORIDE 20 MEQ/1
20 TABLET, EXTENDED RELEASE ORAL
Status: DISCONTINUED | OUTPATIENT
Start: 2023-09-12 | End: 2023-09-19 | Stop reason: HOSPADM

## 2023-09-12 RX ORDER — SODIUM CHLORIDE, SODIUM LACTATE, POTASSIUM CHLORIDE, CALCIUM CHLORIDE 600; 310; 30; 20 MG/100ML; MG/100ML; MG/100ML; MG/100ML
INJECTION, SOLUTION INTRAVENOUS CONTINUOUS
Status: DISCONTINUED | OUTPATIENT
Start: 2023-09-12 | End: 2023-09-12

## 2023-09-12 RX ORDER — POTASSIUM CHLORIDE 20 MEQ/1
40 TABLET, EXTENDED RELEASE ORAL ONCE
Status: COMPLETED | OUTPATIENT
Start: 2023-09-12 | End: 2023-09-12

## 2023-09-12 RX ORDER — LANOLIN ALCOHOL/MO/W.PET/CERES
100 CREAM (GRAM) TOPICAL DAILY
Status: DISCONTINUED | OUTPATIENT
Start: 2023-09-12 | End: 2023-09-19 | Stop reason: HOSPADM

## 2023-09-12 RX ADMIN — APIXABAN 5 MG: 5 TABLET, FILM COATED ORAL at 09:45

## 2023-09-12 RX ADMIN — Medication 1000 UNITS: at 14:54

## 2023-09-12 RX ADMIN — MEROPENEM 1000 MG: 1 INJECTION, POWDER, FOR SOLUTION INTRAVENOUS at 10:25

## 2023-09-12 RX ADMIN — POTASSIUM CHLORIDE 20 MEQ: 1500 TABLET, EXTENDED RELEASE ORAL at 09:45

## 2023-09-12 RX ADMIN — POTASSIUM CHLORIDE 20 MEQ: 1500 TABLET, EXTENDED RELEASE ORAL at 21:03

## 2023-09-12 RX ADMIN — ACETAMINOPHEN 650 MG: 325 TABLET ORAL at 05:57

## 2023-09-12 RX ADMIN — POTASSIUM CHLORIDE 40 MEQ: 1500 TABLET, EXTENDED RELEASE ORAL at 14:54

## 2023-09-12 RX ADMIN — ACETAMINOPHEN 650 MG: 325 TABLET ORAL at 12:02

## 2023-09-12 RX ADMIN — METOPROLOL SUCCINATE 12.5 MG: 25 TABLET, FILM COATED, EXTENDED RELEASE ORAL at 09:45

## 2023-09-12 RX ADMIN — FLUOXETINE 20 MG: 20 CAPSULE ORAL at 09:44

## 2023-09-12 RX ADMIN — INSULIN GLARGINE 10 UNITS: 100 INJECTION, SOLUTION SUBCUTANEOUS at 21:03

## 2023-09-12 RX ADMIN — SODIUM CHLORIDE, PRESERVATIVE FREE 10 ML: 5 INJECTION INTRAVENOUS at 10:16

## 2023-09-12 RX ADMIN — LEVOTHYROXINE SODIUM 125 MCG: 0.12 TABLET ORAL at 05:57

## 2023-09-12 RX ADMIN — FLUOXETINE 20 MG: 20 CAPSULE ORAL at 21:04

## 2023-09-12 RX ADMIN — APIXABAN 5 MG: 5 TABLET, FILM COATED ORAL at 21:04

## 2023-09-12 RX ADMIN — SODIUM CHLORIDE, PRESERVATIVE FREE 10 ML: 5 INJECTION INTRAVENOUS at 21:04

## 2023-09-12 RX ADMIN — ROSUVASTATIN CALCIUM 20 MG: 20 TABLET, FILM COATED ORAL at 21:04

## 2023-09-12 RX ADMIN — Medication 100 MG: at 10:26

## 2023-09-12 ASSESSMENT — PAIN DESCRIPTION - LOCATION
LOCATION: GENERALIZED

## 2023-09-12 ASSESSMENT — ENCOUNTER SYMPTOMS
COUGH: 0
DIARRHEA: 0
CONSTIPATION: 0
ABDOMINAL PAIN: 0
SHORTNESS OF BREATH: 0
COLOR CHANGE: 0
SINUS PRESSURE: 0
VOMITING: 0
BACK PAIN: 0
NAUSEA: 0
SINUS PAIN: 0
WHEEZING: 0
SORE THROAT: 0

## 2023-09-12 ASSESSMENT — PAIN SCALES - GENERAL
PAINLEVEL_OUTOF10: 4

## 2023-09-12 ASSESSMENT — PAIN DESCRIPTION - DESCRIPTORS
DESCRIPTORS: ACHING

## 2023-09-12 NOTE — PROGRESS NOTES
PD treatment started as ordered, unable to obtain sample at this time will try again in morning. PD catheter site care and dressing gonzalez completed.

## 2023-09-12 NOTE — CONSULTS
Comprehensive Nutrition Assessment    Type and Reason for Visit:  Initial, Consult (Poor intake/appetite 5 or more days)    Nutrition Recommendations/Plan:   Modify diet to regular, low fat, 1800mL FR  Offer low calorie/high protein oral nutrition supplement daily, vanilla  Please encourage and document po intakes at all meals  Monitor weights, po intakes, renal labs, glucose, POC     Malnutrition Assessment:  Malnutrition Status: Moderate malnutrition (09/12/23 1408)    Context:  Chronic Illness     Findings of the 6 clinical characteristics of malnutrition:  Energy Intake:  75% or less estimated energy requirements for 1 month or longer  Weight Loss:  No significant weight loss     Body Fat Loss:  Mild body fat loss Orbital, Triceps   Muscle Mass Loss:  Mild muscle mass loss Temples (temporalis), Thigh (quadraceps), Hand (interosseous)  Fluid Accumulation:  Mild Extremities   Strength:  Not Performed    Nutrition Assessment:    Admitted w/ generalized weakness, PMHx of Afib on eliquis, ESRD on PD, HTN, T2DM, hypothyroidism. Pt reports poor appetite d/t gas from PD, abdominal pain and diarrhea w/ fatty foods. poor appetite since April, mostly consuming fluids, small meals/snacks.  and daughter help w/ meals at home. Has only consumed a few bites of bread today, states she feels too full of fluid to eat. Will add fat restriction to diet. Patient also willing to try low calorie/high protein (or standard if OOS) w/ dinner, prefers vanilla. No significant weight loss noted; NFPE performed, does meet criteria for malnutrition. Follow at high nutrition risk. Nutrition Related Findings:    glucose 132-158, Na 134, K 3.3, GFR 8 Wound Type: None       Current Nutrition Intake & Therapies:    Average Meal Intake: 1-25%  Average Supplements Intake: None Ordered  ADULT DIET;  Regular; 1800 ml  Additional Calorie Sources:  85kcal from PD/dex    Anthropometric Measures:  Height: 5' 5\" (165.1 cm)  Ideal Body Weight

## 2023-09-12 NOTE — PROGRESS NOTES
[place]2.[place]  [facility] [place]      Name:  Ann Marie White /Age/Sex: 1950  (74 y.o. female)   MRN & CSN:  [id number] & [id number] Encounter Date/Time: 2023 1:02 PM EDT    Location:  [address] [place] [address]-A PCP: Bao Abbott, [address] [place] [address]  Day: 3    Assessment and Plan:   Ann Marie White is a 74 y.o. female with pmh of who presents with Generalized weakness      Plan:    UTI  Patient UA consistent with infection. History of MDRO  IV Merrem  Follow-up urine and blood cultures  Follow-up peritoneal fluid cultures to also rule out peritonitis. This would be covered by Christy Roa. T2DM  BS on admit 165  Lantus 10 units nightly    Afib  -Continue Eliquis and BB     HTN  -Continue Toprol     HLD  -Continue statin      ESRD on PD  -Nephro consult      Mood Disorder   -Continue prozac     Hypothyroidism   -Continue Synthroid       Diet ADULT DIET; Regular; 1800 ml   DVT Prophylaxis [] Lovenox, []  Heparin, [] SCDs, [] Ambulation,    [x] Eliquis, [] Xarelto  [] Coumadin [] other   Code Status Full Code   Disposition From: home  Expected Disposition: home  Estimated Date of Discharge: 2-3 days  Patient requires continued admission due to IV antibiotics   Surrogate Decision Maker/ POA      Personally reviewed Lab Studies and Imaging     Discussed management of the case with nephrology who recommended CT scan of abdomen and PD dialysis    EKG interpreted personally and results no acute ST findings    Drugs that require monitoring for toxicity include insulin and the method of monitoring was point-of-care glucose checks    Subjective:     Chief Complaint: Fatigue     Patient continues to endorse fatigue. States that she does feel slightly better than when she arrived. Review of Systems:    Review of Systems   Constitutional:  Positive for fatigue. Negative for activity change, appetite change, chills and fever. HENT:  Negative for congestion, sinus pressure, sinus pain and sore throat.     Eyes: 0.2 - 1.0 MG/DL    Nitrite Urine, Quantitative NEGATIVE NEGATIVE    Leukocyte Esterase, Urine LARGE NUMBER OR AMOUNT OBSERVED (A) NEGATIVE   Urine Microscopic with Reflex to Culture    Collection Time: 09/11/23  8:50 PM   Result Value Ref Range    RBC, UA 96 (H) 0 - 6 /HPF    WBC, UA 2881 (H) 0 - 5 /HPF    Bacteria, UA NEGATIVE NEGATIVE /HPF    WBC Clumps, UA MANY /HPF    Trichomonas NONE SEEN NONE SEEN /HPF   POCT Glucose    Collection Time: 09/11/23  9:27 PM   Result Value Ref Range    POC Glucose 196 (H) 70 - 99 MG/DL   CBC with Auto Differential    Collection Time: 09/12/23  5:18 AM   Result Value Ref Range    WBC 3.2 (L) 4.0 - 10.5 K/CU MM    RBC 3.86 (L) 4.2 - 5.4 M/CU MM    Hemoglobin 11.8 (L) 12.5 - 16.0 GM/DL    Hematocrit 41.1 37 - 47 %    .5 (H) 78 - 100 FL    MCH 30.6 27 - 31 PG    MCHC 28.7 (L) 32.0 - 36.0 %    RDW 15.0 (H) 11.7 - 14.9 %    Platelets 426 (L) 836 - 440 K/CU MM    MPV 10.4 7.5 - 11.1 FL    Differential Type AUTOMATED DIFFERENTIAL     Segs Relative 69.3 (H) 36 - 66 %    Lymphocytes % 20.5 (L) 24 - 44 %    Monocytes % 9.3 (H) 0 - 4 %    Eosinophils % 0.3 0 - 3 %    Basophils % 0.3 0 - 1 %    Segs Absolute 2.2 K/CU MM    Lymphocytes Absolute 0.7 K/CU MM    Monocytes Absolute 0.3 K/CU MM    Eosinophils Absolute 0.0 K/CU MM    Basophils Absolute 0.0 K/CU MM    Nucleated RBC % 0.0 %    Total Nucleated RBC 0.0 K/CU MM    Total Immature Neutrophil 0.01 K/CU MM    Immature Neutrophil % 0.3 0 - 0.43 %   Protime-INR    Collection Time: 09/12/23  5:18 AM   Result Value Ref Range    Protime 20.9 (H) 11.7 - 14.5 SECONDS    INR 1.8 INDEX   POCT Glucose    Collection Time: 09/12/23  8:50 AM   Result Value Ref Range    POC Glucose 152 (H) 70 - 99 MG/DL   Comprehensive Metabolic Panel w/ Reflex to MG    Collection Time: 09/12/23  9:13 AM   Result Value Ref Range    Sodium 134 (L) 135 - 145 MMOL/L    Potassium 3.3 (L) 3.5 - 5.1 MMOL/L    Chloride 97 (L) 99 - 110 mMol/L    CO2 21 21 - 32 MMOL/L    BUN 50 (H) 6 - 23 MG/DL    Creatinine 5.2 (H) 0.6 - 1.1 MG/DL    Est, Glom Filt Rate 8 (L) >60 mL/min/1.73m2    Glucose 158 (H) 70 - 99 MG/DL    Calcium 8.1 (L) 8.3 - 10.6 MG/DL    Albumin 2.6 (L) 3.4 - 5.0 GM/DL    Total Protein 5.0 (L) 6.4 - 8.2 GM/DL    Total Bilirubin 0.3 0.0 - 1.0 MG/DL    ALT 50 (H) 10 - 40 U/L     (H) 15 - 37 IU/L    Alkaline Phosphatase 123 40 - 128 IU/L    Anion Gap 16 4 - 16   Vitamin B12 & Folate    Collection Time: 09/12/23  9:13 AM   Result Value Ref Range    Vitamin B-12 1865 (H) 211 - 911 pg/ml    Folate >20.0 (H) 3.1 - 17.5 NG/ML   Vitamin D 25 Hydroxy    Collection Time: 09/12/23  9:13 AM   Result Value Ref Range    Vit D, 25-Hydroxy 28.25 >20 NG/ML   Magnesium    Collection Time: 09/12/23  9:13 AM   Result Value Ref Range    Magnesium 1.8 1.8 - 2.4 mg/dl   POCT Glucose    Collection Time: 09/12/23 11:54 AM   Result Value Ref Range    POC Glucose 132 (H) 70 - 99 MG/DL        Imaging/Diagnostics Last 24 Hours   XR CHEST (2 VW)    Result Date: 9/10/2023  EXAMINATION: TWO XRAY VIEWS OF THE CHEST 9/10/2023 11:01 pm COMPARISON: 08/17/2023 HISTORY: ORDERING SYSTEM PROVIDED HISTORY: suspected sepsis/infection TECHNOLOGIST PROVIDED HISTORY: Reason for exam:->suspected sepsis/infection Reason for Exam: suspected sepsis/infection FINDINGS: Left-sided pacemaker is present with 2 leads to the heart. Previous right-sided central venous catheter has been removed. No endotracheal tube or nasogastric tube. Postsurgical changes the right lower thoracoabdominal junction. Cardiac silhouette remains moderately enlarged. There is a mild cephalization of the vasculature. Pleural effusion along the right hemidiaphragm, costophrenic angle, and minor fissure is increased. Some linear opacities in the right lower lung and medial basilar opacity. No pneumothorax is identified. Limited bony evaluation appears stable. 1.  Cardiomegaly with mild pulmonary vascular congestion.   May have a slight

## 2023-09-12 NOTE — PROGRESS NOTES
Pt disconnected from PD cycler without issues. Effluent clear/yellow, total UF neg 819 ml. Dr. César Parrish notified. Fluid sample obtained and walked to lab. Pt denies needs. Will continue to follow.

## 2023-09-12 NOTE — CONSULTS
[facility] [place] [address] [employer], [address]   Consult Note  [facility] 1 2 3 4 5  Date: 2023   Patient: Jeancarlos Cabral   : 1950   DOA: 9/10/2023   MRN: [id number]   ROOM#: [id number]     Reason for Consult: Dysuria  Collaborating Urologist on call at time of admission: Dr. Cid Overall  Chief Complaint:   Chief Complaint   Patient presents with    Fatigue       HISTORY OF PRESENT ILLNESS:    Patient is a 74years old female with history of end-stage renal disease currently on peritoneal dialysis. She was hospitalized for generalized weakness. During hospitalization straight cath was performed to obtain urine sample and patient complains dysuria. Urology was consulted. Upon talking to the patient and her daughter dysuria has been a chronic issue for about a year and ongoing. Never seen urologist before. Denies pelvic fullness or gross hematuria. Does have history of UTI. Denies fever, or nausea vomiting at this time.     Past Medical History:    Past Medical History:   Diagnosis Date    Asthma     Bradycardia     Bronchitis     COPD (chronic obstructive pulmonary disease) ([address])     H/O echocardiogram 2019    EF 55-60%, Grade II Diastolic Dysfunction, Left atrium is moderately dilated, no significant valvular disease, Mild Pulm HTN, No pericardial effusion     History of nuclear stress test 2019    ABN, Moderate inferior and lateral wall ischemia of a large territory, EF 55%    Hypercholesteremia     Hyperlipidemia     Hypertension     Hyperthyroidism     Hypothyroidism     Kidney disease     Pneumonia     Type II or unspecified type diabetes mellitus without mention of complication, not stated as uncontrolled     Unspecified sleep apnea        Past Surgical History:    Past Surgical History:   Procedure Laterality Date    [address]    COLONOSCOPY      COLONOSCOPY N/A 2021    COLONOSCOPY W/ ENDOSCOPIC supraclavicular lymphadenopathy. There is a tunneled dialysis catheter which terminates in the right atrium. Implanted cardiac device with atrial and ventricular leads noted. Multilevel degenerative changes are seen in the spine. No acute fracture. No dislocation. Multilevel osteophyte formation. Healed mid sternal fracture. 1. No pulmonary emboli. Enlarged central pulmonary arteries suggestive of pulmonary hypertension. 2. Cardiomegaly with small pericardial effusion. 3. Atherosclerosis with coronary artery involvement. 4. Small right and trace left pleural effusions, with bilateral atelectatic changes though no other acute process. 5. Small hiatal hernia with trace reflux noted. 6. Subtle subcapsular nodularity suspect along the surface of the left hepatic lobe which suggest possible cirrhosis. XR CHEST PORTABLE    Result Date: 8/17/2023  EXAMINATION: ONE XRAY VIEW OF THE CHEST 8/17/2023 6:44 pm COMPARISON: Chest x-ray July 3, 2023 HISTORY: ORDERING SYSTEM PROVIDED HISTORY: Chest Pain TECHNOLOGIST PROVIDED HISTORY: Reason for exam:->Chest Pain Reason for Exam: Chest Pain Additional signs and symptoms: na Relevant Medical/Surgical History: COPD, hemodialysis treatments FINDINGS: Cardiac silhouette is enlarged but grossly stable. Left-sided cardiac pacer device in place. Atherosclerotic changes of the aorta. Bibasilar atelectasis, otherwise no acute cardiopulmonary process identified. Right-sided dialysis catheter in place with tip at the right atrium. Osseous structures appear stable. 1. Bibasilar atelectasis, otherwise no acute cardiopulmonary process identified. Assessment & Plan:      1) Dysuria - apparently a chronic issue for about a year. Patient voids daily but only trickles due to peritoneal dialysis. UA is positive for leukocytes and RBC. Urine culture is pending. We will do bladder scan to ensure bladder emptying.   Consider sonogram should bladder scan read really high in

## 2023-09-13 ENCOUNTER — APPOINTMENT (OUTPATIENT)
Dept: CT IMAGING | Age: 73
DRG: 853 | End: 2023-09-13
Payer: MEDICARE

## 2023-09-13 LAB
ALBUMIN SERPL-MCNC: 2.6 GM/DL (ref 3.4–5)
ALP BLD-CCNC: 124 IU/L (ref 40–128)
ALT SERPL-CCNC: 62 U/L (ref 10–40)
ANION GAP SERPL CALCULATED.3IONS-SCNC: 16 MMOL/L (ref 4–16)
AST SERPL-CCNC: 123 IU/L (ref 15–37)
BASOPHILS ABSOLUTE: 0 K/CU MM
BASOPHILS RELATIVE PERCENT: 0.3 % (ref 0–1)
BILIRUB SERPL-MCNC: 0.3 MG/DL (ref 0–1)
BUN SERPL-MCNC: 56 MG/DL (ref 6–23)
CALCIUM SERPL-MCNC: 8.6 MG/DL (ref 8.3–10.6)
CHLORIDE BLD-SCNC: 94 MMOL/L (ref 99–110)
CO2: 22 MMOL/L (ref 21–32)
CREAT SERPL-MCNC: 4.2 MG/DL (ref 0.6–1.1)
DIFFERENTIAL TYPE: ABNORMAL
EOSINOPHILS ABSOLUTE: 0.1 K/CU MM
EOSINOPHILS RELATIVE PERCENT: 1.8 % (ref 0–3)
GFR SERPL CREATININE-BSD FRML MDRD: 10 ML/MIN/1.73M2
GLUCOSE BLD-MCNC: 127 MG/DL (ref 70–99)
GLUCOSE BLD-MCNC: 128 MG/DL (ref 70–99)
GLUCOSE BLD-MCNC: 133 MG/DL (ref 70–99)
GLUCOSE BLD-MCNC: 180 MG/DL (ref 70–99)
GLUCOSE SERPL-MCNC: 174 MG/DL (ref 70–99)
HCT VFR BLD CALC: 37.8 % (ref 37–47)
HEMOGLOBIN: 11.5 GM/DL (ref 12.5–16)
IMMATURE NEUTROPHIL %: 0.3 % (ref 0–0.43)
LYMPHOCYTES ABSOLUTE: 1 K/CU MM
LYMPHOCYTES RELATIVE PERCENT: 24.6 % (ref 24–44)
MAGNESIUM: 2 MG/DL (ref 1.8–2.4)
MCH RBC QN AUTO: 30.3 PG (ref 27–31)
MCHC RBC AUTO-ENTMCNC: 30.4 % (ref 32–36)
MCV RBC AUTO: 99.5 FL (ref 78–100)
MONOCYTES ABSOLUTE: 0.5 K/CU MM
MONOCYTES RELATIVE PERCENT: 11.4 % (ref 0–4)
NUCLEATED RBC %: 0 %
PDW BLD-RTO: 14.9 % (ref 11.7–14.9)
PLATELET # BLD: 98 K/CU MM (ref 140–440)
PMV BLD AUTO: 10.9 FL (ref 7.5–11.1)
POTASSIUM SERPL-SCNC: 3.7 MMOL/L (ref 3.5–5.1)
RBC # BLD: 3.8 M/CU MM (ref 4.2–5.4)
SEGMENTED NEUTROPHILS ABSOLUTE COUNT: 2.4 K/CU MM
SEGMENTED NEUTROPHILS RELATIVE PERCENT: 61.6 % (ref 36–66)
SODIUM BLD-SCNC: 132 MMOL/L (ref 135–145)
TOTAL IMMATURE NEUTOROPHIL: 0.01 K/CU MM
TOTAL NUCLEATED RBC: 0 K/CU MM
TOTAL PROTEIN: 5.2 GM/DL (ref 6.4–8.2)
WBC # BLD: 3.9 K/CU MM (ref 4–10.5)

## 2023-09-13 PROCEDURE — 85025 COMPLETE CBC W/AUTO DIFF WBC: CPT

## 2023-09-13 PROCEDURE — 2580000003 HC RX 258: Performed by: INTERNAL MEDICINE

## 2023-09-13 PROCEDURE — 6370000000 HC RX 637 (ALT 250 FOR IP): Performed by: STUDENT IN AN ORGANIZED HEALTH CARE EDUCATION/TRAINING PROGRAM

## 2023-09-13 PROCEDURE — 82962 GLUCOSE BLOOD TEST: CPT

## 2023-09-13 PROCEDURE — 6370000000 HC RX 637 (ALT 250 FOR IP): Performed by: INTERNAL MEDICINE

## 2023-09-13 PROCEDURE — 80053 COMPREHEN METABOLIC PANEL: CPT

## 2023-09-13 PROCEDURE — 94761 N-INVAS EAR/PLS OXIMETRY MLT: CPT

## 2023-09-13 PROCEDURE — 2580000003 HC RX 258: Performed by: STUDENT IN AN ORGANIZED HEALTH CARE EDUCATION/TRAINING PROGRAM

## 2023-09-13 PROCEDURE — 83735 ASSAY OF MAGNESIUM: CPT

## 2023-09-13 PROCEDURE — 6360000004 HC RX CONTRAST MEDICATION: Performed by: INTERNAL MEDICINE

## 2023-09-13 PROCEDURE — A4722 DIALYS SOL FLD VOL > 1999CC: HCPCS | Performed by: INTERNAL MEDICINE

## 2023-09-13 PROCEDURE — 74177 CT ABD & PELVIS W/CONTRAST: CPT

## 2023-09-13 PROCEDURE — 1200000000 HC SEMI PRIVATE

## 2023-09-13 PROCEDURE — 6360000002 HC RX W HCPCS: Performed by: STUDENT IN AN ORGANIZED HEALTH CARE EDUCATION/TRAINING PROGRAM

## 2023-09-13 PROCEDURE — 36415 COLL VENOUS BLD VENIPUNCTURE: CPT

## 2023-09-13 PROCEDURE — 6360000002 HC RX W HCPCS: Performed by: INTERNAL MEDICINE

## 2023-09-13 RX ORDER — MIDODRINE HYDROCHLORIDE 5 MG/1
5 TABLET ORAL
Status: DISCONTINUED | OUTPATIENT
Start: 2023-09-13 | End: 2023-09-15

## 2023-09-13 RX ORDER — HYDROXYZINE HYDROCHLORIDE 10 MG/1
10 TABLET, FILM COATED ORAL 3 TIMES DAILY PRN
Status: DISCONTINUED | OUTPATIENT
Start: 2023-09-13 | End: 2023-09-19 | Stop reason: HOSPADM

## 2023-09-13 RX ORDER — POTASSIUM CHLORIDE 20 MEQ/1
20 TABLET, EXTENDED RELEASE ORAL ONCE
Status: COMPLETED | OUTPATIENT
Start: 2023-09-13 | End: 2023-09-13

## 2023-09-13 RX ORDER — HYDROXYZINE HYDROCHLORIDE 10 MG/1
10 TABLET, FILM COATED ORAL ONCE
Status: COMPLETED | OUTPATIENT
Start: 2023-09-13 | End: 2023-09-13

## 2023-09-13 RX ADMIN — HEPARIN SODIUM: 1000 INJECTION INTRAVENOUS; SUBCUTANEOUS at 19:08

## 2023-09-13 RX ADMIN — SODIUM CHLORIDE, PRESERVATIVE FREE 10 ML: 5 INJECTION INTRAVENOUS at 09:27

## 2023-09-13 RX ADMIN — MEROPENEM 500 MG: 500 INJECTION, POWDER, FOR SOLUTION INTRAVENOUS at 09:33

## 2023-09-13 RX ADMIN — HYDROXYZINE HYDROCHLORIDE 10 MG: 10 TABLET ORAL at 13:49

## 2023-09-13 RX ADMIN — SODIUM CHLORIDE, PRESERVATIVE FREE 10 ML: 5 INJECTION INTRAVENOUS at 21:32

## 2023-09-13 RX ADMIN — APIXABAN 5 MG: 5 TABLET, FILM COATED ORAL at 09:33

## 2023-09-13 RX ADMIN — Medication 1000 UNITS: at 09:33

## 2023-09-13 RX ADMIN — HEPARIN SODIUM: 1000 INJECTION INTRAVENOUS; SUBCUTANEOUS at 19:09

## 2023-09-13 RX ADMIN — FLUOXETINE 20 MG: 20 CAPSULE ORAL at 21:32

## 2023-09-13 RX ADMIN — ACETAMINOPHEN 650 MG: 325 TABLET ORAL at 02:10

## 2023-09-13 RX ADMIN — HYDROXYZINE HYDROCHLORIDE 10 MG: 10 TABLET ORAL at 22:36

## 2023-09-13 RX ADMIN — HEPARIN SODIUM: 1000 INJECTION INTRAVENOUS; SUBCUTANEOUS at 19:07

## 2023-09-13 RX ADMIN — IOPAMIDOL 75 ML: 755 INJECTION, SOLUTION INTRAVENOUS at 18:06

## 2023-09-13 RX ADMIN — FLUOXETINE 20 MG: 20 CAPSULE ORAL at 09:33

## 2023-09-13 RX ADMIN — INSULIN GLARGINE 10 UNITS: 100 INJECTION, SOLUTION SUBCUTANEOUS at 21:31

## 2023-09-13 RX ADMIN — Medication 100 MG: at 09:33

## 2023-09-13 RX ADMIN — LEVOTHYROXINE SODIUM 125 MCG: 0.12 TABLET ORAL at 06:13

## 2023-09-13 RX ADMIN — ROSUVASTATIN CALCIUM 20 MG: 20 TABLET, FILM COATED ORAL at 21:32

## 2023-09-13 RX ADMIN — APIXABAN 5 MG: 5 TABLET, FILM COATED ORAL at 21:32

## 2023-09-13 RX ADMIN — POTASSIUM CHLORIDE 20 MEQ: 1500 TABLET, EXTENDED RELEASE ORAL at 18:49

## 2023-09-13 RX ADMIN — METOPROLOL SUCCINATE 12.5 MG: 25 TABLET, FILM COATED, EXTENDED RELEASE ORAL at 09:33

## 2023-09-13 RX ADMIN — POTASSIUM CHLORIDE 20 MEQ: 1500 TABLET, EXTENDED RELEASE ORAL at 11:49

## 2023-09-13 ASSESSMENT — ENCOUNTER SYMPTOMS
COUGH: 0
BACK PAIN: 0
CONSTIPATION: 0
COLOR CHANGE: 0
NAUSEA: 0
SINUS PRESSURE: 0
SHORTNESS OF BREATH: 0
DIARRHEA: 0
ABDOMINAL PAIN: 0
SORE THROAT: 0
WHEEZING: 0
VOMITING: 0
SINUS PAIN: 0

## 2023-09-13 ASSESSMENT — PAIN DESCRIPTION - LOCATION
LOCATION: ABDOMEN
LOCATION: ABDOMEN

## 2023-09-13 ASSESSMENT — PAIN DESCRIPTION - ORIENTATION: ORIENTATION: RIGHT

## 2023-09-13 ASSESSMENT — PAIN DESCRIPTION - DESCRIPTORS: DESCRIPTORS: ACHING

## 2023-09-13 ASSESSMENT — PAIN SCALES - GENERAL
PAINLEVEL_OUTOF10: 2
PAINLEVEL_OUTOF10: 0
PAINLEVEL_OUTOF10: 6

## 2023-09-13 NOTE — PROGRESS NOTES
V2.0  [facility] [place] Hospitalist Progress Note      Name:  Antoinette Carrillo /Age/Sex: 1950  (74 y.o. female)   MRN & CSN:  [id number] & [id number] Encounter Date/Time: 2023 1:02 PM EDT    Location:  [address] [place], [place] [address]O [facility]  Day: 4    Assessment and Plan:   Antoinette Carrillo is a 74 y.o. female with pmh of who presents with Generalized weakness      Plan:    UTI  Patient UA consistent with infection. History of MDRO  IV Merrem  Follow-up urine and blood cultures: Growing Klebsiella  Follow-up peritoneal fluid cultures to also rule out peritonitis. This would be covered by Aliza Mosquera. Difficulty urinating  Patient is ESRD on PD but still makes urine. Also has recurrent UTIs as difficulty voiding and as well as trickles. Pain after straight cath  Urology consulted  To follow-up with urology office in 2 to 4 weeks after discharge    T2DM  BS on admit 165  Lantus 10 units nightly    Afib  -Continue Eliquis and BB     HTN  -Continue Toprol     HLD  -Continue statin      ESRD on PD  -Nephro consult      Mood Disorder   -Continue prozac     Hypothyroidism   -Continue Synthroid       Diet ADULT ORAL NUTRITION SUPPLEMENT; Dinner; Low Calorie/High Protein Oral Supplement  ADULT DIET;  Regular; Low Fat (less than or equal to 50 gm/day); 1800 ml   DVT Prophylaxis [] Lovenox, []  Heparin, [] SCDs, [] Ambulation,    [x] Eliquis, [] Xarelto  [] Coumadin [] other   Code Status Full Code   Disposition From: home  Expected Disposition: home  Estimated Date of Discharge: 2-3 days  Patient requires continued admission due to IV antibiotics   Surrogate Decision Maker/ POA      Personally reviewed Lab Studies and Imaging     Discussed management of the case with nephrology who recommended CT scan of abdomen and PD dialysis    EKG interpreted personally and results no acute ST findings    Drugs that require monitoring for toxicity include insulin and the method of monitoring was point-of-care glucose K/CU MM    Immature Neutrophil % 0.3 0 - 0.43 %   Magnesium    Collection Time: 09/13/23 12:04 AM   Result Value Ref Range    Magnesium 2.0 1.8 - 2.4 mg/dl   POCT Glucose    Collection Time: 09/13/23  8:40 AM   Result Value Ref Range    POC Glucose 133 (H) 70 - 99 MG/DL   POCT Glucose    Collection Time: 09/13/23 11:55 AM   Result Value Ref Range    POC Glucose 128 (H) 70 - 99 MG/DL        Imaging/Diagnostics Last 24 Hours   XR CHEST (2 VW)    Result Date: 9/10/2023  EXAMINATION: TWO XRAY VIEWS OF THE CHEST 9/10/2023 11:01 pm COMPARISON: 08/17/2023 HISTORY: ORDERING SYSTEM PROVIDED HISTORY: suspected sepsis/infection TECHNOLOGIST PROVIDED HISTORY: Reason for exam:->suspected sepsis/infection Reason for Exam: suspected sepsis/infection FINDINGS: Left-sided pacemaker is present with 2 leads to the heart. Previous right-sided central venous catheter has been removed. No endotracheal tube or nasogastric tube. Postsurgical changes the right lower thoracoabdominal junction. Cardiac silhouette remains moderately enlarged. There is a mild cephalization of the vasculature. Pleural effusion along the right hemidiaphragm, costophrenic angle, and minor fissure is increased. Some linear opacities in the right lower lung and medial basilar opacity. No pneumothorax is identified. Limited bony evaluation appears stable. 1.  Cardiomegaly with mild pulmonary vascular congestion. May have a slight interstitial edema and increased right pleural effusion compared with the prior study. 2.  Linear opacity in the right lower lobe and medial basilar opacity could relate to atelectasis but pneumonia is not excluded. Comment: Please note this report has been produced using speech recognition software and may contain errors related to that system including errors in grammar, punctuation, and spelling, as well as words and phrases that may be inappropriate.  If there are any questions or concerns please feel free to contact the

## 2023-09-13 NOTE — CARE COORDINATION
09/13/23 3046   Service Assessment   Patient Orientation Alert and Oriented;Person;Place;Situation   Cognition Alert   History Provided By Patient   Primary Caregiver Self   Support Systems Spouse/Significant Other;Children   Patient's Healthcare Decision Maker is: Legal Next of Kin   PCP Verified by CM Yes   Last Visit to PCP Within last 6 months   Prior Functional Level Assistance with the following:;Mobility; Shopping;Housework;Cooking; Bathing; Toileting  (pts  assists and support pt)   Current Functional Level Assistance with the following:;Bathing; Toileting;Cooking;Housework; Shopping;Mobility   Can patient return to prior living arrangement Unknown at present   Ability to make needs known: Good   Family able to assist with home care needs: Yes     CM into see pt to initiate a safe discharge plan. Cm introduced self and explained role of CM. Pt is kind, alert and oriented. Pt lives with her spouse in a two story home. Pt is able to stay on the first floor but unable to shower because upstairs. Pts  very supportive for any needs. Assist with mobility as needed, provides transportation, goes to grocery and provides meals. Pt also has a dtr that lives an hour away. Dtr visits biweekly to support. DME includes a walker, W/C, hosp bed and a shower chr. Pt has PD nightly with support from spouse . Pt has a PCP. Pt has a insurance and is able to obtain her prescriptions. CM discussed discharge planning. CM discussed MOW and Senior services. Pt informed CM that she has been in contact with the agency already. CM discussed homecare and/ or SNF option. Pt does not want SNF. Pt is agreeable to home but she had a bad experience and does not want the same agency. She is unable to remember the name of agency. CM PS to [facility] to see pt was active with [address]. CM additional provided resources. Discharge plan is for pt to return home with spouse. PT/OT are pending. Pt refuses SNF.  She is uncertain of what home care agency, see notes. Pt all needed DME. PD at night, supported by . [facility] will follow.     CM provided card and encouraged to call for any needs or concern. CM is available if any needs arise.      Upon discharge pt will be going home with [address] [place] [address] home care  Please fax H/P, D/S.  AVS, order, and face sheet to [phone]  Please call [phone] and inform of discharge

## 2023-09-13 NOTE — PROGRESS NOTES
Nephrology  Dialysis Note        [address], [facility], [address] [place] [address]  Phone: [phone]  Office Hours: 8:30AM - 4:30PM  Monday - Friday          PROCEDURE:  Patient seen during PD      PHYSICIAN:  NASRA      INDICATION:  End-stage renal disease      RX:  See dialysis flowsheet for specifics on access, blood flow rate, dialysate baths, duration of dialysis, anticoagulation and other technical information.       COMMENTS:  SEEN ON PD  SOME ALARMS OVERNIGHTS  WILL ADD HEPARIN TO THE DIALYSATES FOR PD TONIGHT  START MIDODRINE TID  CONTINUE DAILY KCL    Electronically signed by Miguel Angel Zafar DO on 9/13/2023 at 315 MD Tj Carey Jr., DO  [address] [place] [address], [address] [place] [address]  PHONE: [phone]  FAX: [phone]

## 2023-09-13 NOTE — PROGRESS NOTES
09/13/23 1615   Encounter Summary   Encounter Overview/Reason  Initial Encounter   Service Provided For: Patient   Referral/Consult From: [facility]   Support System Spouse; Children   Last Encounter  09/13/23   Complexity of Encounter Low   Begin Time 1604   End Time  1620   Total Time Calculated 16 min   Spiritual/Emotional needs   Type Spiritual Support   Grief, Loss, and Adjustments   Type Adjustment to illness   Assessment/Intervention/Outcome   Assessment Coping   Intervention Empowerment; Active listening;Sustaining Presence/Ministry of presence;Nurtured Hope   Outcome Encouraged;Engaged in conversation;Expressed Gratitude   Plan and Referrals   Plan/Referrals No future visits requested

## 2023-09-13 NOTE — PLAN OF CARE
Problem: Discharge Planning  Goal: Discharge to home or other facility with appropriate resources  9/13/2023 0001 by Lazaro Pop RN  Outcome: Progressing  9/12/2023 1413 by Rodríguez Keene RN  Outcome: Progressing     Problem: Pain  Goal: Verbalizes/displays adequate comfort level or baseline comfort level  9/13/2023 0001 by Lazaro Pop RN  Outcome: Progressing  9/12/2023 1413 by Rodríguez Keene RN  Outcome: Progressing     Problem: Skin/Tissue Integrity  Goal: Absence of new skin breakdown  Description: 1. Monitor for areas of redness and/or skin breakdown  2. Assess vascular access sites hourly  3. Every 4-6 hours minimum:  Change oxygen saturation probe site  4. Every 4-6 hours:  If on nasal continuous positive airway pressure, respiratory therapy assess nares and determine need for appliance change or resting period.   9/13/2023 0001 by Lazaro Pop RN  Outcome: Progressing  9/12/2023 1413 by Rodríguez Keene RN  Outcome: Progressing     Problem: ABCDS Injury Assessment  Goal: Absence of physical injury  9/13/2023 0001 by Lazaro Pop RN  Outcome: Progressing  9/12/2023 1413 by Rodríguez Keene RN  Outcome: Progressing     Problem: Safety - Adult  Goal: Free from fall injury  9/13/2023 0001 by Lazaro Pop RN  Outcome: Progressing  9/12/2023 1413 by Rodríguez Keene RN  Outcome: Progressing     Problem: Chronic Conditions and Co-morbidities  Goal: Patient's chronic conditions and co-morbidity symptoms are monitored and maintained or improved  9/13/2023 0001 by Lazaro Pop RN  Outcome: Progressing  9/12/2023 1413 by Rodríguez Keene RN  Outcome: Progressing     Problem: Nutrition Deficit:  Goal: Optimize nutritional status  Outcome: Progressing

## 2023-09-14 LAB
ALBUMIN SERPL-MCNC: 2.6 GM/DL (ref 3.4–5)
ALP BLD-CCNC: 133 IU/L (ref 40–128)
ALT SERPL-CCNC: 63 U/L (ref 10–40)
ANION GAP SERPL CALCULATED.3IONS-SCNC: 15 MMOL/L (ref 4–16)
AST SERPL-CCNC: 112 IU/L (ref 15–37)
BASOPHILS ABSOLUTE: 0 K/CU MM
BASOPHILS RELATIVE PERCENT: 0.5 % (ref 0–1)
BILIRUB SERPL-MCNC: 0.3 MG/DL (ref 0–1)
BUN SERPL-MCNC: 52 MG/DL (ref 6–23)
CALCIUM SERPL-MCNC: 7.6 MG/DL (ref 8.3–10.6)
CHLORIDE BLD-SCNC: 93 MMOL/L (ref 99–110)
CO2: 20 MMOL/L (ref 21–32)
CREAT SERPL-MCNC: 4.8 MG/DL (ref 0.6–1.1)
CULTURE: ABNORMAL
CULTURE: ABNORMAL
DIFFERENTIAL TYPE: ABNORMAL
EOSINOPHILS ABSOLUTE: 0.1 K/CU MM
EOSINOPHILS RELATIVE PERCENT: 1.8 % (ref 0–3)
GFR SERPL CREATININE-BSD FRML MDRD: 9 ML/MIN/1.73M2
GLUCOSE BLD-MCNC: 131 MG/DL (ref 70–99)
GLUCOSE BLD-MCNC: 95 MG/DL (ref 70–99)
GLUCOSE BLD-MCNC: 97 MG/DL (ref 70–99)
GLUCOSE BLD-MCNC: 98 MG/DL (ref 70–99)
GLUCOSE SERPL-MCNC: 143 MG/DL (ref 70–99)
HCT VFR BLD CALC: 35.9 % (ref 37–47)
HEMOGLOBIN: 11.3 GM/DL (ref 12.5–16)
IMMATURE NEUTROPHIL %: 0.5 % (ref 0–0.43)
LYMPHOCYTES ABSOLUTE: 1.3 K/CU MM
LYMPHOCYTES RELATIVE PERCENT: 29.4 % (ref 24–44)
Lab: ABNORMAL
MCH RBC QN AUTO: 30.2 PG (ref 27–31)
MCHC RBC AUTO-ENTMCNC: 31.5 % (ref 32–36)
MCV RBC AUTO: 96 FL (ref 78–100)
MONOCYTES ABSOLUTE: 0.6 K/CU MM
MONOCYTES RELATIVE PERCENT: 12.9 % (ref 0–4)
NUCLEATED RBC %: 0 %
PDW BLD-RTO: 14.6 % (ref 11.7–14.9)
PLATELET # BLD: 106 K/CU MM (ref 140–440)
PMV BLD AUTO: 10.4 FL (ref 7.5–11.1)
POTASSIUM SERPL-SCNC: 3.7 MMOL/L (ref 3.5–5.1)
RBC # BLD: 3.74 M/CU MM (ref 4.2–5.4)
SEGMENTED NEUTROPHILS ABSOLUTE COUNT: 2.4 K/CU MM
SEGMENTED NEUTROPHILS RELATIVE PERCENT: 54.9 % (ref 36–66)
SODIUM BLD-SCNC: 128 MMOL/L (ref 135–145)
SPECIMEN: ABNORMAL
TOTAL IMMATURE NEUTOROPHIL: 0.02 K/CU MM
TOTAL NUCLEATED RBC: 0 K/CU MM
TOTAL PROTEIN: 5.1 GM/DL (ref 6.4–8.2)
WBC # BLD: 4.4 K/CU MM (ref 4–10.5)

## 2023-09-14 PROCEDURE — 36415 COLL VENOUS BLD VENIPUNCTURE: CPT

## 2023-09-14 PROCEDURE — 2580000003 HC RX 258: Performed by: STUDENT IN AN ORGANIZED HEALTH CARE EDUCATION/TRAINING PROGRAM

## 2023-09-14 PROCEDURE — 6370000000 HC RX 637 (ALT 250 FOR IP): Performed by: STUDENT IN AN ORGANIZED HEALTH CARE EDUCATION/TRAINING PROGRAM

## 2023-09-14 PROCEDURE — A4722 DIALYS SOL FLD VOL > 1999CC: HCPCS | Performed by: INTERNAL MEDICINE

## 2023-09-14 PROCEDURE — 90945 DIALYSIS ONE EVALUATION: CPT

## 2023-09-14 PROCEDURE — 6360000002 HC RX W HCPCS: Performed by: INTERNAL MEDICINE

## 2023-09-14 PROCEDURE — 82962 GLUCOSE BLOOD TEST: CPT

## 2023-09-14 PROCEDURE — 1200000000 HC SEMI PRIVATE

## 2023-09-14 PROCEDURE — 97162 PT EVAL MOD COMPLEX 30 MIN: CPT

## 2023-09-14 PROCEDURE — 97530 THERAPEUTIC ACTIVITIES: CPT

## 2023-09-14 PROCEDURE — 6370000000 HC RX 637 (ALT 250 FOR IP): Performed by: INTERNAL MEDICINE

## 2023-09-14 PROCEDURE — 85025 COMPLETE CBC W/AUTO DIFF WBC: CPT

## 2023-09-14 PROCEDURE — 97166 OT EVAL MOD COMPLEX 45 MIN: CPT

## 2023-09-14 PROCEDURE — 2580000003 HC RX 258: Performed by: INTERNAL MEDICINE

## 2023-09-14 PROCEDURE — 6360000002 HC RX W HCPCS: Performed by: STUDENT IN AN ORGANIZED HEALTH CARE EDUCATION/TRAINING PROGRAM

## 2023-09-14 PROCEDURE — 80053 COMPREHEN METABOLIC PANEL: CPT

## 2023-09-14 RX ORDER — SODIUM BICARBONATE 650 MG/1
1300 TABLET ORAL 3 TIMES DAILY
Status: DISCONTINUED | OUTPATIENT
Start: 2023-09-14 | End: 2023-09-15

## 2023-09-14 RX ORDER — SODIUM CHLORIDE 1 G/1
1 TABLET ORAL
Status: DISCONTINUED | OUTPATIENT
Start: 2023-09-14 | End: 2023-09-14

## 2023-09-14 RX ORDER — LACTULOSE 10 G/15ML
10 SOLUTION ORAL ONCE
Status: DISCONTINUED | OUTPATIENT
Start: 2023-09-14 | End: 2023-09-15

## 2023-09-14 RX ORDER — DOCUSATE SODIUM 100 MG/1
100 CAPSULE, LIQUID FILLED ORAL DAILY
Status: DISCONTINUED | OUTPATIENT
Start: 2023-09-14 | End: 2023-09-15

## 2023-09-14 RX ADMIN — LEVOTHYROXINE SODIUM 125 MCG: 0.12 TABLET ORAL at 05:54

## 2023-09-14 RX ADMIN — ACETAMINOPHEN 650 MG: 325 TABLET ORAL at 09:10

## 2023-09-14 RX ADMIN — HEPARIN SODIUM: 1000 INJECTION INTRAVENOUS; SUBCUTANEOUS at 16:28

## 2023-09-14 RX ADMIN — APIXABAN 5 MG: 5 TABLET, FILM COATED ORAL at 20:12

## 2023-09-14 RX ADMIN — SODIUM CHLORIDE, PRESERVATIVE FREE 10 ML: 5 INJECTION INTRAVENOUS at 09:19

## 2023-09-14 RX ADMIN — ALTEPLASE 6 MG: 2.2 INJECTION, POWDER, LYOPHILIZED, FOR SOLUTION INTRAVENOUS at 11:46

## 2023-09-14 RX ADMIN — ROSUVASTATIN CALCIUM 20 MG: 20 TABLET, FILM COATED ORAL at 20:12

## 2023-09-14 RX ADMIN — Medication 100 MG: at 09:11

## 2023-09-14 RX ADMIN — SODIUM BICARBONATE 1300 MG: 650 TABLET ORAL at 14:33

## 2023-09-14 RX ADMIN — ACETAMINOPHEN 650 MG: 325 TABLET ORAL at 03:59

## 2023-09-14 RX ADMIN — APIXABAN 5 MG: 5 TABLET, FILM COATED ORAL at 09:11

## 2023-09-14 RX ADMIN — DOCUSATE SODIUM 100 MG: 100 CAPSULE, LIQUID FILLED ORAL at 14:33

## 2023-09-14 RX ADMIN — MIDODRINE HYDROCHLORIDE 5 MG: 5 TABLET ORAL at 09:10

## 2023-09-14 RX ADMIN — FLUOXETINE 20 MG: 20 CAPSULE ORAL at 09:11

## 2023-09-14 RX ADMIN — SODIUM CHLORIDE, PRESERVATIVE FREE 10 ML: 5 INJECTION INTRAVENOUS at 20:12

## 2023-09-14 RX ADMIN — FLUOXETINE 20 MG: 20 CAPSULE ORAL at 20:12

## 2023-09-14 RX ADMIN — SODIUM BICARBONATE 1300 MG: 650 TABLET ORAL at 09:19

## 2023-09-14 RX ADMIN — Medication 1000 UNITS: at 09:11

## 2023-09-14 RX ADMIN — ACETAMINOPHEN 650 MG: 325 TABLET ORAL at 17:05

## 2023-09-14 RX ADMIN — METOPROLOL SUCCINATE 12.5 MG: 25 TABLET, FILM COATED, EXTENDED RELEASE ORAL at 09:10

## 2023-09-14 RX ADMIN — HYDROXYZINE HYDROCHLORIDE 10 MG: 10 TABLET ORAL at 09:11

## 2023-09-14 RX ADMIN — SODIUM BICARBONATE 1300 MG: 650 TABLET ORAL at 20:11

## 2023-09-14 RX ADMIN — MEROPENEM 500 MG: 500 INJECTION, POWDER, FOR SOLUTION INTRAVENOUS at 09:15

## 2023-09-14 RX ADMIN — POTASSIUM CHLORIDE 20 MEQ: 1500 TABLET, EXTENDED RELEASE ORAL at 09:11

## 2023-09-14 RX ADMIN — MIDODRINE HYDROCHLORIDE 5 MG: 5 TABLET ORAL at 17:05

## 2023-09-14 RX ADMIN — MIDODRINE HYDROCHLORIDE 5 MG: 5 TABLET ORAL at 12:38

## 2023-09-14 ASSESSMENT — PAIN DESCRIPTION - DESCRIPTORS
DESCRIPTORS: ACHING
DESCRIPTORS: ACHING

## 2023-09-14 ASSESSMENT — PAIN SCALES - GENERAL
PAINLEVEL_OUTOF10: 6
PAINLEVEL_OUTOF10: 4
PAINLEVEL_OUTOF10: 0
PAINLEVEL_OUTOF10: 5

## 2023-09-14 ASSESSMENT — PAIN DESCRIPTION - ONSET: ONSET: SUDDEN

## 2023-09-14 ASSESSMENT — ENCOUNTER SYMPTOMS
WHEEZING: 0
COUGH: 0
SHORTNESS OF BREATH: 0
NAUSEA: 0
VOMITING: 0
SINUS PAIN: 0
BACK PAIN: 0
SORE THROAT: 0
CONSTIPATION: 0
COLOR CHANGE: 0
SINUS PRESSURE: 0
ABDOMINAL PAIN: 0
DIARRHEA: 0

## 2023-09-14 ASSESSMENT — PAIN DESCRIPTION - ORIENTATION
ORIENTATION: RIGHT

## 2023-09-14 ASSESSMENT — PAIN DESCRIPTION - LOCATION
LOCATION: SHOULDER;WRIST
LOCATION: WRIST
LOCATION: SHOULDER
LOCATION: SHOULDER

## 2023-09-14 ASSESSMENT — PAIN DESCRIPTION - PAIN TYPE: TYPE: ACUTE PAIN

## 2023-09-14 NOTE — PROGRESS NOTES
PD treatment not finished. Machine alarming low uf, on drain 2 of 5. MD notified and ordered to end tx. Instill TPA into PD catheter and see if that helps for tonight treatment.

## 2023-09-14 NOTE — CONSULTS
[address] [facility], 1950, 3015/3015-A, 9/14/2023      Discharge Recommendation: SNF     History:  [place]:  The primary encounter diagnosis was Other fatigue. A diagnosis of Abnormal CXR was also pertinent to this visit. Past Medical History:   Diagnosis Date    Asthma     Bradycardia     Bronchitis     COPD (chronic obstructive pulmonary disease) ([address])     H/O echocardiogram 12/04/2019    EF 55-60%, Grade II Diastolic Dysfunction, Left atrium is moderately dilated, no significant valvular disease, Mild Pulm HTN, No pericardial effusion     History of nuclear stress test 12/04/2019    ABN, Moderate inferior and lateral wall ischemia of a large territory, EF 55%    Hypercholesteremia     Hyperlipidemia     Hypertension     Hyperthyroidism     Hypothyroidism     Kidney disease     Pneumonia     Type II or unspecified type diabetes mellitus without mention of complication, not stated as uncontrolled     Unspecified sleep apnea          Subjective:  Patient states: \"I feel proud I did that today\"  Pain: denied   Communication with other providers: RN, CM, PT   Restrictions: general precautions, fall risk, contact ISO   /daughter at bedside    Home Setup/Prior level of function:  Social/Functional History  Lives With: Spouse  Type of Home: House  Home Layout: Two level (lives on main level with hospital bed and BSC)  Home Access: Ramped entrance  Bathroom Shower/Tub:  (sponge bathes)  Bathroom Toilet: Bedside commode  Home Equipment: Hospital bed, Rollator, Walker, rolling, [employer]  ADL Assistance: Independent (sponge bathes)  Homemaking Assistance: Needs assistance (tries to help with what she can, spouse performs most tasks)  Ambulation Assistance: Independent (Sofy with rollator short distances.  Occasional need for use  of WC)  Transfer Assistance: Independent  Active : No    Examination:  Observation: Pt was in bed upon

## 2023-09-14 NOTE — PROGRESS NOTES
Physician Progress Note      Erica Victoria  CSN #:                  [id number]  :                       1950  ADMIT DATE:       9/10/2023 9:25 PM  [address] DATE:  RESPONDING  PROVIDER #:        Frederic ALVARADO          QUERY TEXT:    Pt admitted with weakness. Pt noted to have UTI with 5 days of fevers and   chills per H&P. If possible, please document in the progress notes and   discharge summary if you are evaluating and /or treating any of the following: The medical record reflects the following:  Risk Factors: UTI  Clinical Indicators:  progress note \"UTI\", urine Cx \"KLEBSIELLA PNEUMONIAE   75,000 CFU/ml\", WBC 3.2-4.4, procal 2.3 on , T- max 99.4, P 107-84, R   27-15. Treatment: IV antibiotic, CBC, procal, urine Cx. Thank you,  Eileen PATEL, RN, [facility]  [phone]  Options provided:  -- Sepsis, present on admission  -- Sepsis, that developed during admission  -- UTI without Sepsis  -- Other - I will add my own diagnosis  -- Disagree - Not applicable / Not valid  -- Disagree - Clinically unable to determine / Unknown  -- Refer to Clinical Documentation Reviewer    PROVIDER RESPONSE TEXT:    This patient has sepsis which was present on admission.     Query created by: Eileen Emery on 2023 9:36 AM      Electronically signed by:  Ibeth Quezada 2023 1:24 PM

## 2023-09-14 NOTE — PROGRESS NOTES
Physician Progress Note      KOFIConcepcion Nyhan  CSN #:                  [id number]  :                       1950  ADMIT DATE:       9/10/2023 9:25 PM  [facility] DATE:  RESPONDING  PROVIDER #:        Esther ALVARADO          QUERY TEXT:    Patient admitted with Weakness, noted to have atrial fibrillation and is   maintained on apixaban. If possible, please document in progress notes and   discharge summary if you are evaluating and/or treating any of the following: The medical record reflects the following:  Risk Factors: Age 74, Female, DM, HTN, CAD  Clinical Indicators: H&P  ? Afib? Treatment: apixaban    Thank you,  Yvonne PATEL, RN, [facility]  [phone]  Options provided:  -- Secondary hypercoagulable state in a patient with atrial fibrillation  -- Other - I will add my own diagnosis  -- Disagree - Not applicable / Not valid  -- Disagree - Clinically unable to determine / Unknown  -- Refer to Clinical Documentation Reviewer    PROVIDER RESPONSE TEXT:    This patient has secondary hypercoagulable state in a patient with atrial   fibrillation.     Query created by: Hillary Cerda on 2023 1:52 PM      Electronically signed by:  Melquiades Garza 2023 7:39 AM

## 2023-09-14 NOTE — PROGRESS NOTES
09/14/23 1023   Encounter Summary   Encounter Overview/Reason  Crisis   Service Provided For: Patient not available   Last Encounter  09/14/23  (patient was not available.  Room service.)   Begin Time 1021   End Time  1024   Total Time Calculated 3 min   Assessment/Intervention/Outcome   Assessment Unable to assess   Plan and Referrals   Plan/Referrals No future visits requested

## 2023-09-14 NOTE — CARE COORDINATION
LSW was informed by PT/OT their recommendations for SNF. LSW spoke with pt and her . LSW informed her of the recs from therapy and pt informed this LSW that she is not going to go to a SNF she will be going home. LSW spoke with her about [facility] and pt and her  agree with HC. Pt has had [address] in the past. Pt and  told this LSW that pt has recently been discharged from [facility]. LSW asked if she would like [address] again and pt stated No, They have become too commercial and she does not like that. LSW spoke with pt about other [facility] and pt and  agree with [facility]. LSW PS Lin with [facility] and gave referral.     CM will need a inpt HC order for nursing and PT/OT. If pt is discharged after hours please call [phone] [facility] [phone] and fax [facility] order and AVS to [phone].

## 2023-09-14 NOTE — PROGRESS NOTES
Nephrology  Dialysis Note        [address] [facility], [address] [facility], [address] [place] [address]  Phone: [phone]  Office Hours: 8:30AM - 4:30PM  Monday - Friday          PROCEDURE:  Patient seen during PD      PHYSICIAN:  NASRA      INDICATION:  End-stage renal disease      RX:  See dialysis flowsheet for specifics on access, blood flow rate, dialysate baths, duration of dialysis, anticoagulation and other technical information.       COMMENTS:    -SEEN ON PD, ON DWELL 2 OF 5  -NO SIGNS OF PERITONITIS PER PD FLUID STUDIES  -CT A/P WITH CONTRAST RESULTS NOTED, MAY NEED ECHOCARDIOGRAM TO BETTER ASSESS THE PERICARDIAL EFFUSION  -WILL GIVE SOME SODIUM BICARB TABS for the metabolic acidosis TODAY  -CONTINUE DAILY KCL    Electronically signed by Quyen Kathleen DO on 9/14/2023 at 3871 MD Eulalio Yi DO  [address]  PHONE: [phone]  FAX: [phone]

## 2023-09-14 NOTE — PROGRESS NOTES
Physical Therapy  [facility] ACUTE CARE PHYSICAL THERAPY EVALUATION  Iris Hy, 1950, 3015/3015-A, 2023    History  [place]:  The primary encounter diagnosis was Other fatigue. A diagnosis of Abnormal CXR was also pertinent to this visit. Patient  has a past medical history of Asthma, Bradycardia, Bronchitis, COPD (chronic obstructive pulmonary disease) ([address]), H/O echocardiogram, History of nuclear stress test, Hypercholesteremia, Hyperlipidemia, Hypertension, Hyperthyroidism, Hypothyroidism, Kidney disease, Pneumonia, Type II or unspecified type diabetes mellitus without mention of complication, not stated as uncontrolled, and Unspecified sleep apnea. Patient  has a past surgical history that includes  section; Appendectomy (); Cholecystectomy (); Colonoscopy; Cardiac catheterization; Colonoscopy (N/A, 2021); IR TUNNELED CVC PLACE WO SQ PORT/PUMP > 5 YEARS (2023); Dialysis Catheter Removal (N/A, 2023); Umbilical hernia repair (N/A, 7/10/2023); Dialysis Catheter Insertion (N/A, 7/10/2023); and IR TUNNELED CVC PLACE WO SQ PORT/PUMP > 5 YEARS (2023). Subjective:  Patient states:  \"I think just sitting up is good today\"   Pain:  buttock from sitting in one spot, did not rate   Communication with other providers:   co-eval with Caryn DO   Restrictions: contact precautions, falls     Home Setup/Prior level of function  Social/Functional History  Lives With: Spouse  Type of Home: House  Home Layout: Two level (lives on main level with hospital bed and BSC)  Home Access: Ramped entrance  Bathroom Shower/Tub:  (sponge bathes)  Bathroom Toilet: Bedside commode  Home Equipment: Hospital bed, Rollator, Walker, rolling, [employer]  ADL Assistance: Independent (sponge bathes)  Homemaking Assistance: Needs assistance (tries to help with what she can, spouse performs most tasks)  Ambulation Assistance: Independent (Sofy with rollator short distances.

## 2023-09-15 PROBLEM — T85.611A PD CATHETER DYSFUNCTION (HCC): Status: ACTIVE | Noted: 2023-09-15

## 2023-09-15 PROBLEM — N30.00 ACUTE CYSTITIS WITHOUT HEMATURIA: Status: ACTIVE | Noted: 2023-09-15

## 2023-09-15 PROBLEM — Z79.01 ANTICOAGULATED: Status: ACTIVE | Noted: 2023-09-15

## 2023-09-15 LAB
ANION GAP SERPL CALCULATED.3IONS-SCNC: 16 MMOL/L (ref 4–16)
BUN SERPL-MCNC: 56 MG/DL (ref 6–23)
CALCIUM SERPL-MCNC: 8.2 MG/DL (ref 8.3–10.6)
CHLORIDE BLD-SCNC: 100 MMOL/L (ref 99–110)
CO2: 16 MMOL/L (ref 21–32)
CREAT SERPL-MCNC: 5.3 MG/DL (ref 0.6–1.1)
GFR SERPL CREATININE-BSD FRML MDRD: 8 ML/MIN/1.73M2
GLUCOSE BLD-MCNC: 123 MG/DL (ref 70–99)
GLUCOSE BLD-MCNC: 137 MG/DL (ref 70–99)
GLUCOSE BLD-MCNC: 191 MG/DL (ref 70–99)
GLUCOSE BLD-MCNC: 96 MG/DL (ref 70–99)
GLUCOSE SERPL-MCNC: 91 MG/DL (ref 70–99)
POTASSIUM SERPL-SCNC: 4.2 MMOL/L (ref 3.5–5.1)
SODIUM BLD-SCNC: 132 MMOL/L (ref 135–145)

## 2023-09-15 PROCEDURE — 2580000003 HC RX 258: Performed by: STUDENT IN AN ORGANIZED HEALTH CARE EDUCATION/TRAINING PROGRAM

## 2023-09-15 PROCEDURE — 6370000000 HC RX 637 (ALT 250 FOR IP): Performed by: STUDENT IN AN ORGANIZED HEALTH CARE EDUCATION/TRAINING PROGRAM

## 2023-09-15 PROCEDURE — 1200000000 HC SEMI PRIVATE

## 2023-09-15 PROCEDURE — 90945 DIALYSIS ONE EVALUATION: CPT

## 2023-09-15 PROCEDURE — 99222 1ST HOSP IP/OBS MODERATE 55: CPT | Performed by: SURGERY

## 2023-09-15 PROCEDURE — 82962 GLUCOSE BLOOD TEST: CPT

## 2023-09-15 PROCEDURE — 36415 COLL VENOUS BLD VENIPUNCTURE: CPT

## 2023-09-15 PROCEDURE — 99222 1ST HOSP IP/OBS MODERATE 55: CPT | Performed by: INTERNAL MEDICINE

## 2023-09-15 PROCEDURE — 94761 N-INVAS EAR/PLS OXIMETRY MLT: CPT

## 2023-09-15 PROCEDURE — 93308 TTE F-UP OR LMTD: CPT

## 2023-09-15 PROCEDURE — 2700000000 HC OXYGEN THERAPY PER DAY

## 2023-09-15 PROCEDURE — 6370000000 HC RX 637 (ALT 250 FOR IP): Performed by: INTERNAL MEDICINE

## 2023-09-15 PROCEDURE — 80048 BASIC METABOLIC PNL TOTAL CA: CPT

## 2023-09-15 PROCEDURE — 6360000002 HC RX W HCPCS: Performed by: STUDENT IN AN ORGANIZED HEALTH CARE EDUCATION/TRAINING PROGRAM

## 2023-09-15 RX ORDER — SODIUM BICARBONATE 650 MG/1
1300 TABLET ORAL 4 TIMES DAILY
Status: DISCONTINUED | OUTPATIENT
Start: 2023-09-15 | End: 2023-09-17

## 2023-09-15 RX ORDER — DOCUSATE SODIUM 100 MG/1
100 CAPSULE, LIQUID FILLED ORAL 2 TIMES DAILY
Status: DISCONTINUED | OUTPATIENT
Start: 2023-09-15 | End: 2023-09-19 | Stop reason: HOSPADM

## 2023-09-15 RX ORDER — MIDODRINE HYDROCHLORIDE 5 MG/1
5 TABLET ORAL 2 TIMES DAILY WITH MEALS
Status: DISCONTINUED | OUTPATIENT
Start: 2023-09-15 | End: 2023-09-19 | Stop reason: HOSPADM

## 2023-09-15 RX ADMIN — SODIUM BICARBONATE 1300 MG: 650 TABLET ORAL at 10:23

## 2023-09-15 RX ADMIN — Medication 1000 UNITS: at 10:24

## 2023-09-15 RX ADMIN — FLUOXETINE 20 MG: 20 CAPSULE ORAL at 20:48

## 2023-09-15 RX ADMIN — ROSUVASTATIN CALCIUM 20 MG: 20 TABLET, FILM COATED ORAL at 20:48

## 2023-09-15 RX ADMIN — SODIUM CHLORIDE, PRESERVATIVE FREE 10 ML: 5 INJECTION INTRAVENOUS at 20:48

## 2023-09-15 RX ADMIN — MEROPENEM 500 MG: 500 INJECTION, POWDER, FOR SOLUTION INTRAVENOUS at 10:28

## 2023-09-15 RX ADMIN — Medication 100 MG: at 10:24

## 2023-09-15 RX ADMIN — LEVOTHYROXINE SODIUM 125 MCG: 0.12 TABLET ORAL at 06:47

## 2023-09-15 RX ADMIN — SODIUM BICARBONATE 1300 MG: 650 TABLET ORAL at 15:03

## 2023-09-15 RX ADMIN — SODIUM CHLORIDE, PRESERVATIVE FREE 10 ML: 5 INJECTION INTRAVENOUS at 10:24

## 2023-09-15 RX ADMIN — ACETAMINOPHEN 650 MG: 325 TABLET ORAL at 20:52

## 2023-09-15 RX ADMIN — FLUOXETINE 20 MG: 20 CAPSULE ORAL at 10:24

## 2023-09-15 RX ADMIN — ACETAMINOPHEN 650 MG: 325 TABLET ORAL at 06:47

## 2023-09-15 RX ADMIN — HYDROXYZINE HYDROCHLORIDE 10 MG: 10 TABLET ORAL at 00:13

## 2023-09-15 RX ADMIN — SODIUM BICARBONATE 1300 MG: 650 TABLET ORAL at 20:48

## 2023-09-15 RX ADMIN — METOPROLOL SUCCINATE 12.5 MG: 25 TABLET, FILM COATED, EXTENDED RELEASE ORAL at 10:23

## 2023-09-15 RX ADMIN — DOCUSATE SODIUM 100 MG: 100 CAPSULE, LIQUID FILLED ORAL at 10:24

## 2023-09-15 ASSESSMENT — ENCOUNTER SYMPTOMS
EYE REDNESS: 0
CHEST TIGHTNESS: 0
ABDOMINAL PAIN: 0
NAUSEA: 0
DIARRHEA: 0
DIARRHEA: 1
EYE DISCHARGE: 0
SINUS PRESSURE: 0
COLOR CHANGE: 0
SINUS PAIN: 0
CONSTIPATION: 0
ABDOMINAL DISTENTION: 0
BACK PAIN: 0
SHORTNESS OF BREATH: 0
SORE THROAT: 0
COUGH: 0
WHEEZING: 0
VOMITING: 0

## 2023-09-15 ASSESSMENT — PAIN DESCRIPTION - ORIENTATION
ORIENTATION: RIGHT
ORIENTATION: RIGHT

## 2023-09-15 ASSESSMENT — PAIN SCALES - GENERAL
PAINLEVEL_OUTOF10: 6
PAINLEVEL_OUTOF10: 7

## 2023-09-15 ASSESSMENT — PAIN DESCRIPTION - DESCRIPTORS
DESCRIPTORS: ACHING;SORE
DESCRIPTORS: ACHING

## 2023-09-15 ASSESSMENT — PAIN DESCRIPTION - LOCATION
LOCATION: ARM
LOCATION: SHOULDER

## 2023-09-15 NOTE — PROGRESS NOTES
No one from [employer] ever called back. My charge and the supervisor both were trying to et a hold of anyone. I was in the room over 30 times turning the alarms off.

## 2023-09-15 NOTE — PROGRESS NOTES
PATIENT FILLED WITH 1.2L OF 1.5% DIANEAL  DWELL TIME OF 45 MINUTES    WAS ABLE TO DRAIN 1.2L  DR HAMMONDS NOTIFIED    SUELLEN JUNE OCDT  09/15/2023  1010

## 2023-09-15 NOTE — CONSULTS
GENERAL SURGERY INPATIENT CONSULT NOTE  [facility]) Physicians    PATIENT: James Rios 1950, 74 y.o., female  MRN: [id number]    Physician: Richard Dubon MD    Date: 9/15/23    Reason for Evaluation & Chief Complaint:  peritoneal dialysis catheter dysfunction   Chief Complaint   Patient presents with    Fatigue     Requesting Provider: Chayo Tam DO    History Obtained From:  patient, electronic medical record    HISTORY OF PRESENT ILLNESS:    James Rios is a 74 y.o. female admitted 9/10/23 presenting with fatigue and weakness, currently being treated for a drug resistant urinary tract infection. She has history of renal failure and is on peritoneal dialysis, but has been having trouble with the catheter since being admitted. Her  reports the catheter has been working well at home and although they occasionally have to have her reposition, they have been able to successfully complete cycles. The PD catheter was repositioned and an umbilical hernia repaired with Dr. John Manjarrez on 7/10/23. She denies any abdominal pain or concerns for infection around the PD catheter.      Past Medical History:    Past Medical History:   Diagnosis Date    Asthma     Bradycardia     Bronchitis     COPD (chronic obstructive pulmonary disease) ([address])     H/O echocardiogram 12/04/2019    EF 55-60%, Grade II Diastolic Dysfunction, Left atrium is moderately dilated, no significant valvular disease, Mild Pulm HTN, No pericardial effusion     History of nuclear stress test 12/04/2019    ABN, Moderate inferior and lateral wall ischemia of a large territory, EF 55%    Hypercholesteremia     Hyperlipidemia     Hypertension     Hyperthyroidism     Hypothyroidism     Kidney disease     Pneumonia     Type II or unspecified type diabetes mellitus without mention of complication, not stated as uncontrolled     Unspecified sleep apnea        Past Surgical History:    Past Surgical History:   Procedure Laterality Moderate right pleural effusion. Moderate pericardial effusion. Pacer wires right heart. Calcific coronary artery disease and cardiomegaly. Small left pleural effusion. Organs: Ventral hernia. Anterior left-sided percutaneous in peritoneal catheter coiled in the right lower quadrant. The, pancreas, and adrenals appear normal.  The liver appears somewhat nodular. Splenomegaly. Gallbladder surgically absent. Bilateral simple renal cortical cysts measuring up to 18 mm. Para shaped bladder with or possible ventral diverticulum. Moderate fluid in the cul-de-sac. GI/Bowel: Air-fluid levels in the small bowel. Stomach appears normal. Colonic diverticulosis. Appendix not identified. Pelvis: As above. Peritoneum/Retroperitoneum: The abdominal aorta and iliac arteries are normal in caliber. There is no pathologic adenopathy. Calcified plaque along the aorta and its branches. Bones/Soft Tissues: Severe disc space narrowing and irregular endplates at I1-9. Slight anterior subluxation. Compression fracture L5 age indeterminate. Moderate right and small left pleural effusion. Moderate pericardial effusion. Moderate fluid in the cul-de-sac and peritoneal catheter. Ileus. Possible chronic discitis L4-5. Compression fracture L5 age indeterminate. Pertinent laboratory and imaging studies were personally reviewed if available.     IMPRESSION:    Mercedez Lawrence is a 74 y.o. female with renal failure and peritoneal dialysis catheter, admitted with UTI and weakness    Patient Active Problem List    Diagnosis Date Noted    Epigastric pain 08/17/2023    Pleural effusion on right 06/30/2023    Generalized weakness 05/01/2023    Gait disturbance 05/01/2023    Paroxysmal atrial fibrillation ([address]) 05/01/2023    Heart block, AV 05/01/2023    Uncontrolled type 2 diabetes mellitus with hyperglycemia ([address]) 05/01/2023    Simple chronic bronchitis ([address]) 05/01/2023    Moderate malnutrition ([address]) 04/24/2023    Cardiac arrest ([address]) 04/24/2023    End-stage renal disease on hemodialysis ([address]) 04/14/2023    Personal history of colonic polyps     Benign neoplasm of ascending colon     Benign neoplasm of transverse colon     Benign neoplasm of descending colon     Bradycardia 09/20/2021    Diabetic nephropathy associated with type 2 diabetes mellitus ([address]) 11/16/2020    Chronic kidney disease-mineral and bone disorder 11/16/2020    CKD (chronic kidney disease), stage IV ([address]) 11/09/2020    Acute kidney injury (LALITA) with acute tubular necrosis (ATN) ([address]) 11/09/2020    Other proteinuria 11/09/2020    Type 2 diabetes mellitus with chronic kidney disease ([address]) 11/09/2020    Hypertensive renal disease 11/09/2020    Abnormal nuclear stress test 12/11/2019    SOB (shortness of breath) 11/19/2019    Hypothyroidism     Essential hypertension     Hyperlipidemia     Family history of colon cancer 05/04/2016    Chronic asthmatic bronchitis ([address]) 10/08/2013    JARAD on CPAP 10/08/2013     Visit Diagnoses:  1. Other fatigue    2. Abnormal CXR      PLAN:  Discussed findings and options with Irmaor Philly and her  at bedside. Dr. Oiv Das was asking about manipulating the catheter today, but she has been on Eliquis. Would prefer to wait until Eliquis is held for a few days to reduce risk of hematoma or bleeding with surgery. She reports she is having a manual withdrawal of peritoneal dialysate today. Will see how it does. If continuing to have problems with the PD catheter, will tentatively plan for revision early next week. This would also allow for completion of antibiotic therapy for her MDR UTI prior to surgery. Continue to hold Eliquis  No apparent cause of PD catheter dysfunction on recent CT scan  Will continue to follow.    Thank you for the consultation and the opportunity to care for  [place]    Electronically signed by Zane Saavedra MD, 9/15/2023, 10:32 AM

## 2023-09-15 NOTE — PROGRESS NOTES
MANUAL EXCHANGES COMPLETED  2 EXCHANGES COMPLETED  2L OF 1.5% DIANEAL   90 MINUTE DWELL TIME    1ST EXCHANGE:  1500ML TOTAL FLUID REMOVAL  2ND EXCHANGE:  1600ML TOTAL FLUID REMOVAL    EFFLUENT WAS CLEAR AND FREE OF FIBRIN    CATHETER CARE COMPLETED  DRESSING CHANGE COMPLETED    PD CYCLER TREATMENT ON HOLD FOR TONIGHT PER DR HAMMONDS  PATIENT VOICED NO OTHER NEEDS AT THIS TIME  REMAINS IN BED  CALL LIGHT WITHIN REACH  PRIMARY RN MADE AWARE    SUELLEN JUNE OCDT  09/15/2023  0089

## 2023-09-15 NOTE — PROGRESS NOTES
Summary   This is a limited echo. Ejection fraction is abnormal and visually estimated at 40-45%. Trace tricuspid regurgitation; RVSP: 24 mmHg. There is a moderate circumfrential pericardial effusion without tamponade   physiology.       Signature      ------------------------------------------------------------------   Electronically signed by Gregory Stephen MD   (Interpreting physician) on 09/15/2023 at 11:44 AM      Will hold off on Eliquis and monitor probably repeat an echo on Monday to reassess the size of the pericardial effusion

## 2023-09-15 NOTE — PROGRESS NOTES
PD TREATMENT NOT COMPLETED  CYCLER WAS ON 1 OF 5 DRAINS  DR HAMMONDS NOTIFIED    PATIENT DISCONNECTED FROM CYCLER  MINI CAP APPLIED    DRESSING CLEAN, DRY AND INTACT    PRIMARY RN AT BEDSIDE AND MADE AWARE  PATIENT VOICED NO OTHER NEEDS    [facility]E [facility]  09/15/2023  0298

## 2023-09-15 NOTE — PROGRESS NOTES
Charge nurse called two numbers and left messages for dialysis nurse in regards to machine still beeping after it was replaced today. Waiting for a call back. Message still reads low drain volume same as last night.

## 2023-09-15 NOTE — PROGRESS NOTES
V2.0  [facility] Hospitalist Progress Note      Name:  Cr Edward /Age/Sex: 1950  (74 y.o. female)   MRN & CSN:  [id number] & [id number] Encounter Date/Time: 9/15/2023 1:02 PM EDT    Location:  [address] [place] [address] PCP: Miroslava Mejia, [address] [place] [address] Day: 6    Assessment and Plan:   Cr Edward is a 74 y.o. female with pmh of who presents with Generalized weakness      Plan:    UTI  Patient UA consistent with infection. History of MDRO  IV Merrem  Follow-up urine and blood cultures: Growing Klebsiella MDRO  Follow-up peritoneal fluid cultures to also rule out peritonitis. This would be covered by Shy Vega. PD catheter dysfunction  Patient's PD catheter is not working properly. Unable to fully perform dialysis  General surgery consulted for repair    Difficulty urinating  Patient is ESRD on PD but still makes urine. Also has recurrent UTIs as difficulty voiding and as well as trickles. Pain after straight cath  Urology consulted  To follow-up with urology office in 2 to 4 weeks after discharge    T2DM  BS on admit 165  Lantus 10 units nightly    Afib  -Continue Eliquis and BB     HTN  -Continue Toprol     HLD  -Continue statin      ESRD on PD  -Nephro consult      Mood Disorder   -Continue prozac     Hypothyroidism   -Continue Synthroid       Diet ADULT DIET;  Regular; 1800 ml   DVT Prophylaxis [] Lovenox, []  Heparin, [] SCDs, [] Ambulation,    [x] Eliquis, [] Xarelto  [] Coumadin [] other   Code Status Full Code   Disposition From: home  Expected Disposition: home  Estimated Date of Discharge: 2-3 days  Patient requires continued admission due to IV antibiotics   Surrogate Decision Maker/ POA      Personally reviewed Lab Studies and Imaging     Discussed management of the case with nephrology who recommended CT scan of abdomen and PD dialysis    EKG interpreted personally and results no acute ST findings    Drugs that require monitoring for toxicity include insulin and the method of monitoring interstitial edema and increased right pleural effusion compared with the prior study. 2.  Linear opacity in the right lower lobe and medial basilar opacity could relate to atelectasis but pneumonia is not excluded. Comment: Please note this report has been produced using speech recognition software and may contain errors related to that system including errors in grammar, punctuation, and spelling, as well as words and phrases that may be inappropriate. If there are any questions or concerns please feel free to contact the dictating provider for clarification.      Electronically signed by Blanche Posada MD on 9/15/2023 at 12:40 PM

## 2023-09-15 NOTE — PROGRESS NOTES
Comprehensive Nutrition Assessment    Type and Reason for Visit:  Reassess    Nutrition Recommendations/Plan:   Modify diet to Carb Control 5 with LANCE, 1800 ml   Add Renal oral nutrition supplement BID   Please document all PO intakes in I/O   Encourage lower fiber intake while having diarrhea from abx     Malnutrition Assessment:  Malnutrition Status: Moderate malnutrition (09/12/23 1408)    Context:  Chronic Illness     Findings of the 6 clinical characteristics of malnutrition:  Energy Intake:  75% or less estimated energy requirements for 1 month or longer  Weight Loss:  No significant weight loss     Body Fat Loss:  Mild body fat loss Orbital, Triceps   Muscle Mass Loss:  Mild muscle mass loss Temples (temporalis), Thigh (quadraceps), Hand (interosseous)  Fluid Accumulation:  Mild Extremities   Strength:  Not Performed    Nutrition Assessment:    Appetite is improving. Pt consumed % of meal, likes the vanilla supplements. C/o diarrhea due to antibiotics, encourage rich fiber sources. Discussed nutrition for renal health, pt requested recipes and education, will send handouts. Follow as moderate nutrition risk. Nutrition Related Findings:    Pt buys the food but her dtr cooks the meals, encourage pt to read handouts with dtr. GFR 8, BUN 56, Cr 5.3, Na 132 Wound Type: None       Current Nutrition Intake & Therapies:    Average Meal Intake: 1-25%  Average Supplements Intake: None Ordered  ADULT DIET; Regular; 1800 ml    Anthropometric Measures:  Height: 5' 5\" (165.1 cm)  Ideal Body Weight (IBW): 125 lbs (57 kg)    Admission Body Weight: 190 lb 0.6 oz (86.2 kg)  Current Body Weight: 188 lb 7.9 oz (85.5 kg), 150.8 % IBW. Weight Source: Bed Scale  Current BMI (kg/m2): 31.4  Usual Body Weight: 188 lb (85.3 kg) (3/20/23)  % Weight Change (Calculated): 1.1  Weight Adjustment For: No Adjustment                 BMI Categories: Obese Class 1 (BMI 30.0-34. 9)    Estimated Daily Nutrient Needs:  Energy

## 2023-09-15 NOTE — CONSULTS
Name:  Anika Wells /Age/Sex:   (74 y.o. female)   MRN & CSN:  [id number] & [id number] Admission Date/Time: 9/10/2023  9:25 PM   Location:  8536/0760-C PCP: Alanna Evans [address] [place] [address] Day: 6          Referring physician:  Montez Cockayne, MD         Reason for consultation: Pericardial effusion        Thanks for referral.    Information source: Chart/patient    CC; generalized fatigue and weakness      HPI:   Thank you for involving me in taking  care of Anika Wells who  is a 74 y. o.year  Old female  Presents with history of A-fib on Eliquis, end-stage renal disease on peritoneal dialysis, hypertension, diabetes, hypothyroidism admitted with generalized weakness and fatigue patient was having fevers and chills was found to have a UTI on antibiotics had a CT abdomen done which showed the patient has moderate pericardial effusion patient has failed peritoneal dialysis cardiology has been consulted for possible uremic pericarditis causing her to have this pericardial effusion                   Past medical history:    has a past medical history of Asthma, Bradycardia, Bronchitis, COPD (chronic obstructive pulmonary disease) ([address]), H/O echocardiogram, History of nuclear stress test, Hypercholesteremia, Hyperlipidemia, Hypertension, Hyperthyroidism, Hypothyroidism, Kidney disease, Pneumonia, Type II or unspecified type diabetes mellitus without mention of complication, not stated as uncontrolled, and Unspecified sleep apnea. Past surgical history:   has a past surgical history that includes  section; Appendectomy (); Cholecystectomy (); Colonoscopy; Cardiac catheterization; Colonoscopy (N/A, 2021); IR TUNNELED CVC PLACE WO SQ PORT/PUMP > 5 YEARS (2023); Dialysis Catheter Removal (N/A, 2023); Umbilical hernia repair (N/A, 7/10/2023);  Dialysis Catheter Insertion (N/A, 7/10/2023); and IR TUNNELED CVC PLACE WO SQ PORT/PUMP > 5 YEARS (TYLENOL) suppository 650 mg, Q6H PRN  insulin glargine (LANTUS) injection vial 10 Units, Nightly  insulin lispro (HUMALOG) injection vial 0-4 Units, TID WC  insulin lispro (HUMALOG) injection vial 0-4 Units, Nightly      Current Facility-Administered Medications   Medication Dose Route Frequency Provider Last Rate Last Admin    docusate sodium (COLACE) capsule 100 mg  100 mg Oral BID Thelma Thorpe DO        sodium bicarbonate tablet 1,300 mg  1,300 mg Oral 4x Daily Thelma Thorpe DO        [START ON 9/16/2023] meropenem (MERREM) 500 mg in sodium chloride 0.9 % 100 mL IVPB (mini-bag)  500 mg IntraVENous Q24H Vandana Santos MD        midodrine (PROAMATINE) tablet 5 mg  5 mg Oral TID  Thelma Thorpe DO   5 mg at 09/14/23 1705    hydrOXYzine HCl (ATARAX) tablet 10 mg  10 mg Oral TID PRN Pipo Stephenson MD   10 mg at 09/15/23 0013    thiamine tablet 100 mg  100 mg Oral Daily Thelma Thorpe DO   100 mg at 09/14/23 0911    [Held by provider] potassium chloride (KLOR-CON M) extended release tablet 20 mEq  20 mEq Oral Daily with breakfast Thelma Thorpe DO   20 mEq at 09/14/23 0911    Vitamin D (CHOLECALCIFEROL) tablet 1,000 Units  1,000 Units Oral Daily Thelma Thorpe DO   1,000 Units at 09/14/23 0911    [Held by provider] apixaban (ELIQUIS) tablet 5 mg  5 mg Oral BID Vasyl Valdez MD   5 mg at 09/14/23 2012    FLUoxetine (PROZAC) capsule 20 mg  20 mg Oral BID Vasyl Valdez MD   20 mg at 09/14/23 2012    metoprolol succinate (TOPROL XL) extended release tablet 12.5 mg  12.5 mg Oral Daily Vasyl Valdez MD   12.5 mg at 09/14/23 0910    levothyroxine (SYNTHROID) tablet 125 mcg  125 mcg Oral Daily Vasyl Valdez MD   125 mcg at 09/15/23 0647    rosuvastatin (CRESTOR) tablet 20 mg  20 mg Oral Nightly Vasyl Valdez MD   20 mg at 09/14/23 2012    glucose chewable tablet 16 g  4 tablet Oral PRN Vasyl Valdez MD        dextrose bolus 10% 125 mL  125 mL IntraVENous PRN Johnnie

## 2023-09-15 NOTE — PROGRESS NOTES
Occupational Therapy ATTEMPT Treatment Note  Name: Aida Dickson MRN: [id number] :   1950   Date:  9/15/2023   Admission Date: 9/10/2023 Room:  [address] [place] [address]-A     Primary Problem:  The primary encounter diagnosis was Other fatigue. A diagnosis of Abnormal CXR was also pertinent to this visit. Past Medical History:   Diagnosis Date    Asthma     Bradycardia     Bronchitis     COPD (chronic obstructive pulmonary disease) ([address])     H/O echocardiogram 2019    EF 55-60%, Grade II Diastolic Dysfunction, Left atrium is moderately dilated, no significant valvular disease, Mild Pulm HTN, No pericardial effusion     History of nuclear stress test 2019    ABN, Moderate inferior and lateral wall ischemia of a large territory, EF 55%    Hypercholesteremia     Hyperlipidemia     Hypertension     Hyperthyroidism     Hypothyroidism     Kidney disease     Pneumonia     Type II or unspecified type diabetes mellitus without mention of complication, not stated as uncontrolled     Unspecified sleep apnea              OT attempted to see pt at this time. Pt reported great fatigue and requested to return another time. Educated on benefits of therapy and pt requested to return. Will cont.            Electronically signed by:    DIANDRA Wylie/L  License: [id number]  , 4:03 PM

## 2023-09-15 NOTE — PROGRESS NOTES
Two [facility] nurses came to room to get machine. I advised that we left messages on two numbers, one being Jennifer's. I was told Fercho Bolaños \"missed a few calls during the night\". We never received any calls from [facility].

## 2023-09-16 PROBLEM — R53.83 OTHER FATIGUE: Status: ACTIVE | Noted: 2023-05-01

## 2023-09-16 LAB
ANION GAP SERPL CALCULATED.3IONS-SCNC: 13 MMOL/L (ref 4–16)
BUN SERPL-MCNC: 57 MG/DL (ref 6–23)
CALCIUM SERPL-MCNC: 8.2 MG/DL (ref 8.3–10.6)
CHLORIDE BLD-SCNC: 100 MMOL/L (ref 99–110)
CO2: 23 MMOL/L (ref 21–32)
CREAT SERPL-MCNC: 5.3 MG/DL (ref 0.6–1.1)
CULTURE: NORMAL
CULTURE: NORMAL
GFR SERPL CREATININE-BSD FRML MDRD: 8 ML/MIN/1.73M2
GLUCOSE BLD-MCNC: 135 MG/DL (ref 70–99)
GLUCOSE BLD-MCNC: 138 MG/DL (ref 70–99)
GLUCOSE BLD-MCNC: 178 MG/DL (ref 70–99)
GLUCOSE SERPL-MCNC: 128 MG/DL (ref 70–99)
Lab: NORMAL
Lab: NORMAL
POTASSIUM SERPL-SCNC: 3.6 MMOL/L (ref 3.5–5.1)
SODIUM BLD-SCNC: 136 MMOL/L (ref 135–145)
SPECIMEN: NORMAL
SPECIMEN: NORMAL

## 2023-09-16 PROCEDURE — 99231 SBSQ HOSP IP/OBS SF/LOW 25: CPT | Performed by: NURSE PRACTITIONER

## 2023-09-16 PROCEDURE — 6370000000 HC RX 637 (ALT 250 FOR IP): Performed by: STUDENT IN AN ORGANIZED HEALTH CARE EDUCATION/TRAINING PROGRAM

## 2023-09-16 PROCEDURE — 82962 GLUCOSE BLOOD TEST: CPT

## 2023-09-16 PROCEDURE — 36415 COLL VENOUS BLD VENIPUNCTURE: CPT

## 2023-09-16 PROCEDURE — 80048 BASIC METABOLIC PNL TOTAL CA: CPT

## 2023-09-16 PROCEDURE — 97535 SELF CARE MNGMENT TRAINING: CPT

## 2023-09-16 PROCEDURE — 6370000000 HC RX 637 (ALT 250 FOR IP): Performed by: INTERNAL MEDICINE

## 2023-09-16 PROCEDURE — 2580000003 HC RX 258: Performed by: STUDENT IN AN ORGANIZED HEALTH CARE EDUCATION/TRAINING PROGRAM

## 2023-09-16 PROCEDURE — APPSS60 APP SPLIT SHARED TIME 46-60 MINUTES: Performed by: NURSE PRACTITIONER

## 2023-09-16 PROCEDURE — 97116 GAIT TRAINING THERAPY: CPT

## 2023-09-16 PROCEDURE — 6360000002 HC RX W HCPCS: Performed by: STUDENT IN AN ORGANIZED HEALTH CARE EDUCATION/TRAINING PROGRAM

## 2023-09-16 PROCEDURE — 97530 THERAPEUTIC ACTIVITIES: CPT

## 2023-09-16 PROCEDURE — 90945 DIALYSIS ONE EVALUATION: CPT

## 2023-09-16 PROCEDURE — 99233 SBSQ HOSP IP/OBS HIGH 50: CPT | Performed by: INTERNAL MEDICINE

## 2023-09-16 PROCEDURE — 2700000000 HC OXYGEN THERAPY PER DAY

## 2023-09-16 PROCEDURE — 1200000000 HC SEMI PRIVATE

## 2023-09-16 PROCEDURE — 94761 N-INVAS EAR/PLS OXIMETRY MLT: CPT

## 2023-09-16 RX ADMIN — INSULIN GLARGINE 10 UNITS: 100 INJECTION, SOLUTION SUBCUTANEOUS at 20:46

## 2023-09-16 RX ADMIN — LEVOTHYROXINE SODIUM 125 MCG: 0.12 TABLET ORAL at 06:21

## 2023-09-16 RX ADMIN — METOPROLOL SUCCINATE 12.5 MG: 25 TABLET, FILM COATED, EXTENDED RELEASE ORAL at 09:07

## 2023-09-16 RX ADMIN — SODIUM CHLORIDE, PRESERVATIVE FREE 10 ML: 5 INJECTION INTRAVENOUS at 09:12

## 2023-09-16 RX ADMIN — SODIUM BICARBONATE 1300 MG: 650 TABLET ORAL at 20:46

## 2023-09-16 RX ADMIN — SODIUM BICARBONATE 1300 MG: 650 TABLET ORAL at 09:08

## 2023-09-16 RX ADMIN — MIDODRINE HYDROCHLORIDE 5 MG: 5 TABLET ORAL at 17:18

## 2023-09-16 RX ADMIN — FLUOXETINE 20 MG: 20 CAPSULE ORAL at 09:08

## 2023-09-16 RX ADMIN — Medication 100 MG: at 09:08

## 2023-09-16 RX ADMIN — MIDODRINE HYDROCHLORIDE 5 MG: 5 TABLET ORAL at 09:28

## 2023-09-16 RX ADMIN — Medication 1000 UNITS: at 09:08

## 2023-09-16 RX ADMIN — ACETAMINOPHEN 650 MG: 325 TABLET ORAL at 03:03

## 2023-09-16 RX ADMIN — HYDROXYZINE HYDROCHLORIDE 10 MG: 10 TABLET ORAL at 23:02

## 2023-09-16 RX ADMIN — SODIUM CHLORIDE, PRESERVATIVE FREE 10 ML: 5 INJECTION INTRAVENOUS at 20:47

## 2023-09-16 RX ADMIN — MEROPENEM 500 MG: 500 INJECTION, POWDER, FOR SOLUTION INTRAVENOUS at 09:12

## 2023-09-16 RX ADMIN — SODIUM BICARBONATE 1300 MG: 650 TABLET ORAL at 17:18

## 2023-09-16 RX ADMIN — FLUOXETINE 20 MG: 20 CAPSULE ORAL at 20:47

## 2023-09-16 RX ADMIN — ROSUVASTATIN CALCIUM 20 MG: 20 TABLET, FILM COATED ORAL at 20:46

## 2023-09-16 ASSESSMENT — PAIN SCALES - GENERAL
PAINLEVEL_OUTOF10: 2
PAINLEVEL_OUTOF10: 6
PAINLEVEL_OUTOF10: 4

## 2023-09-16 ASSESSMENT — PAIN DESCRIPTION - DESCRIPTORS
DESCRIPTORS: ACHING

## 2023-09-16 ASSESSMENT — ENCOUNTER SYMPTOMS
BACK PAIN: 0
NAUSEA: 0
VOMITING: 0
CONSTIPATION: 0
SINUS PAIN: 0
DIARRHEA: 0
COUGH: 0
COLOR CHANGE: 0
WHEEZING: 0
SHORTNESS OF BREATH: 0
ABDOMINAL PAIN: 0
SORE THROAT: 0
SINUS PRESSURE: 0

## 2023-09-16 ASSESSMENT — PAIN DESCRIPTION - LOCATION
LOCATION: BACK;ARM
LOCATION: BACK;ARM
LOCATION: ARM

## 2023-09-16 ASSESSMENT — PAIN DESCRIPTION - ORIENTATION
ORIENTATION: RIGHT

## 2023-09-16 ASSESSMENT — PAIN DESCRIPTION - FREQUENCY
FREQUENCY: CONTINUOUS
FREQUENCY: CONTINUOUS

## 2023-09-16 NOTE — PROGRESS NOTES
Nightly    insulin lispro  0-4 Units SubCUTAneous TID WC    insulin lispro  0-4 Units SubCUTAneous Nightly      Infusions:    dextrose      sodium chloride       PRN Meds: hydrOXYzine HCl, 10 mg, TID PRN  glucose, 4 tablet, PRN  dextrose bolus, 125 mL, PRN   Or  dextrose bolus, 250 mL, PRN  glucagon (rDNA), 1 mg, PRN  dextrose, , Continuous PRN  sodium chloride flush, 5-40 mL, PRN  sodium chloride, , PRN  ondansetron, 4 mg, Q8H PRN   Or  ondansetron, 4 mg, Q6H PRN  polyethylene glycol, 17 g, Daily PRN  acetaminophen, 650 mg, Q6H PRN   Or  acetaminophen, 650 mg, Q6H PRN        Labs      Recent Results (from the past 24 hour(s))   POCT Glucose    Collection Time: 09/15/23  5:18 PM   Result Value Ref Range    POC Glucose 137 (H) 70 - 99 MG/DL   POCT Glucose    Collection Time: 09/15/23  8:43 PM   Result Value Ref Range    POC Glucose 123 (H) 70 - 99 MG/DL   Basic Metabolic Panel    Collection Time: 09/16/23  5:10 AM   Result Value Ref Range    Sodium 136 135 - 145 MMOL/L    Potassium 3.6 3.5 - 5.1 MMOL/L    Chloride 100 99 - 110 mMol/L    CO2 23 21 - 32 MMOL/L    Anion Gap 13 4 - 16    BUN 57 (H) 6 - 23 MG/DL    Creatinine 5.3 (H) 0.6 - 1.1 MG/DL    Est, Glom Filt Rate 8 (L) >60 mL/min/1.73m2    Glucose 128 (H) 70 - 99 MG/DL    Calcium 8.2 (L) 8.3 - 10.6 MG/DL   POCT Glucose    Collection Time: 09/16/23 11:26 AM   Result Value Ref Range    POC Glucose 135 (H) 70 - 99 MG/DL        Imaging/Diagnostics Last 24 Hours   XR CHEST (2 VW)    Result Date: 9/10/2023  EXAMINATION: TWO XRAY VIEWS OF THE CHEST 9/10/2023 11:01 pm COMPARISON: 08/17/2023 HISTORY: ORDERING SYSTEM PROVIDED HISTORY: suspected sepsis/infection TECHNOLOGIST PROVIDED HISTORY: Reason for exam:->suspected sepsis/infection Reason for Exam: suspected sepsis/infection FINDINGS: Left-sided pacemaker is present with 2 leads to the heart. Previous right-sided central venous catheter has been removed. No endotracheal tube or nasogastric tube.   Postsurgical changes the right lower thoracoabdominal junction. Cardiac silhouette remains moderately enlarged. There is a mild cephalization of the vasculature. Pleural effusion along the right hemidiaphragm, costophrenic angle, and minor fissure is increased. Some linear opacities in the right lower lung and medial basilar opacity. No pneumothorax is identified. Limited bony evaluation appears stable. 1.  Cardiomegaly with mild pulmonary vascular congestion. May have a slight interstitial edema and increased right pleural effusion compared with the prior study. 2.  Linear opacity in the right lower lobe and medial basilar opacity could relate to atelectasis but pneumonia is not excluded. Comment: Please note this report has been produced using speech recognition software and may contain errors related to that system including errors in grammar, punctuation, and spelling, as well as words and phrases that may be inappropriate. If there are any questions or concerns please feel free to contact the dictating provider for clarification.      Electronically signed by Pancho Quintero MD on 9/16/2023 at 12:38 PM

## 2023-09-16 NOTE — PROGRESS NOTES
Occupational Therapy    Occupational Therapy Treatment Note    Name: Faustino Palencia MRN: [id number] :   1950   Date:  2023   Admission Date: 9/10/2023 Room:  [address] [place] [address]-A     Primary Problem:  The primary encounter diagnosis was Other fatigue. A diagnosis of Abnormal CXR was also pertinent to this visit. Restrictions/Precautions: general precautions, fall risk      Communication with other providers: RN approved session, co tx with PTA with Johnathan Bonilla for tolerance and mobility    Subjective:  Patient states:  Pt agreeable to therapy session. Pain:   Location, Type, Intensity (0/10 to 10/10):  denies pain    Objective:    Observation: Pt supine in bed    Objective Measures:  Pt alert and oriented. Treatment, including education:  Self Care Training:   Cues were given for safety, sequence, UE/LE placement, visual cues, and balance. Activities performed today included UB/LB dressing tasks, toileting, hand hygiene at sink    Pt completed supine to sit with head of bed elevated and use of bed rails with MIN A x2 to MOD A x1. Pt completed scooting to edge of bed with CGA. Pt completed sit to stand from edge of bed with use of WW and MOD A x2 with two trials with noted anterior posture and completed functional mobility to bathroom and CGA and completed toilet transfer with MIN A x2 and completed MAX A to doff undergarment  and required increased time for bathroom needs. Pt completed donning of undergarment with MAX A and completed sit to stand from toilet with [employer] and grab bar and MOD A x2. Pt completed DEP A pericare. Pt completed functional transfer to chair and WW and CGA and seated in chair with all needs met and call light in reach.      Assessment / Impression:    Patient's tolerance of treatment: Well  Adverse Reaction: None  Significant change in status and impact: Improved from initial evaluation  Barriers to improvement: None noted      Plan for Next Session:    Continue OT POC and progress LE ADLs.     Time in:  1134  Time out:  1208  Timed treatment minutes:  34  Total treatment time:  34      Electronically signed by:    ELIO Iniguez,   9/16/2023, 1:42 PM

## 2023-09-16 NOTE — FLOWSHEET NOTE
09/16/23 1844   Vitals   /76   Pulse 73   Respirations 20   Post-Treatment (Cycler)   Average Dwell Time (Hours:Minutes) 1:09   Lost Dwell Time (Hours:Minutes) 1:48   Effluent Appearance Yellow;Fibrin   I Drain (mL) 0 mL   Volume Gain (mL) 289 mL     Pd cycler treatment complete, low volume alarm after 1500 ml removed per cycle, machine reset and drain continues and eventually moves to next step. Cap applied post treatment and patient denies any further needs, call light in reach,  with patient.

## 2023-09-16 NOTE — PROGRESS NOTES
Cardiology Progress Note     Today's Plan: [facility]    Admit Date:  9/10/2023    Consult reason/ Seen today for: pericardial effusion    Subjective and  Overnight Events:  Denies any chest pain, SOB, or palpations. History of Presenting Illness:    Chief complain on admission : 74 y. o.year old who is admitted for  Chief Complaint   Patient presents with    Fatigue        Past medical history:    has a past medical history of Asthma, Bradycardia, Bronchitis, COPD (chronic obstructive pulmonary disease) ([address]), H/O echocardiogram, History of nuclear stress test, Hypercholesteremia, Hyperlipidemia, Hypertension, Hyperthyroidism, Hypothyroidism, Kidney disease, Pneumonia, Type II or unspecified type diabetes mellitus without mention of complication, not stated as uncontrolled, and Unspecified sleep apnea. Past surgical history:   has a past surgical history that includes  section; Appendectomy (); Cholecystectomy (); Colonoscopy; Cardiac catheterization; Colonoscopy (N/A, 2021); IR TUNNELED CVC PLACE WO SQ PORT/PUMP > 5 YEARS (2023); Dialysis Catheter Removal (N/A, 2023); Umbilical hernia repair (N/A, 7/10/2023); Dialysis Catheter Insertion (N/A, 7/10/2023); and IR TUNNELED CVC PLACE WO SQ PORT/PUMP > 5 YEARS (2023). Social History:   reports that she quit smoking about 32 years ago. Her smoking use included cigarettes. She has a 20.00 pack-year smoking history. She has never used smokeless tobacco. She reports that she does not currently use alcohol. She reports that she does not use drugs. Family history:  family history includes Cancer in her father; Colon Cancer in an other family member; Depression in an other family member; Diabetes in an other family member; Hypertension in an other family member; Stroke in her mother.     Allergies   Allergen Reactions    Empagliflozin Hives    Lorazepam Other (See Comments)     Confusion Partial Purse String (Intermediate) Text: Given the location of the defect and the characteristics of the surrounding skin an intermediate purse string closure was deemed most appropriate.  Undermining was performed circumfirentially around the surgical defect.  A purse string suture was then placed and tightened. Wound tension only allowed a partial closure of the circular defect.

## 2023-09-16 NOTE — PROGRESS NOTES
Physical Therapy  Name: Cr Edward MRN: [id number] :   1950   Date:  2023   Admission Date: 9/10/2023 Room:  [address] [place] [address]   Restrictions/Precautions:        Contact Precautions, Fall risk    Communication with other providers:  Corwin Wheeler RN provides O2 tank for ambulation. COTX with Duaine Footman per patient tolerane with rehab    Subjective:  Patient states:  \"You girls are the answer to my prayers\"  Pain:   Location, Type, Intensity (0/10 to 10/10): Does not complain of pain    Objective:    Observation:  Patient is supine in bed upon arrival. NC O2 2L with SpO2 96-97% with activities     Treatment, including education/measures:    Transfers with line management of TeleMonitor, BP Cuff, Pulse Ox, NC O2, IV  Supine to sit :Mod A to complete hip and trunk translation on EOB  Scooting :Seated scooting CGA  Sit to stand :From EOB, initially Mod A x 2, and progresses to Min A from EOB with next attempt. From toilet patient is Mod A x 2 with x2 unsuccessful attempts. Cues for sequence and BUE effort to assist at RW   Standing balance: Min-CGA with heavy reliance on RW. She demo's forward flexed posture and WBOS. Neur-laith: Patient needs cues for SHANTEL set up, and increase upright posture during occupational activities  Stand to sit :Patient is Min A x 2 to sit at toilet and recliner  SPT:Stand pivot with RW CGA with patient needing cues for RW sequencing    Gait:  Pt amb with RW for 10 ft + 15 ft  with CGA assist  Pt needed VC's for increase upright posture and proper walker sequencing    Safety  Patient left safely in the recliner, RN notified, with call light/phone in reach with alarm applied. Gait belt and mask were used for transfers and gait.     Assessment / Impression:     Patient's tolerance of treatment:  Good   Adverse Reaction: none  Significant change in status and impact:  tolerates OOB activities today  Barriers to improvement:  endurance, strength, medical status     Plan for Next Session: Will cont to work towards pt's goals per patient tolerance  Time in:  1134  Time out:  1205  Timed treatment minutes:  31  Total treatment time:  31  Previously filed items:  Social/Functional History  Lives With: Spouse  Type of Home: House  Home Layout: Two level (lives on main level with hospital bed and BSC)  Home Access: Ramped entrance  Bathroom Shower/Tub:  (sponge bathes)  Bathroom Toilet: Bedside commode  Home Equipment: Hospital bed, Rollator, Walker, rolling, [employer]  ADL Assistance: Independent (sponge bathes)  Homemaking Assistance: Needs assistance (tries to help with what she can, spouse performs most tasks)  Ambulation Assistance: Independent (Sofy with rollator short distances. Occasional need for use  of WC)  Transfer Assistance: Independent  Active : No  Short Term Goals  Time Frame for Short Term Goals: 2 weeks  Short Term Goal 1: Pt will perform sit><supine SBA  Short Term Goal 2: Pt will transfer to all surfaces SBA  Short Term Goal 3: Pt will ambulate 50ft with RW SBA  Short Term Goal 4: Pt will perform standing light dynamic activity x 2 minutes, single UE support SBA       Electronically signed by:     Gabbie Carrera PTA  9/16/2023, 11:58 AM

## 2023-09-16 NOTE — PROGRESS NOTES
Nephrology Progress Note        [address], [address], [address] [place] [address]  Phone: [phone]  Office Hours: 8:30AM - 4:30PM  Monday - Friday 9/16/2023 7:57 AM  Subjective:   Admit Date: 9/10/2023  PCP: SARAH Dobson - CNP  Interval History: On NC    Diet: ADULT DIET; Regular; 5 carb choices (75 gm/meal); No Added Salt (3-4 gm); 1800 ml  ADULT ORAL NUTRITION SUPPLEMENT; Dinner, Lunch; Renal Oral Supplement      Data:   Scheduled Meds:   docusate sodium  100 mg Oral BID    sodium bicarbonate  1,300 mg Oral 4x Daily    meropenem  500 mg IntraVENous Q24H    midodrine  5 mg Oral BID WC    thiamine  100 mg Oral Daily    [Held by provider] potassium chloride  20 mEq Oral Daily with breakfast    Vitamin D  1,000 Units Oral Daily    [Held by provider] apixaban  5 mg Oral BID    FLUoxetine  20 mg Oral BID    metoprolol succinate  12.5 mg Oral Daily    levothyroxine  125 mcg Oral Daily    rosuvastatin  20 mg Oral Nightly    sodium chloride flush  5-40 mL IntraVENous 2 times per day    insulin glargine  10 Units SubCUTAneous Nightly    insulin lispro  0-4 Units SubCUTAneous TID WC    insulin lispro  0-4 Units SubCUTAneous Nightly     Continuous Infusions:   dextrose      sodium chloride       PRN Meds:hydrOXYzine HCl, glucose, dextrose bolus **OR** dextrose bolus, glucagon (rDNA), dextrose, sodium chloride flush, sodium chloride, ondansetron **OR** ondansetron, polyethylene glycol, acetaminophen **OR** acetaminophen  I/O last 3 completed shifts: In: 6010 [I.V.:10; Other:6000]  Out: 3100 [Other:3100]  No intake/output data recorded.     Intake/Output Summary (Last 24 hours) at 9/16/2023 0757  Last data filed at 9/15/2023 1510  Gross per 24 hour   Intake 6010 ml   Output 3100 ml   Net 2910 ml       CBC:   Recent Labs     09/14/23  0054   WBC 4.4   HGB 11.3*   *       BMP:    Recent Labs     09/14/23  0054 09/15/23  0411 09/16/23  0510   * 132* 136   K 3.7 4.2 3.6   CL 93* 100 100

## 2023-09-17 VITALS
TEMPERATURE: 97.9 F | SYSTOLIC BLOOD PRESSURE: 117 MMHG | WEIGHT: 193.7 LBS | RESPIRATION RATE: 16 BRPM | BODY MASS INDEX: 32.27 KG/M2 | OXYGEN SATURATION: 99 % | HEART RATE: 66 BPM | HEIGHT: 65 IN | DIASTOLIC BLOOD PRESSURE: 65 MMHG

## 2023-09-17 LAB
ANION GAP SERPL CALCULATED.3IONS-SCNC: 12 MMOL/L (ref 4–16)
BASOPHILS ABSOLUTE: 0 K/CU MM
BASOPHILS RELATIVE PERCENT: 0.5 % (ref 0–1)
BUN SERPL-MCNC: 55 MG/DL (ref 6–23)
CALCIUM SERPL-MCNC: 8.3 MG/DL (ref 8.3–10.6)
CHLORIDE BLD-SCNC: 99 MMOL/L (ref 99–110)
CO2: 24 MMOL/L (ref 21–32)
CREAT SERPL-MCNC: 5.2 MG/DL (ref 0.6–1.1)
CULTURE: ABNORMAL
DIFFERENTIAL TYPE: ABNORMAL
EOSINOPHILS ABSOLUTE: 0.3 K/CU MM
EOSINOPHILS RELATIVE PERCENT: 3 % (ref 0–3)
GFR SERPL CREATININE-BSD FRML MDRD: 8 ML/MIN/1.73M2
GLUCOSE BLD-MCNC: 104 MG/DL (ref 70–99)
GLUCOSE BLD-MCNC: 147 MG/DL (ref 70–99)
GLUCOSE BLD-MCNC: 90 MG/DL (ref 70–99)
GLUCOSE BLD-MCNC: 98 MG/DL (ref 70–99)
GLUCOSE SERPL-MCNC: 100 MG/DL (ref 70–99)
GRAM SMEAR: ABNORMAL
HCT VFR BLD CALC: 32.8 % (ref 37–47)
HEMOGLOBIN: 10.5 GM/DL (ref 12.5–16)
IMMATURE NEUTROPHIL %: 0.6 % (ref 0–0.43)
LYMPHOCYTES ABSOLUTE: 2.1 K/CU MM
LYMPHOCYTES RELATIVE PERCENT: 24.7 % (ref 24–44)
Lab: ABNORMAL
MCH RBC QN AUTO: 30.5 PG (ref 27–31)
MCHC RBC AUTO-ENTMCNC: 32 % (ref 32–36)
MCV RBC AUTO: 95.3 FL (ref 78–100)
MONOCYTES ABSOLUTE: 0.7 K/CU MM
MONOCYTES RELATIVE PERCENT: 7.6 % (ref 0–4)
NUCLEATED RBC %: 0 %
PDW BLD-RTO: 14.8 % (ref 11.7–14.9)
PLATELET # BLD: 129 K/CU MM (ref 140–440)
PMV BLD AUTO: 9.3 FL (ref 7.5–11.1)
POTASSIUM SERPL-SCNC: 3.5 MMOL/L (ref 3.5–5.1)
RBC # BLD: 3.44 M/CU MM (ref 4.2–5.4)
SEGMENTED NEUTROPHILS ABSOLUTE COUNT: 5.5 K/CU MM
SEGMENTED NEUTROPHILS RELATIVE PERCENT: 63.6 % (ref 36–66)
SODIUM BLD-SCNC: 135 MMOL/L (ref 135–145)
SPECIMEN: ABNORMAL
TOTAL IMMATURE NEUTOROPHIL: 0.05 K/CU MM
TOTAL NUCLEATED RBC: 0 K/CU MM
WBC # BLD: 8.7 K/CU MM (ref 4–10.5)

## 2023-09-17 PROCEDURE — 94761 N-INVAS EAR/PLS OXIMETRY MLT: CPT

## 2023-09-17 PROCEDURE — 1200000000 HC SEMI PRIVATE

## 2023-09-17 PROCEDURE — 99232 SBSQ HOSP IP/OBS MODERATE 35: CPT | Performed by: INTERNAL MEDICINE

## 2023-09-17 PROCEDURE — 6370000000 HC RX 637 (ALT 250 FOR IP): Performed by: STUDENT IN AN ORGANIZED HEALTH CARE EDUCATION/TRAINING PROGRAM

## 2023-09-17 PROCEDURE — 82962 GLUCOSE BLOOD TEST: CPT

## 2023-09-17 PROCEDURE — 2580000003 HC RX 258: Performed by: STUDENT IN AN ORGANIZED HEALTH CARE EDUCATION/TRAINING PROGRAM

## 2023-09-17 PROCEDURE — 6370000000 HC RX 637 (ALT 250 FOR IP): Performed by: INTERNAL MEDICINE

## 2023-09-17 PROCEDURE — 85025 COMPLETE CBC W/AUTO DIFF WBC: CPT

## 2023-09-17 PROCEDURE — 6360000002 HC RX W HCPCS: Performed by: STUDENT IN AN ORGANIZED HEALTH CARE EDUCATION/TRAINING PROGRAM

## 2023-09-17 PROCEDURE — 36415 COLL VENOUS BLD VENIPUNCTURE: CPT

## 2023-09-17 PROCEDURE — 90945 DIALYSIS ONE EVALUATION: CPT

## 2023-09-17 PROCEDURE — APPSS60 APP SPLIT SHARED TIME 46-60 MINUTES: Performed by: NURSE PRACTITIONER

## 2023-09-17 PROCEDURE — 2700000000 HC OXYGEN THERAPY PER DAY

## 2023-09-17 PROCEDURE — 80048 BASIC METABOLIC PNL TOTAL CA: CPT

## 2023-09-17 RX ORDER — SODIUM BICARBONATE 650 MG/1
1300 TABLET ORAL 3 TIMES DAILY
Status: DISCONTINUED | OUTPATIENT
Start: 2023-09-17 | End: 2023-09-19 | Stop reason: HOSPADM

## 2023-09-17 RX ORDER — POTASSIUM CHLORIDE 20 MEQ/1
20 TABLET, EXTENDED RELEASE ORAL ONCE
Status: COMPLETED | OUTPATIENT
Start: 2023-09-17 | End: 2023-09-17

## 2023-09-17 RX ADMIN — SODIUM BICARBONATE 1300 MG: 650 TABLET ORAL at 09:48

## 2023-09-17 RX ADMIN — INSULIN GLARGINE 10 UNITS: 100 INJECTION, SOLUTION SUBCUTANEOUS at 20:47

## 2023-09-17 RX ADMIN — MEROPENEM 500 MG: 500 INJECTION, POWDER, FOR SOLUTION INTRAVENOUS at 09:56

## 2023-09-17 RX ADMIN — Medication 1000 UNITS: at 09:47

## 2023-09-17 RX ADMIN — SODIUM CHLORIDE, PRESERVATIVE FREE 10 ML: 5 INJECTION INTRAVENOUS at 20:47

## 2023-09-17 RX ADMIN — Medication 100 MG: at 09:47

## 2023-09-17 RX ADMIN — ROSUVASTATIN CALCIUM 20 MG: 20 TABLET, FILM COATED ORAL at 20:47

## 2023-09-17 RX ADMIN — MIDODRINE HYDROCHLORIDE 5 MG: 5 TABLET ORAL at 17:17

## 2023-09-17 RX ADMIN — FLUOXETINE 20 MG: 20 CAPSULE ORAL at 20:47

## 2023-09-17 RX ADMIN — SODIUM BICARBONATE 1300 MG: 650 TABLET ORAL at 14:18

## 2023-09-17 RX ADMIN — ACETAMINOPHEN 650 MG: 325 TABLET ORAL at 06:49

## 2023-09-17 RX ADMIN — FLUOXETINE 20 MG: 20 CAPSULE ORAL at 09:48

## 2023-09-17 RX ADMIN — SODIUM CHLORIDE, PRESERVATIVE FREE 10 ML: 5 INJECTION INTRAVENOUS at 09:52

## 2023-09-17 RX ADMIN — SODIUM BICARBONATE 1300 MG: 650 TABLET ORAL at 20:46

## 2023-09-17 RX ADMIN — MIDODRINE HYDROCHLORIDE 5 MG: 5 TABLET ORAL at 09:47

## 2023-09-17 RX ADMIN — POTASSIUM CHLORIDE 20 MEQ: 1500 TABLET, EXTENDED RELEASE ORAL at 14:17

## 2023-09-17 RX ADMIN — METOPROLOL SUCCINATE 12.5 MG: 25 TABLET, FILM COATED, EXTENDED RELEASE ORAL at 09:47

## 2023-09-17 RX ADMIN — DOCUSATE SODIUM 100 MG: 100 CAPSULE, LIQUID FILLED ORAL at 09:48

## 2023-09-17 RX ADMIN — POTASSIUM CHLORIDE 20 MEQ: 1500 TABLET, EXTENDED RELEASE ORAL at 09:47

## 2023-09-17 RX ADMIN — LEVOTHYROXINE SODIUM 125 MCG: 0.12 TABLET ORAL at 06:46

## 2023-09-17 ASSESSMENT — ENCOUNTER SYMPTOMS
COLOR CHANGE: 0
BACK PAIN: 0
NAUSEA: 0
DIARRHEA: 0
SINUS PAIN: 0
ABDOMINAL PAIN: 0
SINUS PRESSURE: 0
SHORTNESS OF BREATH: 0
SORE THROAT: 0
COUGH: 0
VOMITING: 0
WHEEZING: 0
CONSTIPATION: 0

## 2023-09-17 ASSESSMENT — PAIN DESCRIPTION - DESCRIPTORS
DESCRIPTORS: ACHING
DESCRIPTORS: ACHING

## 2023-09-17 ASSESSMENT — PAIN DESCRIPTION - LOCATION
LOCATION: ARM
LOCATION: ARM

## 2023-09-17 ASSESSMENT — PAIN DESCRIPTION - ORIENTATION
ORIENTATION: RIGHT
ORIENTATION: RIGHT

## 2023-09-17 ASSESSMENT — PAIN SCALES - GENERAL
PAINLEVEL_OUTOF10: 6
PAINLEVEL_OUTOF10: 3

## 2023-09-17 NOTE — PROGRESS NOTES
Disposition From: home  Expected Disposition: home  Estimated Date of Discharge: 2-3 days  Patient requires continued admission due to IV antibiotics and PD catheter dysfunction   Surrogate Decision Maker/ POA      Personally reviewed Lab Studies and Imaging     Discussed management of the case with nephrology who recommended CT scan of abdomen and PD dialysis    EKG interpreted personally and results no acute ST findings    Drugs that require monitoring for toxicity include insulin and the method of monitoring was point-of-care glucose checks    Subjective:     Chief Complaint: Fatigue     Patient feeling much better today. Her dialysis did run better yesterday. The plan is for her to use a cycler overnight tonight and determine if her catheter is functional or not. If it does not work properly may need surgical intervention on her PD catheter tomorrow. If everything goes well likely can go home. Review of Systems:    Review of Systems   Constitutional:  Positive for fatigue. Negative for activity change, appetite change, chills and fever. HENT:  Negative for congestion, sinus pressure, sinus pain and sore throat. Eyes:  Negative for visual disturbance. Respiratory:  Negative for cough, shortness of breath and wheezing. Cardiovascular:  Negative for chest pain, palpitations and leg swelling. Gastrointestinal:  Negative for abdominal pain, constipation, diarrhea, nausea and vomiting. Endocrine: Negative for polydipsia and polyuria. Genitourinary:  Negative for dysuria. Musculoskeletal:  Negative for arthralgias and back pain. Skin:  Negative for color change. Neurological:  Negative for dizziness, weakness and headaches. Psychiatric/Behavioral:  Negative for agitation, behavioral problems and confusion. Objective:      Intake/Output Summary (Last 24 hours) at 9/17/2023 1256  Last data filed at 9/17/2023 0948  Gross per 24 hour   Intake 529 ml   Output --   Net 529 ml congestion. May have a slight interstitial edema and increased right pleural effusion compared with the prior study. 2.  Linear opacity in the right lower lobe and medial basilar opacity could relate to atelectasis but pneumonia is not excluded. Comment: Please note this report has been produced using speech recognition software and may contain errors related to that system including errors in grammar, punctuation, and spelling, as well as words and phrases that may be inappropriate. If there are any questions or concerns please feel free to contact the dictating provider for clarification.      Electronically signed by Jana Lackey MD on 9/17/2023 at 12:56 PM

## 2023-09-17 NOTE — PROGRESS NOTES
Nephrology Progress Note        [address], [address] [facility], [address] [place] [address]  Phone: [phone]  Office Hours: 8:30AM - 4:30PM  Monday - Friday 9/17/2023 7:46 AM  Subjective:   Admit Date: 9/10/2023  PCP: SARAH Jasso - FELIPE  Interval History:   Doing ok  Had at least once anthony of PD with the cycler yesterday but there were still some alarms    Diet: ADULT DIET; Regular; 5 carb choices (75 gm/meal); No Added Salt (3-4 gm); 1800 ml  ADULT ORAL NUTRITION SUPPLEMENT; Dinner, Lunch; Renal Oral Supplement      Data:   Scheduled Meds:   sodium bicarbonate  1,300 mg Oral TID    dianeal lo-tej 2.5% 6,000 mL with heparin (porcine) 3,000 Units solution   IntraPERitoneal Once    docusate sodium  100 mg Oral BID    meropenem  500 mg IntraVENous Q24H    midodrine  5 mg Oral BID WC    thiamine  100 mg Oral Daily    potassium chloride  20 mEq Oral Daily with breakfast    Vitamin D  1,000 Units Oral Daily    [Held by provider] apixaban  5 mg Oral BID    FLUoxetine  20 mg Oral BID    metoprolol succinate  12.5 mg Oral Daily    levothyroxine  125 mcg Oral Daily    rosuvastatin  20 mg Oral Nightly    sodium chloride flush  5-40 mL IntraVENous 2 times per day    insulin glargine  10 Units SubCUTAneous Nightly    insulin lispro  0-4 Units SubCUTAneous TID WC    insulin lispro  0-4 Units SubCUTAneous Nightly     Continuous Infusions:   dextrose      sodium chloride       PRN Meds:hydrOXYzine HCl, glucose, dextrose bolus **OR** dextrose bolus, glucagon (rDNA), dextrose, sodium chloride flush, sodium chloride, ondansetron **OR** ondansetron, polyethylene glycol, acetaminophen **OR** acetaminophen  I/O last 3 completed shifts: In: 289   Out: -   No intake/output data recorded.     Intake/Output Summary (Last 24 hours) at 9/17/2023 0746  Last data filed at 9/16/2023 1844  Gross per 24 hour   Intake 289 ml   Output --   Net 289 ml       CBC:   Recent Labs     09/17/23  0629   WBC 8.7   HGB 10.5*   *

## 2023-09-17 NOTE — FLOWSHEET NOTE
09/17/23 1830   Vitals   /76   Pulse 75   Respirations 13   SpO2 98 %   Cycler   Verification of Prescription CCPD   Informed Consent  Yes   Total Volume Programmed 04102 mL   Therapy Time (Hours:Minutes) 10   Cycler Type [facility]   Fill Volume 2300 mL   Last Fill Volume 0 mL   I Drain Alarm 0 mL   Number of Cycles 5   Bag Volume 6000 mL   Number of Bags Used 20   Dianeal Solution   (2.5 and 1.5)     PD CYCLER TREATMENT STARTED WITHOUT ISSUES, CATH DRESSING CHANGED, SITE UNREMARKABLE, TREATMENT RAN THROUGH FIRST FILL AND DRAIN WITHOUT ALARMS INTO SECOND FILL. PATIENT DENIES ANY NEEDS, CALL LIGHT IN REACH, NIGHT NURSE AWARE HOW TO HANDLE ALARMS AND HOW TO GET AHOLD OF NURSE ON CALL.

## 2023-09-18 ENCOUNTER — ANESTHESIA EVENT (OUTPATIENT)
Dept: OPERATING ROOM | Age: 73
DRG: 853 | End: 2023-09-18
Payer: MEDICARE

## 2023-09-18 ENCOUNTER — ANESTHESIA (OUTPATIENT)
Dept: OPERATING ROOM | Age: 73
DRG: 853 | End: 2023-09-18
Payer: MEDICARE

## 2023-09-18 LAB
ANION GAP SERPL CALCULATED.3IONS-SCNC: 11 MMOL/L (ref 4–16)
BASOPHILS ABSOLUTE: 0 K/CU MM
BASOPHILS RELATIVE PERCENT: 0.6 % (ref 0–1)
BUN SERPL-MCNC: 53 MG/DL (ref 6–23)
CALCIUM SERPL-MCNC: 8.1 MG/DL (ref 8.3–10.6)
CHLORIDE BLD-SCNC: 100 MMOL/L (ref 99–110)
CO2: 26 MMOL/L (ref 21–32)
CREAT SERPL-MCNC: 5 MG/DL (ref 0.6–1.1)
DIFFERENTIAL TYPE: ABNORMAL
EOSINOPHILS ABSOLUTE: 0.3 K/CU MM
EOSINOPHILS RELATIVE PERCENT: 4.7 % (ref 0–3)
GFR SERPL CREATININE-BSD FRML MDRD: 9 ML/MIN/1.73M2
GLUCOSE BLD-MCNC: 103 MG/DL (ref 70–99)
GLUCOSE BLD-MCNC: 309 MG/DL (ref 70–99)
GLUCOSE BLD-MCNC: 82 MG/DL (ref 70–99)
GLUCOSE BLD-MCNC: 87 MG/DL (ref 70–99)
GLUCOSE SERPL-MCNC: 115 MG/DL (ref 70–99)
HCT VFR BLD CALC: 33.1 % (ref 37–47)
HEMOGLOBIN: 10.2 GM/DL (ref 12.5–16)
IMMATURE NEUTROPHIL %: 0.3 % (ref 0–0.43)
LYMPHOCYTES ABSOLUTE: 2 K/CU MM
LYMPHOCYTES RELATIVE PERCENT: 28.4 % (ref 24–44)
MCH RBC QN AUTO: 29.7 PG (ref 27–31)
MCHC RBC AUTO-ENTMCNC: 30.8 % (ref 32–36)
MCV RBC AUTO: 96.2 FL (ref 78–100)
MONOCYTES ABSOLUTE: 0.6 K/CU MM
MONOCYTES RELATIVE PERCENT: 8.8 % (ref 0–4)
NUCLEATED RBC %: 0 %
PDW BLD-RTO: 14.7 % (ref 11.7–14.9)
PLATELET # BLD: 145 K/CU MM (ref 140–440)
PMV BLD AUTO: 10 FL (ref 7.5–11.1)
POTASSIUM SERPL-SCNC: 3.5 MMOL/L (ref 3.5–5.1)
RBC # BLD: 3.44 M/CU MM (ref 4.2–5.4)
SEGMENTED NEUTROPHILS ABSOLUTE COUNT: 4.1 K/CU MM
SEGMENTED NEUTROPHILS RELATIVE PERCENT: 57.2 % (ref 36–66)
SODIUM BLD-SCNC: 137 MMOL/L (ref 135–145)
TOTAL IMMATURE NEUTOROPHIL: 0.02 K/CU MM
TOTAL NUCLEATED RBC: 0 K/CU MM
WBC # BLD: 7.1 K/CU MM (ref 4–10.5)

## 2023-09-18 PROCEDURE — 2709999900 HC NON-CHARGEABLE SUPPLY: Performed by: SURGERY

## 2023-09-18 PROCEDURE — 49324 LAP INSERT TUNNEL IP CATH: CPT | Performed by: SURGERY

## 2023-09-18 PROCEDURE — 6360000002 HC RX W HCPCS

## 2023-09-18 PROCEDURE — 94761 N-INVAS EAR/PLS OXIMETRY MLT: CPT

## 2023-09-18 PROCEDURE — C2628 CATHETER, OCCLUSION: HCPCS | Performed by: SURGERY

## 2023-09-18 PROCEDURE — 6370000000 HC RX 637 (ALT 250 FOR IP)

## 2023-09-18 PROCEDURE — 2700000000 HC OXYGEN THERAPY PER DAY

## 2023-09-18 PROCEDURE — 90945 DIALYSIS ONE EVALUATION: CPT

## 2023-09-18 PROCEDURE — 6360000002 HC RX W HCPCS: Performed by: SURGERY

## 2023-09-18 PROCEDURE — C9399 UNCLASSIFIED DRUGS OR BIOLOG: HCPCS

## 2023-09-18 PROCEDURE — 6370000000 HC RX 637 (ALT 250 FOR IP): Performed by: SURGERY

## 2023-09-18 PROCEDURE — 82962 GLUCOSE BLOOD TEST: CPT

## 2023-09-18 PROCEDURE — 7100000000 HC PACU RECOVERY - FIRST 15 MIN: Performed by: SURGERY

## 2023-09-18 PROCEDURE — 36415 COLL VENOUS BLD VENIPUNCTURE: CPT

## 2023-09-18 PROCEDURE — 7100000001 HC PACU RECOVERY - ADDTL 15 MIN: Performed by: SURGERY

## 2023-09-18 PROCEDURE — 99233 SBSQ HOSP IP/OBS HIGH 50: CPT | Performed by: INTERNAL MEDICINE

## 2023-09-18 PROCEDURE — 2580000003 HC RX 258: Performed by: STUDENT IN AN ORGANIZED HEALTH CARE EDUCATION/TRAINING PROGRAM

## 2023-09-18 PROCEDURE — 2580000003 HC RX 258: Performed by: SURGERY

## 2023-09-18 PROCEDURE — 3600000005 HC SURGERY LEVEL 5 BASE: Performed by: SURGERY

## 2023-09-18 PROCEDURE — 3600000015 HC SURGERY LEVEL 5 ADDTL 15MIN: Performed by: SURGERY

## 2023-09-18 PROCEDURE — 2580000003 HC RX 258

## 2023-09-18 PROCEDURE — 80048 BASIC METABOLIC PNL TOTAL CA: CPT

## 2023-09-18 PROCEDURE — 94150 VITAL CAPACITY TEST: CPT

## 2023-09-18 PROCEDURE — 6360000002 HC RX W HCPCS: Performed by: STUDENT IN AN ORGANIZED HEALTH CARE EDUCATION/TRAINING PROGRAM

## 2023-09-18 PROCEDURE — 3700000001 HC ADD 15 MINUTES (ANESTHESIA): Performed by: SURGERY

## 2023-09-18 PROCEDURE — 1200000000 HC SEMI PRIVATE

## 2023-09-18 PROCEDURE — 0WWG43Z REVISION OF INFUSION DEVICE IN PERITONEAL CAVITY, PERCUTANEOUS ENDOSCOPIC APPROACH: ICD-10-PCS | Performed by: SURGERY

## 2023-09-18 PROCEDURE — 2500000003 HC RX 250 WO HCPCS

## 2023-09-18 PROCEDURE — 6370000000 HC RX 637 (ALT 250 FOR IP): Performed by: INTERNAL MEDICINE

## 2023-09-18 PROCEDURE — 6370000000 HC RX 637 (ALT 250 FOR IP): Performed by: STUDENT IN AN ORGANIZED HEALTH CARE EDUCATION/TRAINING PROGRAM

## 2023-09-18 PROCEDURE — 85025 COMPLETE CBC W/AUTO DIFF WBC: CPT

## 2023-09-18 PROCEDURE — C1750 CATH, HEMODIALYSIS,LONG-TERM: HCPCS | Performed by: SURGERY

## 2023-09-18 PROCEDURE — 93308 TTE F-UP OR LMTD: CPT

## 2023-09-18 PROCEDURE — 2720000010 HC SURG SUPPLY STERILE: Performed by: SURGERY

## 2023-09-18 PROCEDURE — 3700000000 HC ANESTHESIA ATTENDED CARE: Performed by: SURGERY

## 2023-09-18 RX ORDER — BUPIVACAINE HYDROCHLORIDE 5 MG/ML
INJECTION, SOLUTION EPIDURAL; INTRACAUDAL
Status: COMPLETED | OUTPATIENT
Start: 2023-09-18 | End: 2023-09-18

## 2023-09-18 RX ORDER — DEXAMETHASONE SODIUM PHOSPHATE 4 MG/ML
INJECTION, SOLUTION INTRA-ARTICULAR; INTRALESIONAL; INTRAMUSCULAR; INTRAVENOUS; SOFT TISSUE PRN
Status: DISCONTINUED | OUTPATIENT
Start: 2023-09-18 | End: 2023-09-18 | Stop reason: SDUPTHER

## 2023-09-18 RX ORDER — METOPROLOL SUCCINATE 25 MG/1
25 TABLET, EXTENDED RELEASE ORAL DAILY
Status: DISCONTINUED | OUTPATIENT
Start: 2023-09-18 | End: 2023-09-19 | Stop reason: HOSPADM

## 2023-09-18 RX ORDER — OXYCODONE HYDROCHLORIDE AND ACETAMINOPHEN 5; 325 MG/1; MG/1
1 TABLET ORAL EVERY 4 HOURS PRN
Status: DISCONTINUED | OUTPATIENT
Start: 2023-09-18 | End: 2023-09-19 | Stop reason: HOSPADM

## 2023-09-18 RX ORDER — LIDOCAINE HYDROCHLORIDE 20 MG/ML
INJECTION, SOLUTION INTRAVENOUS PRN
Status: DISCONTINUED | OUTPATIENT
Start: 2023-09-18 | End: 2023-09-18 | Stop reason: SDUPTHER

## 2023-09-18 RX ORDER — HYDRALAZINE HYDROCHLORIDE 20 MG/ML
10 INJECTION INTRAMUSCULAR; INTRAVENOUS
Status: DISCONTINUED | OUTPATIENT
Start: 2023-09-18 | End: 2023-09-18 | Stop reason: HOSPADM

## 2023-09-18 RX ORDER — ROCURONIUM BROMIDE 10 MG/ML
INJECTION, SOLUTION INTRAVENOUS PRN
Status: DISCONTINUED | OUTPATIENT
Start: 2023-09-18 | End: 2023-09-18 | Stop reason: SDUPTHER

## 2023-09-18 RX ORDER — DROPERIDOL 2.5 MG/ML
0.62 INJECTION, SOLUTION INTRAMUSCULAR; INTRAVENOUS EVERY 10 MIN PRN
Status: DISCONTINUED | OUTPATIENT
Start: 2023-09-18 | End: 2023-09-18 | Stop reason: HOSPADM

## 2023-09-18 RX ORDER — ONDANSETRON 2 MG/ML
INJECTION INTRAMUSCULAR; INTRAVENOUS PRN
Status: DISCONTINUED | OUTPATIENT
Start: 2023-09-18 | End: 2023-09-18 | Stop reason: SDUPTHER

## 2023-09-18 RX ORDER — PROPOFOL 10 MG/ML
INJECTION, EMULSION INTRAVENOUS PRN
Status: DISCONTINUED | OUTPATIENT
Start: 2023-09-18 | End: 2023-09-18 | Stop reason: SDUPTHER

## 2023-09-18 RX ORDER — DIPHENHYDRAMINE HYDROCHLORIDE 50 MG/ML
12.5 INJECTION INTRAMUSCULAR; INTRAVENOUS
Status: DISCONTINUED | OUTPATIENT
Start: 2023-09-18 | End: 2023-09-18 | Stop reason: HOSPADM

## 2023-09-18 RX ORDER — SODIUM CHLORIDE, SODIUM LACTATE, POTASSIUM CHLORIDE, CALCIUM CHLORIDE 600; 310; 30; 20 MG/100ML; MG/100ML; MG/100ML; MG/100ML
INJECTION, SOLUTION INTRAVENOUS CONTINUOUS
Status: DISCONTINUED | OUTPATIENT
Start: 2023-09-18 | End: 2023-09-18

## 2023-09-18 RX ORDER — SODIUM CHLORIDE 0.9 % (FLUSH) 0.9 %
5-40 SYRINGE (ML) INJECTION PRN
Status: DISCONTINUED | OUTPATIENT
Start: 2023-09-18 | End: 2023-09-18 | Stop reason: HOSPADM

## 2023-09-18 RX ORDER — ONDANSETRON 2 MG/ML
4 INJECTION INTRAMUSCULAR; INTRAVENOUS
Status: DISCONTINUED | OUTPATIENT
Start: 2023-09-18 | End: 2023-09-18 | Stop reason: HOSPADM

## 2023-09-18 RX ORDER — PHENYLEPHRINE HCL IN 0.9% NACL 1 MG/10 ML
SYRINGE (ML) INTRAVENOUS PRN
Status: DISCONTINUED | OUTPATIENT
Start: 2023-09-18 | End: 2023-09-18 | Stop reason: SDUPTHER

## 2023-09-18 RX ORDER — ISOSORBIDE MONONITRATE 30 MG/1
30 TABLET, EXTENDED RELEASE ORAL DAILY
Status: DISCONTINUED | OUTPATIENT
Start: 2023-09-18 | End: 2023-09-18

## 2023-09-18 RX ORDER — OXYCODONE HYDROCHLORIDE 5 MG/1
5 TABLET ORAL
Status: DISCONTINUED | OUTPATIENT
Start: 2023-09-18 | End: 2023-09-18 | Stop reason: HOSPADM

## 2023-09-18 RX ORDER — FENTANYL CITRATE 50 UG/ML
25 INJECTION, SOLUTION INTRAMUSCULAR; INTRAVENOUS EVERY 5 MIN PRN
Status: DISCONTINUED | OUTPATIENT
Start: 2023-09-18 | End: 2023-09-18 | Stop reason: HOSPADM

## 2023-09-18 RX ORDER — SODIUM CHLORIDE 0.9 % (FLUSH) 0.9 %
5-40 SYRINGE (ML) INJECTION EVERY 12 HOURS SCHEDULED
Status: DISCONTINUED | OUTPATIENT
Start: 2023-09-18 | End: 2023-09-18 | Stop reason: HOSPADM

## 2023-09-18 RX ORDER — LABETALOL HYDROCHLORIDE 5 MG/ML
10 INJECTION, SOLUTION INTRAVENOUS
Status: DISCONTINUED | OUTPATIENT
Start: 2023-09-18 | End: 2023-09-18 | Stop reason: HOSPADM

## 2023-09-18 RX ORDER — SODIUM CHLORIDE 9 MG/ML
INJECTION, SOLUTION INTRAVENOUS CONTINUOUS PRN
Status: DISCONTINUED | OUTPATIENT
Start: 2023-09-18 | End: 2023-09-18 | Stop reason: SDUPTHER

## 2023-09-18 RX ADMIN — PROPOFOL 100 MG: 10 INJECTION, EMULSION INTRAVENOUS at 13:05

## 2023-09-18 RX ADMIN — METOPROLOL SUCCINATE 25 MG: 25 TABLET, FILM COATED, EXTENDED RELEASE ORAL at 09:44

## 2023-09-18 RX ADMIN — SODIUM CHLORIDE: 900 INJECTION INTRAVENOUS at 13:00

## 2023-09-18 RX ADMIN — SUGAMMADEX 200 MG: 100 INJECTION, SOLUTION INTRAVENOUS at 14:00

## 2023-09-18 RX ADMIN — CEFAZOLIN 2000 MG: 2 INJECTION, POWDER, FOR SOLUTION INTRAMUSCULAR; INTRAVENOUS at 13:05

## 2023-09-18 RX ADMIN — INSULIN LISPRO 4 UNITS: 100 INJECTION, SOLUTION INTRAVENOUS; SUBCUTANEOUS at 20:24

## 2023-09-18 RX ADMIN — HYDROXYZINE HYDROCHLORIDE 10 MG: 10 TABLET ORAL at 00:57

## 2023-09-18 RX ADMIN — DEXAMETHASONE SODIUM PHOSPHATE 4 MG: 4 INJECTION, SOLUTION INTRAMUSCULAR; INTRAVENOUS at 13:05

## 2023-09-18 RX ADMIN — ROSUVASTATIN CALCIUM 20 MG: 20 TABLET, FILM COATED ORAL at 20:31

## 2023-09-18 RX ADMIN — ONDANSETRON 4 MG: 2 INJECTION INTRAMUSCULAR; INTRAVENOUS at 13:05

## 2023-09-18 RX ADMIN — ROCURONIUM BROMIDE 50 MG: 10 INJECTION, SOLUTION INTRAVENOUS at 13:05

## 2023-09-18 RX ADMIN — Medication 100 MG: at 09:42

## 2023-09-18 RX ADMIN — SODIUM CHLORIDE, PRESERVATIVE FREE 5 ML: 5 INJECTION INTRAVENOUS at 20:32

## 2023-09-18 RX ADMIN — INSULIN GLARGINE 10 UNITS: 100 INJECTION, SOLUTION SUBCUTANEOUS at 20:26

## 2023-09-18 RX ADMIN — ISOSORBIDE MONONITRATE 30 MG: 30 TABLET, EXTENDED RELEASE ORAL at 17:49

## 2023-09-18 RX ADMIN — LIDOCAINE HYDROCHLORIDE 100 MG: 20 INJECTION, SOLUTION INTRAVENOUS at 13:05

## 2023-09-18 RX ADMIN — DOCUSATE SODIUM 100 MG: 100 CAPSULE, LIQUID FILLED ORAL at 20:28

## 2023-09-18 RX ADMIN — SODIUM CHLORIDE, PRESERVATIVE FREE 10 ML: 5 INJECTION INTRAVENOUS at 09:44

## 2023-09-18 RX ADMIN — ROCURONIUM BROMIDE 20 MG: 10 INJECTION, SOLUTION INTRAVENOUS at 13:48

## 2023-09-18 RX ADMIN — LEVOTHYROXINE SODIUM 125 MCG: 0.12 TABLET ORAL at 06:33

## 2023-09-18 RX ADMIN — FLUOXETINE 20 MG: 20 CAPSULE ORAL at 20:28

## 2023-09-18 RX ADMIN — Medication 0.2 MG: at 13:49

## 2023-09-18 RX ADMIN — MEROPENEM 500 MG: 500 INJECTION, POWDER, FOR SOLUTION INTRAVENOUS at 09:50

## 2023-09-18 RX ADMIN — FLUOXETINE 20 MG: 20 CAPSULE ORAL at 09:44

## 2023-09-18 RX ADMIN — Medication 1000 UNITS: at 09:43

## 2023-09-18 ASSESSMENT — PAIN SCALES - GENERAL: PAINLEVEL_OUTOF10: 0

## 2023-09-18 ASSESSMENT — ENCOUNTER SYMPTOMS
COLOR CHANGE: 0
SORE THROAT: 0
SINUS PRESSURE: 0
CONSTIPATION: 0
ABDOMINAL PAIN: 0
SHORTNESS OF BREATH: 0
SINUS PAIN: 0
VOMITING: 0
COUGH: 0
DIARRHEA: 0
BACK PAIN: 0
WHEEZING: 0
NAUSEA: 0

## 2023-09-18 NOTE — FLOWSHEET NOTE
09/18/23 0712   Vitals   /67   Pulse 74   Respirations 16   Post-Treatment (Cycler)   Average Dwell Time (Hours:Minutes) 1:04   Lost Dwell Time (Hours:Minutes) 1:34   Effluent Appearance Clear;Yellow   I Drain (mL) 2 mL   PD Output (mL) 678 mL   Volume Gain (mL) 0 mL     PD CYCLER TREATMENT DISCONTINUED, PATIENT COMPLETED 4 OF 5 CYCLES BEFORE DRAIN ALARMS AND TREATMENT UNABLE TO MOVE FORWARD, CATH CAPPED, PATIENT DENIES ANY NEEDS, CALL LIGHT IN REACH, DR HAMMONDS AWARE OF TREATMENT COMPLICATIONS

## 2023-09-18 NOTE — ANESTHESIA PRE PROCEDURE
Department of Anesthesiology  Preprocedure Note       Name:  Antoinette Carrillo   Age:  74 y.o.  :  1950                                          MRN:  [id number]         Date:  2023      Surgeon: Reg Pratt):  Bina Horowitz MD    Procedure: Procedure(s):  CATHETER ADJUSTMENT  PERITONEAL DIALYSIS LAPAROSCOPIC    Medications prior to admission:   Prior to Admission medications    Medication Sig Start Date End Date Taking? Authorizing Provider   midodrine (PROAMATINE) 10 MG tablet Take 1 tablet by mouth 3 times daily (before meals) 23   Christine Schroeder MD   metoprolol succinate (TOPROL XL) 25 MG extended release tablet Take 0.5 tablets by mouth daily  Patient taking differently: Take 0.5 tablets by mouth daily 23 Per insurance claims last prescription filled Metoprolol Tartrate 25 mg BID 23.  Metoprolol Succinate last fill 2021   Idell Phoenix, APRN - CNP   docusate sodium (COLACE) 100 MG capsule Take 1 capsule by mouth 2 times daily as needed 23 Only takes as needed    Historical Provider, MD   insulin glargine (LANTUS SOLOSTAR) 100 UNIT/ML injection pen Inject 10 Units into the skin nightly  Patient taking differently: Inject 15-20 Units into the skin nightly 23 patient states she uses sliding scale 23   Airam Bustos MD   Insulin Pen Needle ([employer] PEN NEEDLES) 31G X 6 MM MISC 1 each by Does not apply route daily 23   Airam Bustos MD   potassium chloride (KLOR-CON M) 20 MEQ extended release tablet Take 1 tablet by mouth daily 23   Airam Bustos MD   B Complex-C-Folic Acid [facility]) 1 MG CAPS Take 1 capsule by mouth daily 23   Historical Provider, MD   rosuvastatin (CRESTOR) 20 MG tablet Take 1 tablet by mouth nightly 23   Historical Provider, MD   Simethicone (GAS-X PO) Take by mouth    Historical Provider, MD   Cyanocobalamin (VITAMIN B 12) 100 MCG LOZG   TAKE ONE CAPSULE BY MOUTH EVERY DAY, # 90 EA, 3 total

## 2023-09-18 NOTE — PROGRESS NOTES
V2.0  [facility] Hospitalist Progress Note      Name:  Mercedez Lawrence /Age/Sex: 1950  (74 y.o. female)   MRN & CSN:  [id number] & [id number] Encounter Date/Time: 2023 1:02 PM EDT    Location:  1945/6024-L PCP: Arnaldo Collazo [address] [place] [address]  Day: 9    Assessment and Plan:   Mercedez Lawrence is a 74 y.o. female with pmh of who presents with Other fatigue      Plan:    UTI  Patient UA consistent with infection. History of MDRO  IV Merrem: Does have repeat infections will treat for a 7-day course. Follow-up urine and blood cultures: Growing Klebsiella MDRO  Follow-up peritoneal fluid cultures to also rule out peritonitis. This would be covered by [facility]. PD catheter dysfunction  Patient's PD catheter is not working properly. Unable to fully perform dialysis  General surgery consulted for repair    Moderate Pericardial effusion  With echo that showed a moderate pericardial effusion  Cardiology consulted  Plan for likely repeat echo Monday  Continue dialysis for fluid management    Difficulty urinating  Patient is ESRD on PD but still makes urine. Also has recurrent UTIs as difficulty voiding and as well as trickles.   Pain after straight cath  Urology consulted  To follow-up with urology office in 2 to 4 weeks after discharge    T2DM  BS on admit 165  Lantus 10 units nightly    Afib  -Continue Eliquis and BB     HTN  -Continue Toprol     HLD  -Continue statin      ESRD on PD  -Nephro consult      Mood Disorder   -Continue prozac     Hypothyroidism   -Continue Synthroid       Diet Diet NPO Exceptions are: Sips of Water with Meds   DVT Prophylaxis [] Lovenox, []  Heparin, [] SCDs, [] Ambulation,    [x] Eliquis, [] Xarelto  [] Coumadin [] other   Code Status Full Code   Disposition From: home  Expected Disposition: home  Estimated Date of Discharge: 1-2 days  Patient requires continued admission due to IV antibiotics and PD catheter dysfunction   Surrogate Decision Maker/ POA      Personally related to that system including errors in grammar, punctuation, and spelling, as well as words and phrases that may be inappropriate. If there are any questions or concerns please feel free to contact the dictating provider for clarification.      Electronically signed by Blanche Posada MD on 9/18/2023 at 10:19 AM

## 2023-09-18 NOTE — CONSULTS
Consult completed. RUE proximal forearm PIV site evaluated per RN request. Mild bruising without erythema noted surrounding access site, but site returns blood sluggishly and flushes without any resistance/abnormalities, even with Powerflush. Pt refusing site change unless absolutely necessary. Dressing removed, site cleaned with CHG, and new sterile dressing with SkinPrep, StatLock Securing Device, Tegaderm, and new LuerLok cap/SwabCap applied. Pt tolerated well & denies other c/o or needs.

## 2023-09-18 NOTE — FLOWSHEET NOTE
09/18/23 1840   Vitals   /85   Pulse 80   Respirations 16   Cycler   Verification of Prescription CCPD   Informed Consent  Yes   Total Volume Programmed 53808 mL   Therapy Time (Hours:Minutes) 10   Cycler Type [facility] [facility]   Fill Volume 1500 mL   Last Fill Volume 0 mL   I Drain Alarm 0 mL   Number of Cycles 8   Bag Volume 6000 mL   Number of Bags Used 3   Dianeal Solution   (1.5 and 2.5)     Pd cycler treatment started, dressing changed site unremarkable, pt denies any needs, call light in reach

## 2023-09-18 NOTE — ANESTHESIA POSTPROCEDURE EVALUATION
Department of Anesthesiology  Postprocedure Note    Patient: Werner Perez  MRN: [id number]  YOB: 1950  Date of evaluation: 9/18/2023      Procedure Summary     Date: 09/18/23 Room / Location: [address] [place] [address] OR [address] [place] [address]    Anesthesia Start: 1300 Anesthesia Stop: 2865    Procedure: [address] (Abdomen) Diagnosis:       Peritoneal dialysis catheter dysfunction, subsequent encounter ([address])      (Peritoneal dialysis catheter dysfunction, subsequent encounter ([address]) [T85.611D])    Surgeons: Demian Plasencia MD Responsible Provider: Brice Cohen MD    Anesthesia Type: General ASA Status: 4          Anesthesia Type: General    Quinn Phase I: Quinn Score: 8    Quinn Phase II:        Anesthesia Post Evaluation    Patient location during evaluation: PACU  Patient participation: complete - patient participated  Level of consciousness: awake and alert  Airway patency: patent  Nausea & Vomiting: no nausea and no vomiting  Complications: no  Cardiovascular status: hemodynamically stable  Respiratory status: spontaneous ventilation and nasal cannula  Hydration status: stable  Pain management: adequate

## 2023-09-18 NOTE — PROGRESS NOTES
Nephrology Progress Note        [address], 5 [facility], [address] [place] [address]  Phone: [phone]  Office Hours: 8:30AM - 4:30PM  Monday - Friday 9/18/2023 8:01 AM  Subjective:   Admit Date: 9/10/2023  PCP: SARAH Ashton - CNP  Interval History: On NC  4 exchanges out 5 happened with PD yesterday before drain alarms  Diet: Diet NPO Exceptions are: Sips of Water with Meds      Data:   Scheduled Meds:   sodium bicarbonate  1,300 mg Oral TID    dianeal PD-2 2.5% 2,000 mL with heparin (porcine) 2,000 Units solution   IntraPERitoneal Once    dianeal lo-tej 1.5% 6,000 mL with heparin (porcine) 6,000 Units solution   IntraPERitoneal Once    dianeal lo-tej 1.5% 6,000 mL with heparin (porcine) 6,000 Units solution   IntraPERitoneal Once    dianeal lo-tej 2.5% 6,000 mL with heparin (porcine) 3,000 Units solution   IntraPERitoneal Once    docusate sodium  100 mg Oral BID    meropenem  500 mg IntraVENous Q24H    midodrine  5 mg Oral BID WC    thiamine  100 mg Oral Daily    potassium chloride  20 mEq Oral Daily with breakfast    Vitamin D  1,000 Units Oral Daily    [Held by provider] apixaban  5 mg Oral BID    FLUoxetine  20 mg Oral BID    metoprolol succinate  12.5 mg Oral Daily    levothyroxine  125 mcg Oral Daily    rosuvastatin  20 mg Oral Nightly    sodium chloride flush  5-40 mL IntraVENous 2 times per day    insulin glargine  10 Units SubCUTAneous Nightly    insulin lispro  0-4 Units SubCUTAneous TID WC    insulin lispro  0-4 Units SubCUTAneous Nightly     Continuous Infusions:   dextrose      sodium chloride       PRN Meds:hydrOXYzine HCl, glucose, dextrose bolus **OR** dextrose bolus, glucagon (rDNA), dextrose, sodium chloride flush, sodium chloride, ondansetron **OR** ondansetron, polyethylene glycol, acetaminophen **OR** acetaminophen  I/O last 3 completed shifts: In: 240 [P.O.:240]  Out: -   I/O this shift:   In: 0   Out: 678     Intake/Output Summary (Last 24 hours) at 9/18/2023

## 2023-09-18 NOTE — CARE COORDINATION
Pt plans home with  and [facility] . CM will need a inpt HC order for nursing and PT/OT. If pt is discharged after hours please call [phone] [facility] [phone] and fax [facility]. order and AVS to [phone].

## 2023-09-18 NOTE — PROGRESS NOTES
ATTEMPT  Attempt, pt off floor for procedure. PT will f/u as schedule allows.      Electronically signed by:    Felicita De La Fuente PTA  9/18/2023, 2:34 PM

## 2023-09-18 NOTE — PROGRESS NOTES
1412- pt received from OR. Monitors placed and alarms on. Report received from DIVINA HARDIN. Pt drowsy but arouses to name being called. 1430- pt resting comfortably. Denies any pain or nausea. 1450- pt repositioned in bed. Linens adjusted. Pt clean and dry. 1459- report called to La Nena Carrera RN prior to transport to inpatient room. 1517- pt transported to inpatient room.

## 2023-09-18 NOTE — OP NOTE
Operative Note      Patient: Twin Diego  YOB: 1950  MRN: [id number]    Date of Procedure: 9/18/2023    Pre-Op Diagnosis Codes:     * Peritoneal dialysis catheter dysfunction, subsequent encounter ([address]) [T85.611D]    Post-Op Diagnosis: Same       Procedure(s):  CATHETER ADJUSTMENT  PERITONEAL DIALYSIS LAPAROSCOPIC    Surgeon(s):  Will Olsen MD    Assistant:   First Assistant: Sudie Pallas; SARAH Hadley CNP    Anesthesia: General    Estimated Blood Loss (mL): 86DB    Complications: None    Specimens:   * No specimens in log *    Implants:  * No implants in log *      Drains: * No LDAs found *    Findings: blood tinged fibrinous debris present within the PD catheter coil      Procedure Details: The patient was seen again in the Holding Room. The risks, benefits, complications, treatment options, and expected outcomes were discussed with the patient. The possibilities of reaction to medication, aspiration, injury to bowel or abdominal organs, bleeding, infection and the need for additional procedures were discussed with the patient and/or family. There was concurrence with the proposed plan, and informed consent was obtained. The patient was taken to the Operating Room, identified as Twin Diego and the procedure verified. A Time Out was held and the above information confirmed. DESCRIPTION OF PROCEDURE: The patient was brought into the operating room and placed supine. The abdomen was prepped and draped in the usual sterile fashion. Local anesthetic agent was injected into the LUQ skin and an incision made. Veress needle was placed and aspirated and drop test was performed. The abdomen was then insufflated to 15 mmHg. With a 5 mm optical trocar, the peritoneum was entered without incidence. 2 additional 5mm ports were placed in the left abdomen. The catheter was inspected and found to be coiled in the pelvis appropriately.   There were a couple of flimsy

## 2023-09-18 NOTE — PROGRESS NOTES
This RN informed dialysis RN Ginna Wood of repeated alarms after every few hours with restarts as she had instructed. She advised to turn off and let the patient rest and would come and check in the morning. Pt informed of plan and agreeable.  Will continue to monitor

## 2023-09-18 NOTE — PLAN OF CARE
Problem: Discharge Planning  Goal: Discharge to home or other facility with appropriate resources  9/18/2023 1225 by Behzad Fleming RN  Outcome: Progressing  9/18/2023 0115 by Danya Darnell RN  Outcome: Progressing  Flowsheets (Taken 9/17/2023 0813 by Susy Harp RN)  Discharge to home or other facility with appropriate resources:   Identify barriers to discharge with patient and caregiver   Arrange for needed discharge resources and transportation as appropriate   Identify discharge learning needs (meds, wound care, etc)   Refer to discharge planning if patient needs post-hospital services based on physician order or complex needs related to functional status, cognitive ability or social support system     Problem: Pain  Goal: Verbalizes/displays adequate comfort level or baseline comfort level  9/18/2023 1225 by Behzad Fleming RN  Outcome: Progressing  9/18/2023 0115 by Danya Darnell RN  Outcome: Progressing  Flowsheets (Taken 9/17/2023 0544)  Verbalizes/displays adequate comfort level or baseline comfort level:   Encourage patient to monitor pain and request assistance   Assess pain using appropriate pain scale   Administer analgesics based on type and severity of pain and evaluate response   Implement non-pharmacological measures as appropriate and evaluate response   Consider cultural and social influences on pain and pain management   Notify Licensed Independent Practitioner if interventions unsuccessful or patient reports new pain     Problem: Skin/Tissue Integrity  Goal: Absence of new skin breakdown  Description: 1. Monitor for areas of redness and/or skin breakdown  2. Assess vascular access sites hourly  3. Every 4-6 hours minimum:  Change oxygen saturation probe site  4. Every 4-6 hours:  If on nasal continuous positive airway pressure, respiratory therapy assess nares and determine need for appliance change or resting period.   9/18/2023 1225 by Cristo Yoon RN  Outcome: Progressing  9/18/2023 0115 by Todd Freed RN  Outcome: Progressing     Problem: ABCDS Injury Assessment  Goal: Absence of physical injury  9/18/2023 1225 by Rm Worthington RN  Outcome: Progressing  9/18/2023 0115 by Todd Freed RN  Outcome: Progressing  Flowsheets (Taken 9/17/2023 0544)  Absence of Physical Injury: Implement safety measures based on patient assessment     Problem: Safety - Adult  Goal: Free from fall injury  9/18/2023 1225 by Rm Worthington RN  Outcome: Progressing  9/18/2023 0115 by Todd Freed RN  Outcome: Progressing  Flowsheets (Taken 9/17/2023 0544)  Free From Fall Injury: Instruct family/caregiver on patient safety     Problem: Chronic Conditions and Co-morbidities  Goal: Patient's chronic conditions and co-morbidity symptoms are monitored and maintained or improved  9/18/2023 1225 by Rm Worthington RN  Outcome: Progressing  9/18/2023 0115 by Todd Freed RN  Outcome: Progressing  Flowsheets (Taken 9/17/2023 0813 by China Guo RN)  Care Plan - Patient's Chronic Conditions and Co-Morbidity Symptoms are Monitored and Maintained or Improved:   Monitor and assess patient's chronic conditions and comorbid symptoms for stability, deterioration, or improvement   Collaborate with multidisciplinary team to address chronic and comorbid conditions and prevent exacerbation or deterioration   Update acute care plan with appropriate goals if chronic or comorbid symptoms are exacerbated and prevent overall improvement and discharge     Problem: Nutrition Deficit:  Goal: Optimize nutritional status  9/18/2023 1225 by Rm Worthington RN  Outcome: Progressing  9/18/2023 0115 by Todd Freed RN  Outcome: Progressing

## 2023-09-19 VITALS
HEART RATE: 76 BPM | WEIGHT: 199.5 LBS | RESPIRATION RATE: 18 BRPM | BODY MASS INDEX: 33.24 KG/M2 | TEMPERATURE: 97.5 F | DIASTOLIC BLOOD PRESSURE: 55 MMHG | OXYGEN SATURATION: 94 % | HEIGHT: 65 IN | SYSTOLIC BLOOD PRESSURE: 132 MMHG

## 2023-09-19 LAB
ANION GAP SERPL CALCULATED.3IONS-SCNC: 13 MMOL/L (ref 4–16)
BASOPHILS ABSOLUTE: 0 K/CU MM
BASOPHILS RELATIVE PERCENT: 0 % (ref 0–1)
BUN SERPL-MCNC: 58 MG/DL (ref 6–23)
CALCIUM SERPL-MCNC: 8.1 MG/DL (ref 8.3–10.6)
CHLORIDE BLD-SCNC: 101 MMOL/L (ref 99–110)
CO2: 22 MMOL/L (ref 21–32)
CREAT SERPL-MCNC: 4.9 MG/DL (ref 0.6–1.1)
DIFFERENTIAL TYPE: ABNORMAL
EOSINOPHILS ABSOLUTE: 0 K/CU MM
EOSINOPHILS RELATIVE PERCENT: 0 % (ref 0–3)
GFR SERPL CREATININE-BSD FRML MDRD: 9 ML/MIN/1.73M2
GLUCOSE BLD-MCNC: 180 MG/DL (ref 70–99)
GLUCOSE BLD-MCNC: 215 MG/DL (ref 70–99)
GLUCOSE SERPL-MCNC: 168 MG/DL (ref 70–99)
HCT VFR BLD CALC: 33.5 % (ref 37–47)
HEMOGLOBIN: 10.4 GM/DL (ref 12.5–16)
IMMATURE NEUTROPHIL %: 0.5 % (ref 0–0.43)
LYMPHOCYTES ABSOLUTE: 1.1 K/CU MM
LYMPHOCYTES RELATIVE PERCENT: 23.9 % (ref 24–44)
MCH RBC QN AUTO: 30.5 PG (ref 27–31)
MCHC RBC AUTO-ENTMCNC: 31 % (ref 32–36)
MCV RBC AUTO: 98.2 FL (ref 78–100)
MONOCYTES ABSOLUTE: 0.1 K/CU MM
MONOCYTES RELATIVE PERCENT: 2.5 % (ref 0–4)
NUCLEATED RBC %: 0 %
PDW BLD-RTO: 14.6 % (ref 11.7–14.9)
PLATELET # BLD: 138 K/CU MM (ref 140–440)
PMV BLD AUTO: 9.8 FL (ref 7.5–11.1)
POTASSIUM SERPL-SCNC: 4 MMOL/L (ref 3.5–5.1)
RBC # BLD: 3.41 M/CU MM (ref 4.2–5.4)
SEGMENTED NEUTROPHILS ABSOLUTE COUNT: 3.2 K/CU MM
SEGMENTED NEUTROPHILS RELATIVE PERCENT: 73.1 % (ref 36–66)
SODIUM BLD-SCNC: 136 MMOL/L (ref 135–145)
TOTAL IMMATURE NEUTOROPHIL: 0.02 K/CU MM
TOTAL NUCLEATED RBC: 0 K/CU MM
WBC # BLD: 4.4 K/CU MM (ref 4–10.5)

## 2023-09-19 PROCEDURE — 2580000003 HC RX 258: Performed by: SURGERY

## 2023-09-19 PROCEDURE — 6360000002 HC RX W HCPCS: Performed by: SURGERY

## 2023-09-19 PROCEDURE — 99024 POSTOP FOLLOW-UP VISIT: CPT | Performed by: SURGERY

## 2023-09-19 PROCEDURE — 2700000000 HC OXYGEN THERAPY PER DAY

## 2023-09-19 PROCEDURE — 85025 COMPLETE CBC W/AUTO DIFF WBC: CPT

## 2023-09-19 PROCEDURE — 80048 BASIC METABOLIC PNL TOTAL CA: CPT

## 2023-09-19 PROCEDURE — 6370000000 HC RX 637 (ALT 250 FOR IP): Performed by: SURGERY

## 2023-09-19 PROCEDURE — 94618 PULMONARY STRESS TESTING: CPT

## 2023-09-19 PROCEDURE — 94761 N-INVAS EAR/PLS OXIMETRY MLT: CPT

## 2023-09-19 PROCEDURE — 6370000000 HC RX 637 (ALT 250 FOR IP): Performed by: STUDENT IN AN ORGANIZED HEALTH CARE EDUCATION/TRAINING PROGRAM

## 2023-09-19 PROCEDURE — 36415 COLL VENOUS BLD VENIPUNCTURE: CPT

## 2023-09-19 PROCEDURE — 82962 GLUCOSE BLOOD TEST: CPT

## 2023-09-19 RX ORDER — SODIUM BICARBONATE 650 MG/1
1300 TABLET ORAL 3 TIMES DAILY
Qty: 90 TABLET | Refills: 1 | Status: SHIPPED | OUTPATIENT
Start: 2023-09-19

## 2023-09-19 RX ORDER — METOPROLOL SUCCINATE 25 MG/1
25 TABLET, EXTENDED RELEASE ORAL DAILY
Qty: 30 TABLET | Refills: 1
Start: 2023-09-19

## 2023-09-19 RX ORDER — LEVOTHYROXINE SODIUM 0.12 MG/1
125 TABLET ORAL DAILY
Qty: 30 TABLET | Refills: 1
Start: 2023-09-19

## 2023-09-19 RX ORDER — ISOSORBIDE MONONITRATE 30 MG/1
30 TABLET, EXTENDED RELEASE ORAL DAILY
Status: DISCONTINUED | OUTPATIENT
Start: 2023-09-19 | End: 2023-09-19 | Stop reason: HOSPADM

## 2023-09-19 RX ORDER — INSULIN GLARGINE 100 [IU]/ML
15-20 INJECTION, SOLUTION SUBCUTANEOUS NIGHTLY
Qty: 2 ADJUSTABLE DOSE PRE-FILLED PEN SYRINGE | Refills: 1
Start: 2023-09-19

## 2023-09-19 RX ORDER — ISOSORBIDE MONONITRATE 30 MG/1
30 TABLET, EXTENDED RELEASE ORAL DAILY
Qty: 30 TABLET | Refills: 1 | Status: SHIPPED | OUTPATIENT
Start: 2023-09-19

## 2023-09-19 RX ADMIN — APIXABAN 5 MG: 5 TABLET, FILM COATED ORAL at 09:53

## 2023-09-19 RX ADMIN — MEROPENEM 500 MG: 500 INJECTION, POWDER, FOR SOLUTION INTRAVENOUS at 10:01

## 2023-09-19 RX ADMIN — METOPROLOL SUCCINATE 25 MG: 25 TABLET, FILM COATED, EXTENDED RELEASE ORAL at 09:53

## 2023-09-19 RX ADMIN — SODIUM CHLORIDE, PRESERVATIVE FREE 10 ML: 5 INJECTION INTRAVENOUS at 09:51

## 2023-09-19 RX ADMIN — ISOSORBIDE MONONITRATE 30 MG: 30 TABLET, EXTENDED RELEASE ORAL at 09:53

## 2023-09-19 RX ADMIN — POTASSIUM CHLORIDE 20 MEQ: 1500 TABLET, EXTENDED RELEASE ORAL at 09:52

## 2023-09-19 RX ADMIN — SODIUM CHLORIDE: 9 INJECTION, SOLUTION INTRAVENOUS at 10:00

## 2023-09-19 RX ADMIN — INSULIN LISPRO 1 UNITS: 100 INJECTION, SOLUTION INTRAVENOUS; SUBCUTANEOUS at 09:52

## 2023-09-19 RX ADMIN — LEVOTHYROXINE SODIUM 125 MCG: 0.12 TABLET ORAL at 06:14

## 2023-09-19 RX ADMIN — FLUOXETINE 20 MG: 20 CAPSULE ORAL at 09:52

## 2023-09-19 RX ADMIN — Medication 100 MG: at 09:53

## 2023-09-19 RX ADMIN — Medication 1000 UNITS: at 09:52

## 2023-09-19 NOTE — PROGRESS NOTES
Called to pt room d/t low UF alarm. Machine was in drain 1 of 8. Attempted to get machine to continue to drain, unsuccessful. Dr. César Parrish notified, order to stop tx tonight. Pt disconnected from PD cycler. Effluent clear/blood tinged, total UF neg 1025 ml. Pt denies needs. Will continue to follow. Report to Nate Peoples RN.

## 2023-09-19 NOTE — DISCHARGE INSTRUCTIONS
Peritoneal dialysis as per your nephrologist recommendation. We have started you on new blood pressure medication here. Check blood pressure at home daily. Write down your blood pressure and take the log to nephrology office for close monitoring.

## 2023-09-19 NOTE — CARE COORDINATION
Pt is on discharge for home with [address] JOSE PS Lin with HC and she will initiate [facility]. services.

## 2023-09-19 NOTE — PROGRESS NOTES
Patient was seen in hospital for Asthma, Chronic Bronch. I am prescribing oxygen because the diagnosis and testing requires the patient to have oxygen in the home. Conditions will improve or be benefited by oxygen use. The patient is able to perform good mobility and therefore requires the use of a portable oxygen system for ambulation.

## 2023-09-19 NOTE — DISCHARGE SUMMARY
diverticulosis. Appendix not identified. Pelvis: As above. Peritoneum/Retroperitoneum: The abdominal aorta and iliac arteries are normal in caliber. There is no pathologic adenopathy. Calcified plaque along the aorta and its branches. Bones/Soft Tissues: Severe disc space narrowing and irregular endplates at L3-9. Slight anterior subluxation. Compression fracture L5 age indeterminate. Moderate right and small left pleural effusion. Moderate pericardial effusion. Moderate fluid in the cul-de-sac and peritoneal catheter. Ileus. Possible chronic discitis L4-5. Compression fracture L5 age indeterminate.        CBC:   Recent Labs     09/17/23  0629 09/18/23 0048 09/19/23 0159   WBC 8.7 7.1 4.4   HGB 10.5* 10.2* 10.4*   * 145 138*     BMP:    Recent Labs     09/17/23 0629 09/18/23 0048 09/19/23 0159    137 136   K 3.5 3.5 4.0   CL 99 100 101   CO2 24 26 22   BUN 55* 53* 58*   CREATININE 5.2* 5.0* 4.9*   GLUCOSE 100* 115* 168*       Lipids:   Lab Results   Component Value Date/Time    CHOL 89 04/14/2023 01:12 PM    HDL 45 04/14/2023 01:12 PM    TRIG 59 04/14/2023 01:12 PM     Hemoglobin A1C:   Lab Results   Component Value Date/Time    LABA1C 5.8 08/18/2023 04:25 AM     TSH: No results found for: \"TSH\"  Troponin:   Lab Results   Component Value Date/Time    TROPONINT 0.046 08/18/2023 04:25 AM    TROPONINT 0.051 08/17/2023 06:57 PM    TROPONINT 0.046 07/01/2023 10:24 AM       UA:  Lab Results   Component Value Date/Time    NITRU NEGATIVE 09/11/2023 08:50 PM    COLORU YELLOW 09/11/2023 08:50 PM    WBCUA 2881 09/11/2023 08:50 PM    RBCUA 96 09/11/2023 08:50 PM    MUCUS RARE 07/01/2023 02:05 PM    TRICHOMONAS NONE SEEN 09/11/2023 08:50 PM    BACTERIA NEGATIVE 09/11/2023 08:50 PM    CLARITYU CLOUDY 09/11/2023 08:50 PM    SPECGRAV 1.015 09/11/2023 08:50 PM    LEUKOCYTESUR LARGE NUMBER OR AMOUNT OBSERVED 09/11/2023 08:50 PM    UROBILINOGEN 1.0 09/11/2023 08:50 PM    BILIRUBINUR NEGATIVE 09/11/2023 08:50 PM    BLOODU LARGE NUMBER OR AMOUNT OBSERVED 09/11/2023 08:50 PM    KETUA NEGATIVE 09/11/2023 08:50 PM         Time Spent Discharging patient 36 minutes    Electronically signed by Jarvis Ramírez MD on 9/19/2023 at 1:08 PM

## 2023-09-19 NOTE — PROGRESS NOTES
Pt on discharge. Reviewed AVS with patient and her . Pt educated on how to use home O2 and states will call the company for delivery once home. All questions answered and pt will also reach out to their home PD nurse to restart. Pt taken to front entrance for discharge home.

## 2023-09-19 NOTE — PROGRESS NOTES
[employer] delivered with instructions to call [facility] when she gets home. Pt home O2 paperwork faxed to [facility]. Please do not discharge pt without oxygen. This testing will be good for 48 hours and will have to be repeated if pt has not discharged prior to then. If portable oxygen concentrator or tank has not been delivered prior to pt being ready to leave, please call PFT @ [phone] or Respiratory Therapy @ [phone]. Thanks.

## 2023-09-19 NOTE — PROGRESS NOTES
Nephrology Progress Note        [address], [address] [facility], [address] [place] [address]  Phone: [phone]  Office Hours: 8:30AM - 4:30PM  Monday - Friday 9/19/2023 7:09 AM  Subjective:   Admit Date: 9/10/2023  PCP: SARAH Ness - CNP  Interval History: On NC  Doing ok    Diet: ADULT DIET; Regular  ADULT ORAL NUTRITION SUPPLEMENT; AM Snack, HS Snack; Diabetic Oral Supplement      Data:   Scheduled Meds:   isosorbide mononitrate  30 mg Oral Daily    metoprolol succinate  25 mg Oral Daily    apixaban  5 mg Oral BID    dianeal PD-2 2.5% 2,000 mL with heparin (porcine) 1,000 Units solution   IntraPERitoneal Once    dianeal lo-tej 1.5% 6,000 mL with heparin (porcine) 6,000 Units solution   IntraPERitoneal Once    dianeal lo-tej 1.5% 6,000 mL with heparin (porcine) 6,000 Units solution   IntraPERitoneal Once    sodium bicarbonate  1,300 mg Oral TID    docusate sodium  100 mg Oral BID    meropenem  500 mg IntraVENous Q24H    midodrine  5 mg Oral BID WC    thiamine  100 mg Oral Daily    potassium chloride  20 mEq Oral Daily with breakfast    Vitamin D  1,000 Units Oral Daily    FLUoxetine  20 mg Oral BID    levothyroxine  125 mcg Oral Daily    rosuvastatin  20 mg Oral Nightly    sodium chloride flush  5-40 mL IntraVENous 2 times per day    insulin glargine  10 Units SubCUTAneous Nightly    insulin lispro  0-4 Units SubCUTAneous TID WC    insulin lispro  0-4 Units SubCUTAneous Nightly     Continuous Infusions:   dextrose      sodium chloride       PRN Meds:oxyCODONE-acetaminophen, HYDROmorphone, hydrOXYzine HCl, glucose, dextrose bolus **OR** dextrose bolus, glucagon (rDNA), dextrose, sodium chloride flush, sodium chloride, ondansetron **OR** ondansetron, polyethylene glycol, acetaminophen **OR** acetaminophen  I/O last 3 completed shifts: In: 0960 [IV Piggyback:50]  Out: 698 [Blood:20]  No intake/output data recorded.     Intake/Output Summary (Last 24 hours) at 9/19/2023 8243  Last data filed at fibrinous material in or at the tip of the PD cath, that Dr Ebenezer Hughes cleared in the [address] yesterday. Ok to resume eliquis  -May need oxygen on dc  -Cardiology started imdur er, ok to try but I suspect she will become hypotensive at home and we will have to stop it as outpt.   Ok to try for now though    Thank you                        Electronically signed by Sherlyn Alan DO on 9/19/2023 at 7:09 MD Kanchan Parnell DO  [address] [place] [address]  PHONE: [phone]  FAX: [phone]

## 2023-09-19 NOTE — PROGRESS NOTES
Instilled 1 L 1.5 % dianeal.   500 ml returned when drained. MD notified. Effluent clear pink tinged. Mini cap applied.

## 2023-09-19 NOTE — CARE COORDINATION
[facility] Liaison is spoke wpt &  is aware of discharge & will initiate [address].

## 2023-09-20 DIAGNOSIS — N18.6 ESRD (END STAGE RENAL DISEASE) (HCC): Primary | ICD-10-CM

## 2023-09-20 NOTE — PROGRESS NOTES
IR Procedure at [facility]:  Spoke with patient's  and she will arrive at 0700 at [facility] on 9/21/2023 for her procedure at 0830. Last dose of eliquis was on 9/19/2023, patient's  stated \"that she has had one dose in the last five days\". Also went over below instructions. NPO at [address]     2. Follow your directions as prescribed by the doctor for your procedure and medications. 3.   Consult your provider as to when to stop blood thinner  4. Do not take any pain medication within 6 hours of your procedure  5. Do not drink any alcoholic beverages or use any street drugs 24 hours before procedure. 6.   Please wear simple, loose fitting clothing to the hospital.  Do not bring valuables (money,             credit cards, checkbooks, etc.)     7. If you  have a Living Will and Durable Power of  for Healthcare, please bring in a copy. 8.   Please bring picture ID,  insurance card, paperwork from the doctors office            (H & P, Consent,  & card for implantable devices). 9.   Report to the information desk on the ground floor. 10. Take a shower the night before or morning of your procedure, do not apply any lotion, oil or powder. 11. If you are going to be sedated for the procedure, you will need a responsible adult to drive you home.

## 2023-09-20 NOTE — PROGRESS NOTES
-PD CATH DID NOT FUNCTION AGAIN AT HOME SO IT IS TIME TO TRANSITION TO HD  -WILL NEED ASAP TUNNELLED HD CATH

## 2023-09-21 ENCOUNTER — TELEPHONE (OUTPATIENT)
Dept: SURGERY | Age: 73
End: 2023-09-21

## 2023-09-21 ENCOUNTER — HOSPITAL ENCOUNTER (OUTPATIENT)
Dept: INTERVENTIONAL RADIOLOGY/VASCULAR | Age: 73
Discharge: HOME | End: 2023-09-21
Payer: MEDICARE

## 2023-09-21 VITALS
HEART RATE: 70 BPM | RESPIRATION RATE: 14 BRPM | TEMPERATURE: 97.1 F | DIASTOLIC BLOOD PRESSURE: 54 MMHG | SYSTOLIC BLOOD PRESSURE: 122 MMHG | OXYGEN SATURATION: 96 %

## 2023-09-21 DIAGNOSIS — N18.6 ESRD (END STAGE RENAL DISEASE) (HCC): ICD-10-CM

## 2023-09-21 PROCEDURE — 77001 FLUOROGUIDE FOR VEIN DEVICE: CPT

## 2023-09-21 PROCEDURE — 76937 US GUIDE VASCULAR ACCESS: CPT

## 2023-09-21 PROCEDURE — 6360000002 HC RX W HCPCS

## 2023-09-21 PROCEDURE — C1750 CATH, HEMODIALYSIS,LONG-TERM: HCPCS

## 2023-09-21 PROCEDURE — 6360000002 HC RX W HCPCS: Performed by: RADIOLOGY

## 2023-09-21 PROCEDURE — 36558 INSERT TUNNELED CV CATH: CPT

## 2023-09-21 RX ORDER — SODIUM CHLORIDE 0.9 % (FLUSH) 0.9 %
10 SYRINGE (ML) INJECTION PRN
Status: DISCONTINUED | OUTPATIENT
Start: 2023-09-21 | End: 2023-09-22 | Stop reason: HOSPADM

## 2023-09-21 RX ORDER — HEPARIN SODIUM 1000 [USP'U]/ML
INJECTION, SOLUTION INTRAVENOUS; SUBCUTANEOUS PRN
Status: COMPLETED | OUTPATIENT
Start: 2023-09-21 | End: 2023-09-21

## 2023-09-21 RX ADMIN — HEPARIN SODIUM 4000 UNITS: 1000 INJECTION, SOLUTION INTRAVENOUS; SUBCUTANEOUS at 09:08

## 2023-09-21 NOTE — PROGRESS NOTES
TRANSFER - OUT REPORT:    Verbal report given to Cami/Hailey RN on Sergio Buitrago being transferred to [facility](unit) for routine post-op       Report consisted of patient's Situation, Background, Assessment and   Recommendations(SBAR). Information from the following report(s) Nurse Handoff Report was reviewed with the receiving nurse. Opportunity for questions and clarification was provided.       Patient transported with:   Registered Nurse

## 2023-09-21 NOTE — DISCHARGE INSTRUCTIONS
May resume all medications as previously prescribed. Activity as tolerated. Follow up with ordering physician.

## 2023-09-21 NOTE — PROGRESS NOTES
Pt returned from procedure with no complaints, Vital signs stable, no complaints of pain, alert and oriented x4, pt has small amount of bleeding on dressing. Pt IV removed per order and pt discharged home with her .

## 2023-09-22 NOTE — PROGRESS NOTES
9/22/23 - . LM on home & cell with my call-back # concerning  surgery @ [facility] on  9/28/23. Please call the PAT Nurse for a phone assessment and surgery instructions.

## 2023-09-27 ENCOUNTER — ANESTHESIA EVENT (OUTPATIENT)
Dept: OPERATING ROOM | Age: 73
End: 2023-09-27
Payer: MEDICARE

## 2023-09-27 NOTE — PROGRESS NOTES
9/27/23 - . LM for patient - surgery @ [facility] on  9/28/23 @ 0945, arrival 0745. NPO status and morning medications reviewed. Please call with any questions.

## 2023-09-28 ENCOUNTER — ANESTHESIA (OUTPATIENT)
Dept: OPERATING ROOM | Age: 73
End: 2023-09-28
Payer: MEDICARE

## 2023-09-28 ENCOUNTER — HOSPITAL ENCOUNTER (OUTPATIENT)
Age: 73
Setting detail: OUTPATIENT SURGERY
Discharge: HOME | End: 2023-09-28
Attending: SURGERY | Admitting: SURGERY
Payer: MEDICARE

## 2023-09-28 VITALS
OXYGEN SATURATION: 97 % | WEIGHT: 199 LBS | HEIGHT: 65 IN | SYSTOLIC BLOOD PRESSURE: 132 MMHG | RESPIRATION RATE: 16 BRPM | TEMPERATURE: 97.4 F | HEART RATE: 60 BPM | BODY MASS INDEX: 33.15 KG/M2 | DIASTOLIC BLOOD PRESSURE: 56 MMHG

## 2023-09-28 DIAGNOSIS — T85.611A PERITONEAL DIALYSIS CATHETER DYSFUNCTION, INITIAL ENCOUNTER (HCC): Primary | ICD-10-CM

## 2023-09-28 LAB
ANION GAP SERPL CALCULATED.3IONS-SCNC: 7 MMOL/L (ref 4–16)
BUN SERPL-MCNC: 29 MG/DL (ref 6–23)
CALCIUM SERPL-MCNC: 8.6 MG/DL (ref 8.3–10.6)
CHLORIDE BLD-SCNC: 100 MMOL/L (ref 99–110)
CO2: 29 MMOL/L (ref 21–32)
CREAT SERPL-MCNC: 2.4 MG/DL (ref 0.6–1.1)
GFR SERPL CREATININE-BSD FRML MDRD: 21 ML/MIN/1.73M2
GLUCOSE SERPL-MCNC: 142 MG/DL (ref 70–99)
POTASSIUM SERPL-SCNC: 2.9 MMOL/L (ref 3.5–5.1)
SODIUM BLD-SCNC: 136 MMOL/L (ref 135–145)

## 2023-09-28 PROCEDURE — 6360000002 HC RX W HCPCS: Performed by: SURGERY

## 2023-09-28 PROCEDURE — 3700000000 HC ANESTHESIA ATTENDED CARE: Performed by: SURGERY

## 2023-09-28 PROCEDURE — 2500000003 HC RX 250 WO HCPCS: Performed by: NURSE ANESTHETIST, CERTIFIED REGISTERED

## 2023-09-28 PROCEDURE — 7100000011 HC PHASE II RECOVERY - ADDTL 15 MIN: Performed by: SURGERY

## 2023-09-28 PROCEDURE — 7100000001 HC PACU RECOVERY - ADDTL 15 MIN: Performed by: SURGERY

## 2023-09-28 PROCEDURE — 3700000001 HC ADD 15 MINUTES (ANESTHESIA): Performed by: SURGERY

## 2023-09-28 PROCEDURE — 2709999900 HC NON-CHARGEABLE SUPPLY: Performed by: SURGERY

## 2023-09-28 PROCEDURE — 3600000004 HC SURGERY LEVEL 4 BASE: Performed by: SURGERY

## 2023-09-28 PROCEDURE — 2580000003 HC RX 258: Performed by: SURGERY

## 2023-09-28 PROCEDURE — 6360000002 HC RX W HCPCS: Performed by: NURSE ANESTHETIST, CERTIFIED REGISTERED

## 2023-09-28 PROCEDURE — 49422 REMOVE TUNNELED IP CATH: CPT | Performed by: SURGERY

## 2023-09-28 PROCEDURE — 80048 BASIC METABOLIC PNL TOTAL CA: CPT

## 2023-09-28 PROCEDURE — 7100000010 HC PHASE II RECOVERY - FIRST 15 MIN: Performed by: SURGERY

## 2023-09-28 PROCEDURE — 3600000014 HC SURGERY LEVEL 4 ADDTL 15MIN: Performed by: SURGERY

## 2023-09-28 PROCEDURE — 6370000000 HC RX 637 (ALT 250 FOR IP): Performed by: ANESTHESIOLOGY

## 2023-09-28 PROCEDURE — 7100000000 HC PACU RECOVERY - FIRST 15 MIN: Performed by: SURGERY

## 2023-09-28 RX ORDER — OXYCODONE HYDROCHLORIDE AND ACETAMINOPHEN 5; 325 MG/1; MG/1
1 TABLET ORAL ONCE
Status: COMPLETED | OUTPATIENT
Start: 2023-09-28 | End: 2023-09-28

## 2023-09-28 RX ORDER — SODIUM CHLORIDE 0.9 % (FLUSH) 0.9 %
5-40 SYRINGE (ML) INJECTION EVERY 12 HOURS SCHEDULED
Status: CANCELLED | OUTPATIENT
Start: 2023-09-28

## 2023-09-28 RX ORDER — VASOPRESSIN 20 U/ML
INJECTION PARENTERAL PRN
Status: DISCONTINUED | OUTPATIENT
Start: 2023-09-28 | End: 2023-09-28 | Stop reason: SDUPTHER

## 2023-09-28 RX ORDER — BUPIVACAINE HYDROCHLORIDE 5 MG/ML
INJECTION, SOLUTION EPIDURAL; INTRACAUDAL
Status: COMPLETED | OUTPATIENT
Start: 2023-09-28 | End: 2023-09-28

## 2023-09-28 RX ORDER — FENTANYL CITRATE 50 UG/ML
25 INJECTION, SOLUTION INTRAMUSCULAR; INTRAVENOUS EVERY 5 MIN PRN
Status: CANCELLED | OUTPATIENT
Start: 2023-09-28

## 2023-09-28 RX ORDER — SODIUM CHLORIDE, SODIUM LACTATE, POTASSIUM CHLORIDE, CALCIUM CHLORIDE 600; 310; 30; 20 MG/100ML; MG/100ML; MG/100ML; MG/100ML
INJECTION, SOLUTION INTRAVENOUS CONTINUOUS
Status: DISCONTINUED | OUTPATIENT
Start: 2023-09-28 | End: 2023-09-28 | Stop reason: HOSPADM

## 2023-09-28 RX ORDER — DIPHENHYDRAMINE HYDROCHLORIDE 50 MG/ML
12.5 INJECTION INTRAMUSCULAR; INTRAVENOUS
Status: CANCELLED | OUTPATIENT
Start: 2023-09-28 | End: 2023-09-29

## 2023-09-28 RX ORDER — OXYCODONE HYDROCHLORIDE 5 MG/1
5 TABLET ORAL
Status: CANCELLED | OUTPATIENT
Start: 2023-09-28 | End: 2023-09-29

## 2023-09-28 RX ORDER — SODIUM CHLORIDE 0.9 % (FLUSH) 0.9 %
5-40 SYRINGE (ML) INJECTION PRN
Status: CANCELLED | OUTPATIENT
Start: 2023-09-28

## 2023-09-28 RX ORDER — OXYCODONE HYDROCHLORIDE AND ACETAMINOPHEN 5; 325 MG/1; MG/1
1 TABLET ORAL EVERY 6 HOURS PRN
Qty: 14 TABLET | Refills: 0 | Status: SHIPPED | OUTPATIENT
Start: 2023-09-28 | End: 2023-10-03

## 2023-09-28 RX ORDER — DOCUSATE SODIUM 100 MG/1
100 CAPSULE, LIQUID FILLED ORAL 2 TIMES DAILY
Qty: 60 CAPSULE | Refills: 0 | COMMUNITY
Start: 2023-09-28 | End: 2023-10-12

## 2023-09-28 RX ORDER — PROPOFOL 10 MG/ML
INJECTION, EMULSION INTRAVENOUS PRN
Status: DISCONTINUED | OUTPATIENT
Start: 2023-09-28 | End: 2023-09-28 | Stop reason: SDUPTHER

## 2023-09-28 RX ORDER — ROCURONIUM BROMIDE 10 MG/ML
INJECTION, SOLUTION INTRAVENOUS PRN
Status: DISCONTINUED | OUTPATIENT
Start: 2023-09-28 | End: 2023-09-28 | Stop reason: SDUPTHER

## 2023-09-28 RX ORDER — SODIUM CHLORIDE, SODIUM LACTATE, POTASSIUM CHLORIDE, CALCIUM CHLORIDE 600; 310; 30; 20 MG/100ML; MG/100ML; MG/100ML; MG/100ML
INJECTION, SOLUTION INTRAVENOUS CONTINUOUS
Status: CANCELLED | OUTPATIENT
Start: 2023-09-28

## 2023-09-28 RX ORDER — ONDANSETRON 2 MG/ML
4 INJECTION INTRAMUSCULAR; INTRAVENOUS
Status: CANCELLED | OUTPATIENT
Start: 2023-09-28 | End: 2023-09-29

## 2023-09-28 RX ORDER — LIDOCAINE HYDROCHLORIDE 20 MG/ML
INJECTION, SOLUTION INTRAVENOUS PRN
Status: DISCONTINUED | OUTPATIENT
Start: 2023-09-28 | End: 2023-09-28 | Stop reason: SDUPTHER

## 2023-09-28 RX ORDER — HYDRALAZINE HYDROCHLORIDE 20 MG/ML
10 INJECTION INTRAMUSCULAR; INTRAVENOUS
Status: CANCELLED | OUTPATIENT
Start: 2023-09-28

## 2023-09-28 RX ORDER — LABETALOL HYDROCHLORIDE 5 MG/ML
10 INJECTION, SOLUTION INTRAVENOUS
Status: CANCELLED | OUTPATIENT
Start: 2023-09-28

## 2023-09-28 RX ORDER — FENTANYL CITRATE 50 UG/ML
INJECTION, SOLUTION INTRAMUSCULAR; INTRAVENOUS PRN
Status: DISCONTINUED | OUTPATIENT
Start: 2023-09-28 | End: 2023-09-28 | Stop reason: SDUPTHER

## 2023-09-28 RX ORDER — DROPERIDOL 2.5 MG/ML
0.62 INJECTION, SOLUTION INTRAMUSCULAR; INTRAVENOUS EVERY 10 MIN PRN
Status: CANCELLED | OUTPATIENT
Start: 2023-09-28

## 2023-09-28 RX ADMIN — PROPOFOL 100 MG: 10 INJECTION, EMULSION INTRAVENOUS at 11:25

## 2023-09-28 RX ADMIN — FENTANYL CITRATE 25 MCG: 50 INJECTION, SOLUTION INTRAMUSCULAR; INTRAVENOUS at 11:42

## 2023-09-28 RX ADMIN — VASOPRESSIN 3 UNITS: 20 INJECTION INTRAVENOUS at 11:34

## 2023-09-28 RX ADMIN — ROCURONIUM BROMIDE 20 MG: 10 INJECTION INTRAVENOUS at 11:25

## 2023-09-28 RX ADMIN — CEFAZOLIN 2000 MG: 2 INJECTION, POWDER, FOR SOLUTION INTRAMUSCULAR; INTRAVENOUS at 11:07

## 2023-09-28 RX ADMIN — OXYCODONE AND ACETAMINOPHEN 1 TABLET: 5; 325 TABLET ORAL at 13:13

## 2023-09-28 RX ADMIN — SUGAMMADEX 200 MG: 100 INJECTION, SOLUTION INTRAVENOUS at 11:47

## 2023-09-28 RX ADMIN — FENTANYL CITRATE 25 MCG: 50 INJECTION, SOLUTION INTRAMUSCULAR; INTRAVENOUS at 11:34

## 2023-09-28 RX ADMIN — LIDOCAINE HYDROCHLORIDE 80 MG: 20 INJECTION, SOLUTION INTRAVENOUS at 11:25

## 2023-09-28 ASSESSMENT — PAIN SCALES - GENERAL
PAINLEVEL_OUTOF10: 3
PAINLEVEL_OUTOF10: 0

## 2023-09-28 NOTE — DISCHARGE INSTRUCTIONS
Patient Discharge Instructions  Dr. Joce Corrales  [address], [facility] [address]    [address] [place] [address], [address]  Jony Schneider    [phone]         RESUME ACTIVITY:      BATHING:   OK to shower but no bath tub or submerging incision under water. Pat the incisions dry with a towel after showers. Wound:    Keep wound dry and clean. May shower as instructed above. Dressing: Your wound is sealed with surgical glue and the other has a simple dressing in place. Glue will wear off in 1-2 weeks. No need for a dressing over the glue. Place a dry dressing over the open incision and change this daily. Once the dressing comes off clean without drainage you may again leave your incision open to air. DRIVING:   No driving for at least 24 hours after your procedure. No driving until off narcotic pain medications and walking comfortably    RETURN TO WORK: When cleared after your follow up office visit    WALKING:    As tolerated     STAIRS:    As tolerated    LIFTING:   No heavy lifting for one week. DIET:    Regular diet    SPECIAL INSTRUCTIONS:     Call the office at [phone]  if you have a fever greater than or equal to 101 oF or if your incision becomes red, tender, or has drainage of pus. If follow up appointment was not given to you, call the Surgical Clinic at [phone] for follow up appointment with Dr. Merlene Donahue in:  1-2 weeks. [facility] in [address] not drive, work around [facility] or use equipment. Do not drink any alcoholic beverages. Do not smoke while alone. Avoid making important decisions. Plan to spend a quiet, relaxed evening @ home. Resume normal activities as you begin to feel better. Eat lightly for your first meal, then gradually increase your diet to what is normal for you. In case of nausea, avoid food and drink only clear liquids. Resume food as nausea ceases.   Notify your surgeon if you experience fever,

## 2023-09-28 NOTE — PROGRESS NOTES
Pt. Awake, alert, and oriented x 4. On oxygen via NC @ 2L. Vs stable. Pt. Denies nausea. Does c/o some mild, tolerable pain in abdomen at 2-3/10. Dressing to left lower abdomen is clean, dry, and intact. Site to left lower abdomen is closed with surgical glue, well-approximated, no drainage noted. IV infusing without difficulty. SCDs to BLEs. Pt. Ready to be discharged from PACU. Will transport back to room 14 in [facility] via cart.

## 2023-09-28 NOTE — PROGRESS NOTES
Returned to room 14. Report received from Rhonda RN. Alert and oriented. Respirations even and unlabored. Color pink. Abdomen soft and nontender. Beverage provided. Call light in reach. Family supportive at bedside. Dressing to abdomen is dry and intact.

## 2023-09-28 NOTE — H&P
History and Physical Exam  Dr. Elva Blair  Chief Complaint:  PD catheter malfunction    HPI   Mayela Chapin is a 74 y.o. female with a history of ESRD well known to me from placement and manipulation of her PD catheter. She has continued to have problems with the function of her catheter despite several adjustments. Plan for removal of the catheter today. She reports doing well since starting on HD.         Past Medical History:   Diagnosis Date    Asthma     Bradycardia     Bronchitis     COPD (chronic obstructive pulmonary disease) ([address])     Dialysis patient ([address]) 09/22/2023    Currently PD but spouse states switching to [facility] 9/28/23    H/O echocardiogram 12/04/2019    EF 55-60%, Grade II Diastolic Dysfunction, Left atrium is moderately dilated, no significant valvular disease, Mild Pulm HTN, No pericardial effusion     History of nuclear stress test 12/04/2019    ABN, Moderate inferior and lateral wall ischemia of a large territory, EF 55%    Hypercholesteremia     Hyperlipidemia     Hypertension     Hyperthyroidism     Hypothyroidism     Kidney disease     Pneumonia     Type II or unspecified type diabetes mellitus without mention of complication, not stated as uncontrolled     Unspecified sleep apnea      Patient Active Problem List    Diagnosis Date Noted    PD catheter dysfunction ([address]) 09/15/2023    Acute cystitis without hematuria 09/15/2023    Anticoagulated 09/15/2023    Epigastric pain 08/17/2023    Pleural effusion on right 06/30/2023    Other fatigue 05/01/2023    Gait disturbance 05/01/2023    Paroxysmal atrial fibrillation ([address]) 05/01/2023    Heart block, AV 05/01/2023    Uncontrolled type 2 diabetes mellitus with hyperglycemia ([address]) 05/01/2023    Simple chronic bronchitis ([address]) 05/01/2023    Moderate malnutrition ([address]) 04/24/2023    Cardiac arrest ([address]) 04/24/2023    End-stage renal disease on hemodialysis ([address]) 04/14/2023    Personal history of colonic polyps     Benign neoplasm medications on file prior to encounter.      Current Outpatient Medications on File Prior to Encounter   Medication Sig Dispense Refill    sodium bicarbonate 650 MG tablet Take 2 tablets by mouth 3 times daily 90 tablet 1    isosorbide mononitrate (IMDUR) 30 MG extended release tablet Take 1 tablet by mouth daily 30 tablet 1    insulin glargine (LANTUS SOLOSTAR) 100 UNIT/ML injection pen Inject 15-20 Units into the skin nightly 09/11/23 patient states she uses sliding scale 2 Adjustable Dose Pre-filled Pen Syringe 1    metoprolol succinate (TOPROL XL) 25 MG extended release tablet Take 1 tablet by mouth daily 30 tablet 1    levothyroxine (SYNTHROID) 125 MCG tablet Take 1 tablet by mouth Daily 30 tablet 1    midodrine (PROAMATINE) 10 MG tablet Take 1 tablet by mouth 3 times daily (before meals) 90 tablet 5    docusate sodium (COLACE) 100 MG capsule Take 1 capsule by mouth 2 times daily as needed 09/11/23 Only takes as needed      Insulin Pen Needle (Lama Bravo PEN NEEDLES) 31G X 6 MM MISC 1 each by Does not apply route daily 100 each 3    potassium chloride (KLOR-CON M) 20 MEQ extended release tablet Take 1 tablet by mouth daily (Patient not taking: Reported on 9/21/2023) 30 tablet 1    B Complex-C-Folic Acid (WESCAPS) 1 MG CAPS Take 1 capsule by mouth daily      rosuvastatin (CRESTOR) 20 MG tablet Take 1 tablet by mouth nightly      Simethicone (GAS-X PO) Take by mouth      Cyanocobalamin (VITAMIN B 12) 100 MCG LOZG   TAKE ONE CAPSULE BY MOUTH EVERY DAY, # 90 EA, 3 total refill(s), Acute, JOSH [Federal Rx: #90 last filled 03/27/23]      apixaban (ELIQUIS) 5 MG TABS tablet Take 1 tablet by mouth 2 times daily 60 tablet 5    acetaminophen (TYLENOL) 500 MG tablet Take 1 tablet by mouth three times daily      vitamin D (CHOLECALCIFEROL) 25 MCG (1000 UT) TABS tablet Take 2 tablets by mouth daily      FLUoxetine (PROZAC) 20 MG capsule Take 1 capsule by mouth 2 times daily       Allergies   Allergen Reactions    Empagliflozin

## 2023-09-28 NOTE — OP NOTE
Operative Note      Patient: Georgia Moore  YOB: 1950  MRN: [id number]    Date of Procedure: 9/28/2023    Pre-Op Diagnosis Codes:     * Displacement of intraperitoneal dialysis catheter, initial encounter ([address]) [C16.162W]    Post-Op Diagnosis: Same       Procedure(s):  CATHETER REMOVAL PERITONEAL DIALYSIS    Surgeon(s):  Tadeo Savage MD    Assistant:   * No surgical staff found *    Anesthesia: Monitor Anesthesia Care    Estimated Blood Loss (mL): 11KH    Complications: None    Specimens:   * No specimens in log *    Implants:  * No implants in log *      Drains: * No LDAs found *    Findings: as expected        Detailed Description of Procedure: Georgia Moore is a 74 y.o. female with ESRD. They had a peritoneal dialysis catheter placed as well as several manipulations without improvement in catheter function. After discussion with the patient and Nephrology, decision was made to remove the catheter. The patient was brought to the OR. General anesthesia was initiated by our CRNA and Georgia Moore was intubated via ETT. They were prepped and draped in sterile fashion and timeout was performed. Local anesthetic was injected into the subcutaneous skin at the site of his previous infra-umbilical incision. Dissection was carried down to the catheter with electrocautery. The catheter was grasped and the cuffs were freed from the soft tissue with blunt dissection using a hemostat. The catheter was then cut and removed in 2 pieces. The fascia was closed using 0-vicryl suture and the umbilical incision was closed in layers with vicryl deep and monocryl for skin. Skin was dressed with dermabond. The PD catheter exit site was not closed but was dressed with a dry 4x4 gauze and tape. The patient was awakened from anesthesia and taken to PACU in good condition.       Electronically signed by Tadeo Saavge MD on 9/28/2023 at 11:49 AM

## 2023-09-28 NOTE — PROGRESS NOTES
1200-Pt. Arrived in PACU via cart from the OR. Oxygen via simple mask at 8L applied, attached to monitor, vs stable. Brakes applied, side rails up x 2. Pt. Nurys Basilio, but arouses to voice and touch. Denies pain or nausea. Incision site to Left lower abdomen, closed with glue, well-approximated and no drainage noted. ,. Clean, dry dressing to left abdomen where PD catheter was removed. No drainage noted. Iv infusing without difficulty. SCDs to BLEs. Bedside report received from [address].

## 2023-09-28 NOTE — ANESTHESIA POSTPROCEDURE EVALUATION
Department of Anesthesiology  Postprocedure Note    Patient: Vitaliy Farmer  MRN: [id number]  YOB: 1950  Date of evaluation: 9/28/2023      Procedure Summary     Date: 09/28/23 Room / Location: [address] [place] [address]    Anesthesia Start: 1106 Anesthesia Stop:     Procedure: CATHETER REMOVAL PERITONEAL DIALYSIS Diagnosis:       Displacement of intraperitoneal dialysis catheter, initial encounter ([address])      (Displacement of intraperitoneal dialysis catheter, initial encounter ([address]) [D33.702G])    Surgeons: Diann Caro MD Responsible Provider: Sinai Garcia MD    Anesthesia Type: General ASA Status: 4          Anesthesia Type: General    Quinn Phase I:      Qunin Phase II:        Anesthesia Post Evaluation    Patient location during evaluation: PACU  Patient participation: complete - patient participated  Level of consciousness: awake  Pain score: 2  Airway patency: patent  Nausea & Vomiting: no nausea and no vomiting  Cardiovascular status: blood pressure returned to baseline  Respiratory status: acceptable  Hydration status: euvolemic  Pain management: adequate

## 2023-10-09 ENCOUNTER — HOSPITAL ENCOUNTER (OUTPATIENT)
Age: 73
Setting detail: SPECIMEN
Discharge: HOME | End: 2023-10-09

## 2023-10-09 LAB — POTASSIUM SERPL-SCNC: 3.3 MMOL/L (ref 3.5–5.1)

## 2023-10-09 PROCEDURE — 84132 ASSAY OF SERUM POTASSIUM: CPT

## 2023-10-23 ENCOUNTER — HOSPITAL ENCOUNTER (OUTPATIENT)
Age: 73
Setting detail: SPECIMEN
Discharge: HOME | End: 2023-10-23

## 2023-10-23 ENCOUNTER — TELEPHONE (OUTPATIENT)
Dept: CARDIOLOGY CLINIC | Age: 73
End: 2023-10-23

## 2023-10-23 LAB — POTASSIUM SERPL-SCNC: 4.2 MMOL/L (ref 3.5–5.1)

## 2023-10-23 PROCEDURE — 84132 ASSAY OF SERUM POTASSIUM: CPT

## 2023-10-23 NOTE — TELEPHONE ENCOUNTER
Question: IN AM she takes medication that causes her blood pressure drops. While at Dialysis Blood pressure drops. This only happens when she goes to dialysis. Should she take more of Midodrine? Please advise. If no answer please leave a message. No

## 2023-10-24 RX ORDER — ISOSORBIDE MONONITRATE 30 MG/1
30 TABLET, EXTENDED RELEASE ORAL DAILY
Qty: 90 TABLET | Refills: 1 | Status: SHIPPED | OUTPATIENT
Start: 2023-10-24

## 2023-10-24 NOTE — TELEPHONE ENCOUNTER
Left message, cannot increase midodrine as at maximum dose.  Suggest holding metoprolol on dialysis days til after treatment

## 2023-12-05 ENCOUNTER — PROCEDURE VISIT (OUTPATIENT)
Dept: CARDIOLOGY CLINIC | Age: 73
End: 2023-12-05

## 2023-12-05 ENCOUNTER — OFFICE VISIT (OUTPATIENT)
Dept: CARDIOLOGY CLINIC | Age: 73
End: 2023-12-05
Payer: MEDICARE

## 2023-12-05 VITALS
HEIGHT: 65 IN | OXYGEN SATURATION: 100 % | SYSTOLIC BLOOD PRESSURE: 138 MMHG | HEART RATE: 60 BPM | DIASTOLIC BLOOD PRESSURE: 68 MMHG | WEIGHT: 209 LBS | BODY MASS INDEX: 34.82 KG/M2

## 2023-12-05 DIAGNOSIS — G47.33 OSA ON CPAP: ICD-10-CM

## 2023-12-05 DIAGNOSIS — Z99.2 END-STAGE RENAL DISEASE ON HEMODIALYSIS (HCC): ICD-10-CM

## 2023-12-05 DIAGNOSIS — E78.2 MIXED HYPERLIPIDEMIA: ICD-10-CM

## 2023-12-05 DIAGNOSIS — I10 ESSENTIAL HYPERTENSION: Primary | ICD-10-CM

## 2023-12-05 DIAGNOSIS — I48.0 PAROXYSMAL ATRIAL FIBRILLATION (HCC): ICD-10-CM

## 2023-12-05 DIAGNOSIS — E11.22 TYPE 2 DIABETES MELLITUS WITH DIABETIC CHRONIC KIDNEY DISEASE, UNSPECIFIED CKD STAGE, UNSPECIFIED WHETHER LONG TERM INSULIN USE (HCC): ICD-10-CM

## 2023-12-05 DIAGNOSIS — R00.1 BRADYCARDIA: ICD-10-CM

## 2023-12-05 DIAGNOSIS — I49.5 SINUS NODE DYSFUNCTION (HCC): ICD-10-CM

## 2023-12-05 DIAGNOSIS — Z95.0 CARDIAC PACEMAKER IN SITU: Primary | ICD-10-CM

## 2023-12-05 DIAGNOSIS — Z79.01 ANTICOAGULATED: ICD-10-CM

## 2023-12-05 DIAGNOSIS — E11.65 UNCONTROLLED TYPE 2 DIABETES MELLITUS WITH HYPERGLYCEMIA (HCC): ICD-10-CM

## 2023-12-05 DIAGNOSIS — N18.6 END-STAGE RENAL DISEASE ON HEMODIALYSIS (HCC): ICD-10-CM

## 2023-12-05 PROCEDURE — 3078F DIAST BP <80 MM HG: CPT | Performed by: INTERNAL MEDICINE

## 2023-12-05 PROCEDURE — 3044F HG A1C LEVEL LT 7.0%: CPT | Performed by: INTERNAL MEDICINE

## 2023-12-05 PROCEDURE — 1036F TOBACCO NON-USER: CPT | Performed by: INTERNAL MEDICINE

## 2023-12-05 PROCEDURE — G8399 PT W/DXA RESULTS DOCUMENT: HCPCS | Performed by: INTERNAL MEDICINE

## 2023-12-05 PROCEDURE — 99213 OFFICE O/P EST LOW 20 MIN: CPT | Performed by: INTERNAL MEDICINE

## 2023-12-05 PROCEDURE — G8484 FLU IMMUNIZE NO ADMIN: HCPCS | Performed by: INTERNAL MEDICINE

## 2023-12-05 PROCEDURE — 2022F DILAT RTA XM EVC RTNOPTHY: CPT | Performed by: INTERNAL MEDICINE

## 2023-12-05 PROCEDURE — 1090F PRES/ABSN URINE INCON ASSESS: CPT | Performed by: INTERNAL MEDICINE

## 2023-12-05 PROCEDURE — G8417 CALC BMI ABV UP PARAM F/U: HCPCS | Performed by: INTERNAL MEDICINE

## 2023-12-05 PROCEDURE — G8427 DOCREV CUR MEDS BY ELIG CLIN: HCPCS | Performed by: INTERNAL MEDICINE

## 2023-12-05 PROCEDURE — 3075F SYST BP GE 130 - 139MM HG: CPT | Performed by: INTERNAL MEDICINE

## 2023-12-05 PROCEDURE — 1123F ACP DISCUSS/DSCN MKR DOCD: CPT | Performed by: INTERNAL MEDICINE

## 2023-12-05 PROCEDURE — 3017F COLORECTAL CA SCREEN DOC REV: CPT | Performed by: INTERNAL MEDICINE

## 2023-12-05 NOTE — PROGRESS NOTES
medical problems & all Labs + testing. This includes chart prep even prior to the vosit. Various goals are discussed and multiple questions answered. Relevant concelling performed. Office follow up in six months. Device check per protocol.

## 2024-02-14 ENCOUNTER — HOSPITAL ENCOUNTER (OUTPATIENT)
Age: 74
Setting detail: SPECIMEN
Discharge: HOME | End: 2024-02-14

## 2024-02-14 PROCEDURE — 87086 URINE CULTURE/COLONY COUNT: CPT

## 2024-02-15 LAB
CULTURE: NORMAL
Lab: NORMAL
SPECIMEN: NORMAL

## 2024-03-11 ENCOUNTER — PROCEDURE VISIT (OUTPATIENT)
Dept: CARDIOLOGY CLINIC | Age: 74
End: 2024-03-11

## 2024-03-11 DIAGNOSIS — I49.5 SINUS NODE DYSFUNCTION (HCC): ICD-10-CM

## 2024-03-11 DIAGNOSIS — Z95.0 CARDIAC PACEMAKER IN SITU: Primary | ICD-10-CM

## 2024-04-22 ENCOUNTER — HOSPITAL ENCOUNTER (OUTPATIENT)
Age: 74
Setting detail: SPECIMEN
Discharge: HOME | End: 2024-04-22
Payer: MEDICARE

## 2024-04-22 LAB
ANION GAP SERPL CALCULATED.3IONS-SCNC: 17 MMOL/L (ref 7–16)
APTT: 32 SECONDS (ref 25.1–37.1)
BUN SERPL-MCNC: 63 MG/DL (ref 6–23)
CALCIUM SERPL-MCNC: 8.7 MG/DL (ref 8.3–10.6)
CHLORIDE BLD-SCNC: 102 MMOL/L (ref 99–110)
CO2: 21 MMOL/L (ref 21–32)
CREAT SERPL-MCNC: 4.3 MG/DL (ref 0.6–1.1)
GFR SERPL CREATININE-BSD FRML MDRD: 10 ML/MIN/1.73M2
GLUCOSE SERPL-MCNC: 246 MG/DL (ref 70–99)
HCT VFR BLD CALC: 32.2 % (ref 37–47)
HEMOGLOBIN: 10 GM/DL (ref 12.5–16)
INR BLD: 1.2 INDEX
MCH RBC QN AUTO: 32.5 PG (ref 27–31)
MCHC RBC AUTO-ENTMCNC: 31.1 % (ref 32–36)
MCV RBC AUTO: 104.5 FL (ref 78–100)
PDW BLD-RTO: 13.6 % (ref 11.7–14.9)
PLATELET # BLD: 106 K/CU MM (ref 140–440)
PMV BLD AUTO: 10.4 FL (ref 7.5–11.1)
POTASSIUM SERPL-SCNC: 4.3 MMOL/L (ref 3.5–5.1)
PROTHROMBIN TIME: 15.4 SECONDS (ref 11.7–14.5)
RBC # BLD: 3.08 M/CU MM (ref 4.2–5.4)
SODIUM BLD-SCNC: 140 MMOL/L (ref 135–145)
WBC # BLD: 5.4 K/CU MM (ref 4–10.5)

## 2024-04-22 PROCEDURE — 85027 COMPLETE CBC AUTOMATED: CPT

## 2024-04-22 PROCEDURE — 85730 THROMBOPLASTIN TIME PARTIAL: CPT

## 2024-04-22 PROCEDURE — 80048 BASIC METABOLIC PNL TOTAL CA: CPT

## 2024-04-22 PROCEDURE — 85610 PROTHROMBIN TIME: CPT

## 2024-05-15 ENCOUNTER — TELEPHONE (OUTPATIENT)
Dept: CARDIOLOGY CLINIC | Age: 74
End: 2024-05-15

## 2024-05-15 NOTE — TELEPHONE ENCOUNTER
Cardiologist: Dr. Escalona  Surgeon: Dr. Mattson  Surgery: Extractions  Surgery/Procedure should be done in the hospital setting    _____ yes    _____  no    Anesthesia: Local  Date: Pending  Fax# [phone]  # [phone]    Last OV 12/5/2023 w/Iqra      CORONARY ARTERY DISEASE:None known.     HTN:Patient is Hypotensive rather & she is on Midodrine..     VHD: No significant VHD noted   DYSLIPIDEMIA: yes,   Patient's profile is at / near Goal.no  Tolerating current medical regimen wellyes, takes Lipitor.  See most recent Lab values in Labs section above.  Diabetes mellitis:yes,   BS under good control no     Arrhythmia:   In April this year, Patient was sent to the hospital due to high degree AV blcock where in she was noted to be in complete heart block with escape rhythm and subsequently went into asystole and later had cardiac arrest and resuscitated and then moved to the cath lab for urgent pacemaker because of escape rhythm.  Patient is now on Eliquis for a diagnosis of A-fib.      Last EKG- 8/17/2023      NM- 8/18/2023  Abnormal Stress Test with low LVEF    43% but perfusion study negative for   ischemia   Start patient on GDMT with Toprol XL - Hold off on ACEI due to renal failure   at this time   Follow up with primary cardiologist as outpt, will consider coronary   angiogram if symptoms worsen       Cath- 12/11/2019  No significant CAD. Left dominant system & Left side arteries   are all normal. RCA is non-dominant has minimal disease.   Normal LV systolic function. LVEF is 50 to 55 %.      Echo- 9/18/2023   This is a limited echo to assess for pericardial effusion.   Left ventricular systolic function is abnormal.   Ejection fraction is visually estimated at 40-45%.   Trace tricuspid regurgitation; RVSP: 26 mmHg.   There is a moderate pericardial effusion without tamponade physiology   (consistent with previous echo on 09/15/23).   Bilateral pleural effusion.    Pacemaker insert-

## 2024-05-16 ENCOUNTER — TELEPHONE (OUTPATIENT)
Dept: CARDIOLOGY CLINIC | Age: 74
End: 2024-05-16

## 2024-05-16 NOTE — TELEPHONE ENCOUNTER
A representative from Dr. Ida Mattson's office ( Dental) called and stated that our pt is having dental work done and was wondering if the pt should discontinue her blood thinners for procedure, phone number [phone].

## 2024-05-17 ENCOUNTER — TELEPHONE (OUTPATIENT)
Dept: CARDIOLOGY CLINIC | Age: 74
End: 2024-05-17

## 2024-05-17 NOTE — TELEPHONE ENCOUNTER
Dentist office called they are needing for   Clearance form filled out and faxed   Back specific instructions for holding the  Her blood thinner Iqra badillo

## 2024-06-11 ENCOUNTER — TELEPHONE (OUTPATIENT)
Dept: CARDIOLOGY CLINIC | Age: 74
End: 2024-06-11

## 2024-06-11 NOTE — TELEPHONE ENCOUNTER
Cardiologist: Dr. Escalona  Surgeon: Dr. Gutiérrez  Surgery: PNE  Surgery/Procedure should be done in the hospital setting    _____ yes    _____  no    Anesthesia: MAC  Date: Pending  Fax# [phone]  # [phone]      Last OV 12/5/2023 w/Iqra        CORONARY ARTERY DISEASE:None known.      HTN:Patient is Hypotensive rather & she is on Midodrine..     VHD: No significant VHD noted   DYSLIPIDEMIA: yes,   Patient's profile is at / near Goal.no  Tolerating current medical regimen wellyes, takes Lipitor.  See most recent Lab values in Labs section above.  Diabetes mellitis:yes,   BS under good control no     Arrhythmia:   In April this year, Patient was sent to the hospital due to high degree AV blcock where in she was noted to be in complete heart block with escape rhythm and subsequently went into asystole and later had cardiac arrest and resuscitated and then moved to the cath lab for urgent pacemaker because of escape rhythm.  Patient is now on Eliquis for a diagnosis of A-fib.        Last EKG- 8/17/2023        NM- 8/18/2023  Abnormal Stress Test with low LVEF    43% but perfusion study negative for   ischemia   Start patient on GDMT with Toprol XL - Hold off on ACEI due to renal failure   at this time   Follow up with primary cardiologist as outpt, will consider coronary   angiogram if symptoms worsen        Cath- 12/11/2019  No significant CAD. Left dominant system & Left side arteries   are all normal. RCA is non-dominant has minimal disease.   Normal LV systolic function. LVEF is 50 to 55 %.        Echo- 9/18/2023   This is a limited echo to assess for pericardial effusion.   Left ventricular systolic function is abnormal.   Ejection fraction is visually estimated at 40-45%.   Trace tricuspid regurgitation; RVSP: 26 mmHg.   There is a moderate pericardial effusion without tamponade physiology   (consistent with previous echo on 09/15/23).   Bilateral pleural effusion.     Pacemaker insert- 4/14/2023

## 2024-06-12 ENCOUNTER — OFFICE VISIT (OUTPATIENT)
Dept: CARDIOLOGY CLINIC | Age: 74
End: 2024-06-12
Payer: MEDICARE

## 2024-06-12 VITALS
DIASTOLIC BLOOD PRESSURE: 62 MMHG | HEIGHT: 64 IN | OXYGEN SATURATION: 97 % | BODY MASS INDEX: 40.19 KG/M2 | WEIGHT: 235.4 LBS | HEART RATE: 65 BPM | SYSTOLIC BLOOD PRESSURE: 132 MMHG

## 2024-06-12 DIAGNOSIS — I10 ESSENTIAL HYPERTENSION: Primary | ICD-10-CM

## 2024-06-12 DIAGNOSIS — I44.30 HEART BLOCK, AV: ICD-10-CM

## 2024-06-12 DIAGNOSIS — E11.22 TYPE 2 DIABETES MELLITUS WITH DIABETIC CHRONIC KIDNEY DISEASE, UNSPECIFIED CKD STAGE, UNSPECIFIED WHETHER LONG TERM INSULIN USE (HCC): ICD-10-CM

## 2024-06-12 DIAGNOSIS — G47.33 OSA ON CPAP: ICD-10-CM

## 2024-06-12 DIAGNOSIS — Z95.0 PACEMAKER: ICD-10-CM

## 2024-06-12 DIAGNOSIS — I46.9 CARDIAC ARREST (HCC): ICD-10-CM

## 2024-06-12 DIAGNOSIS — E78.2 MIXED HYPERLIPIDEMIA: ICD-10-CM

## 2024-06-12 DIAGNOSIS — R00.1 BRADYCARDIA: ICD-10-CM

## 2024-06-12 DIAGNOSIS — I48.0 PAROXYSMAL ATRIAL FIBRILLATION (HCC): ICD-10-CM

## 2024-06-12 PROCEDURE — 1090F PRES/ABSN URINE INCON ASSESS: CPT | Performed by: INTERNAL MEDICINE

## 2024-06-12 PROCEDURE — 99213 OFFICE O/P EST LOW 20 MIN: CPT | Performed by: INTERNAL MEDICINE

## 2024-06-12 PROCEDURE — 3078F DIAST BP <80 MM HG: CPT | Performed by: INTERNAL MEDICINE

## 2024-06-12 PROCEDURE — 1036F TOBACCO NON-USER: CPT | Performed by: INTERNAL MEDICINE

## 2024-06-12 PROCEDURE — G8399 PT W/DXA RESULTS DOCUMENT: HCPCS | Performed by: INTERNAL MEDICINE

## 2024-06-12 PROCEDURE — G8417 CALC BMI ABV UP PARAM F/U: HCPCS | Performed by: INTERNAL MEDICINE

## 2024-06-12 PROCEDURE — G8427 DOCREV CUR MEDS BY ELIG CLIN: HCPCS | Performed by: INTERNAL MEDICINE

## 2024-06-12 PROCEDURE — 3075F SYST BP GE 130 - 139MM HG: CPT | Performed by: INTERNAL MEDICINE

## 2024-06-12 PROCEDURE — 1123F ACP DISCUSS/DSCN MKR DOCD: CPT | Performed by: INTERNAL MEDICINE

## 2024-06-12 RX ORDER — MIDODRINE HYDROCHLORIDE 10 MG/1
10 TABLET ORAL
Qty: 90 TABLET | Refills: 5 | Status: SHIPPED | OUTPATIENT
Start: 2024-06-12 | End: 2024-06-12 | Stop reason: SDUPTHER

## 2024-06-12 RX ORDER — GRANULES FOR ORAL 3 G/1
POWDER ORAL
COMMUNITY
Start: 2024-01-04

## 2024-06-12 RX ORDER — MIDODRINE HYDROCHLORIDE 10 MG/1
10 TABLET ORAL
Qty: 90 TABLET | Refills: 5 | Status: SHIPPED | OUTPATIENT
Start: 2024-06-12

## 2024-06-12 NOTE — PROGRESS NOTES
the medication.     Recommend Continue present management & medications as listed.     AFFIRMATION: I spent at least 20 minutes of time reviewing patient's history, previous & current medical problems & all Labs + testing. This includes chart prep even prior to the vosit. Various goals are discussed and multiple questions answered.Relevant concelling performed.     Office follow up in six months. Device check per protocol.

## 2024-06-12 NOTE — PATIENT INSTRUCTIONS
AP,.  93.0 % atrial paced; 99.3 % ventricular paced.     Atrial Arrhythmia : No   Non sustained VT episodes : No   Sustained VT episodes : No   Patient activity reported 0.5 hrs/day   The patient is pacemaker dependent.       Obesity: Diet & Exercise.      TESTS ORDERED:none this visit.     PREVIOUSLY ORDERED TESTS REVIEWED & DISCUSSED WITH THE PATIENT:     I personally reviewed & interpreted, all previously ordered tests as copied above. Latest Labs are pulled in to the note with dates.   Labs, specially in Reference to Lipid profile, Cardiac testing in the form of Echo ( dated: ), stress tests ( dated: ) & other relevant cardiac testing reviewed with patient & recommendations made based on assessment of the results.    Discussed role of Cardiac risk factors & effects + treatment of co morbidities with patient & advised accordingly.     MEDICATIONS: List of medications patient is currently taking is reviewed in detail with the patient & family member present. Discussed any side effects or problems taking the medication.     Recommend Continue present management & medications as listed.     AFFIRMATION: I spent at least 20 minutes of time reviewing patient's history, previous & current medical problems & all Labs + testing. This includes chart prep even prior to the vosit. Various goals are discussed and multiple questions answered.Relevant concelling performed.     Office follow up in six months. Device check per protocol.   Methotrexate Pregnancy And Lactation Text: This medication is Pregnancy Category X and is known to cause fetal harm. This medication is excreted in breast milk.

## 2024-06-17 ENCOUNTER — TELEPHONE (OUTPATIENT)
Dept: CARDIOLOGY CLINIC | Age: 74
End: 2024-06-17

## 2024-06-17 ENCOUNTER — PROCEDURE VISIT (OUTPATIENT)
Dept: CARDIOLOGY CLINIC | Age: 74
End: 2024-06-17

## 2024-06-17 DIAGNOSIS — I49.5 SINUS NODE DYSFUNCTION (HCC): ICD-10-CM

## 2024-06-17 DIAGNOSIS — Z95.0 CARDIAC PACEMAKER IN SITU: Primary | ICD-10-CM

## 2024-07-01 ENCOUNTER — HOSPITAL ENCOUNTER (OUTPATIENT)
Age: 74
Setting detail: SPECIMEN
Discharge: HOME | End: 2024-07-01
Payer: MEDICARE

## 2024-07-01 LAB
ANION GAP SERPL CALCULATED.3IONS-SCNC: 12 MMOL/L (ref 7–16)
APTT: 32.1 SECONDS (ref 25.1–37.1)
BUN SERPL-MCNC: 17 MG/DL (ref 6–23)
CALCIUM SERPL-MCNC: 8.5 MG/DL (ref 8.3–10.6)
CHLORIDE BLD-SCNC: 98 MMOL/L (ref 99–110)
CO2: 26 MMOL/L (ref 21–32)
CREAT SERPL-MCNC: 1.8 MG/DL (ref 0.6–1.1)
GFR, ESTIMATED: 29 ML/MIN/1.73M2
GLUCOSE SERPL-MCNC: 145 MG/DL (ref 70–99)
HEMOGLOBIN: 11.1 GM/DL (ref 12.5–16)
INR BLD: 1 INDEX
POTASSIUM SERPL-SCNC: 3.8 MMOL/L (ref 3.5–5.1)
PROTHROMBIN TIME: 13.8 SECONDS (ref 11.7–14.5)
SODIUM BLD-SCNC: 136 MMOL/L (ref 135–145)

## 2024-07-01 PROCEDURE — 80048 BASIC METABOLIC PNL TOTAL CA: CPT

## 2024-07-01 PROCEDURE — 85610 PROTHROMBIN TIME: CPT

## 2024-07-01 PROCEDURE — 85018 HEMOGLOBIN: CPT

## 2024-07-01 PROCEDURE — 85730 THROMBOPLASTIN TIME PARTIAL: CPT

## 2024-07-31 ENCOUNTER — HOSPITAL ENCOUNTER (OUTPATIENT)
Age: 74
Setting detail: SPECIMEN
Discharge: HOME | End: 2024-07-31
Payer: MEDICARE

## 2024-07-31 LAB
ANION GAP SERPL CALCULATED.3IONS-SCNC: 17 MMOL/L (ref 7–16)
BASOPHILS ABSOLUTE: 0.1 K/CU MM
BASOPHILS RELATIVE PERCENT: 1 % (ref 0–1)
BUN SERPL-MCNC: 20 MG/DL (ref 6–23)
CALCIUM SERPL-MCNC: 8.9 MG/DL (ref 8.3–10.6)
CHLORIDE BLD-SCNC: 93 MMOL/L (ref 99–110)
CO2: 24 MMOL/L (ref 21–32)
CREAT SERPL-MCNC: 2 MG/DL (ref 0.6–1.1)
DIFFERENTIAL TYPE: ABNORMAL
EOSINOPHILS ABSOLUTE: 0.3 K/CU MM
EOSINOPHILS RELATIVE PERCENT: 4.2 % (ref 0–3)
GFR, ESTIMATED: 25 ML/MIN/1.73M2
GLUCOSE SERPL-MCNC: 146 MG/DL (ref 70–99)
HCT VFR BLD CALC: 36.7 % (ref 37–47)
HEMOGLOBIN: 11.8 GM/DL (ref 12.5–16)
IMMATURE NEUTROPHIL %: 0.3 % (ref 0–0.43)
LYMPHOCYTES ABSOLUTE: 1.6 K/CU MM
LYMPHOCYTES RELATIVE PERCENT: 26.6 % (ref 24–44)
MCH RBC QN AUTO: 33.2 PG (ref 27–31)
MCHC RBC AUTO-ENTMCNC: 32.2 % (ref 32–36)
MCV RBC AUTO: 103.4 FL (ref 78–100)
MONOCYTES ABSOLUTE: 0.5 K/CU MM
MONOCYTES RELATIVE PERCENT: 7.5 % (ref 0–4)
NEUTROPHILS ABSOLUTE: 3.6 K/CU MM
NEUTROPHILS RELATIVE PERCENT: 60.4 % (ref 36–66)
NUCLEATED RBC %: 0 %
PDW BLD-RTO: 13.7 % (ref 11.7–14.9)
PLATELET # BLD: 87 K/CU MM (ref 140–440)
PMV BLD AUTO: 11 FL (ref 7.5–11.1)
POTASSIUM SERPL-SCNC: 3.4 MMOL/L (ref 3.5–5.1)
RBC # BLD: 3.55 M/CU MM (ref 4.2–5.4)
SODIUM BLD-SCNC: 134 MMOL/L (ref 135–145)
TOTAL IMMATURE NEUTOROPHIL: 0.02 K/CU MM
TOTAL NUCLEATED RBC: 0 K/CU MM
WBC # BLD: 6 K/CU MM (ref 4–10.5)

## 2024-07-31 PROCEDURE — 85610 PROTHROMBIN TIME: CPT

## 2024-07-31 PROCEDURE — 80048 BASIC METABOLIC PNL TOTAL CA: CPT

## 2024-07-31 PROCEDURE — 85730 THROMBOPLASTIN TIME PARTIAL: CPT

## 2024-07-31 PROCEDURE — 85025 COMPLETE CBC W/AUTO DIFF WBC: CPT

## 2024-08-01 LAB
APTT: NORMAL SECONDS (ref 25.1–37.1)
INR BLD: NORMAL INDEX
PROTHROMBIN TIME: NORMAL SECONDS (ref 11.7–14.5)

## 2024-08-02 ENCOUNTER — HOSPITAL ENCOUNTER (OUTPATIENT)
Age: 74
Setting detail: SPECIMEN
Discharge: HOME | End: 2024-08-02

## 2024-08-02 LAB
APTT: 27.8 SECONDS (ref 25.1–37.1)
INR BLD: 1 INDEX
PROTHROMBIN TIME: 13.4 SECONDS (ref 11.7–14.5)

## 2024-08-02 PROCEDURE — 85730 THROMBOPLASTIN TIME PARTIAL: CPT

## 2024-08-02 PROCEDURE — 85610 PROTHROMBIN TIME: CPT

## 2024-08-12 ENCOUNTER — TELEPHONE (OUTPATIENT)
Dept: CARDIOLOGY CLINIC | Age: 74
End: 2024-08-12

## 2024-08-12 NOTE — TELEPHONE ENCOUNTER
Cardiologist: Dr. Escalona  Surgeon: Dr. Gutiérrez  Surgery: Interstim Implant  Surgery/Procedure should be done in the hospital setting    _____ yes    _____  no    Anesthesia: MAC  Date: 8/28/2024  Fax# [phone]  # [phone]    Last OV 6/12/2024 w/Iqra    CORONARY ARTERY DISEASE:None known. Chest pain most likely non-cardiac     HTN:Patient is Hypotensive rather & she is on Midodrine..     VHD: No significant VHD noted     DYSLIPIDEMIA: yes,   Patient's profile is at / near Goal.no  Tolerating current medical regimen wellyes, takes Lipitor.  See most recent Lab values in Labs section above.  Diabetes mellitis:yes,   BS under good control no    S/P PPM for CHB: 3/2024       Last EKG- 8/17/2023        NM- 8/18/2023  Abnormal Stress Test with low LVEF    43% but perfusion study negative for   ischemia   Start patient on GDMT with Toprol XL - Hold off on ACEI due to renal failure   at this time   Follow up with primary cardiologist as outpt, will consider coronary   angiogram if symptoms worsen        Cath- 12/11/2019  No significant CAD. Left dominant system & Left side arteries   are all normal. RCA is non-dominant has minimal disease.   Normal LV systolic function. LVEF is 50 to 55 %.        Echo- 9/18/2023   This is a limited echo to assess for pericardial effusion.   Left ventricular systolic function is abnormal.   Ejection fraction is visually estimated at 40-45%.   Trace tricuspid regurgitation; RVSP: 26 mmHg.   There is a moderate pericardial effusion without tamponade physiology   (consistent with previous echo on 09/15/23).   Bilateral pleural effusion.     Pacemaker insert- 4/14/2023        Eliquis

## 2024-09-17 ENCOUNTER — HOSPITAL ENCOUNTER (EMERGENCY)
Age: 74
Discharge: HOME | End: 2024-09-17
Attending: EMERGENCY MEDICINE
Payer: MEDICARE

## 2024-09-17 ENCOUNTER — APPOINTMENT (OUTPATIENT)
Dept: GENERAL RADIOLOGY | Age: 74
End: 2024-09-17
Payer: MEDICARE

## 2024-09-17 VITALS
TEMPERATURE: 97.7 F | WEIGHT: 240.3 LBS | BODY MASS INDEX: 41.25 KG/M2 | HEART RATE: 60 BPM | DIASTOLIC BLOOD PRESSURE: 60 MMHG | OXYGEN SATURATION: 96 % | SYSTOLIC BLOOD PRESSURE: 135 MMHG | RESPIRATION RATE: 12 BRPM

## 2024-09-17 DIAGNOSIS — N18.6 END STAGE RENAL DISEASE (HCC): ICD-10-CM

## 2024-09-17 DIAGNOSIS — Z95.0 HISTORY OF PERMANENT CARDIAC PACEMAKER PLACEMENT: ICD-10-CM

## 2024-09-17 DIAGNOSIS — R20.8 BURNING SENSATION: ICD-10-CM

## 2024-09-17 DIAGNOSIS — R00.2 PALPITATIONS: Primary | ICD-10-CM

## 2024-09-17 DIAGNOSIS — R11.0 NAUSEA WITHOUT VOMITING: ICD-10-CM

## 2024-09-17 LAB
ANION GAP SERPL CALCULATED.3IONS-SCNC: 13 MMOL/L (ref 9–17)
BASOPHILS # BLD: 0.04 K/UL
BASOPHILS NFR BLD: 1 % (ref 0–1)
BNP SERPL-MCNC: 8973 PG/ML (ref 0–450)
BUN SERPL-MCNC: 38 MG/DL (ref 7–20)
CALCIUM SERPL-MCNC: 9 MG/DL (ref 8.3–10.6)
CHLORIDE SERPL-SCNC: 98 MMOL/L (ref 99–110)
CO2 SERPL-SCNC: 27 MMOL/L (ref 21–32)
CREAT SERPL-MCNC: 3.6 MG/DL (ref 0.6–1.2)
EOSINOPHIL # BLD: 0.21 K/UL
EOSINOPHILS RELATIVE PERCENT: 4 % (ref 0–3)
ERYTHROCYTE [DISTWIDTH] IN BLOOD BY AUTOMATED COUNT: 14.3 % (ref 11.7–14.9)
GFR, ESTIMATED: 12 ML/MIN/1.73M2
GLUCOSE SERPL-MCNC: 79 MG/DL (ref 74–99)
HCT VFR BLD AUTO: 35.3 % (ref 37–47)
HGB BLD-MCNC: 11.4 G/DL (ref 12.5–16)
IMM GRANULOCYTES # BLD AUTO: 0.01 K/UL
IMM GRANULOCYTES NFR BLD: 0 %
LYMPHOCYTES NFR BLD: 1.72 K/UL
LYMPHOCYTES RELATIVE PERCENT: 32 % (ref 24–44)
MAGNESIUM SERPL-MCNC: 2.5 MG/DL (ref 1.8–2.4)
MCH RBC QN AUTO: 32.9 PG (ref 27–31)
MCHC RBC AUTO-ENTMCNC: 32.3 G/DL (ref 32–36)
MCV RBC AUTO: 102 FL (ref 78–100)
MONOCYTES NFR BLD: 0.42 K/UL
MONOCYTES NFR BLD: 8 % (ref 0–4)
NEUTROPHILS NFR BLD: 55 % (ref 36–66)
NEUTS SEG NFR BLD: 2.94 K/UL
PLATELET, FLUORESCENCE: 93 K/UL (ref 140–440)
PMV BLD AUTO: 11 FL (ref 7.5–11.1)
POTASSIUM SERPL-SCNC: 3.5 MMOL/L (ref 3.5–5.1)
RBC # BLD AUTO: 3.46 M/UL (ref 4.2–5.4)
SODIUM SERPL-SCNC: 138 MMOL/L (ref 136–145)
TROPONIN I SERPL HS-MCNC: 46 NG/L (ref 0–14)
TROPONIN I SERPL HS-MCNC: 46 NG/L (ref 0–14)
WBC OTHER # BLD: 5.3 K/UL (ref 4–10.5)

## 2024-09-17 PROCEDURE — 93005 ELECTROCARDIOGRAM TRACING: CPT | Performed by: EMERGENCY MEDICINE

## 2024-09-17 PROCEDURE — 71045 X-RAY EXAM CHEST 1 VIEW: CPT

## 2024-09-17 PROCEDURE — 85025 COMPLETE CBC W/AUTO DIFF WBC: CPT

## 2024-09-17 PROCEDURE — 84484 ASSAY OF TROPONIN QUANT: CPT

## 2024-09-17 PROCEDURE — 80048 BASIC METABOLIC PNL TOTAL CA: CPT

## 2024-09-17 PROCEDURE — 83735 ASSAY OF MAGNESIUM: CPT

## 2024-09-17 PROCEDURE — 83880 ASSAY OF NATRIURETIC PEPTIDE: CPT

## 2024-09-17 PROCEDURE — 99285 EMERGENCY DEPT VISIT HI MDM: CPT

## 2024-09-17 RX ORDER — LORATADINE 10 MG/1
10 TABLET ORAL DAILY PRN
COMMUNITY

## 2024-09-17 RX ORDER — METOPROLOL TARTRATE 25 MG/1
12.5 TABLET, FILM COATED ORAL DAILY
COMMUNITY

## 2024-09-17 ASSESSMENT — PAIN - FUNCTIONAL ASSESSMENT: PAIN_FUNCTIONAL_ASSESSMENT: NONE - DENIES PAIN

## 2024-09-18 LAB
EKG ATRIAL RATE: 0 BPM
EKG DIAGNOSIS: NORMAL
EKG P-R INTERVAL: 186 MS
EKG Q-T INTERVAL: 550 MS
EKG QRS DURATION: 164 MS
EKG QTC CALCULATION (BAZETT): 550 MS
EKG R AXIS: -89 DEGREES
EKG T AXIS: 105 DEGREES
EKG VENTRICULAR RATE: 60 BPM

## 2024-09-18 PROCEDURE — 93010 ELECTROCARDIOGRAM REPORT: CPT | Performed by: INTERNAL MEDICINE

## 2024-09-19 ENCOUNTER — OFFICE VISIT (OUTPATIENT)
Dept: CARDIOLOGY CLINIC | Age: 74
End: 2024-09-19
Payer: MEDICARE

## 2024-09-19 VITALS
OXYGEN SATURATION: 98 % | BODY MASS INDEX: 41.21 KG/M2 | SYSTOLIC BLOOD PRESSURE: 132 MMHG | WEIGHT: 241.4 LBS | HEIGHT: 64 IN | DIASTOLIC BLOOD PRESSURE: 60 MMHG | HEART RATE: 60 BPM

## 2024-09-19 DIAGNOSIS — I10 ESSENTIAL HYPERTENSION: Primary | ICD-10-CM

## 2024-09-19 DIAGNOSIS — I48.0 PAROXYSMAL ATRIAL FIBRILLATION (HCC): ICD-10-CM

## 2024-09-19 DIAGNOSIS — Z95.0 PACEMAKER: ICD-10-CM

## 2024-09-19 DIAGNOSIS — Z79.01 ANTICOAGULATED: ICD-10-CM

## 2024-09-19 DIAGNOSIS — R00.1 BRADYCARDIA: ICD-10-CM

## 2024-09-19 DIAGNOSIS — G47.33 OSA ON CPAP: ICD-10-CM

## 2024-09-19 DIAGNOSIS — E11.22 TYPE 2 DIABETES MELLITUS WITH DIABETIC CHRONIC KIDNEY DISEASE, UNSPECIFIED CKD STAGE, UNSPECIFIED WHETHER LONG TERM INSULIN USE (HCC): ICD-10-CM

## 2024-09-19 DIAGNOSIS — I46.9 CARDIAC ARREST (HCC): ICD-10-CM

## 2024-09-19 DIAGNOSIS — E78.2 MIXED HYPERLIPIDEMIA: ICD-10-CM

## 2024-09-19 PROCEDURE — 1036F TOBACCO NON-USER: CPT | Performed by: INTERNAL MEDICINE

## 2024-09-19 PROCEDURE — G8427 DOCREV CUR MEDS BY ELIG CLIN: HCPCS | Performed by: INTERNAL MEDICINE

## 2024-09-19 PROCEDURE — 99214 OFFICE O/P EST MOD 30 MIN: CPT | Performed by: INTERNAL MEDICINE

## 2024-09-19 PROCEDURE — 1123F ACP DISCUSS/DSCN MKR DOCD: CPT | Performed by: INTERNAL MEDICINE

## 2024-09-19 PROCEDURE — 3075F SYST BP GE 130 - 139MM HG: CPT | Performed by: INTERNAL MEDICINE

## 2024-09-19 PROCEDURE — G8399 PT W/DXA RESULTS DOCUMENT: HCPCS | Performed by: INTERNAL MEDICINE

## 2024-09-19 PROCEDURE — G8417 CALC BMI ABV UP PARAM F/U: HCPCS | Performed by: INTERNAL MEDICINE

## 2024-09-19 PROCEDURE — 3078F DIAST BP <80 MM HG: CPT | Performed by: INTERNAL MEDICINE

## 2024-09-19 PROCEDURE — 1090F PRES/ABSN URINE INCON ASSESS: CPT | Performed by: INTERNAL MEDICINE

## 2024-10-11 ENCOUNTER — TELEPHONE (OUTPATIENT)
Dept: CARDIOLOGY CLINIC | Age: 74
End: 2024-10-11

## 2024-10-11 NOTE — TELEPHONE ENCOUNTER
Test preformed on 10/10, Next OV 10/24    Notified of mildly abnormal results and next OV.        Echo (TTE) complete         Left Ventricle: Normal left ventricular systolic function with a visually estimated EF of 50 - 55%. Left ventricle size is normal. Mildly increased wall thickness. Septal motion is consistent with pacing. Normal wall motion. Grade I diastolic dysfunction with normal LAP.    Right Ventricle: Right ventricle size is normal. PPM wire noted in the right side of the heart.    Tricuspid Valve: Mild regurgitation. Mild Pulmonary Hypertension with a RVSP of 38 mmHg.    Left Atrium: Left atrium is mildly dilated.    Interatrial Septum: Hypermobile interatrial septum.    Pericardium: No pericardial effusion.    Image quality is fair.

## 2024-10-14 ENCOUNTER — HOSPITAL ENCOUNTER (OUTPATIENT)
Age: 74
Discharge: HOME | End: 2024-10-14
Payer: MEDICARE

## 2024-10-14 ENCOUNTER — HOSPITAL ENCOUNTER (OUTPATIENT)
Dept: GENERAL RADIOLOGY | Age: 74
Discharge: HOME | End: 2024-10-14
Payer: MEDICARE

## 2024-10-14 DIAGNOSIS — N18.6 ESRD (END STAGE RENAL DISEASE) (HCC): ICD-10-CM

## 2024-10-14 DIAGNOSIS — R05.9 COUGH IN ADULT: ICD-10-CM

## 2024-10-14 PROCEDURE — 71046 X-RAY EXAM CHEST 2 VIEWS: CPT

## 2024-10-24 ENCOUNTER — OFFICE VISIT (OUTPATIENT)
Dept: CARDIOLOGY CLINIC | Age: 74
End: 2024-10-24

## 2024-10-24 VITALS
SYSTOLIC BLOOD PRESSURE: 122 MMHG | HEIGHT: 64 IN | HEART RATE: 62 BPM | DIASTOLIC BLOOD PRESSURE: 60 MMHG | BODY MASS INDEX: 41.37 KG/M2

## 2024-10-24 DIAGNOSIS — I48.0 PAF (PAROXYSMAL ATRIAL FIBRILLATION) (HCC): ICD-10-CM

## 2024-10-24 DIAGNOSIS — I42.8 OTHER CARDIOMYOPATHY (HCC): Primary | ICD-10-CM

## 2024-10-24 DIAGNOSIS — Z95.0 PACEMAKER: ICD-10-CM

## 2024-10-24 DIAGNOSIS — I44.39 HIGH DEGREE ATRIOVENTRICULAR BLOCK: ICD-10-CM

## 2024-10-24 DIAGNOSIS — I10 PRIMARY HYPERTENSION: ICD-10-CM

## 2024-10-24 ASSESSMENT — ENCOUNTER SYMPTOMS
SHORTNESS OF BREATH: 1
ABDOMINAL PAIN: 0
COUGH: 0
COLOR CHANGE: 0
PHOTOPHOBIA: 0
BLOOD IN STOOL: 0
SINUS PAIN: 0
SINUS PRESSURE: 0
BACK PAIN: 0
ABDOMINAL DISTENTION: 0

## 2024-10-24 NOTE — PROGRESS NOTES
Hyperlipidemia     Hypertension     Hyperthyroidism     Hypothyroidism     Kidney disease     Pneumonia     Type II or unspecified type diabetes mellitus without mention of complication, not stated as uncontrolled     Unspecified sleep apnea        Surgical history :   Past Surgical History:   Procedure Laterality Date    APPENDECTOMY      CARDIAC CATHETERIZATION       SECTION      CHOLECYSTECTOMY      COLONOSCOPY      COLONOSCOPY N/A 2021    COLONOSCOPY W/ ENDOSCOPIC MUCOSAL RESECTION WITH ORISE INJECTION, HEMACLIP PLACEMENT X 2 POST-POLYPECTOMY, SPOT INK TO LEXY 1.2CM POLYP HEPATIC FLEXURE, POLYPECTOMY performed by Logan Kirk MD at [facility] OR    DIALYSIS CATHETER INSERTION N/A 07/10/2023    CATHETER ADJUSTMENT PERITONEAL DIALYSIS LAPAROSCOPIC ROBOTIC performed by Steve Wilson MD at [facility] OR    DIALYSIS CATHETER INSERTION N/A 2023    CATHETER ADJUSTMENT  PERITONEAL DIALYSIS LAPAROSCOPIC performed by Steve Wilson MD at [facility] OR    DIALYSIS CATHETER REMOVAL N/A 2023    CATHETER INSERTION PERITONEAL DIALYSIS performed by Steve Wilson MD at [facility] OR    DIALYSIS CATHETER REMOVAL N/A 2023    CATHETER REMOVAL PERITONEAL DIALYSIS performed by Steve Wilson MD at [facility] OR    IR TUNNELED CATHETER PLACEMENT GREATER THAN 5 YEARS  2023    IR TUNNELED CATHETER PLACEMENT GREATER THAN 5 YEARS 2023 [facility]    IR TUNNELED CATHETER PLACEMENT GREATER THAN 5 YEARS  2023    IR TUNNELED CATHETER PLACEMENT GREATER THAN 5 YEARS [facility]    IR TUNNELED CATHETER PLACEMENT GREATER THAN 5 YEARS  2023    IR TUNNELED CATHETER PLACEMENT GREATER THAN 5 YEARS 2023 [facility]    PERITONEAL CATHETER REMOVAL Left 2023    by Dr. Wilson in [place]    UMBILICAL HERNIA REPAIR N/A 07/10/2023    HERNIA UMBILICAL REPAIR LAPAROSCOPIC ROBOTIC performed by Steve Wilson MD at [facility] OR       Family history:

## 2024-12-12 ENCOUNTER — OFFICE VISIT (OUTPATIENT)
Dept: CARDIOLOGY CLINIC | Age: 74
End: 2024-12-12
Payer: MEDICARE

## 2024-12-12 VITALS
BODY MASS INDEX: 40.97 KG/M2 | WEIGHT: 240 LBS | DIASTOLIC BLOOD PRESSURE: 78 MMHG | HEIGHT: 64 IN | SYSTOLIC BLOOD PRESSURE: 128 MMHG | HEART RATE: 60 BPM

## 2024-12-12 DIAGNOSIS — E78.2 MIXED HYPERLIPIDEMIA: ICD-10-CM

## 2024-12-12 DIAGNOSIS — Z79.01 ANTICOAGULATED: ICD-10-CM

## 2024-12-12 DIAGNOSIS — I48.0 PAROXYSMAL ATRIAL FIBRILLATION (HCC): ICD-10-CM

## 2024-12-12 DIAGNOSIS — E11.22 TYPE 2 DIABETES MELLITUS WITH DIABETIC CHRONIC KIDNEY DISEASE, UNSPECIFIED CKD STAGE, UNSPECIFIED WHETHER LONG TERM INSULIN USE (HCC): ICD-10-CM

## 2024-12-12 DIAGNOSIS — I10 ESSENTIAL HYPERTENSION: Primary | ICD-10-CM

## 2024-12-12 DIAGNOSIS — Z95.0 PACEMAKER: ICD-10-CM

## 2024-12-12 DIAGNOSIS — G47.33 OSA ON CPAP: ICD-10-CM

## 2024-12-12 DIAGNOSIS — I44.30 HEART BLOCK, AV: ICD-10-CM

## 2024-12-12 PROCEDURE — 1160F RVW MEDS BY RX/DR IN RCRD: CPT | Performed by: INTERNAL MEDICINE

## 2024-12-12 PROCEDURE — G8484 FLU IMMUNIZE NO ADMIN: HCPCS | Performed by: INTERNAL MEDICINE

## 2024-12-12 PROCEDURE — 1159F MED LIST DOCD IN RCRD: CPT | Performed by: INTERNAL MEDICINE

## 2024-12-12 PROCEDURE — 3078F DIAST BP <80 MM HG: CPT | Performed by: INTERNAL MEDICINE

## 2024-12-12 PROCEDURE — 1090F PRES/ABSN URINE INCON ASSESS: CPT | Performed by: INTERNAL MEDICINE

## 2024-12-12 PROCEDURE — 99214 OFFICE O/P EST MOD 30 MIN: CPT | Performed by: INTERNAL MEDICINE

## 2024-12-12 PROCEDURE — G8417 CALC BMI ABV UP PARAM F/U: HCPCS | Performed by: INTERNAL MEDICINE

## 2024-12-12 PROCEDURE — G8427 DOCREV CUR MEDS BY ELIG CLIN: HCPCS | Performed by: INTERNAL MEDICINE

## 2024-12-12 PROCEDURE — 1123F ACP DISCUSS/DSCN MKR DOCD: CPT | Performed by: INTERNAL MEDICINE

## 2024-12-12 PROCEDURE — 1036F TOBACCO NON-USER: CPT | Performed by: INTERNAL MEDICINE

## 2024-12-12 PROCEDURE — 3074F SYST BP LT 130 MM HG: CPT | Performed by: INTERNAL MEDICINE

## 2024-12-12 PROCEDURE — G8399 PT W/DXA RESULTS DOCUMENT: HCPCS | Performed by: INTERNAL MEDICINE

## 2024-12-12 NOTE — PROGRESS NOTES
Atrium: Left atrium is mildly dilated.    Interatrial Septum: Hypermobile interatrial septum.    Pericardium: No pericardial effusion.     DYSLIPIDEMIA: yes,   Patient's profile is at / near Goal.no  Tolerating current medical regimen wellyes, takes Lipitor.  See most recent Lab values in Labs section above.  Diabetes mellitis:yes,   BS under good control no     Arrhythmia:   In April this year, Patient was sent to the hospital due to high degree AV blcock where in she was noted to be in complete heart block with escape rhythm and subsequently went into asystole and later had cardiac arrest and resuscitated and then moved to the cath lab for urgent pacemaker because of escape rhythm.  Patient is now on Eliquis for a diagnosis of A-fib.     [id number] Score for Atrial Fibrillation Stroke Risk    Risk   Factors   Component Value   C CHF No 0   H HTN Yes 1   A2 Age >= 75 No,  (72 y.o.) 0   D DM Yes 1   S2 Prior Stroke/TIA No 0   V Vascular Disease No 0   A Age 65-74 Yes,  (72 y.o.) 1   Sc Sex female 1     [id number]  Score   4   Score last updated 11/17/22 1:47 PM EST     S/P PPM for CHB: 10/29/2024  Normal Remote: No Events Normal Device Function Alerts or events: None Battery: OK, 10.58 yrs Sensing, impedance and thresholds reviewed Programmed parameters reviewed Presenting rhythm reviewed Heart Rate Histograms reviewed No significant changes not Additional Notes: RV pacing greater than 40% if patient having HF symptoms please consider ECHO     Obesity: Diet & Exercise.     TESTS ORDERED:none this visit.     PREVIOUSLY ORDERED TESTS REVIEWED & DISCUSSED WITH THE PATIENT:     I personally reviewed & interpreted, all previously ordered tests as copied above. Latest Labs are pulled in to the note with dates.   Labs, specially in Reference to Lipid profile, Cardiac testing in the form of Echo ( dated: ), stress tests ( dated: ) & other relevant cardiac testing reviewed with patient & recommendations made based on

## 2024-12-12 NOTE — PATIENT INSTRUCTIONS
CORONARY ARTERY DISEASE:None known.   CATH 12/2019   No significant CAD. Left dominant system & Left side arteries   are all normal. RCA is non-dominant has minimal disease.   Normal LV systolic function. LVEF is 50 to 55 %.     8/17/2023 Cardiolite perfusion imaging  Normal tracer uptake in all segments of myocardium on stress and rest   images.   No infarct or ischemia noted.   Normal EF 43 % with normal ventricular contractility.     HTN:Patient is Hypotensive rather & she is on Midodrine..     VHD: No significant VHD noted      ECHO 5/2022  1. Left ventricle: The cavity size was mildly to moderately      dilated. There was mild concentric hypertrophy. Systolic      function was normal. The calculated Ejection Fraction is 58%.      Wall motion was normal; there were no regional wall motion      abnormalities. Doppler parameters are consistent with abnormal      left ventricular relaxation (grade 1 diastolic dysfunction).      Internal dimension, ED (PLAX chordal): 6.2cm. Global      longitudinal strain rate of 17.70%. The longitudal strain study      is normal. The longitudal strain study is borderline abnormal.   2. Aortic valve: There was very mild stenosis. Peak velocity (S):      2.15m/sec. Mean gradient (S): 10mm Hg. VTI ratio of LVOT to      aortic valve: 0.57. Valve area (VTI): 1.9cm^2.   3. Mitral valve: There was mild regurgitation. Mean gradient (D):      3mm Hg. Valve area by pressure half-time: 2.3cm^2. Valve area by      continuity equation (using LVOT flow): 2.8cm^2.   4. Left atrium: The atrium was moderately to markedly dilated.   5. Right ventricle: The cavity size was normal. Wall thickness was      normal. Systolic function was normal.   6. Right atrium: The atrium was dilated.   7. Pulmonary arteries: Estimated PA peak pressure is 40mm Hg (S).   8. Pericardium, extracardiac: There was no pericardial effusion.      4/14/2023   Left ventricular systolic function is abnormal.   Ejection fraction

## 2025-01-30 PROBLEM — R45.89 ANXIETY ABOUT HEALTH: Status: ACTIVE | Noted: 2025-01-30

## 2025-01-30 PROBLEM — R60.0 LOCALIZED EDEMA: Status: ACTIVE | Noted: 2025-01-30

## 2025-03-17 NOTE — PROGRESS NOTES
Patient Name: Malena Anne  : 1950  MRN# [id number]    REASON FOR VISIT: 6 month follow  Patient Active Problem List    Diagnosis Date Noted    Anxiety about health 2025    Localized edema 2025    Pacemaker 2024    PD catheter dysfunction 09/15/2023    Acute cystitis without hematuria 09/15/2023    Anticoagulated 09/15/2023    Epigastric pain 2023    Pleural effusion on right 2023    Other fatigue 2023    Gait disturbance 2023    Paroxysmal atrial fibrillation (HCC) 2023    Heart block, AV 2023    Uncontrolled type 2 diabetes mellitus with hyperglycemia (HCC) 2023    Simple chronic bronchitis (HCC) 2023    Moderate malnutrition 2023    Cardiac arrest ([facility]) 2023    End-stage renal disease on hemodialysis (HCC) 2023    History of colonic polyps     Benign neoplasm of ascending colon     Benign neoplasm of transverse colon     Benign neoplasm of descending colon     Bradycardia 2021    Diabetic nephropathy associated with type 2 diabetes mellitus (HCC) 2020    Chronic kidney disease-mineral and bone disorder 2020    CKD (chronic kidney disease), stage IV ([facility]) 2020    Acute kidney injury ([facility]) with acute tubular necrosis ([facility]) 2020    Other proteinuria 2020    Type 2 diabetes mellitus with chronic kidney disease ([facility]) 2020    Hypertensive renal disease 2020    Abnormal nuclear stress test 2019    SOB (shortness of breath) 2019    Hypothyroidism     Essential hypertension     Hyperlipidemia     Family history of colon cancer 2016    Chronic asthmatic bronchitis (HCC) 10/08/2013    JARAD on CPAP 10/08/2013         LABS:  Lab Results   Component Value Date    CHOL 89 2023    TRIG 59 2023    HDL 45 2023    LDLDIRECT 60 2021     Hemoglobin A1C   Date Value Ref Range Status   2023 5.8 4.2 - 6.3 % Final

## 2025-03-20 ENCOUNTER — OFFICE VISIT (OUTPATIENT)
Dept: CARDIOLOGY CLINIC | Age: 75
End: 2025-03-20

## 2025-03-20 VITALS
HEIGHT: 64 IN | HEART RATE: 60 BPM | BODY MASS INDEX: 41.15 KG/M2 | WEIGHT: 241 LBS | DIASTOLIC BLOOD PRESSURE: 62 MMHG | SYSTOLIC BLOOD PRESSURE: 148 MMHG

## 2025-03-20 DIAGNOSIS — I46.9 CARDIAC ARREST: ICD-10-CM

## 2025-03-20 DIAGNOSIS — Z95.0 PACEMAKER: ICD-10-CM

## 2025-03-20 DIAGNOSIS — I44.30 AV BLOCK: ICD-10-CM

## 2025-03-20 DIAGNOSIS — I45.9 HEART BLOCK: ICD-10-CM

## 2025-03-20 DIAGNOSIS — I48.0 PAROXYSMAL ATRIAL FIBRILLATION (HCC): ICD-10-CM

## 2025-03-20 DIAGNOSIS — R00.1 BRADYCARDIA: ICD-10-CM

## 2025-03-20 DIAGNOSIS — R06.02 SOB (SHORTNESS OF BREATH): ICD-10-CM

## 2025-03-20 DIAGNOSIS — I10 ESSENTIAL HYPERTENSION: Primary | ICD-10-CM

## 2025-03-20 DIAGNOSIS — Z95.0 CARDIAC PACEMAKER IN SITU: ICD-10-CM

## 2025-03-20 RX ORDER — BUSPIRONE HYDROCHLORIDE 5 MG/1
5 TABLET ORAL PRN
COMMUNITY

## 2025-03-20 NOTE — PATIENT INSTRUCTIONS
Presenting rhythm reviewed Sensing, impedance and thresholds reviewed Programmed parameters reviewed Heart Rate Histograms reviewed.  dditional Notes: RV pacing greater than 40%. if patient is having HF symptoms, please consider ECHO for possible BIV upgrade     EKG: DDD pacing with underlying Sinus rhythm 60/min.     Obesity: Diet & Exercise.      TESTS ORDERED:none this visit.     PREVIOUSLY ORDERED TESTS REVIEWED & DISCUSSED WITH THE PATIENT:     I personally reviewed & interpreted, all previously ordered tests as copied above. Latest Labs are pulled in to the note with dates.   Labs, specially in Reference to Lipid profile, Cardiac testing in the form of Echo ( dated: ), stress tests ( dated: ) & other relevant cardiac testing reviewed with patient & recommendations made based on assessment of the results.    Discussed role of Cardiac risk factors & effects + treatment of co morbidities with patient & advised accordingly.     MEDICATIONS: List of medications patient is currently taking is reviewed in detail with the patient. Discussed any side effects or problems taking the medication.     Recommend Continue present management & medications as listed.     AFFIRMATION: I reviewed patient's history, previous & current medical problems & all Labs + testing. This includes chart prep even prior to the vosit. Various goals are discussed and multiple questions answered.Relevant concelling performed.     Office follow up in six months. Device check per protocol.

## 2025-03-20 NOTE — PROGRESS NOTES
CLINICAL STAFF DOCUMENTATION    Dr. Nacho Anne  1950  [id number]    Have you had any Chest Pain recently? - No    Have you had any Shortness of Breath -  Yes, Same as before    Have you had any dizziness -  Only when pt leaves dialysis  Monday and Friday    Have you had any palpitations recently? - No    Do you have any edema - swelling in No      When did you have your last labs drawn 9/17/2024  Do we have the labs in their chart Yes    If we do not have these labs, you are retrieve these labs for the provider!    Do you have a surgery or procedure scheduled in the near future - No      Check medication list thoroughly!!! AND RECONCILE OUTSIDE MEDICATIONS  If dose has changed change the entire order not just the MG  BE SURE TO ASK PATIENT IF THEY NEED MEDICATION REFILLS  Verify Pharmacy and update if incorrect    Add to every patient's \"wrap up\" the following dot phrase AFTERVISITCARDIOHEARTHOUSE and ensure we explain this to our patients    
ORDERED:none this visit.     PREVIOUSLY ORDERED TESTS REVIEWED & DISCUSSED WITH THE PATIENT:     I personally reviewed & interpreted, all previously ordered tests as copied above. Latest Labs are pulled in to the note with dates.   Labs, specially in Reference to Lipid profile, Cardiac testing in the form of Echo ( dated: ), stress tests ( dated: ) & other relevant cardiac testing reviewed with patient & recommendations made based on assessment of the results.    Discussed role of Cardiac risk factors & effects + treatment of co morbidities with patient & advised accordingly.     MEDICATIONS: List of medications patient is currently taking is reviewed in detail with the patient. Discussed any side effects or problems taking the medication.     Recommend Continue present management & medications as listed.     AFFIRMATION: I reviewed patient's history, previous & current medical problems & all Labs + testing. This includes chart prep even prior to the vosit. Various goals are discussed and multiple questions answered.Relevant concelling performed.     Office follow up in six months. Device check per protocol.

## 2025-04-13 NOTE — PROGRESS NOTES
1971 Patient returned to room from [facility],  VSS (see doc flow), assessment completed as per doc flow. Patient has no c/o pain, and denies any needs at this time. Beverage offered. Call light in reach, bed in low position. RN to continue to monitor. Will call to waiting room for visitor/family. [id number] Patient discharge instructions reviewed and verified. RN reviewed d/c instructions with patient. All questions answered. Discharge paperwork signed by RN and patient. [id number] Discharged to car  via wheelchair, home with spouse. no dysphagia

## 2025-04-25 ENCOUNTER — HOSPITAL ENCOUNTER (OUTPATIENT)
Age: 75
Setting detail: SPECIMEN
Discharge: HOME | End: 2025-04-25

## 2025-04-25 LAB
ALBUMIN SERPL-MCNC: 3.9 G/DL (ref 3.4–5)
ALBUMIN/GLOB SERPL: 1.6 {RATIO} (ref 1.1–2.2)
ALP SERPL-CCNC: 81 U/L (ref 40–129)
ALT SERPL-CCNC: 35 U/L (ref 10–40)
ANION GAP SERPL CALCULATED.3IONS-SCNC: 19 MMOL/L (ref 9–17)
AST SERPL-CCNC: 32 U/L (ref 15–37)
BASOPHILS # BLD: 0.05 K/UL
BASOPHILS NFR BLD: 1 % (ref 0–1)
BILIRUB SERPL-MCNC: 0.3 MG/DL (ref 0–1)
BUN SERPL-MCNC: 18 MG/DL (ref 7–20)
BUN SERPL-MCNC: 54 MG/DL (ref 7–20)
CALCIUM SERPL-MCNC: 9.5 MG/DL (ref 8.3–10.6)
CHLORIDE SERPL-SCNC: 97 MMOL/L (ref 99–110)
CO2 SERPL-SCNC: 18 MMOL/L (ref 21–32)
CREAT SERPL-MCNC: 5.2 MG/DL (ref 0.6–1.2)
EOSINOPHIL # BLD: 0.33 K/UL
EOSINOPHILS RELATIVE PERCENT: 4 % (ref 0–3)
ERYTHROCYTE [DISTWIDTH] IN BLOOD BY AUTOMATED COUNT: 14.1 % (ref 11.7–14.9)
GFR, ESTIMATED: 8 ML/MIN/1.73M2
GLUCOSE SERPL-MCNC: 344 MG/DL (ref 74–99)
HBV SURFACE AG SERPL QL IA: NONREACTIVE
HCT VFR BLD AUTO: 36.9 % (ref 37–47)
HGB BLD-MCNC: 12.3 G/DL (ref 12.5–16)
IMM GRANULOCYTES # BLD AUTO: 0.03 K/UL
IMM GRANULOCYTES NFR BLD: 0 %
LYMPHOCYTES NFR BLD: 1.97 K/UL
LYMPHOCYTES RELATIVE PERCENT: 22 % (ref 24–44)
MCH RBC QN AUTO: 33.4 PG (ref 27–31)
MCHC RBC AUTO-ENTMCNC: 33.3 G/DL (ref 32–36)
MCV RBC AUTO: 100.3 FL (ref 78–100)
MONOCYTES NFR BLD: 0.55 K/UL
MONOCYTES NFR BLD: 6 % (ref 0–5)
NEUTROPHILS NFR BLD: 67 % (ref 36–66)
NEUTS SEG NFR BLD: 6.02 K/UL
PLATELET # BLD AUTO: 141 K/UL (ref 140–440)
PMV BLD AUTO: 10.5 FL (ref 7.5–11.1)
POTASSIUM SERPL-SCNC: 3.6 MMOL/L (ref 3.5–5.1)
PROT SERPL-MCNC: 6.4 G/DL (ref 6.4–8.2)
RBC # BLD AUTO: 3.68 M/UL (ref 4.2–5.4)
SODIUM SERPL-SCNC: 133 MMOL/L (ref 136–145)
WBC OTHER # BLD: 9 K/UL (ref 4–10.5)

## 2025-04-25 PROCEDURE — 87340 HEPATITIS B SURFACE AG IA: CPT

## 2025-04-25 PROCEDURE — 85025 COMPLETE CBC W/AUTO DIFF WBC: CPT

## 2025-04-25 PROCEDURE — 80053 COMPREHEN METABOLIC PANEL: CPT

## 2025-04-25 PROCEDURE — 84520 ASSAY OF UREA NITROGEN: CPT

## 2025-06-05 ENCOUNTER — TELEPHONE (OUTPATIENT)
Dept: CARDIOLOGY CLINIC | Age: 75
End: 2025-06-05

## 2025-06-05 RX ORDER — METOPROLOL TARTRATE 25 MG/1
12.5 TABLET, FILM COATED ORAL DAILY
Qty: 30 TABLET | Refills: 5 | Status: SHIPPED | OUTPATIENT
Start: 2025-06-05

## 2025-06-05 NOTE — TELEPHONE ENCOUNTER
Pt  called on Monday 6/2 for refill on Metoprolol. She has been out of this medication since Monday. This medication needs to go to [facility] before 2pm so he is able to pickup today. He would like a call back when we call this in so he can have noman rockwell activate this.

## 2025-06-12 ENCOUNTER — HOSPITAL ENCOUNTER (OUTPATIENT)
Dept: GENERAL RADIOLOGY | Age: 75
Discharge: HOME | End: 2025-06-12
Payer: MEDICARE

## 2025-06-12 DIAGNOSIS — R05.9 COUGH, UNSPECIFIED TYPE: ICD-10-CM

## 2025-06-12 PROCEDURE — 71046 X-RAY EXAM CHEST 2 VIEWS: CPT

## 2025-07-07 ENCOUNTER — TELEPHONE (OUTPATIENT)
Dept: CARDIOLOGY CLINIC | Age: 75
End: 2025-07-07

## 2025-07-08 RX ORDER — MIDODRINE HYDROCHLORIDE 10 MG/1
10 TABLET ORAL
Qty: 90 TABLET | Refills: 5 | Status: SHIPPED | OUTPATIENT
Start: 2025-07-08

## 2025-08-22 PROCEDURE — 93294 REM INTERROG EVL PM/LDLS PM: CPT | Performed by: INTERNAL MEDICINE

## 2025-08-22 PROCEDURE — 93296 REM INTERROG EVL PM/IDS: CPT | Performed by: INTERNAL MEDICINE

## (undated) DEVICE — TUBING, SUCTION, 9/32" X 10', STRAIGHT: Brand: MEDLINE

## (undated) DEVICE — VERESS PNEUMOPERITONEUM NEEDLE: Brand: N.A.

## (undated) DEVICE — PENCIL ES CRD L10FT HND SWCHING ROCK SWCH W/ EDGE COAT BLDE

## (undated) DEVICE — THIS DEVICE IS A PLASTIC DISCONNECT CAP FOR PERITONEAL DIALYSIS AND CONTAINS POVIDONE-IODINE INTENDED TO PROTECT THE FEMALE LUER CONNECTOR OF THE BAXTER TRANSFER SET.: Brand: MINICAP WITH POVIDONE-IODINE SOLUTION

## (undated) DEVICE — TROCARS: Brand: KII® OPTICAL ACCESS SYSTEM

## (undated) DEVICE — SUTURE MCRYL SZ 4-0 L18IN ABSRB UD L19MM PS-2 3/8 CIR PRIM Y496G

## (undated) DEVICE — COUNTER NDL 30 COUNT FOAM STRP SGL MAG

## (undated) DEVICE — TROCAR: Brand: KII FIOS FIRST ENTRY

## (undated) DEVICE — CLIP HEMO L235CM WRK CHN DIA OPN 17MM BRAID CATH ROT

## (undated) DEVICE — CATHETER PERITONEAL DLYS 35X53 MMX62 CM FLEX-NECK ARC

## (undated) DEVICE — TROCAR: Brand: KII® SLEEVE

## (undated) DEVICE — STRIP SKIN CLSR W0.25XL4IN WHT SPUNBOUND FBR NYL HI ADH

## (undated) DEVICE — CATHETER PERITONEAL DLYS 2 CUF STD AD 62 CM 6 CM 6 CC ARC

## (undated) DEVICE — GLOVE ORANGE PI 7   MSG9070

## (undated) DEVICE — KIT,ANTI FOG,W/SPONGE & FLUID,SOFT PACK: Brand: MEDLINE

## (undated) DEVICE — IMPLANTATION KIT PD CATH CONN STYL

## (undated) DEVICE — EXCEL 10FT (3.05 M) INSUFFLATION TUBING SET WITH 0.1 MICRON FILTER: Brand: EXCEL

## (undated) DEVICE — TIP COVER ACCESSORY

## (undated) DEVICE — ADHESIVE SKIN CLSR 0.7ML TOP DERMBND ADV

## (undated) DEVICE — GAUZE,SPONGE,4"X4",16PLY,XRAY,STRL,LF: Brand: MEDLINE

## (undated) DEVICE — NEEDLE HYPO 20GA L1.5IN YEL POLYPR HUB S STL REG BVL STR

## (undated) DEVICE — SYRINGE MED 30ML STD CLR PLAS LUERLOCK TIP N CTRL DISP

## (undated) DEVICE — POSITIONER HD AD W4.5XH8XL9IN HIGHLY RESILIENT FOAM CMFRT

## (undated) DEVICE — CONNECTOR CATH TWO PART AD FOR PERITONEAL DLYS FLX NK

## (undated) DEVICE — GLOVE SURG SZ 75 L12IN FNGR THK79MIL GRN LTX FREE

## (undated) DEVICE — TUBING INSUFFLATOR HEAT HI FLO SET PNEUMOCLEAR

## (undated) DEVICE — LIQUIBAND RAPID ADHESIVE 36/CS 0.8ML: Brand: MEDLINE

## (undated) DEVICE — GLOVE SURG SZ 6 CRM LTX FREE POLYISOPRENE POLYMER BEAD ANTI

## (undated) DEVICE — Device

## (undated) DEVICE — SET TBNG DISP TIP FOR AHTO

## (undated) DEVICE — NEEDLE INSUF L120MM DIA2MM DISP FOR PNEUMOPERI ENDOPATH

## (undated) DEVICE — ENDOSCOPY KIT: Brand: MEDLINE INDUSTRIES, INC.

## (undated) DEVICE — 3M™ IOBAN™ 2 ANTIMICROBIAL INCISE DRAPE 6650EZ: Brand: IOBAN™ 2

## (undated) DEVICE — GOWN,SIRUS,POLYRNF,BRTHSLV,XLN/XL,20/CS: Brand: MEDLINE

## (undated) DEVICE — ARM DRAPE

## (undated) DEVICE — SUTURE VCRL SZ 3-0 L27IN ABSRB UD L26MM SH 1/2 CIR J416H

## (undated) DEVICE — STYLET CATH ADOL AD 62 CM COILED FLEXNECK CATH IMPLANTATION

## (undated) DEVICE — ELECTRODE ES AD CRDLSS PT RET REM POLYHESIVE

## (undated) DEVICE — STERILE LATEX POWDER-FREE SURGICAL GLOVESWITH NITRILE COATING: Brand: PROTEXIS

## (undated) DEVICE — SNARE ENDOSCP AD L240CM LOOP W10MM SHTH DIA2.4MM RND INSUL

## (undated) DEVICE — SUTURE 1 STRATAFIX SYMMETRIC PDS + 30CM CT-1 SXPP1A435

## (undated) DEVICE — MARKER SURG SKIN UTIL REGULAR/FINE 2 TIP W/ RUL AND 9 LBL

## (undated) DEVICE — SUTURE SZ 0 27IN 5/8 CIR UR-6  TAPER PT VIOLET ABSRB VICRYL J603H

## (undated) DEVICE — APPLICATOR MEDICATED 26 CC TINT HI-LITE ORNG STRL CHLORAPREP

## (undated) DEVICE — SHEET, T, LAPAROTOMY, STERILE: Brand: MEDLINE

## (undated) DEVICE — SUTURE ABSRB L30CM 2-0 VLT SPRL PDS + STRATAFIX SXPP1B410

## (undated) DEVICE — SOLUTION IV IRRIG WATER 1000ML POUR BRL 2F7114

## (undated) DEVICE — GLOVE SURG SZ 7 CRM LTX FREE POLYISOPRENE POLYMER BEAD ANTI

## (undated) DEVICE — SUTURE VCRL SZ 4-0 L18IN ABSRB UD L19MM PS-2 3/8 CIR PRIM J496H

## (undated) DEVICE — GLOVE ORANGE PI 7 1/2   MSG9075

## (undated) DEVICE — GLOVE SURG SZ 8 L12IN THK75MIL DK GRN LTX FREE

## (undated) DEVICE — SUTURE PERMAHAND SZ 2-0 L17X18IN NONABSORBABLE BLK SILK SA65H

## (undated) DEVICE — GLOVE SURG SZ 6 THK91MIL LTX FREE SYN POLYISOPRENE ANTI

## (undated) DEVICE — TROCAR ENDOSCP FALLER S STL

## (undated) DEVICE — DIANEAL PD-2 1.5% DEX 2L/3L(SYS 2)

## (undated) DEVICE — SYRINGE 20ML LL S/C 50

## (undated) DEVICE — SUTURE VCRL SZ 3-0 L27IN ABSRB UD L26MM CT-2 1/2 CIR J232H

## (undated) DEVICE — BLADELESS OBTURATOR, LONG: Brand: WECK VISTA

## (undated) DEVICE — DRESSING TRNSPAR W6XL8IN FLM SURESITE 123

## (undated) DEVICE — AEGIS 1" DISK 4MM HOLE, PEEL OPEN: Brand: MEDLINE

## (undated) DEVICE — GLOVE SURG SZ 65 CRM LTX FREE POLYISOPRENE POLYMER BEAD ANTI

## (undated) DEVICE — FORCEPS BX L240CM JAW DIA2.8MM L CAP W/ NDL MIC MESH TOOTH

## (undated) DEVICE — COLUMN DRAPE

## (undated) DEVICE — GLOVE SURG SZ 75 CRM LTX FREE POLYISOPRENE POLYMER BEAD ANTI

## (undated) DEVICE — SPONGE GZ W4XL8IN COT WVN 12 PLY

## (undated) DEVICE — CANNULA SEAL

## (undated) DEVICE — LINER,SEMI-RIGID,3000CC,50EA/CS: Brand: MEDLINE

## (undated) DEVICE — SYRINGE MED 20ML STD CLR PLAS LUERLOCK TIP N CTRL DISP

## (undated) DEVICE — NEEDLE,20GX1.5",BD,YALE,DISP,RG: Brand: MEDLINE

## (undated) DEVICE — Z DISCONTINUED NO SUB IDED TUBING ETCO2 AD L6.5FT NSL ORAL CVD PRNG NONFLARED TIP OVR

## (undated) DEVICE — THE ULTRASET PRODUCTS ARE SINGLE USE DEVICES THAT ARE INTENDED FOR THE DRAINAGE AND INFUSION OF PERITONEAL DIALYSIS SOLUTION.: Brand: ULTRASET CAPD DISPOSABLE DISCONNECT Y-SET

## (undated) DEVICE — Z DUP USE 2218338 DRESSING GRMCDL 6 12FR D1N CNTR HOLE 4MM ANTMCRBL PRTCTVE DI

## (undated) DEVICE — TOWEL,OR,DSP,ST,BLUE,STD,6/PK,12PK/CS: Brand: MEDLINE

## (undated) DEVICE — INSTINCT ENDOSCOPIC HEMOCLIP: Brand: INSTINCT

## (undated) DEVICE — TOTAL TRAY, DB, 100% SILI FOLEY, 16FR 10: Brand: MEDLINE

## (undated) DEVICE — REDUCER: Brand: ENDOWRIST

## (undated) DEVICE — INTENDED FOR TISSUE SEPARATION, AND OTHER PROCEDURES THAT REQUIRE A SHARP SURGICAL BLADE TO PUNCTURE OR CUT.: Brand: BARD-PARKER ® STAINLESS STEEL BLADES

## (undated) DEVICE — POSITIONER,HEAD,RING CUSHION,9IN,32CS: Brand: MEDLINE

## (undated) DEVICE — THIS SET CONSISTS OF A FEMALE LOCKING CONNECTOR/ON-OFF CLAMP ASSEMBLY, TUBING AND DOUBLE SEALING MALE LUER LOCK CONNECTOR. THIS SET IS TO BE USED WITH THE BAXTER LOCKING TITANIUM ADAPTER FOR PERITONEAL DIALYSIS CATHETER IN DISCONNECT APPLICATIONS AND IN CYCLER APPLICATIONS WHERE ASEPTIC CONNECTIONS AND DISCONNECTIONS ARE PERFORMED AT THE TRANSFER SET/CYCLER SET JUNCTURE.: Brand: MINICAP

## (undated) DEVICE — GOWN,ECLIPSE,POLYRNF,BRTHSLV,L,30/CS: Brand: MEDLINE

## (undated) DEVICE — SHEET,DRAPE,53X77,STERILE: Brand: MEDLINE

## (undated) DEVICE — INTENDED FOR TISSUE SEPARATION, AND OTHER PROCEDURES THAT REQUIRE A SHARP SURGICAL BLADE TO PUNCTURE OR CUT.: Brand: BARD-PARKER ® CARBON RIB-BACK BLADES

## (undated) DEVICE — SNARE ENDOSCP L240CM SHTH DIA24MM LOOP W10MM POLYP RND REINF

## (undated) DEVICE — SUTURE VCRL SZ 2-0 L27IN ABSRB UD L26MM SH 1/2 CIR J417H

## (undated) DEVICE — PACK,BASIC,SIRUS,V: Brand: MEDLINE

## (undated) DEVICE — Z INACTIVE NO ACTIVE SUPPLIER APPLICATOR MEDICATED 26 CC TINT HI-LITE ORNG STRL CHLORAPREP

## (undated) DEVICE — PACK SURG LAP CHOLE

## (undated) DEVICE — SEAL

## (undated) DEVICE — APPLICATOR MEDICATED 26 CC SOLUTION HI LT ORNG CHLORAPREP

## (undated) DEVICE — YANKAUER,FLEXIBLE HANDLE,REGLR CAPACITY: Brand: MEDLINE INDUSTRIES, INC.